# Patient Record
Sex: FEMALE | Race: BLACK OR AFRICAN AMERICAN | NOT HISPANIC OR LATINO | Employment: OTHER | URBAN - METROPOLITAN AREA
[De-identification: names, ages, dates, MRNs, and addresses within clinical notes are randomized per-mention and may not be internally consistent; named-entity substitution may affect disease eponyms.]

---

## 2017-01-24 ENCOUNTER — LAB VISIT (OUTPATIENT)
Dept: LAB | Facility: HOSPITAL | Age: 72
End: 2017-01-24
Attending: INTERNAL MEDICINE
Payer: MEDICARE

## 2017-01-24 DIAGNOSIS — Z11.59 NEED FOR HEPATITIS C SCREENING TEST: ICD-10-CM

## 2017-01-24 DIAGNOSIS — E78.00 HYPERCHOLESTEREMIA: ICD-10-CM

## 2017-01-24 LAB
ALBUMIN SERPL BCP-MCNC: 3.9 G/DL
ALP SERPL-CCNC: 93 U/L
ALT SERPL W/O P-5'-P-CCNC: 13 U/L
ANION GAP SERPL CALC-SCNC: 9 MMOL/L
AST SERPL-CCNC: 21 U/L
BILIRUB SERPL-MCNC: 0.7 MG/DL
BUN SERPL-MCNC: 22 MG/DL
CALCIUM SERPL-MCNC: 10.5 MG/DL
CHLORIDE SERPL-SCNC: 103 MMOL/L
CHOLEST/HDLC SERPL: 2.3 {RATIO}
CO2 SERPL-SCNC: 28 MMOL/L
CREAT SERPL-MCNC: 1.2 MG/DL
EST. GFR  (AFRICAN AMERICAN): 52.9 ML/MIN/1.73 M^2
EST. GFR  (NON AFRICAN AMERICAN): 45.9 ML/MIN/1.73 M^2
GLUCOSE SERPL-MCNC: 85 MG/DL
HDL/CHOLESTEROL RATIO: 43.7 %
HDLC SERPL-MCNC: 142 MG/DL
HDLC SERPL-MCNC: 62 MG/DL
LDLC SERPL CALC-MCNC: 66.8 MG/DL
NONHDLC SERPL-MCNC: 80 MG/DL
POTASSIUM SERPL-SCNC: 4.3 MMOL/L
PROT SERPL-MCNC: 7.3 G/DL
SODIUM SERPL-SCNC: 140 MMOL/L
TRIGL SERPL-MCNC: 66 MG/DL

## 2017-01-24 PROCEDURE — 86803 HEPATITIS C AB TEST: CPT

## 2017-01-24 PROCEDURE — 80061 LIPID PANEL: CPT

## 2017-01-24 PROCEDURE — 36415 COLL VENOUS BLD VENIPUNCTURE: CPT | Mod: PO

## 2017-01-24 PROCEDURE — 80053 COMPREHEN METABOLIC PANEL: CPT

## 2017-01-25 LAB — HCV AB SERPL QL IA: NEGATIVE

## 2017-04-10 ENCOUNTER — OFFICE VISIT (OUTPATIENT)
Dept: FAMILY MEDICINE | Facility: CLINIC | Age: 72
End: 2017-04-10
Payer: MEDICARE

## 2017-04-10 ENCOUNTER — LAB VISIT (OUTPATIENT)
Dept: LAB | Facility: HOSPITAL | Age: 72
End: 2017-04-10
Payer: MEDICARE

## 2017-04-10 VITALS
DIASTOLIC BLOOD PRESSURE: 68 MMHG | OXYGEN SATURATION: 99 % | TEMPERATURE: 97 F | WEIGHT: 133.81 LBS | RESPIRATION RATE: 18 BRPM | HEIGHT: 63 IN | BODY MASS INDEX: 23.71 KG/M2 | HEART RATE: 70 BPM | SYSTOLIC BLOOD PRESSURE: 122 MMHG

## 2017-04-10 DIAGNOSIS — E78.5 HYPERLIPIDEMIA, UNSPECIFIED HYPERLIPIDEMIA TYPE: ICD-10-CM

## 2017-04-10 DIAGNOSIS — I10 ESSENTIAL HYPERTENSION: Primary | ICD-10-CM

## 2017-04-10 DIAGNOSIS — I10 ESSENTIAL HYPERTENSION: ICD-10-CM

## 2017-04-10 LAB
ALBUMIN SERPL BCP-MCNC: 3.8 G/DL
ALP SERPL-CCNC: 108 U/L
ALT SERPL W/O P-5'-P-CCNC: 9 U/L
ANION GAP SERPL CALC-SCNC: 8 MMOL/L
AST SERPL-CCNC: 19 U/L
BASOPHILS # BLD AUTO: 0.03 K/UL
BASOPHILS NFR BLD: 0.6 %
BILIRUB SERPL-MCNC: 0.3 MG/DL
BUN SERPL-MCNC: 18 MG/DL
CALCIUM SERPL-MCNC: 10.2 MG/DL
CHLORIDE SERPL-SCNC: 102 MMOL/L
CHOLEST/HDLC SERPL: 2.6 {RATIO}
CO2 SERPL-SCNC: 29 MMOL/L
CREAT SERPL-MCNC: 1.2 MG/DL
DIFFERENTIAL METHOD: ABNORMAL
EOSINOPHIL # BLD AUTO: 0.1 K/UL
EOSINOPHIL NFR BLD: 1.8 %
ERYTHROCYTE [DISTWIDTH] IN BLOOD BY AUTOMATED COUNT: 14.1 %
EST. GFR  (AFRICAN AMERICAN): 52.9 ML/MIN/1.73 M^2
EST. GFR  (NON AFRICAN AMERICAN): 45.9 ML/MIN/1.73 M^2
GLUCOSE SERPL-MCNC: 88 MG/DL
HCT VFR BLD AUTO: 36.9 %
HDL/CHOLESTEROL RATIO: 38.5 %
HDLC SERPL-MCNC: 143 MG/DL
HDLC SERPL-MCNC: 55 MG/DL
HGB BLD-MCNC: 11.8 G/DL
LDLC SERPL CALC-MCNC: 64 MG/DL
LYMPHOCYTES # BLD AUTO: 1.8 K/UL
LYMPHOCYTES NFR BLD: 33.9 %
MCH RBC QN AUTO: 30.3 PG
MCHC RBC AUTO-ENTMCNC: 32 %
MCV RBC AUTO: 95 FL
MONOCYTES # BLD AUTO: 0.3 K/UL
MONOCYTES NFR BLD: 5.7 %
NEUTROPHILS # BLD AUTO: 3.1 K/UL
NEUTROPHILS NFR BLD: 57.8 %
NONHDLC SERPL-MCNC: 88 MG/DL
PLATELET # BLD AUTO: 246 K/UL
PMV BLD AUTO: 11.9 FL
POTASSIUM SERPL-SCNC: 4.2 MMOL/L
PROT SERPL-MCNC: 7.1 G/DL
RBC # BLD AUTO: 3.89 M/UL
SODIUM SERPL-SCNC: 139 MMOL/L
TRIGL SERPL-MCNC: 120 MG/DL
WBC # BLD AUTO: 5.42 K/UL

## 2017-04-10 PROCEDURE — 85025 COMPLETE CBC W/AUTO DIFF WBC: CPT

## 2017-04-10 PROCEDURE — 99999 PR PBB SHADOW E&M-EST. PATIENT-LVL III: CPT | Mod: PBBFAC,,, | Performed by: INTERNAL MEDICINE

## 2017-04-10 PROCEDURE — 36415 COLL VENOUS BLD VENIPUNCTURE: CPT | Mod: PO

## 2017-04-10 PROCEDURE — 99214 OFFICE O/P EST MOD 30 MIN: CPT | Mod: S$PBB,,, | Performed by: INTERNAL MEDICINE

## 2017-04-10 PROCEDURE — 80053 COMPREHEN METABOLIC PANEL: CPT

## 2017-04-10 PROCEDURE — 80061 LIPID PANEL: CPT

## 2017-04-10 NOTE — MR AVS SNAPSHOT
[facility]  [address]  [place] [address]  Phone: [phone]                  Mary Flanagan   4/10/2017 8:00 AM   Office Visit    Description:  Female : 1945   Provider:  Raul Hill MD   Department:  [facility]           Reason for Visit     Follow-up     Hypertension     Hyperlipidemia           Diagnoses this Visit        Comments    Essential hypertension    -  Primary     Hyperlipidemia, unspecified hyperlipidemia type                To Do List           Future Appointments        Provider Department Dept Phone    4/10/2017 9:10 AM LABORATORY, [address] [facility] [phone]    10/10/2017 8:00 AM Raul Hill MD [facility] [phone]      Goals (5 Years of Data)     None      Follow-Up and Disposition     Return in about 6 months (around 10/10/2017).    Follow-up and Disposition History      [facility]     [facility] -  Assistance  Unless otherwise directed by your provider, please contact [facility], our nurse care line that is available for  assistance.     Registered nurses in the [facility] provide: appointment scheduling, clinical advisement, health education, and other advisory services.  Call: [phone] (toll free)               Medications           Message regarding Medications     Verify the changes and/or additions to your medication regime listed below are the same as discussed with your clinician today.  If any of these changes or additions are incorrect, please notify your healthcare provider.             Verify that the below list of medications is an accurate representation of the medications you are currently taking.  If none reported, the list may be blank. If incorrect, please contact your healthcare provider. Carry this list with you in case of emergency.           Current Medications      "atorvastatin (LIPITOR) 10 MG tablet Take 1 tablet (10 mg total) by mouth once daily.    DYAZIDE 37.5-25 mg per capsule Take 1 capsule by mouth once daily.           Clinical Reference Information           Your Vitals Were     BP Pulse Temp Resp    122/68 (BP Location: Right arm, Patient Position: Sitting, BP Method: Manual) 70 97 °F (36.1 °C) (Tympanic) 18    Height Weight SpO2 BMI    5' 2.5" (1.588 m) 60.7 kg (133 lb 13.1 oz) 99% 24.09 kg/m2      Blood Pressure          Most Recent Value    BP  122/68      Allergies as of 4/10/2017     No Known Drug Allergies      Immunizations Administered on Date of Encounter - 4/10/2017     None      Orders Placed During Today's Visit     Future Labs/Procedures Expected by Expires    CBC auto differential  4/10/2017 6/9/2018    Comprehensive metabolic panel  4/10/2017 6/9/2018    Lipid panel  4/10/2017 6/9/2018      Language Assistance Services     ATTENTION: Language assistance services are available, free of charge. Please call [phone].      ATENCIÓN: Si habla español, tiene a quinteros disposición servicios gratuitos de asistencia lingüística. Llame al [phone].     CHÚ Ý: N?u b?n nói Ti?ng Vi?t, có các d?ch v? h? tr? ngôn ng? mi?n phí dành cho b?n. G?i s? [phone].         [facility] complies with applicable Federal civil rights laws and does not discriminate on the basis of race, color, national origin, age, disability, or sex.        "

## 2017-04-10 NOTE — PROGRESS NOTES
Subjective:       Patient ID: Mary Flanagan is a 71 y.o. female.    Chief Complaint: Follow-up; Hypertension; and Hyperlipidemia    Hypertension   Pertinent negatives include no chest pain, headaches, neck pain, palpitations or shortness of breath. Past treatments include diuretics and lifestyle changes. The current treatment provides significant improvement.   Hyperlipidemia   Pertinent negatives include no chest pain, myalgias or shortness of breath. Current antihyperlipidemic treatment includes statins. The current treatment provides significant improvement of lipids.     Review of Systems   Constitutional: Negative for activity change, appetite change, chills, diaphoresis, fatigue, fever and unexpected weight change.   HENT: Negative for congestion, drooling, ear discharge, ear pain, facial swelling, hearing loss, mouth sores, nosebleeds, postnasal drip, rhinorrhea, sinus pressure, sneezing, sore throat, tinnitus, trouble swallowing and voice change.    Eyes: Negative for photophobia, redness and visual disturbance.   Respiratory: Negative for apnea, cough, choking, chest tightness, shortness of breath and wheezing.    Cardiovascular: Negative for chest pain, palpitations and leg swelling.   Gastrointestinal: Negative for abdominal distention, abdominal pain, blood in stool, constipation, diarrhea, nausea and vomiting.   Endocrine: Negative for cold intolerance, heat intolerance, polydipsia, polyphagia and polyuria.   Genitourinary: Negative for decreased urine volume, difficulty urinating, dysuria, flank pain, frequency, genital sores, hematuria and urgency.   Musculoskeletal: Negative for arthralgias, back pain, gait problem, joint swelling, myalgias, neck pain and neck stiffness.   Skin: Negative for color change, pallor, rash and wound.   Allergic/Immunologic: Negative for food allergies and immunocompromised state.   Neurological: Negative for dizziness, tremors, seizures, syncope, speech difficulty,  weakness, light-headedness, numbness and headaches.   Hematological: Negative for adenopathy. Does not bruise/bleed easily.   Psychiatric/Behavioral: Negative for agitation, behavioral problems, confusion, decreased concentration, dysphoric mood, hallucinations, self-injury, sleep disturbance and suicidal ideas. The patient is not nervous/anxious and is not hyperactive.    All other systems reviewed and are negative.      Objective:      Physical Exam   Constitutional: She is oriented to person, place, and time. She appears well-developed and well-nourished. No distress.   HENT:   Head: Normocephalic and atraumatic.   Eyes: No scleral icterus.   Neck: Normal range of motion. Neck supple. No JVD present. Carotid bruit is not present. No tracheal deviation present. No thyromegaly present.   Cardiovascular: Normal rate, regular rhythm, normal heart sounds and intact distal pulses.    Pulmonary/Chest: Effort normal and breath sounds normal. No respiratory distress. She has no wheezes. She has no rales. She exhibits no tenderness.   Abdominal: Soft. Bowel sounds are normal. She exhibits no distension. There is no tenderness. There is no rebound and no guarding.   Musculoskeletal: Normal range of motion. She exhibits no edema or tenderness.   Lymphadenopathy:     She has no cervical adenopathy.   Neurological: She is alert and oriented to person, place, and time. No cranial nerve deficit. Coordination normal.   Skin: Skin is warm and dry. No rash noted. She is not diaphoretic. No erythema. No pallor.   Psychiatric: She has a normal mood and affect. Her behavior is normal. Judgment and thought content normal.   Nursing note and vitals reviewed.      Assessment:       1. Essential hypertension    2. Hyperlipidemia, unspecified hyperlipidemia type        Plan:        stable-----------continue meds, watch diet, exercise.         Notes/labs reviewed.      Check cbc,cmp,lipids.            F/u prn or 6 months.

## 2017-05-10 ENCOUNTER — TELEPHONE (OUTPATIENT)
Dept: FAMILY MEDICINE | Facility: CLINIC | Age: 72
End: 2017-05-10

## 2017-05-10 NOTE — TELEPHONE ENCOUNTER
----- Message from Kaushik Medina sent at 5/10/2017 10:17 AM CDT -----  Contact: pt  She's calling in regards to the incorrect diagnosis code, due to billing, please advise,  [phone] (cell)

## 2017-05-10 NOTE — TELEPHONE ENCOUNTER
Spoke with pt.Expressed understanding. Was questioning her DX codes and why insurance didn't cover her visit. I advised her to get with her insurance on copay and annual visits. She stated they said she hadn't met her deductible. That may very well be the problem but once she speaks with insurance, she can call and we will correct what needs to be corrected on our end.

## 2017-06-29 ENCOUNTER — OFFICE VISIT (OUTPATIENT)
Dept: FAMILY MEDICINE | Facility: CLINIC | Age: 72
End: 2017-06-29
Payer: MEDICARE

## 2017-06-29 VITALS
BODY MASS INDEX: 24.06 KG/M2 | DIASTOLIC BLOOD PRESSURE: 66 MMHG | WEIGHT: 135.81 LBS | HEART RATE: 72 BPM | HEIGHT: 63 IN | SYSTOLIC BLOOD PRESSURE: 110 MMHG | OXYGEN SATURATION: 98 %

## 2017-06-29 DIAGNOSIS — Z00.00 ENCOUNTER FOR PREVENTIVE HEALTH EXAMINATION: Primary | ICD-10-CM

## 2017-06-29 PROCEDURE — 99999 PR PBB SHADOW E&M-EST. PATIENT-LVL III: CPT | Mod: PBBFAC,,, | Performed by: NURSE PRACTITIONER

## 2017-06-29 PROCEDURE — 99213 OFFICE O/P EST LOW 20 MIN: CPT | Mod: PBBFAC,PO | Performed by: NURSE PRACTITIONER

## 2017-06-29 PROCEDURE — G0438 PPPS, INITIAL VISIT: HCPCS | Mod: ,,, | Performed by: NURSE PRACTITIONER

## 2017-06-29 RX ORDER — FERROUS SULFATE, DRIED 160(50) MG
1 TABLET, EXTENDED RELEASE ORAL 2 TIMES DAILY WITH MEALS
COMMUNITY
End: 2018-03-06

## 2017-06-29 NOTE — PROGRESS NOTES
"Mary Flanagan presented for an initial Medicare AWV today. The following components were reviewed and updated:    · Medical history  · Family History  · Social history  · Allergies and Current Medications  · Health Risk Assessment  · Health Maintenance  · Care Team    **See Completed Assessments for Annual Wellness visit with in the encounter summary    The following assessments were completed:  · Depression Screening  · Cognitive function Screening  · Timed Get Up Test  · Whisper Test    Vitals:    06/29/17 1253   BP: 110/66   BP Location: Left arm   Patient Position: Sitting   Pulse: 72   SpO2: 98%   Weight: 61.6 kg (135 lb 12.9 oz)   Height: 5' 2.5" (1.588 m)     Body mass index is 24.44 kg/m².  Waist Measurements: 32 in]    Diagnoses and health risks identified today and associated recommendations/orders:    1. Encounter for preventive health examination    Pt to follow up with pharmacy to receive Zoster and Prenvar    Provided Mary with a 5-10 year written screening schedule and personal prevention plan. Recommendations were developed using the USPSTF age appropriate recommendations. Education, counseling, and referrals were provided as needed.  After Visit Summary printed and given to patient which includes a list of additional screenings\tests needed.    Follow up 1 year    Gricelda Gonzalez NP  "

## 2017-06-29 NOTE — PATIENT INSTRUCTIONS
Counseling and Referral of Other Preventative  (Italic type indicates deductible and co-insurance are waived)    Patient Name: Mary Flanagan  Today's Date: 6/29/2017      SERVICE LIMITATIONS RECOMMENDATION    Vaccines    · Pneumococcal (once after 65)    · Influenza (annually)    · Hepatitis B (if medium/high risk)    · Prevnar 13      Hepatitis B medium/high risk factors:       - End-stage renal disease       - Hemophiliacs who received Factor VII or         IX concentrates       - Clients of institutions for the mentally             retarded       - Persons who live in the same house as          a HepB carrier       - Homosexual men       - Illicit injectable drug abusers     Pneumococcal: Done, no repeat necessary     Influenza: Scheduled - see appointments     Hepatitis B: N/A     Prevnar 13:  DUE NOW    Mammogram (biennial age 50-74)  Annually (age 40 or over)  Done this year, repeat every year    Pap (up to age 70 and after 70 if unknown history or abnormal study last 10 years)    3/14/2016  -repeat in 2 yrs     The USPSTF recommends against screening for cervical cancer in women older than age 65 years who have had adequate prior screening and are not otherwise at high risk for cervical cancer.      Colorectal cancer screening (to age 75)    · Fecal occult blood test (annual)  · Flexible sigmoidoscopy (5y)  · Screening colonoscopy (10y)  · Barium enema   1/12/ 2015- due again 2025    Diabetes self-management training (no USPSTF recommendations)  Requires referral by treating physician for patient with diabetes or renal disease. 10 hours of initial DSMT sessions of no less than 30 minutes each in a continuous 12-month period. 2 hours of follow-up DSMT in subsequent years.  N/A    Bone mass measurements (age 65 & older, biennial)  Requires diagnosis related to osteoporosis or estrogen deficiency. Biennial benefit unless patient has history of long-term glucocorticoid 6/17/2015  Due again 6/ 2020    Glaucoma  screening (no USPSTF recommendation)  Diabetes mellitus, family history   , age 50 or over    American, age 65 or over  Done this year, repeat every year    Medical nutrition therapy for diabetes or renal disease (no recommended schedule)  Requires referral by treating physician for patient with diabetes or renal disease or kidney transplant within the past 3 years.  Can be provided in same year as diabetes self-management training (DSMT), and CMS recommends medical nutrition therapy take place after DSMT. Up to 3 hours for initial year and 2 hours in subsequent years.  N/A    Cardiovascular screening blood tests (every 5 years)  · Fasting lipid panel  Order as a panel if possible  Done this year, repeat every year    Diabetes screening tests (at least every 3 years, Medicare covers annually or at 6-month intervals for prediabetic patients)  · Fasting blood sugar (FBS) or glucose tolerance test (GTT)  Patient must be diagnosed with one of the following:       - Hypertension       - Dyslipidemia       - Obesity (BMI 30kg/m2)       - Previous elevated impaired FBS or GTT       ... or any two of the following:       - Overweight (BMI 25 but <30)       - Family history of diabetes       - Age 65 or older       - History of gestational diabetes or birth of baby weighing more than 9 pounds  Done this year, repeat every year    Abdominal aortic aneurysm screening (once)  · Sonogram   Limited to patients who meet one of the following criteria:       - Men who are 65-75 years old and have smoked more than 100 cigarette in their lifetime       - Anyone with a family history of abdominal aortic aneurysm       - Anyone recommended for screening by the USPSTF  N/A    HIV screening (annually for increased risk patients)  · HIV-1 and HIV-2 by EIA, or VICENTE, rapid antibody test or oral mucosa transudate  Patients must be at increased risk for HIV infection per USPSTF guidelines or pregnant. Tests covered  annually for patient at increased risk or as requested by the patient. Pregnant patients may receive up to 3 tests during pregnancy.  Risks discussed, screening is not recommended    Smoking cessation counseling (up to 8 sessions per year)  Patients must be asymptomatic of tobacco-related conditions to receive as a preventative service.  Non-smoker    Subsequent annual wellness visit  At least 12 months since last AWV  Return in one year     The following information is provided to all patients.  This information is to help you find resources for any of the problems found today that may be affecting your health:                Living healthy guide: www.[facility]      Understanding Diabetes: www.diabetes.org      Eating healthy: www.cdc.gov/healthyweight      CDC home safety checklist: www.cdc.gov/steadi/patient.html      Agency on Aging: www.goea.[place].gov      Alcoholics anonymous (AA): www.aa.org      Physical Activity: www.wander.nih.gov/ok0qtdo      Tobacco use: www.quitwithusla.org

## 2017-07-03 ENCOUNTER — OFFICE VISIT (OUTPATIENT)
Dept: OBSTETRICS AND GYNECOLOGY | Facility: CLINIC | Age: 72
End: 2017-07-03
Payer: MEDICARE

## 2017-07-03 VITALS
DIASTOLIC BLOOD PRESSURE: 68 MMHG | WEIGHT: 135.38 LBS | SYSTOLIC BLOOD PRESSURE: 132 MMHG | HEIGHT: 63 IN | BODY MASS INDEX: 23.99 KG/M2

## 2017-07-03 DIAGNOSIS — Z01.419 WELL WOMAN EXAM WITH ROUTINE GYNECOLOGICAL EXAM: Primary | ICD-10-CM

## 2017-07-03 PROCEDURE — 99999 PR PBB SHADOW E&M-EST. PATIENT-LVL II: CPT | Mod: PBBFAC,,, | Performed by: OBSTETRICS & GYNECOLOGY

## 2017-07-03 PROCEDURE — G0101 CA SCREEN;PELVIC/BREAST EXAM: HCPCS | Mod: PBBFAC | Performed by: OBSTETRICS & GYNECOLOGY

## 2017-07-03 PROCEDURE — 99212 OFFICE O/P EST SF 10 MIN: CPT | Mod: PBBFAC | Performed by: OBSTETRICS & GYNECOLOGY

## 2017-07-03 PROCEDURE — G0101 CA SCREEN;PELVIC/BREAST EXAM: HCPCS | Mod: S$PBB,,, | Performed by: OBSTETRICS & GYNECOLOGY

## 2017-07-03 NOTE — PROGRESS NOTES
CHIEF COMPLAINT:   Gynecologic Exam  Chief Complaint   Patient presents with    Follow-up       HISTORY OF PRESENT ILLNESS  Patient presents for annual exam. The patient has no complaints today.   She is fine not doing pap this year - understands recommendations.     No LMP recorded. Patient is postmenopausal.  Postmenopausal    GYN screening history: last Pap: was normal. Never had any abnormal Pap smears in past     Reports no bowel movement irregularities from baseline, bloating, or weight loss.    HRT:   None       Health Maintenance   Topic Date Due    Zoster Vaccine  2006    Pneumococcal (65+) (1 of 2 - PCV13) 2011    Pap Smear  2017    Mammogram  2017    Influenza Vaccine  2017    Lipid Panel  04/10/2018    DEXA SCAN  2020    TETANUS VACCINE  2023    Colonoscopy  2025    Hepatitis C Screening  Completed       HISTORY  Patient Active Problem List   Diagnosis    Osteopenia    Fibroids    Nuclear sclerotic cataract of both eyes    Dry eyes    Refraction error - Both Eyes    Chest pain, atypical    Hyperlipidemia    Hypertension    Insomnia    Chest pain    Gastritis    Gastroesophageal reflux disease without esophagitis    Other constipation    Mastodynia    Constipation    Other symptoms involving digestive system       Past Medical History:   Diagnosis Date    Cataract     Chest pain     Dry eyes     Gastritis     Hyperlipidemia     Hypertension     Insomnia     Osteopenia        Past Surgical History:   Procedure Laterality Date     SECTION      x 1    left arm surgery      left knee surgery         Family History   Problem Relation Age of Onset    Hypertension Mother     Hypertension Father     Breast cancer Neg Hx     Colon cancer Neg Hx     Ovarian cancer Neg Hx     Thrombophilia Neg Hx     Strabismus Neg Hx     Macular degeneration Neg Hx     Retinal detachment Neg Hx     Glaucoma Neg Hx     Blindness  Neg Hx     Amblyopia Neg Hx        Social History     Social History    Marital status:      Spouse name: N/A    Number of children: N/A    Years of education: N/A     Social History Main Topics    Smoking status: Never Smoker    Smokeless tobacco: Never Used    Alcohol use No    Drug use: No    Sexual activity: Yes     Partners: Male     Birth control/ protection: None      Comment: mut monog     Other Topics Concern    None     Social History Narrative    None       Current Outpatient Prescriptions   Medication Sig Dispense Refill    atorvastatin (LIPITOR) 10 MG tablet Take 1 tablet (10 mg total) by mouth once daily. 90 tablet 3    calcium-vitamin D3 500 mg(1,250mg) -200 unit per tablet Take 1 tablet by mouth 2 (two) times daily with meals.      DYAZIDE 37.5-25 mg per capsule TAKE ONE CAPSULE BY MOUTH ONCE DAILY 30 capsule 6    VIT A/C/E AC/ZNOX/CUPRIC OXIDE (EYE VITAMIN AND MINERALS ORAL) Take by mouth.       No current facility-administered medications for this visit.        Review of patient's allergies indicates:   Allergen Reactions    No known drug allergies            PHYSICAL EXAM     Vitals:    07/03/17 1318   BP: 132/68       PAIN SCALE: 0/10 None    ROS:  GENERAL: No fever, chills, fatigability or weight loss.  ABDOMEN: Appetite fine. No weight loss. Denies diarrhea, abdominal pain, hematemesis or blood in stool.  No change in bowel movement pattern.  URINARY: No flank pain, dysuria or hematuria.  REPRODUCTIVE: No abnormal vaginal bleeding.  BREASTS: Breasts symmetric, nontender and no lumps detected.    PE:   APPEARANCE: Well nourished, well developed, in no acute distress.  NECK: Neck symmetric without masses or thyromegaly.   NODES: No inguinal lymph node enlargement.  ABDOMEN: Soft. No tenderness or masses. No hepatosplenomegaly. No hernias.  BREASTS: Symmetrical, no skin changes or visible lesions. No palpable masses, nipple discharge or adenopathy bilaterally.    PELVIC:    VULVA: No lesions.  Atrophic but normal female genitalia.  URETHRAL MEATUS: Normal size and location, no lesions, no prolapse.  URETHRA: No masses, tenderness, prolapse or scarring.  VAGINA: dry and narrow w/ atrophy, no discharge, no significant cystocele or rectocele.  CERVIX: No lesions, normal diameter, no stenosis, no cervical motion tenderness.  UTERUS: 6 week size, regular shape, mobile, non-tender, normal position, good support.  ADNEXA: No masses, tenderness or CDS nodularity.  ANUS PERINEUM: No lesions, no relaxation, external hemorrhoids noted.       DIAGNOSIS:   1. Normal gyn exam  2. Physiologic atrophy    PLAN:   Mammogram    Colonoscopy  dexa    MEDICATIONS PRESCRIBED:   Calcium vitamin D and weight bearing exercise    COUNSELING:  Patient was counseled today on A.C.S. Pap guidelines and recommendations for yearly pelvic exams, mammograms and monthly self breast exams; to see her PCP for other health maintenance.     FOLLOW-UP: With me in 1 year.

## 2017-07-05 ENCOUNTER — TELEPHONE (OUTPATIENT)
Dept: OBSTETRICS AND GYNECOLOGY | Facility: CLINIC | Age: 72
End: 2017-07-05

## 2017-07-05 NOTE — TELEPHONE ENCOUNTER
Spoke with patient regarding needing a bone density scan. I informed her that she has to repeat her scan in 06/2020 however to continue taking her vit. D3 and calcium supplement. She verbalized understanding.

## 2017-07-05 NOTE — TELEPHONE ENCOUNTER
----- Message from Monika Gonzalez sent at 7/5/2017 10:49 AM CDT -----  Contact: pt  Pt request a call concerning a bone density test, pt can be reached at [phone]///[id number]

## 2017-07-12 ENCOUNTER — HOSPITAL ENCOUNTER (OUTPATIENT)
Dept: RADIOLOGY | Facility: HOSPITAL | Age: 72
Discharge: HOME | End: 2017-07-12
Attending: NURSE PRACTITIONER
Payer: MEDICARE

## 2017-07-12 VITALS — WEIGHT: 135 LBS | HEIGHT: 63 IN | BODY MASS INDEX: 23.92 KG/M2

## 2017-07-12 DIAGNOSIS — Z12.31 ENCOUNTER FOR SCREENING MAMMOGRAM FOR BREAST CANCER: ICD-10-CM

## 2017-07-12 DIAGNOSIS — Z12.39 BREAST CANCER SCREENING, HIGH RISK PATIENT: ICD-10-CM

## 2017-07-12 DIAGNOSIS — N64.4 MASTODYNIA: ICD-10-CM

## 2017-07-12 PROCEDURE — 77063 BREAST TOMOSYNTHESIS BI: CPT | Mod: 26,,, | Performed by: RADIOLOGY

## 2017-07-12 PROCEDURE — 77067 SCR MAMMO BI INCL CAD: CPT | Mod: 26,,, | Performed by: RADIOLOGY

## 2017-07-12 PROCEDURE — 77067 SCR MAMMO BI INCL CAD: CPT | Mod: TC

## 2017-08-11 ENCOUNTER — TELEPHONE (OUTPATIENT)
Dept: HEMATOLOGY/ONCOLOGY | Facility: CLINIC | Age: 72
End: 2017-08-11

## 2017-08-11 NOTE — TELEPHONE ENCOUNTER
----- Message from Jasmin Diaz sent at 8/11/2017  1:23 PM CDT -----  States she received a letter stating she was told its time for pap but she recently visited dr trevino and was told it wasn't time. Please call back at [phone]. thanks

## 2017-08-30 ENCOUNTER — OFFICE VISIT (OUTPATIENT)
Dept: SURGERY | Facility: CLINIC | Age: 72
End: 2017-08-30
Payer: MEDICARE

## 2017-08-30 VITALS
SYSTOLIC BLOOD PRESSURE: 142 MMHG | DIASTOLIC BLOOD PRESSURE: 68 MMHG | HEIGHT: 62 IN | HEART RATE: 90 BPM | OXYGEN SATURATION: 100 % | RESPIRATION RATE: 16 BRPM | BODY MASS INDEX: 24.59 KG/M2 | WEIGHT: 133.63 LBS

## 2017-08-30 DIAGNOSIS — Z12.31 VISIT FOR SCREENING MAMMOGRAM: ICD-10-CM

## 2017-08-30 DIAGNOSIS — Z12.39 BREAST CANCER SCREENING: Primary | ICD-10-CM

## 2017-08-30 PROCEDURE — 1126F AMNT PAIN NOTED NONE PRSNT: CPT | Mod: ,,, | Performed by: NURSE PRACTITIONER

## 2017-08-30 PROCEDURE — 99999 PR PBB SHADOW E&M-EST. PATIENT-LVL III: CPT | Mod: PBBFAC,,, | Performed by: NURSE PRACTITIONER

## 2017-08-30 PROCEDURE — 3078F DIAST BP <80 MM HG: CPT | Mod: ,,, | Performed by: NURSE PRACTITIONER

## 2017-08-30 PROCEDURE — 99213 OFFICE O/P EST LOW 20 MIN: CPT | Mod: PBBFAC,PO | Performed by: NURSE PRACTITIONER

## 2017-08-30 PROCEDURE — 99213 OFFICE O/P EST LOW 20 MIN: CPT | Mod: S$PBB,,, | Performed by: NURSE PRACTITIONER

## 2017-08-30 PROCEDURE — 3077F SYST BP >= 140 MM HG: CPT | Mod: ,,, | Performed by: NURSE PRACTITIONER

## 2017-08-30 PROCEDURE — 1159F MED LIST DOCD IN RCRD: CPT | Mod: ,,, | Performed by: NURSE PRACTITIONER

## 2017-08-30 NOTE — PROGRESS NOTES
Patient ID: Mary Flanagan is a 72 y.o. female.    Chief Complaint: Breast Cancer Screening    HPI: Breast cancer screening     Review of Systems   Constitutional: Negative.    HENT: Negative.    Eyes: Negative.    Respiratory: Negative.    Cardiovascular: Negative.    Gastrointestinal: Negative.    Endocrine: Negative.    Genitourinary: Negative.    Musculoskeletal: Negative.    Skin: Negative.    Allergic/Immunologic: Negative.    Neurological: Negative.    Hematological: Negative.    Psychiatric/Behavioral: Negative.    Breast: Patient relates having sensitive breasts for years- bilateral and intermittent. Denies any caffeine intake - no hormone use and no changes in meds. Stable- outer aspect of her breasts- mostly with palpation. Had a cyst excised in the left axilla - - benign. No nipple dishcarge or palpable mass that she can feel. No history of trauma or bruising. Does work out in her yard and exercises most days of the week. States breast tenderness has improved and is intermittent.      Current Outpatient Prescriptions   Medication Sig Dispense Refill    atorvastatin (LIPITOR) 10 MG tablet Take 1 tablet (10 mg total) by mouth once daily. 90 tablet 3    calcium-vitamin D3 500 mg(1,250mg) -200 unit per tablet Take 1 tablet by mouth 2 (two) times daily with meals.      DYAZIDE 37.5-25 mg per capsule TAKE ONE CAPSULE BY MOUTH ONCE DAILY 30 capsule 6    VIT A/C/E AC/ZNOX/CUPRIC OXIDE (EYE VITAMIN AND MINERALS ORAL) Take by mouth.       No current facility-administered medications for this visit.        Allergies   Allergen Reactions    No Known Drug Allergies        Past Medical History:   Diagnosis Date    Cataract     Chest pain     Dry eyes     Gastritis     Hyperlipidemia     Hypertension     Insomnia     Osteopenia        Past Surgical History:   Procedure Laterality Date    BREAST BIOPSY Left          SECTION      x 1    left arm surgery      left knee surgery          Family History   Problem Relation Age of Onset    Hypertension Mother     Hypertension Father     Breast cancer Neg Hx     Colon cancer Neg Hx     Ovarian cancer Neg Hx     Thrombophilia Neg Hx     Strabismus Neg Hx     Macular degeneration Neg Hx     Retinal detachment Neg Hx     Glaucoma Neg Hx     Blindness Neg Hx     Amblyopia Neg Hx        Social History     Social History    Marital status:      Spouse name: N/A    Number of children: N/A    Years of education: N/A     Occupational History    Not on file.     Social History Main Topics    Smoking status: Never Smoker    Smokeless tobacco: Never Used    Alcohol use No    Drug use: No    Sexual activity: Yes     Partners: Male     Birth control/ protection: None      Comment: mut monog     Other Topics Concern    Not on file     Social History Narrative    No narrative on file       There were no vitals filed for this visit.  Menarche at 14 y/o    First preg at 29 y/o  LMP: 50 y/o  No history of hormone use or radiation exposure to chest or neck    FH: negative for breast cancer    Physical Exam   Constitutional: She is oriented to person, place, and time. She appears well-developed and well-nourished.   HENT:   Head: Normocephalic and atraumatic.   Right Ear: External ear normal.   Left Ear: External ear normal.   Eyes: Conjunctivae and EOM are normal. Pupils are equal, round, and reactive to light. Right eye exhibits no discharge. Left eye exhibits no discharge. No scleral icterus.   Neck: Normal range of motion. Neck supple. No thyromegaly present.   Cardiovascular: Normal rate and regular rhythm.    Pulmonary/Chest: Effort normal and breath sounds normal. Right breast exhibits tenderness. Right breast exhibits no inverted nipple, no mass, no nipple discharge and no skin change. Left breast exhibits tenderness. Left breast exhibits no inverted nipple, no mass, no nipple discharge and no skin change.   Bilateral breast  tenderness in the upper outer quadrants and laterally over the chest wall. Muscular in location.    Abdominal: Soft. Bowel sounds are normal.   Musculoskeletal: Normal range of motion.   Lymphadenopathy:        Head (right side): No submental, no submandibular, no tonsillar, no preauricular, no posterior auricular and no occipital adenopathy present.        Head (left side): No submental, no submandibular, no tonsillar, no preauricular, no posterior auricular and no occipital adenopathy present.     She has no cervical adenopathy.        Right cervical: No superficial cervical and no posterior cervical adenopathy present.       Left cervical: No superficial cervical and no posterior cervical adenopathy present.     She has no axillary adenopathy.        Right: No supraclavicular adenopathy present.        Left: No supraclavicular adenopathy present.   Neurological: She is alert and oriented to person, place, and time.   Skin: Skin is warm and dry.   Psychiatric: She has a normal mood and affect. Her behavior is normal. Judgment and thought content normal.   Vitals reviewed.    Imagin17- wnl - risk assessment score is 5.71%    Assessment & Plan:  1. Bilateral breast tenderness that is chronic, improved and intermittent.  2. Lateral chest wall tenderness  3. mammo 17 - wnl  4. Explained proper bse technique and demonstrated the technique on the patient. Pt verbalizes understanding and relief.  5. [place] screening mammogram and clinical exam 2018 for her annual exam and see me same day

## 2017-09-11 ENCOUNTER — TELEPHONE (OUTPATIENT)
Dept: FAMILY MEDICINE | Facility: CLINIC | Age: 72
End: 2017-09-11

## 2017-09-11 DIAGNOSIS — M85.89 OSTEOPENIA OF MULTIPLE SITES: Primary | ICD-10-CM

## 2017-09-11 NOTE — TELEPHONE ENCOUNTER
----- Message from Ayde Palm sent at 9/11/2017  3:03 PM CDT -----  Contact: Patient  Patient would like to have a bone density scan done but she need orders, Please call her at [phone] or [phone](home).    Thanks  Td

## 2017-09-12 ENCOUNTER — APPOINTMENT (OUTPATIENT)
Dept: RADIOLOGY | Facility: CLINIC | Age: 72
End: 2017-09-12
Attending: INTERNAL MEDICINE
Payer: MEDICARE

## 2017-09-12 DIAGNOSIS — M85.89 OSTEOPENIA OF MULTIPLE SITES: ICD-10-CM

## 2017-09-12 PROCEDURE — 77080 DXA BONE DENSITY AXIAL: CPT | Mod: 26,,, | Performed by: INTERNAL MEDICINE

## 2017-09-12 PROCEDURE — 77080 DXA BONE DENSITY AXIAL: CPT | Mod: TC

## 2017-09-26 ENCOUNTER — OFFICE VISIT (OUTPATIENT)
Dept: FAMILY MEDICINE | Facility: CLINIC | Age: 72
End: 2017-09-26
Payer: MEDICARE

## 2017-09-26 ENCOUNTER — LAB VISIT (OUTPATIENT)
Dept: LAB | Facility: HOSPITAL | Age: 72
End: 2017-09-26
Payer: MEDICARE

## 2017-09-26 ENCOUNTER — CLINICAL SUPPORT (OUTPATIENT)
Dept: CARDIOLOGY | Facility: CLINIC | Age: 72
End: 2017-09-26
Payer: MEDICARE

## 2017-09-26 VITALS
BODY MASS INDEX: 24.14 KG/M2 | DIASTOLIC BLOOD PRESSURE: 70 MMHG | HEART RATE: 77 BPM | SYSTOLIC BLOOD PRESSURE: 116 MMHG | TEMPERATURE: 96 F | OXYGEN SATURATION: 99 % | RESPIRATION RATE: 16 BRPM | HEIGHT: 62 IN | WEIGHT: 131.19 LBS

## 2017-09-26 DIAGNOSIS — M79.604 PAIN IN BOTH LOWER EXTREMITIES: ICD-10-CM

## 2017-09-26 DIAGNOSIS — M79.605 PAIN IN BOTH LOWER EXTREMITIES: ICD-10-CM

## 2017-09-26 DIAGNOSIS — E55.9 VITAMIN D DEFICIENCY: ICD-10-CM

## 2017-09-26 DIAGNOSIS — I10 ESSENTIAL HYPERTENSION: ICD-10-CM

## 2017-09-26 DIAGNOSIS — E78.5 HYPERLIPIDEMIA, UNSPECIFIED HYPERLIPIDEMIA TYPE: ICD-10-CM

## 2017-09-26 DIAGNOSIS — M85.89 OSTEOPENIA OF MULTIPLE SITES: ICD-10-CM

## 2017-09-26 DIAGNOSIS — I10 ESSENTIAL HYPERTENSION: Primary | ICD-10-CM

## 2017-09-26 LAB
25(OH)D3+25(OH)D2 SERPL-MCNC: 63 NG/ML
ALBUMIN SERPL BCP-MCNC: 3.8 G/DL
ALP SERPL-CCNC: 96 U/L
ALT SERPL W/O P-5'-P-CCNC: 11 U/L
ANION GAP SERPL CALC-SCNC: 10 MMOL/L
AST SERPL-CCNC: 21 U/L
BILIRUB SERPL-MCNC: 0.6 MG/DL
BUN SERPL-MCNC: 22 MG/DL
CALCIUM SERPL-MCNC: 10.2 MG/DL
CHLORIDE SERPL-SCNC: 99 MMOL/L
CHOLEST SERPL-MCNC: 138 MG/DL
CHOLEST/HDLC SERPL: 2.7 {RATIO}
CO2 SERPL-SCNC: 28 MMOL/L
CREAT SERPL-MCNC: 1.2 MG/DL
EST. GFR  (AFRICAN AMERICAN): 52.5 ML/MIN/1.73 M^2
EST. GFR  (NON AFRICAN AMERICAN): 45.6 ML/MIN/1.73 M^2
GLUCOSE SERPL-MCNC: 82 MG/DL
HDLC SERPL-MCNC: 51 MG/DL
HDLC SERPL: 37 %
LDLC SERPL CALC-MCNC: 69.6 MG/DL
NONHDLC SERPL-MCNC: 87 MG/DL
POTASSIUM SERPL-SCNC: 4.2 MMOL/L
PROT SERPL-MCNC: 7.2 G/DL
SODIUM SERPL-SCNC: 137 MMOL/L
TRIGL SERPL-MCNC: 87 MG/DL

## 2017-09-26 PROCEDURE — 3078F DIAST BP <80 MM HG: CPT | Mod: ,,, | Performed by: INTERNAL MEDICINE

## 2017-09-26 PROCEDURE — 3074F SYST BP LT 130 MM HG: CPT | Mod: ,,, | Performed by: INTERNAL MEDICINE

## 2017-09-26 PROCEDURE — 93922 UPR/L XTREMITY ART 2 LEVELS: CPT | Mod: PBBFAC,PO | Performed by: INTERNAL MEDICINE

## 2017-09-26 PROCEDURE — 82306 VITAMIN D 25 HYDROXY: CPT

## 2017-09-26 PROCEDURE — 36415 COLL VENOUS BLD VENIPUNCTURE: CPT | Mod: PO

## 2017-09-26 PROCEDURE — G0008 ADMIN INFLUENZA VIRUS VAC: HCPCS | Mod: PBBFAC,PO

## 2017-09-26 PROCEDURE — 1159F MED LIST DOCD IN RCRD: CPT | Mod: ,,, | Performed by: INTERNAL MEDICINE

## 2017-09-26 PROCEDURE — 80061 LIPID PANEL: CPT

## 2017-09-26 PROCEDURE — 99999 PR PBB SHADOW E&M-EST. PATIENT-LVL III: CPT | Mod: PBBFAC,,, | Performed by: INTERNAL MEDICINE

## 2017-09-26 PROCEDURE — 99214 OFFICE O/P EST MOD 30 MIN: CPT | Mod: S$PBB,,, | Performed by: INTERNAL MEDICINE

## 2017-09-26 PROCEDURE — 99213 OFFICE O/P EST LOW 20 MIN: CPT | Mod: PBBFAC,PO | Performed by: INTERNAL MEDICINE

## 2017-09-26 PROCEDURE — 1126F AMNT PAIN NOTED NONE PRSNT: CPT | Mod: ,,, | Performed by: INTERNAL MEDICINE

## 2017-09-26 PROCEDURE — 80053 COMPREHEN METABOLIC PANEL: CPT

## 2017-09-26 NOTE — PROGRESS NOTES
Subjective:       Patient ID: Mary Flanagan is a 72 y.o. female.    Chief Complaint: Follow-up; Hypertension; Hyperlipidemia; and Osteopenia    Hypertension   This is a chronic problem. The problem is controlled. Pertinent negatives include no chest pain, headaches, neck pain, palpitations or shortness of breath.   Hyperlipidemia   Pertinent negatives include no chest pain, myalgias or shortness of breath. Current antihyperlipidemic treatment includes statins. The current treatment provides significant improvement of lipids.     Review of Systems   Constitutional: Negative for activity change, appetite change, chills, diaphoresis, fatigue, fever and unexpected weight change.   HENT: Negative for congestion, drooling, ear discharge, ear pain, facial swelling, hearing loss, mouth sores, nosebleeds, postnasal drip, rhinorrhea, sinus pressure, sneezing, sore throat, tinnitus, trouble swallowing and voice change.    Eyes: Negative for photophobia, redness and visual disturbance.   Respiratory: Negative for apnea, cough, choking, chest tightness, shortness of breath and wheezing.    Cardiovascular: Negative for chest pain, palpitations and leg swelling.   Gastrointestinal: Negative for abdominal distention, abdominal pain, blood in stool, constipation, diarrhea, nausea and vomiting.   Endocrine: Negative for cold intolerance, heat intolerance, polydipsia, polyphagia and polyuria.   Genitourinary: Negative for decreased urine volume, difficulty urinating, dysuria, flank pain, frequency, hematuria and urgency.   Musculoskeletal: Negative for arthralgias, back pain, gait problem, joint swelling, myalgias, neck pain and neck stiffness.        Bilat leg pain with walking   Skin: Negative for color change, pallor, rash and wound.   Allergic/Immunologic: Negative for food allergies and immunocompromised state.   Neurological: Negative for dizziness, tremors, seizures, syncope, speech difficulty, weakness, light-headedness,  numbness and headaches.   Hematological: Negative for adenopathy. Does not bruise/bleed easily.   Psychiatric/Behavioral: Negative for agitation, behavioral problems, confusion, decreased concentration, dysphoric mood, hallucinations, self-injury, sleep disturbance and suicidal ideas. The patient is not nervous/anxious and is not hyperactive.    All other systems reviewed and are negative.      Objective:      Physical Exam   Constitutional: She is oriented to person, place, and time. She appears well-developed and well-nourished. No distress.   HENT:   Head: Normocephalic and atraumatic.   Neck: Normal range of motion. Neck supple. No JVD present. Carotid bruit is not present. No tracheal deviation present. No thyromegaly present.   Cardiovascular: Normal rate, regular rhythm, normal heart sounds and intact distal pulses.    Pulmonary/Chest: Effort normal and breath sounds normal. No respiratory distress. She has no wheezes. She has no rales. She exhibits no tenderness.   Abdominal: Soft. Bowel sounds are normal. She exhibits no distension. There is no tenderness. There is no rebound and no guarding.   Musculoskeletal: Normal range of motion. She exhibits no edema or tenderness.   Lymphadenopathy:     She has no cervical adenopathy.   Neurological: She is alert and oriented to person, place, and time.   Skin: Skin is warm and dry. No rash noted. She is not diaphoretic. No erythema. No pallor.   Psychiatric: She has a normal mood and affect. Her behavior is normal. Judgment and thought content normal.   Nursing note and vitals reviewed.      Assessment:       1. Essential hypertension    2. Hyperlipidemia, unspecified hyperlipidemia type    3. Osteopenia of multiple sites    4. Pain in both lower extremities    5. Vitamin D deficiency        Plan:        stable----------------continue meds, exercise.                 Notes/labs reviewed.                Check cmp,lipids,vitd,yolis's.                 F/u prn or 6  months.

## 2017-09-27 LAB — VASCULAR ANKLE BRACHIAL INDEX (ABI) RIGHT: 1.16 (ref 0.9–1.2)

## 2017-10-11 ENCOUNTER — HOSPITAL ENCOUNTER (EMERGENCY)
Age: 72
Discharge: HOME | End: 2017-10-11
Attending: EMERGENCY MEDICINE
Payer: MEDICARE

## 2017-10-11 ENCOUNTER — APPOINTMENT (OUTPATIENT)
Dept: GENERAL RADIOLOGY | Age: 72
End: 2017-10-11
Attending: PHYSICIAN ASSISTANT
Payer: MEDICARE

## 2017-10-11 VITALS
HEART RATE: 80 BPM | OXYGEN SATURATION: 100 % | RESPIRATION RATE: 16 BRPM | BODY MASS INDEX: 24.84 KG/M2 | TEMPERATURE: 98 F | WEIGHT: 135 LBS | HEIGHT: 62 IN | SYSTOLIC BLOOD PRESSURE: 180 MMHG | DIASTOLIC BLOOD PRESSURE: 81 MMHG

## 2017-10-11 DIAGNOSIS — M25.461 EFFUSION OF RIGHT KNEE: ICD-10-CM

## 2017-10-11 DIAGNOSIS — M25.561 ACUTE PAIN OF RIGHT KNEE: Primary | ICD-10-CM

## 2017-10-11 PROCEDURE — 74011250637 HC RX REV CODE- 250/637: Performed by: PHYSICIAN ASSISTANT

## 2017-10-11 PROCEDURE — 73562 X-RAY EXAM OF KNEE 3: CPT

## 2017-10-11 PROCEDURE — 99284 EMERGENCY DEPT VISIT MOD MDM: CPT

## 2017-10-11 PROCEDURE — 93971 EXTREMITY STUDY: CPT

## 2017-10-11 PROCEDURE — L1830 KO IMMOB CANVAS LONG PRE OTS: HCPCS

## 2017-10-11 RX ORDER — HYDROCODONE BITARTRATE AND ACETAMINOPHEN 5; 325 MG/1; MG/1
1 TABLET ORAL
Qty: 20 TAB | Refills: 0 | Status: SHIPPED | OUTPATIENT
Start: 2017-10-11

## 2017-10-11 RX ORDER — HYDROCODONE BITARTRATE AND ACETAMINOPHEN 5; 325 MG/1; MG/1
1 TABLET ORAL
Status: COMPLETED | OUTPATIENT
Start: 2017-10-11 | End: 2017-10-11

## 2017-10-11 RX ADMIN — HYDROCODONE BITARTRATE AND ACETAMINOPHEN 1 TABLET: 5; 325 TABLET ORAL at 20:23

## 2017-10-11 NOTE — ED TRIAGE NOTES
Patient arrives with EMS, states that she was walking down the stairs when her leg felt weak, fell forward, caught on a gate but fell forward. Pain in the right leg and behind the knee.

## 2017-10-11 NOTE — PROCEDURES
[address]  *** FINAL REPORT ***    Name: Veto Carlin  MRN: [id number]    Outpatient  : 30 Aug 1945  HIS Order #: [id number]  [id number] [facility] Visit #: [id number]  Date: 11 Oct 2017    TYPE OF TEST: Peripheral Venous Testing    REASON FOR TEST  Pain in limb, Limb swelling    Right Leg:-  Deep venous thrombosis:           No  Superficial venous thrombosis:    No  Deep venous insufficiency:        No  Superficial venous insufficiency: No      INTERPRETATION/FINDINGS  PROCEDURE:  RIGHT LOWER EXTREMITY  VENOUS DUPLEX. Evaluation of lower  extremity veins with ultrasound (B-mode imaging, pulsed Doppler, color   Doppler). Includes the common femoral, deep femoral, femoral,  popliteal, posterior tibial, peroneal, and great saphenous veins. Other veins, for example the gastrocnemius and soleal veins, may also  be visualized. FINDINGS: [place] Estimable scale and color flow duplex images of the veins in the  right lower extremity demonstrate normal compressibility, spontaneous  and augmented flow profiles, and absence of filling defects throughout   the deep and superficial veins in the right lower extremity. CONCLUSION: Right lower extremity venous duplex negative for deep  venous thrombosis or thrombophlebitis. Left common femoral vein is  thrombus free. NOTE: Avascular structure was seen in the right  popliteal fossa measuring 2.71 x 3.03 cm consistent with a baker's  cyst.    ADDITIONAL COMMENTS    I have personally reviewed the data relevant to the interpretation of  this  study. TECHNOLOGIST: Sarika Fernandez. Bigg  Signed: 10/11/2017 07:51 PM    PHYSICIAN: Alley Edward MD  Signed: 10/12/2017 08:30 AM

## 2017-10-12 NOTE — DISCHARGE INSTRUCTIONS
Knee Pain or Injury: Care Instructions  Your Care Instructions    Injuries are a common cause of knee problems. Sudden (acute) injuries may be caused by a direct blow to the knee. They can also be caused by abnormal twisting, bending, or falling on the knee. Pain, bruising, or swelling may be severe, and may start within minutes of the injury. Overuse is another cause of knee pain. Other causes are climbing stairs, kneeling, and other activities that use the knee. Everyday wear and tear, especially as you get older, also can cause knee pain. Rest, along with home treatment, often relieves pain and allows your knee to heal. If you have a serious knee injury, you may need tests and treatment. Follow-up care is a key part of your treatment and safety. Be sure to make and go to all appointments, and call your doctor if you are having problems. It's also a good idea to know your test results and keep a list of the medicines you take. How can you care for yourself at home? · Be safe with medicines. Read and follow all instructions on the label. ¨ If the doctor gave you a prescription medicine for pain, take it as prescribed. ¨ If you are not taking a prescription pain medicine, ask your doctor if you can take an over-the-counter medicine. · Rest and protect your knee. Take a break from any activity that may cause pain. · Put ice or a cold pack on your knee for 10 to 20 minutes at a time. Put a thin cloth between the ice and your skin. · Prop up a sore knee on a pillow when you ice it or anytime you sit or lie down for the next 3 days. Try to keep it above the level of your heart. This will help reduce swelling. · If your knee is not swollen, you can put moist heat, a heating pad, or a warm cloth on your knee. · If your doctor recommends an elastic bandage, sleeve, or other type of support for your knee, wear it as directed.   · Follow your doctor's instructions about how much weight you can put on your leg. Use a cane, crutches, or a walker as instructed. · Follow your doctor's instructions about activity during your healing process. If you can do mild exercise, slowly increase your activity. · Reach and stay at a healthy weight. Extra weight can strain the joints, especially the knees and hips, and make the pain worse. Losing even a few pounds may help. When should you call for help? Call 911 anytime you think you may need emergency care. For example, call if:  · You have symptoms of a blood clot in your lung (called a pulmonary embolism). These may include:  ¨ Sudden chest pain. ¨ Trouble breathing. ¨ Coughing up blood. Call your doctor now or seek immediate medical care if:  · You have severe or increasing pain. · Your leg or foot turns cold or changes color. · You cannot stand or put weight on your knee. · Your knee looks twisted or bent out of shape. · You cannot move your knee. · You have signs of infection, such as:  ¨ Increased pain, swelling, warmth, or redness. ¨ Red streaks leading from the knee. ¨ Pus draining from a place on your knee. ¨ A fever. · You have signs of a blood clot in your leg (called a deep vein thrombosis), such as:  ¨ Pain in your calf, back of the knee, thigh, or groin. ¨ Redness and swelling in your leg or groin. Watch closely for changes in your health, and be sure to contact your doctor if:  · You have tingling, weakness, or numbness in your knee. · You have any new symptoms, such as swelling. · You have bruises from a knee injury that last longer than 2 weeks. · You do not get better as expected. Where can you learn more? Go to http://liya-al.info/. Enter K195 in the search box to learn more about \"Knee Pain or Injury: Care Instructions. \"  Current as of: March 20, 2017  Content Version: 11.3  © 7472-9941 [employer].  Care instructions adapted under license by [employer] (which disclaims liability or warranty for this information). If you have questions about a medical condition or this instruction, always ask your healthcare professional. [place] [address] any warranty or liability for your use of this information.

## 2017-10-31 DIAGNOSIS — M25.562 ACUTE PAIN OF BOTH KNEES: Primary | ICD-10-CM

## 2017-10-31 DIAGNOSIS — M25.561 ACUTE PAIN OF BOTH KNEES: Primary | ICD-10-CM

## 2017-11-01 ENCOUNTER — OFFICE VISIT (OUTPATIENT)
Dept: ORTHOPEDICS | Facility: CLINIC | Age: 72
End: 2017-11-01
Payer: MEDICARE

## 2017-11-01 ENCOUNTER — HOSPITAL ENCOUNTER (OUTPATIENT)
Dept: RADIOLOGY | Facility: HOSPITAL | Age: 72
Discharge: HOME | End: 2017-11-01
Attending: ORTHOPAEDIC SURGERY
Payer: MEDICARE

## 2017-11-01 VITALS
HEART RATE: 86 BPM | WEIGHT: 128.75 LBS | HEIGHT: 62 IN | BODY MASS INDEX: 23.69 KG/M2 | SYSTOLIC BLOOD PRESSURE: 140 MMHG | DIASTOLIC BLOOD PRESSURE: 76 MMHG

## 2017-11-01 DIAGNOSIS — M17.9 OSTEOARTHRITIS OF KNEE, UNSPECIFIED (CODE): ICD-10-CM

## 2017-11-01 DIAGNOSIS — M76.30 ILIOTIBIAL BAND SYNDROME, UNSPECIFIED LATERALITY: ICD-10-CM

## 2017-11-01 DIAGNOSIS — M25.562 CHRONIC PAIN OF BOTH KNEES: ICD-10-CM

## 2017-11-01 DIAGNOSIS — M25.561 CHRONIC PAIN OF RIGHT KNEE: Primary | ICD-10-CM

## 2017-11-01 DIAGNOSIS — M25.561 ACUTE PAIN OF BOTH KNEES: ICD-10-CM

## 2017-11-01 DIAGNOSIS — M25.561 CHRONIC PAIN OF BOTH KNEES: ICD-10-CM

## 2017-11-01 DIAGNOSIS — G89.29 CHRONIC PAIN OF BOTH KNEES: ICD-10-CM

## 2017-11-01 DIAGNOSIS — G89.29 CHRONIC PAIN OF RIGHT KNEE: Primary | ICD-10-CM

## 2017-11-01 DIAGNOSIS — M17.0 PRIMARY OSTEOARTHRITIS OF BOTH KNEES: Primary | ICD-10-CM

## 2017-11-01 DIAGNOSIS — M25.562 ACUTE PAIN OF BOTH KNEES: ICD-10-CM

## 2017-11-01 PROCEDURE — 73564 X-RAY EXAM KNEE 4 OR MORE: CPT | Mod: TC,50,PO

## 2017-11-01 PROCEDURE — 99204 OFFICE O/P NEW MOD 45 MIN: CPT | Mod: S$PBB,,, | Performed by: ORTHOPAEDIC SURGERY

## 2017-11-01 PROCEDURE — 99213 OFFICE O/P EST LOW 20 MIN: CPT | Mod: PBBFAC,25,PO | Performed by: ORTHOPAEDIC SURGERY

## 2017-11-01 PROCEDURE — 73564 X-RAY EXAM KNEE 4 OR MORE: CPT | Mod: 26,50,, | Performed by: RADIOLOGY

## 2017-11-01 PROCEDURE — 99999 PR PBB SHADOW E&M-EST. PATIENT-LVL III: CPT | Mod: PBBFAC,,, | Performed by: ORTHOPAEDIC SURGERY

## 2017-11-01 RX ORDER — MELOXICAM 15 MG/1
15 TABLET ORAL DAILY
Qty: 30 TABLET | Refills: 2 | Status: SHIPPED | OUTPATIENT
Start: 2017-11-01 | End: 2018-03-06

## 2017-11-01 RX ORDER — HYDROCODONE BITARTRATE AND ACETAMINOPHEN 5; 325 MG/1; MG/1
TABLET ORAL
Refills: 0 | COMMUNITY
Start: 2017-10-11 | End: 2018-03-06

## 2017-11-01 NOTE — PROGRESS NOTES
Subjective:     Patient ID: Mary Flanagan is a 72 y.o. female.    Chief Complaint: Pain of the Right Knee    Mary Flanagan is a 72 y.o. female here for right knee pain, injured knee couple weeks ago, had steroid injection that improved pain greatly.       Knee Injury    The pain is present in the right knee. This is a new problem. The current episode started 1 to 4 weeks ago. The injury was the result of a falling action while at home. The problem occurs daily. The problem has been unchanged. The quality of the pain is described as numb, tightness and aching. The pain is at a severity of 5/10. Associated symptoms include joint swelling, numbness and stiffness. Pertinent negatives include no fever. The symptoms are aggravated by activity and walking. She has tried heat, oral narcotics, OTC ointments and NSAIDs for the symptoms. The treatment provided no relief. Physical therapy was not tried.      Past Medical History:   Diagnosis Date    Cataract     Chest pain     Dry eyes     Gastritis     Hyperlipidemia     Hypertension     Insomnia     Osteopenia      Past Surgical History:   Procedure Laterality Date    BREAST BIOPSY Left     2003     SECTION      x 1    left arm surgery      left knee surgery       Family History   Problem Relation Age of Onset    Hypertension Mother     Hypertension Father     Breast cancer Neg Hx     Colon cancer Neg Hx     Ovarian cancer Neg Hx     Thrombophilia Neg Hx     Strabismus Neg Hx     Macular degeneration Neg Hx     Retinal detachment Neg Hx     Glaucoma Neg Hx     Blindness Neg Hx     Amblyopia Neg Hx      Social History     Social History    Marital status:      Spouse name: N/A    Number of children: N/A    Years of education: N/A     Occupational History    Not on file.     Social History Main Topics    Smoking status: Never Smoker    Smokeless tobacco: Never Used    Alcohol use No    Drug use: No    Sexual activity: Yes      Partners: Male     Birth control/ protection: None      Comment: mut monog     Other Topics Concern    Not on file     Social History Narrative    No narrative on file     Medication List with Changes/Refills   Current Medications    ATORVASTATIN (LIPITOR) 10 MG TABLET    Take 1 tablet (10 mg total) by mouth once daily.    CALCIUM-VITAMIN D3 500 MG(1,250MG) -200 UNIT PER TABLET    Take 1 tablet by mouth 2 (two) times daily with meals.    DYAZIDE 37.5-25 MG PER CAPSULE    TAKE ONE CAPSULE BY MOUTH ONCE DAILY    HYDROCODONE-ACETAMINOPHEN 5-325MG (NORCO) 5-325 MG PER TABLET    TAKE 1 TABLET BY MOUTH EVERY 4 HOURS AS NEEDED FOR PAIN ,MAX DAILY AMOUNT 6 TABLETS    VIT A/C/E AC/ZNOX/CUPRIC OXIDE (EYE VITAMIN AND MINERALS ORAL)    Take by mouth.     Review of patient's allergies indicates:   Allergen Reactions    No known drug allergies      Review of Systems   Constitution: Negative for fever and night sweats.   HENT: Negative for hearing loss.    Eyes: Negative for blurred vision and visual disturbance.   Cardiovascular: Negative for chest pain.   Respiratory: Negative for shortness of breath.    Endocrine: Negative for polyuria.   Hematologic/Lymphatic: Negative for bleeding problem.   Skin: Negative for rash.   Musculoskeletal: Positive for joint pain, joint swelling, muscle weakness and stiffness. Negative for back pain and muscle cramps.   Gastrointestinal: Negative for melena.   Genitourinary: Negative for hematuria.   Neurological: Positive for numbness. Negative for loss of balance and paresthesias.   Psychiatric/Behavioral: Negative for altered mental status.       Objective:   Body mass index is 23.55 kg/m².  Vitals:    11/01/17 1324   BP: (!) 140/76   Pulse: 86       General: Mary is well-developed, well-nourished, appears stated age, in no acute distress, alert and oriented to time, place and person.       General    Vitals reviewed.  Constitutional: She is oriented to person, place, and time. She appears  well-developed and well-nourished. No distress.   HENT:   Mouth/Throat: No oropharyngeal exudate.   Eyes: Right eye exhibits no discharge. Left eye exhibits no discharge.   Neck: Normal range of motion.   Pulmonary/Chest: Effort normal and breath sounds normal. No respiratory distress.   Neurological: She is alert and oriented to person, place, and time. She has normal reflexes. No cranial nerve deficit. Coordination normal.   Psychiatric: She has a normal mood and affect. Her behavior is normal. Judgment and thought content normal.     General Musculoskeletal Exam   Gait: normal       Right Knee Exam     Inspection   Erythema: absent  Scars: absent  Swelling: absent  Effusion: effusion  Deformity: deformity  Bruising: absent    Tenderness   The patient is tender to palpation of the patella and iliotibial band.    Crepitus   The patient has crepitus of the patella.    Range of Motion   Extension: 0   Flexion: 140     Tests   Meniscus   Otto:  Medial - negative Lateral - negative  Ligament Examination Lachman: normal (-1 to 2mm) PCL-Posterior Drawer: normal (0 to 2mm)     MCL - Valgus: normal (0 to 2mm)  LCL - Varus: normalPivot Shift: normal (Equal)Reverse Pivot Shift: normal (Equal)Dial Test at 30 degrees: normal (< 5 degrees)Dial Test at 90 degrees: normal (< 5 degrees)  Posterior Sag Test: negative  Posterolateral Corner: unstable (>15 degrees difference)  Patella   Patellar Apprehension: negative  Passive Patellar Tilt: neutral  Patellar Tracking: normal  Patellar Glide (quadrants): Lateral - 1   Medial - 2  Q-Angle at 90 degrees: normal  Patellar Grind: positive  J-Sign: none    Other   Meniscal Cyst: absent  Popliteal (Baker's) Cyst: absent  Sensation: normal    Left Knee Exam     Inspection   Erythema: absent  Scars: absent  Swelling: absent  Effusion: absent  Deformity: deformity  Bruising: absent    Tenderness   The patient tender to palpation of the medial joint line and patella.    Crepitus   The  patient has crepitus of the patella.    Range of Motion   Extension: 0   Flexion: 140     Tests   Meniscus   Otto:  Medial - negative Lateral - negative  Stability Lachman: normal (-1 to 2mm) PCL-Posterior Drawer: normal (0 to 2mm)  MCL - Valgus: normal (0 to 2mm)  LCL - Varus: normal (0 to 2mm)Pivot Shift: normal (Equal)Reverse Pivot Shift: normal (Equal)Dial Test at 30 degrees: normal (< 5 degrees)Dial Test at 90 degrees: normal (< 5 degrees)  Posterior Sag Test: negative  Posterolateral Corner: unstable (>15 degrees difference)  Patella   Patellar Apprehension: negative  Passive Patellar Tilt: neutral  Patellar Tracking: normal  Patellar Glide (Quadrants): Lateral - 1 Medial - 2  Q-Angle at 90 degrees: normal  Patellar Grind: positive  J-Sign: J sign absent    Other   Meniscal Cyst: absent  Popliteal (Baker's) Cyst: absent  Sensation: normal    Right Hip Exam     Tests   Brittany: positive  Left Hip Exam     Tests   Brittany: negative          Muscle Strength   Right Lower Extremity   Hip Abduction: 5/5   Quadriceps:  4/5   Hamstrin/5   Left Lower Extremity   Hip Abduction: 5/5   Quadriceps:  5/5   Hamstrin/5     Reflexes     Left Side  Quadriceps:  2+  Achilles:  2+    Right Side   Quadriceps:  2+  Achilles:  2+    Vascular Exam     Right Pulses  Dorsalis Pedis:      2+  Posterior Tibial:      2+        Left Pulses  Dorsalis Pedis:      2+  Posterior Tibial:      2+        X-rays including standing, weight bearing AP and flexion bilateral knees, lateral and merchant views ordered and images reviewed by me show:    No fracture, dislocation or other pathology   Medial compartment: mild degenerative changes right, moderate left   Lateral compartment: no degenerative changes   Patellofemoral compartment: mild degenerative changes      Assessment:     Encounter Diagnoses   Name Primary?    Primary osteoarthritis of both knees Yes    Chronic pain of both knees     Iliotibial band syndrome, unspecified  laterality         Plan:     1. 1. PT for quad strengthening/Home exercise program,  IT band dry needling/rolling/stretching.    2.  Mobic    3. Synvisc authorization    4. RTC 6wk for synvisc injections

## 2017-11-03 ENCOUNTER — TELEPHONE (OUTPATIENT)
Dept: ORTHOPEDICS | Facility: CLINIC | Age: 72
End: 2017-11-03

## 2017-11-03 NOTE — TELEPHONE ENCOUNTER
Home number was called. Message left stating intentions. Pt stated that she will seeking a second opinion and wanted all appointments canceled.

## 2017-11-03 NOTE — TELEPHONE ENCOUNTER
----- Message from Monika Gonzalez sent at 11/3/2017  9:32 AM CDT -----  Contact: pt  The pt request a call concerning her 11/1 appt and a medication refill, pt can be reached at [phone]///[id number]

## 2017-11-07 ENCOUNTER — TELEPHONE (OUTPATIENT)
Dept: ORTHOPEDICS | Facility: CLINIC | Age: 72
End: 2017-11-07

## 2017-11-07 NOTE — TELEPHONE ENCOUNTER
Home number called. Message left stating intentions. Mobile phone called. Pt stated that she wanted all orthopedic appointments canceled. Pt was informed that appointments had been canceled.

## 2017-11-07 NOTE — TELEPHONE ENCOUNTER
----- Message from Amber Tomlinson sent at 11/7/2017  9:31 AM CST -----  Contact: Pt  Pt needs to speak with nurse regarding medication being cancelled for her injections. Please give pt a call at ..[phone] (home)

## 2017-12-17 RX ORDER — ATORVASTATIN CALCIUM 10 MG/1
TABLET, FILM COATED ORAL
Qty: 90 TABLET | Refills: 3 | Status: SHIPPED | OUTPATIENT
Start: 2017-12-17 | End: 2018-12-13 | Stop reason: SDUPTHER

## 2018-03-06 ENCOUNTER — OFFICE VISIT (OUTPATIENT)
Dept: FAMILY MEDICINE | Facility: CLINIC | Age: 73
End: 2018-03-06
Payer: MEDICARE

## 2018-03-06 ENCOUNTER — LAB VISIT (OUTPATIENT)
Dept: LAB | Facility: HOSPITAL | Age: 73
End: 2018-03-06
Payer: MEDICARE

## 2018-03-06 VITALS
OXYGEN SATURATION: 98 % | BODY MASS INDEX: 24.83 KG/M2 | DIASTOLIC BLOOD PRESSURE: 70 MMHG | WEIGHT: 134.94 LBS | HEART RATE: 71 BPM | HEIGHT: 62 IN | TEMPERATURE: 98 F | RESPIRATION RATE: 18 BRPM | SYSTOLIC BLOOD PRESSURE: 122 MMHG

## 2018-03-06 DIAGNOSIS — N39.0 URINARY TRACT INFECTION WITHOUT HEMATURIA, SITE UNSPECIFIED: ICD-10-CM

## 2018-03-06 DIAGNOSIS — E78.5 HYPERLIPIDEMIA, UNSPECIFIED HYPERLIPIDEMIA TYPE: ICD-10-CM

## 2018-03-06 DIAGNOSIS — M17.0 PRIMARY OSTEOARTHRITIS OF BOTH KNEES: ICD-10-CM

## 2018-03-06 DIAGNOSIS — M25.561 CHRONIC PAIN OF BOTH KNEES: ICD-10-CM

## 2018-03-06 DIAGNOSIS — I10 ESSENTIAL HYPERTENSION: ICD-10-CM

## 2018-03-06 DIAGNOSIS — K21.9 GASTROESOPHAGEAL REFLUX DISEASE WITHOUT ESOPHAGITIS: ICD-10-CM

## 2018-03-06 DIAGNOSIS — M25.562 CHRONIC PAIN OF BOTH KNEES: ICD-10-CM

## 2018-03-06 DIAGNOSIS — G89.29 CHRONIC PAIN OF BOTH KNEES: ICD-10-CM

## 2018-03-06 DIAGNOSIS — M85.89 OSTEOPENIA OF MULTIPLE SITES: Primary | ICD-10-CM

## 2018-03-06 LAB
ALBUMIN SERPL BCP-MCNC: 4.2 G/DL
ALP SERPL-CCNC: 88 U/L
ALT SERPL W/O P-5'-P-CCNC: 13 U/L
ANION GAP SERPL CALC-SCNC: 10 MMOL/L
AST SERPL-CCNC: 19 U/L
BASOPHILS # BLD AUTO: 0.05 K/UL
BASOPHILS NFR BLD: 0.9 %
BILIRUB SERPL-MCNC: 0.6 MG/DL
BUN SERPL-MCNC: 23 MG/DL
CALCIUM SERPL-MCNC: 10.3 MG/DL
CHLORIDE SERPL-SCNC: 100 MMOL/L
CHOLEST SERPL-MCNC: 226 MG/DL
CHOLEST/HDLC SERPL: 3.6 {RATIO}
CO2 SERPL-SCNC: 28 MMOL/L
CREAT SERPL-MCNC: 1.2 MG/DL
DIFFERENTIAL METHOD: ABNORMAL
EOSINOPHIL # BLD AUTO: 0.1 K/UL
EOSINOPHIL NFR BLD: 1.9 %
ERYTHROCYTE [DISTWIDTH] IN BLOOD BY AUTOMATED COUNT: 13.4 %
EST. GFR  (AFRICAN AMERICAN): 52.5 ML/MIN/1.73 M^2
EST. GFR  (NON AFRICAN AMERICAN): 45.6 ML/MIN/1.73 M^2
GLUCOSE SERPL-MCNC: 90 MG/DL
HCT VFR BLD AUTO: 37 %
HDLC SERPL-MCNC: 63 MG/DL
HDLC SERPL: 27.9 %
HGB BLD-MCNC: 11.7 G/DL
IMM GRANULOCYTES # BLD AUTO: 0.02 K/UL
IMM GRANULOCYTES NFR BLD AUTO: 0.3 %
LDLC SERPL CALC-MCNC: 141.2 MG/DL
LYMPHOCYTES # BLD AUTO: 2.5 K/UL
LYMPHOCYTES NFR BLD: 42.8 %
MCH RBC QN AUTO: 29.5 PG
MCHC RBC AUTO-ENTMCNC: 31.6 G/DL
MCV RBC AUTO: 93 FL
MONOCYTES # BLD AUTO: 0.3 K/UL
MONOCYTES NFR BLD: 5.8 %
NEUTROPHILS # BLD AUTO: 2.8 K/UL
NEUTROPHILS NFR BLD: 48.3 %
NONHDLC SERPL-MCNC: 163 MG/DL
NRBC BLD-RTO: 0 /100 WBC
PLATELET # BLD AUTO: 243 K/UL
PMV BLD AUTO: 11.8 FL
POTASSIUM SERPL-SCNC: 3.7 MMOL/L
PROT SERPL-MCNC: 7.5 G/DL
RBC # BLD AUTO: 3.97 M/UL
SODIUM SERPL-SCNC: 138 MMOL/L
TRIGL SERPL-MCNC: 109 MG/DL
WBC # BLD AUTO: 5.82 K/UL

## 2018-03-06 PROCEDURE — 80061 LIPID PANEL: CPT

## 2018-03-06 PROCEDURE — 36415 COLL VENOUS BLD VENIPUNCTURE: CPT | Mod: PO

## 2018-03-06 PROCEDURE — 85025 COMPLETE CBC W/AUTO DIFF WBC: CPT

## 2018-03-06 PROCEDURE — 99214 OFFICE O/P EST MOD 30 MIN: CPT | Mod: PBBFAC,PO | Performed by: INTERNAL MEDICINE

## 2018-03-06 PROCEDURE — 99214 OFFICE O/P EST MOD 30 MIN: CPT | Mod: S$PBB,,, | Performed by: INTERNAL MEDICINE

## 2018-03-06 PROCEDURE — 99999 PR PBB SHADOW E&M-EST. PATIENT-LVL IV: CPT | Mod: PBBFAC,,, | Performed by: INTERNAL MEDICINE

## 2018-03-06 PROCEDURE — 80053 COMPREHEN METABOLIC PANEL: CPT

## 2018-03-06 RX ORDER — BACLOFEN 10 MG/1
10 TABLET ORAL NIGHTLY PRN
Qty: 30 TABLET | Refills: 0 | Status: SHIPPED | OUTPATIENT
Start: 2018-03-06 | End: 2018-07-06

## 2018-03-06 NOTE — PROGRESS NOTES
Subjective:       Patient ID: Mary Flanagan is a 72 y.o. female.    Chief Complaint: 6 month follow up; Hypertension; Hyperlipidemia; and Osteopenia    Hypertension   This is a chronic problem. The problem is controlled. Pertinent negatives include no chest pain, headaches, neck pain, palpitations or shortness of breath.   Hyperlipidemia   Associated symptoms include myalgias. Pertinent negatives include no chest pain or shortness of breath. Current antihyperlipidemic treatment includes statins. The current treatment provides significant improvement of lipids.     Review of Systems   Constitutional: Negative for activity change, appetite change, chills, diaphoresis, fatigue, fever and unexpected weight change.   HENT: Negative for congestion, drooling, ear discharge, ear pain, facial swelling, hearing loss, mouth sores, nosebleeds, postnasal drip, rhinorrhea, sinus pressure, sneezing, sore throat, tinnitus, trouble swallowing and voice change.    Eyes: Negative for photophobia, redness and visual disturbance.   Respiratory: Negative for apnea, cough, choking, chest tightness, shortness of breath and wheezing.    Cardiovascular: Negative for chest pain, palpitations and leg swelling.   Gastrointestinal: Negative for abdominal distention, abdominal pain, blood in stool, constipation, diarrhea, nausea and vomiting.   Endocrine: Negative for cold intolerance, heat intolerance, polydipsia, polyphagia and polyuria.   Genitourinary: Negative for decreased urine volume, difficulty urinating, dysuria, flank pain, frequency, genital sores, hematuria and urgency.   Musculoskeletal: Positive for arthralgias, back pain and myalgias. Negative for gait problem, joint swelling, neck pain and neck stiffness.   Skin: Negative for color change, pallor, rash and wound.   Allergic/Immunologic: Negative for food allergies and immunocompromised state.   Neurological: Negative for dizziness, tremors, seizures, syncope, speech difficulty,  weakness, light-headedness, numbness and headaches.   Hematological: Negative for adenopathy. Does not bruise/bleed easily.   Psychiatric/Behavioral: Negative for agitation, behavioral problems, confusion, decreased concentration, dysphoric mood, hallucinations, self-injury, sleep disturbance and suicidal ideas. The patient is not nervous/anxious and is not hyperactive.    All other systems reviewed and are negative.      Objective:      Physical Exam   Constitutional: She is oriented to person, place, and time. She appears well-developed and well-nourished. No distress.   HENT:   Head: Normocephalic and atraumatic.   Neck: Normal range of motion. Neck supple. No JVD present. Carotid bruit is not present. No tracheal deviation present. No thyromegaly present.   Cardiovascular: Normal rate, regular rhythm, normal heart sounds and intact distal pulses.    Pulmonary/Chest: Effort normal and breath sounds normal. No respiratory distress. She has no wheezes. She has no rales. She exhibits no tenderness.   Abdominal: Soft. Bowel sounds are normal. She exhibits no distension. There is no tenderness. There is no rebound and no guarding.   Musculoskeletal: Normal range of motion. She exhibits no edema or tenderness.   Lymphadenopathy:     She has no cervical adenopathy.   Neurological: She is alert and oriented to person, place, and time.   Skin: Skin is warm and dry. No rash noted. She is not diaphoretic. No erythema. No pallor.   Psychiatric: She has a normal mood and affect. Her behavior is normal. Judgment and thought content normal.   Nursing note and vitals reviewed.      Assessment:       1. Osteopenia of multiple sites    2. Hyperlipidemia, unspecified hyperlipidemia type    3. Essential hypertension    4. Chronic pain of both knees    5. Primary osteoarthritis of both knees    6. Gastroesophageal reflux disease without esophagitis    7. Urinary tract infection without hematuria, site unspecified        Plan:          stable-continue meds.                     Notes/labs reviewed.              Tylenol with baclofen for back/knees--------call if persists.                     Check cbc,cmp,u/a and culture,lipids.               Has been off lipitor.           Sees ortho at [facility]                       F/u 3 months.

## 2018-03-07 ENCOUNTER — TELEPHONE (OUTPATIENT)
Dept: FAMILY MEDICINE | Facility: CLINIC | Age: 73
End: 2018-03-07

## 2018-03-13 ENCOUNTER — OFFICE VISIT (OUTPATIENT)
Dept: OTOLARYNGOLOGY | Facility: CLINIC | Age: 73
End: 2018-03-13
Payer: MEDICARE

## 2018-03-13 VITALS
HEART RATE: 69 BPM | WEIGHT: 134.06 LBS | DIASTOLIC BLOOD PRESSURE: 76 MMHG | RESPIRATION RATE: 20 BRPM | BODY MASS INDEX: 24.67 KG/M2 | TEMPERATURE: 97 F | SYSTOLIC BLOOD PRESSURE: 136 MMHG | HEIGHT: 62 IN

## 2018-03-13 DIAGNOSIS — H61.23 BILATERAL IMPACTED CERUMEN: Primary | ICD-10-CM

## 2018-03-13 PROCEDURE — 69210 REMOVE IMPACTED EAR WAX UNI: CPT | Mod: PBBFAC,PO | Performed by: ORTHOPAEDIC SURGERY

## 2018-03-13 PROCEDURE — 69210 REMOVE IMPACTED EAR WAX UNI: CPT | Mod: S$PBB,,, | Performed by: ORTHOPAEDIC SURGERY

## 2018-03-13 PROCEDURE — 99499 UNLISTED E&M SERVICE: CPT | Mod: S$PBB,,, | Performed by: ORTHOPAEDIC SURGERY

## 2018-03-13 PROCEDURE — 99213 OFFICE O/P EST LOW 20 MIN: CPT | Mod: PBBFAC,PO,25 | Performed by: ORTHOPAEDIC SURGERY

## 2018-03-13 PROCEDURE — 99999 PR PBB SHADOW E&M-EST. PATIENT-LVL III: CPT | Mod: PBBFAC,,, | Performed by: ORTHOPAEDIC SURGERY

## 2018-03-13 NOTE — PROGRESS NOTES
Subjective:   Cerumen impactions     Patient ID: Mary Flanagan is a 72 y.o. female.    Chief Complaint:  Excessive ear wax     Mary Flanagan is a 72 y.o. female here to see me today for evaluation of a possible wax impaction in bilateral ears.  She has complaints of hearing loss in the affected ears, but denies pain or drainage.  This has been an issue in the past.  The patient has not been using any sort of ear drop to soften the wax.    HPI  Review of Systems   HENT: Positive for hearing loss. Negative for ear discharge, ear pain and tinnitus.        Objective:     Physical Exam   HENT:   Right Ear: External ear and ear canal normal. Decreased hearing is noted.   Left Ear: External ear and ear canal normal. Decreased hearing is noted.   Bilateral complete cerumen impactions, removal described below       Procedure Note    CHIEF COMPLAINT:  Cerumen Impaction    Description:  The patient was seated in an exam chair.  An ear speculum was placed in the right EAC and was examined under the microscope.  Suction and/or loop curettes were used to remove a large cerumen impaction.  The tympanic membrane was visualized and was normal in appearance.  The procedure was repeated on the left side in a similar fashion.  The TM was intact and normal on this side as well.  The patient tolerated the procedure well.           Assessment:     1. Bilateral impacted cerumen        Plan:     1.  Cerumen impaction:  Removed today without difficulty.  I would recommend the use of a wax softening drop, either over the counter Debrox or mineral oil, on a weekly basis.  I also instructed the patient to avoid Qtips.

## 2018-05-22 ENCOUNTER — PATIENT OUTREACH (OUTPATIENT)
Dept: ADMINISTRATIVE | Facility: HOSPITAL | Age: 73
End: 2018-05-22

## 2018-06-05 ENCOUNTER — OFFICE VISIT (OUTPATIENT)
Dept: FAMILY MEDICINE | Facility: CLINIC | Age: 73
End: 2018-06-05
Payer: MEDICARE

## 2018-06-05 ENCOUNTER — LAB VISIT (OUTPATIENT)
Dept: LAB | Facility: HOSPITAL | Age: 73
End: 2018-06-05
Attending: INTERNAL MEDICINE
Payer: MEDICARE

## 2018-06-05 VITALS
WEIGHT: 132.06 LBS | SYSTOLIC BLOOD PRESSURE: 112 MMHG | DIASTOLIC BLOOD PRESSURE: 62 MMHG | TEMPERATURE: 94 F | HEIGHT: 62 IN | OXYGEN SATURATION: 98 % | BODY MASS INDEX: 24.3 KG/M2 | HEART RATE: 72 BPM

## 2018-06-05 DIAGNOSIS — I10 ESSENTIAL HYPERTENSION: ICD-10-CM

## 2018-06-05 DIAGNOSIS — E78.5 HYPERLIPIDEMIA, UNSPECIFIED HYPERLIPIDEMIA TYPE: ICD-10-CM

## 2018-06-05 DIAGNOSIS — R60.9 EDEMA, UNSPECIFIED TYPE: ICD-10-CM

## 2018-06-05 DIAGNOSIS — I10 ESSENTIAL HYPERTENSION: Primary | ICD-10-CM

## 2018-06-05 DIAGNOSIS — M85.89 OSTEOPENIA OF MULTIPLE SITES: ICD-10-CM

## 2018-06-05 LAB
ALBUMIN SERPL BCP-MCNC: 3.9 G/DL
ALP SERPL-CCNC: 84 U/L
ALT SERPL W/O P-5'-P-CCNC: 9 U/L
ANION GAP SERPL CALC-SCNC: 10 MMOL/L
AST SERPL-CCNC: 18 U/L
BILIRUB SERPL-MCNC: 0.6 MG/DL
BUN SERPL-MCNC: 27 MG/DL
CALCIUM SERPL-MCNC: 10.3 MG/DL
CHLORIDE SERPL-SCNC: 100 MMOL/L
CHOLEST SERPL-MCNC: 156 MG/DL
CHOLEST/HDLC SERPL: 2.8 {RATIO}
CO2 SERPL-SCNC: 29 MMOL/L
CREAT SERPL-MCNC: 1.4 MG/DL
EST. GFR  (AFRICAN AMERICAN): 43.6 ML/MIN/1.73 M^2
EST. GFR  (NON AFRICAN AMERICAN): 37.8 ML/MIN/1.73 M^2
GLUCOSE SERPL-MCNC: 91 MG/DL
HDLC SERPL-MCNC: 55 MG/DL
HDLC SERPL: 35.3 %
LDLC SERPL CALC-MCNC: 85.6 MG/DL
NONHDLC SERPL-MCNC: 101 MG/DL
POTASSIUM SERPL-SCNC: 3.7 MMOL/L
PROT SERPL-MCNC: 7.2 G/DL
SODIUM SERPL-SCNC: 139 MMOL/L
TRIGL SERPL-MCNC: 77 MG/DL

## 2018-06-05 PROCEDURE — 99213 OFFICE O/P EST LOW 20 MIN: CPT | Mod: PBBFAC,PO | Performed by: INTERNAL MEDICINE

## 2018-06-05 PROCEDURE — 80061 LIPID PANEL: CPT

## 2018-06-05 PROCEDURE — 80053 COMPREHEN METABOLIC PANEL: CPT

## 2018-06-05 PROCEDURE — 99999 PR PBB SHADOW E&M-EST. PATIENT-LVL III: CPT | Mod: PBBFAC,,, | Performed by: INTERNAL MEDICINE

## 2018-06-05 PROCEDURE — 36415 COLL VENOUS BLD VENIPUNCTURE: CPT | Mod: PO

## 2018-06-05 PROCEDURE — 99214 OFFICE O/P EST MOD 30 MIN: CPT | Mod: S$PBB,,, | Performed by: INTERNAL MEDICINE

## 2018-06-05 NOTE — PROGRESS NOTES
Subjective:       Patient ID: Mary Flanagan is a 72 y.o. female.    Chief Complaint: Follow-up; Hypertension; Hyperlipidemia; and Osteopenia    Hypertension   This is a chronic problem. The problem is controlled. Pertinent negatives include no chest pain, headaches, neck pain, palpitations or shortness of breath.   Hyperlipidemia   Pertinent negatives include no chest pain, myalgias or shortness of breath. Current antihyperlipidemic treatment includes statins. The current treatment provides significant improvement of lipids.     Past Medical History:   Diagnosis Date    Cataract     Chest pain     Dry eyes     Gastritis     Hyperlipidemia     Hypertension     Insomnia     Osteopenia      Past Surgical History:   Procedure Laterality Date    BREAST BIOPSY Left     2003     SECTION      x 1    left arm surgery      left knee surgery       Family History   Problem Relation Age of Onset    Hypertension Mother     Hypertension Father     Breast cancer Neg Hx     Colon cancer Neg Hx     Ovarian cancer Neg Hx     Thrombophilia Neg Hx     Strabismus Neg Hx     Macular degeneration Neg Hx     Retinal detachment Neg Hx     Glaucoma Neg Hx     Blindness Neg Hx     Amblyopia Neg Hx      Social History     Social History    Marital status:      Spouse name: N/A    Number of children: N/A    Years of education: N/A     Occupational History    Not on file.     Social History Main Topics    Smoking status: Never Smoker    Smokeless tobacco: Never Used    Alcohol use No    Drug use: No    Sexual activity: Yes     Partners: Male     Birth control/ protection: None      Comment: mut monog     Other Topics Concern    Not on file     Social History Narrative    No narrative on file     Review of Systems   Constitutional: Negative for activity change, appetite change, chills, diaphoresis, fatigue, fever and unexpected weight change.   HENT: Negative for congestion, drooling, ear  discharge, ear pain, facial swelling, hearing loss, mouth sores, nosebleeds, postnasal drip, rhinorrhea, sinus pressure, sneezing, sore throat, tinnitus, trouble swallowing and voice change.    Eyes: Negative for photophobia, redness and visual disturbance.   Respiratory: Negative for apnea, cough, choking, chest tightness, shortness of breath and wheezing.    Cardiovascular: Positive for leg swelling. Negative for chest pain and palpitations.   Gastrointestinal: Negative for abdominal distention, abdominal pain, blood in stool, constipation, diarrhea, nausea and vomiting.   Endocrine: Negative for cold intolerance, heat intolerance, polydipsia, polyphagia and polyuria.   Genitourinary: Negative for decreased urine volume, difficulty urinating, dysuria, flank pain, frequency, hematuria, pelvic pain and urgency.   Musculoskeletal: Negative for arthralgias, back pain, gait problem, joint swelling, myalgias, neck pain and neck stiffness.   Skin: Negative for color change, pallor, rash and wound.   Allergic/Immunologic: Negative for food allergies and immunocompromised state.   Neurological: Negative for dizziness, tremors, seizures, syncope, speech difficulty, weakness, light-headedness, numbness and headaches.   Hematological: Negative for adenopathy. Does not bruise/bleed easily.   Psychiatric/Behavioral: Negative for agitation, behavioral problems, confusion, decreased concentration, dysphoric mood, hallucinations, self-injury, sleep disturbance and suicidal ideas. The patient is not nervous/anxious and is not hyperactive.    All other systems reviewed and are negative.      Objective:      Physical Exam   Constitutional: She is oriented to person, place, and time. She appears well-developed and well-nourished. No distress.   HENT:   Head: Normocephalic and atraumatic.   Neck: Normal range of motion. Neck supple. Carotid bruit is not present.   Cardiovascular: Normal rate, regular rhythm, normal heart sounds and  intact distal pulses.    Pulmonary/Chest: Effort normal and breath sounds normal. No respiratory distress. She has no wheezes. She has no rales. She exhibits no tenderness.   Abdominal: Soft. Bowel sounds are normal. She exhibits no distension. There is no tenderness. There is no rebound and no guarding.   Musculoskeletal: Normal range of motion. She exhibits edema. She exhibits no tenderness.   Neurological: She is alert and oriented to person, place, and time. No cranial nerve deficit. Coordination normal.   Skin: Skin is warm and dry. No rash noted. She is not diaphoretic. No erythema. No pallor.   Psychiatric: She has a normal mood and affect. Her behavior is normal. Judgment and thought content normal.   Nursing note and vitals reviewed.      CMP  Sodium   Date Value Ref Range Status   03/06/2018 138 136 - 145 mmol/L Final     Potassium   Date Value Ref Range Status   03/06/2018 3.7 3.5 - 5.1 mmol/L Final     Chloride   Date Value Ref Range Status   03/06/2018 100 95 - 110 mmol/L Final     CO2   Date Value Ref Range Status   03/06/2018 28 23 - 29 mmol/L Final     Glucose   Date Value Ref Range Status   03/06/2018 90 70 - 110 mg/dL Final     BUN, Bld   Date Value Ref Range Status   03/06/2018 23 8 - 23 mg/dL Final     Creatinine   Date Value Ref Range Status   03/06/2018 1.2 0.5 - 1.4 mg/dL Final     Calcium   Date Value Ref Range Status   03/06/2018 10.3 8.7 - 10.5 mg/dL Final     Total Protein   Date Value Ref Range Status   03/06/2018 7.5 6.0 - 8.4 g/dL Final     Albumin   Date Value Ref Range Status   03/06/2018 4.2 3.5 - 5.2 g/dL Final     Total Bilirubin   Date Value Ref Range Status   03/06/2018 0.6 0.1 - 1.0 mg/dL Final     Comment:     For infants and newborns, interpretation of results should be based  on gestational age, weight and in agreement with clinical  observations.  Premature Infant recommended reference ranges:  Up to 24 hours.............<8.0 mg/dL  Up to 48 hours............<12.0 mg/dL  3-5  days..................<15.0 mg/dL  6-29 days.................<15.0 mg/dL       Alkaline Phosphatase   Date Value Ref Range Status   03/06/2018 88 55 - 135 U/L Final     AST   Date Value Ref Range Status   03/06/2018 19 10 - 40 U/L Final     ALT   Date Value Ref Range Status   03/06/2018 13 10 - 44 U/L Final     Anion Gap   Date Value Ref Range Status   03/06/2018 10 8 - 16 mmol/L Final     eGFR if    Date Value Ref Range Status   03/06/2018 52.5 (A) >60 mL/min/1.73 m^2 Final     eGFR if non    Date Value Ref Range Status   03/06/2018 45.6 (A) >60 mL/min/1.73 m^2 Final     Comment:     Calculation used to obtain the estimated glomerular filtration  rate (eGFR) is the CKD-EPI equation.        Lab Results   Component Value Date    WBC 5.82 03/06/2018    HGB 11.7 (L) 03/06/2018    HCT 37.0 03/06/2018    MCV 93 03/06/2018     03/06/2018     Lab Results   Component Value Date    CHOL 226 (H) 03/06/2018     Lab Results   Component Value Date    HDL 63 03/06/2018     Lab Results   Component Value Date    LDLCALC 141.2 03/06/2018     Lab Results   Component Value Date    TRIG 109 03/06/2018     Lab Results   Component Value Date    CHOLHDL 27.9 03/06/2018     Lab Results   Component Value Date    TSH 1.219 04/22/2015     No results found for: LABA1C, HGBA1C  Assessment:       1. Essential hypertension    2. Hyperlipidemia, unspecified hyperlipidemia type    3. Osteopenia of multiple sites    4. Edema, unspecified type        Plan:   Essential hypertension--------controlled.  -     Comprehensive metabolic panel; Future; Expected date: 06/05/2018    Hyperlipidemia, unspecified hyperlipidemia type--------------back on lipitor.  -     Lipid panel; Future; Expected date: 06/05/2018    Osteopenia of multiple sites    Edema, unspecified type------------low salt, elevation.  -     Cardiology Lab US Lower Extremity Veins Left; Future                      F/u 6 months.

## 2018-06-07 ENCOUNTER — CLINICAL SUPPORT (OUTPATIENT)
Dept: CARDIOLOGY | Facility: CLINIC | Age: 73
End: 2018-06-07
Attending: INTERNAL MEDICINE
Payer: MEDICARE

## 2018-06-07 DIAGNOSIS — R60.9 EDEMA, UNSPECIFIED TYPE: ICD-10-CM

## 2018-06-07 PROCEDURE — 93971 EXTREMITY STUDY: CPT | Mod: PBBFAC,PO | Performed by: INTERNAL MEDICINE

## 2018-06-08 ENCOUNTER — TELEPHONE (OUTPATIENT)
Dept: FAMILY MEDICINE | Facility: CLINIC | Age: 73
End: 2018-06-08

## 2018-06-08 DIAGNOSIS — I82.90 THROMBUS: Primary | ICD-10-CM

## 2018-06-11 NOTE — TELEPHONE ENCOUNTER
Spoke with patient, she is asking if it is urgent? , sent a message to Dr Hoffman staff to see if patient can be worked in sooner, pt states she is going out of town next week.

## 2018-06-11 NOTE — TELEPHONE ENCOUNTER
----- Message from Paty Hernandez sent at 6/11/2018  4:16 PM CDT -----  Contact: pt   Stated she was return a call about an appointment with Dr Hoffman and can be reached at [phone]

## 2018-06-11 NOTE — TELEPHONE ENCOUNTER
Spoke with pt advised spoke with Dr. Hoffman's office currently waiting on cb to see if she can be seen this week.

## 2018-06-12 ENCOUNTER — TELEPHONE (OUTPATIENT)
Dept: FAMILY MEDICINE | Facility: CLINIC | Age: 73
End: 2018-06-12

## 2018-06-12 NOTE — TELEPHONE ENCOUNTER
----- Message from Rodolfo Gomez sent at 6/12/2018 10:24 AM CDT -----  Contact: dr. cantu's nurse    Can get pt in this week at the main office instead of [facility].       [phone]

## 2018-06-12 NOTE — TELEPHONE ENCOUNTER
Spoke with Dr Hoffman's nurse she states they can see patient at main clinic [facility]  Tomorrow at 1:30 PM on the 3rd floor, pt advised and verbalized an understanding.

## 2018-07-05 DIAGNOSIS — M25.561 CHRONIC PAIN OF RIGHT KNEE: Primary | ICD-10-CM

## 2018-07-05 DIAGNOSIS — G89.29 CHRONIC PAIN OF RIGHT KNEE: Primary | ICD-10-CM

## 2018-07-06 ENCOUNTER — HOSPITAL ENCOUNTER (OUTPATIENT)
Dept: RADIOLOGY | Facility: HOSPITAL | Age: 73
Discharge: HOME | End: 2018-07-06
Attending: PHYSICIAN ASSISTANT
Payer: MEDICARE

## 2018-07-06 ENCOUNTER — OFFICE VISIT (OUTPATIENT)
Dept: ORTHOPEDICS | Facility: CLINIC | Age: 73
End: 2018-07-06
Payer: MEDICARE

## 2018-07-06 VITALS
HEART RATE: 76 BPM | BODY MASS INDEX: 24.3 KG/M2 | SYSTOLIC BLOOD PRESSURE: 138 MMHG | WEIGHT: 132.06 LBS | RESPIRATION RATE: 12 BRPM | HEIGHT: 62 IN | DIASTOLIC BLOOD PRESSURE: 73 MMHG

## 2018-07-06 DIAGNOSIS — G89.29 CHRONIC PAIN OF RIGHT KNEE: ICD-10-CM

## 2018-07-06 DIAGNOSIS — M76.31 ILIOTIBIAL BAND SYNDROME OF RIGHT SIDE: ICD-10-CM

## 2018-07-06 DIAGNOSIS — M25.561 CHRONIC PAIN OF RIGHT KNEE: Primary | ICD-10-CM

## 2018-07-06 DIAGNOSIS — M25.561 CHRONIC PAIN OF RIGHT KNEE: ICD-10-CM

## 2018-07-06 DIAGNOSIS — M17.0 PRIMARY OSTEOARTHRITIS OF BOTH KNEES: ICD-10-CM

## 2018-07-06 DIAGNOSIS — G89.29 CHRONIC PAIN OF RIGHT KNEE: Primary | ICD-10-CM

## 2018-07-06 PROCEDURE — 73564 X-RAY EXAM KNEE 4 OR MORE: CPT | Mod: 26,RT,, | Performed by: RADIOLOGY

## 2018-07-06 PROCEDURE — 99999 PR PBB SHADOW E&M-EST. PATIENT-LVL IV: CPT | Mod: PBBFAC,,, | Performed by: PHYSICIAN ASSISTANT

## 2018-07-06 PROCEDURE — 99214 OFFICE O/P EST MOD 30 MIN: CPT | Mod: S$PBB,,, | Performed by: PHYSICIAN ASSISTANT

## 2018-07-06 PROCEDURE — 73564 X-RAY EXAM KNEE 4 OR MORE: CPT | Mod: TC,FY,PO,RT

## 2018-07-06 PROCEDURE — 73562 X-RAY EXAM OF KNEE 3: CPT | Mod: TC,FY,PO,LT

## 2018-07-06 PROCEDURE — 73562 X-RAY EXAM OF KNEE 3: CPT | Mod: 26,XS,LT, | Performed by: RADIOLOGY

## 2018-07-06 PROCEDURE — 99214 OFFICE O/P EST MOD 30 MIN: CPT | Mod: PBBFAC,25,PO | Performed by: PHYSICIAN ASSISTANT

## 2018-07-06 NOTE — PROGRESS NOTES
Patient ID: Mary Flanagan is a 72 y.o. female.    Chief Complaint: Pain of the Right Knee      HPI: Mary Flanagan  is a 72 y.o. female who c/o Pain of the Right Knee   for duration of more than 8 months.  She for initially saw Dr. Guzman in 2017.  He diagnosed her with IT band syndrome on the right lower extremity as well as osteoarthritis of the bilateral knees.  At the time, he order physical therapy for quad strengthening as well as IT band stretching.  He also ordered an authorization for Synvisc.  She did not return for those injections, because he had ordered them for both knees.  She states that she was not having pain in both knees and worried her to do both.  She did not contact the office in regards to her concerns.  Instead, [facility] Dr. Andrew Handy.  He has given her hyaluronic acid injections.  She does not know which brain but knows that she got 1 a week for 3 weeks.  This did nothing to alleviate her symptoms.  He also felt that she had arthritis in the right knee as well as IT band syndrome.  On 2018, he gave her an intra-articular steroid injection.  This did help her symptoms. Pain level today is 4/10 in severity.  Quality is aching, throbbing, shooting.  She complains of pain down the lateral aspect of her thigh and down the lateral aspect of her leg. Alleviating factors include rest and elevation.  Aggravating factors include walking.  She states that she has walked 2 miles a day for years.  She is upset that she has not been able to do that recently.    Past Medical History:   Diagnosis Date    Cataract     Chest pain     Dry eyes     Gastritis     Hyperlipidemia     Hypertension     Insomnia     Osteopenia      Past Surgical History:   Procedure Laterality Date    BREAST BIOPSY Left          SECTION      x 1    KNEE ARTHROSCOPY Left 2014 approx    KNEE SURGERY      left arm surgery      left knee surgery        Family History   Problem Relation Age of Onset    Hypertension Mother     Hypertension Father     Breast cancer Neg Hx     Colon cancer Neg Hx     Ovarian cancer Neg Hx     Thrombophilia Neg Hx     Strabismus Neg Hx     Macular degeneration Neg Hx     Retinal detachment Neg Hx     Glaucoma Neg Hx     Blindness Neg Hx     Amblyopia Neg Hx      Social History     Social History    Marital status:      Spouse name: N/A    Number of children: N/A    Years of education: N/A     Occupational History    Not on file.     Social History Main Topics    Smoking status: Never Smoker    Smokeless tobacco: Never Used    Alcohol use No    Drug use: No    Sexual activity: Yes     Partners: Male     Birth control/ protection: None      Comment: mut monog     Other Topics Concern    Not on file     Social History Narrative    No narrative on file     Medication List with Changes/Refills   Current Medications    ATORVASTATIN (LIPITOR) 10 MG TABLET    TAKE ONE TABLET BY MOUTH ONCE DAILY    CALCIUM CARBONATE (CALCIUM 600 ORAL)    Take by mouth once daily.    DYAZIDE 37.5-25 MG PER CAPSULE    TAKE ONE CAPSULE BY MOUTH ONCE DAILY    MV,CA,MIN/IRON/FA/GUARANA/CAFF (ONE-A-DAY WOMEN'S ACTIVE ORAL)    Take by mouth once daily.    VIT A/C/E AC/ZNOX/CUPRIC OXIDE (EYE VITAMIN AND MINERALS ORAL)    Take by mouth.   Discontinued Medications    BACLOFEN (LIORESAL) 10 MG TABLET    Take 1 tablet (10 mg total) by mouth nightly as needed.     Review of patient's allergies indicates:   Allergen Reactions    No known drug allergies            Objective:        General    Vitals reviewed.  Constitutional: She is oriented to person, place, and time. She appears well-developed and well-nourished. No distress.   HENT:   Mouth/Throat: No oropharyngeal exudate.   Eyes: Right eye exhibits no discharge. Left eye exhibits no discharge.   Neck: Normal range of motion.   Pulmonary/Chest: Effort normal and breath sounds normal. No  respiratory distress.   Neurological: She is alert and oriented to person, place, and time. She has normal reflexes. No cranial nerve deficit. Coordination normal.   Psychiatric: She has a normal mood and affect. Her behavior is normal. Judgment and thought content normal.           Right Knee Exam     Inspection   Erythema: absent  Scars: absent  Swelling: absent  Effusion: effusion  Deformity: deformity  Bruising: absent    Tenderness   The patient is tender to palpation of the patella and iliotibial band.    Crepitus   The patient has crepitus of the patella.    Range of Motion   Extension: 0   Flexion: 140     Tests   Meniscus   Otto:  Medial - negative Lateral - negative  Ligament Examination Lachman: normal (-1 to 2mm) PCL-Posterior Drawer: normal (0 to 2mm)     MCL - Valgus: normal (0 to 2mm)  LCL - Varus: abnormal - grade IIPivot Shift: normal (Equal)Reverse Pivot Shift: normal (Equal)Dial Test at 30 degrees: normal (< 5 degrees)Dial Test at 90 degrees: normal (< 5 degrees)  Posterior Sag Test: negative  Posterolateral Corner: unstable (>15 degrees difference)  Patella   Patellar Apprehension: negative  Passive Patellar Tilt: neutral  Patellar Tracking: normal  Patellar Glide (quadrants): Lateral - 1   Medial - 2  Q-Angle at 90 degrees: normal  Patellar Grind: positive  J-Sign: none    Other   Meniscal Cyst: absent  Popliteal (Baker's) Cyst: absent  Sensation: normal    Left Knee Exam     Inspection   Erythema: absent  Scars: absent  Swelling: absent  Effusion: absent  Deformity: deformity  Bruising: absent    Crepitus   The patient has crepitus of the patella.    Range of Motion   Extension: 0   Flexion: 140     Tests   Meniscus   Otto:  Medial - negative Lateral - negative  Stability Lachman: normal (-1 to 2mm) PCL-Posterior Drawer: normal (0 to 2mm)  MCL - Valgus: abnormal - grade I  LCL - Varus: normal (0 to 2mm)Pivot Shift: normal (Equal)Reverse Pivot Shift: normal (Equal)Dial Test at 30  degrees: normal (< 5 degrees)Dial Test at 90 degrees: normal (< 5 degrees)  Posterior Sag Test: negative  Posterolateral Corner: unstable (>15 degrees difference)  Patella   Patellar Apprehension: negative  Passive Patellar Tilt: neutral  Patellar Tracking: normal  Patellar Glide (Quadrants): Lateral - 1 Medial - 2  Q-Angle at 90 degrees: normal  Patellar Grind: negative  J-Sign: J sign absent    Other   Meniscal Cyst: absent  Popliteal (Baker's) Cyst: absent  Sensation: normal    Right Hip Exam     Tests   Brittany: positive  Left Hip Exam     Tests   Brittany: negative          Muscle Strength   Right Lower Extremity   Hip Abduction: 5/5   Quadriceps:  4/5   Hamstrin/5   Left Lower Extremity   Hip Abduction: 5/5   Quadriceps:  5/5   Hamstrin/5     Reflexes     Left Side  Quadriceps:  2+  Achilles:  2+    Right Side   Quadriceps:  2+  Achilles:  2+    Vascular Exam     Right Pulses  Dorsalis Pedis:      2+  Posterior Tibial:      2+        Left Pulses  Dorsalis Pedis:      2+  Posterior Tibial:      2+                  Xray:   Bilateral knees from today images and report were reviewed today.  I agree with the radiologist's interpretation.  Small suprapatellar joint effusion suspected on the right.  No acute fractures or dislocations visualized.  Tricompartmental degenerative findings again noted bilaterally.  There is suggestion of possible slight worsening joint space loss in the right lateral tibiofemoral compartment.Previously described degenerative findings on the left appear unchanged.    Assessment:       Encounter Diagnoses   Name Primary?    Chronic pain of right knee Yes    Iliotibial band syndrome of right side     Primary osteoarthritis of both knees           Plan:       Mary was seen today for pain.    Diagnoses and all orders for this visit:    Chronic pain of right knee  -     HME - OTHER  -     Ambulatory Referral to Physical/Occupational Therapy    Iliotibial band syndrome of right side  -      HME - OTHER  -     Ambulatory Referral to Physical/Occupational Therapy    Primary osteoarthritis of both knees  -     HME - OTHER  -     Ambulatory Referral to Physical/Occupational Therapy    Ms. Hernandez comes in today for the above problems.  Although she has been treated in the department in the past, these are all new problems for me.  She does have arthritis in both knees.  However, the left knee is not giving her any trouble at this time.  Her concern is the right knee. She has failed a viscosupplementation.  She is disappointed that she has had to modify her activity level and not able to walk.  She is in no way ready to think about a knee replacement surgery. We had a long discussion regarding her other options.  She got some relief from the steroid injection, but still has pain. I recommend a lateral  brace for the right knee.  Also recommend formal physical therapy for quad strengthening, IT band stretching, manual modalities.  In reviewing her past notes from Dr. Huber's office visits as well as her labs, she is not a candidate for oral anti-inflammatory medications.  Instead, we will send a prescription to [facility] for a topical cream.  I will see her back in the office in 6 weeks.  If at that time she is not making significant progress, we may need to for consider further options. She verbalizes understanding and agrees.  Follow-up in about 6 weeks (around 8/17/2018).          The patient understands, chooses and consents to this plan and accepts all   the risks which include but are not limited to the risks mentioned above.     Disclaimer: This note was prepared using a voice recognition system and is likely to have sound alike errors within the text.

## 2018-07-13 ENCOUNTER — HOSPITAL ENCOUNTER (OUTPATIENT)
Dept: RADIOLOGY | Facility: HOSPITAL | Age: 73
Discharge: HOME | End: 2018-07-13
Attending: NURSE PRACTITIONER
Payer: MEDICARE

## 2018-07-13 DIAGNOSIS — Z12.39 BREAST CANCER SCREENING: ICD-10-CM

## 2018-07-13 DIAGNOSIS — Z12.31 VISIT FOR SCREENING MAMMOGRAM: ICD-10-CM

## 2018-07-13 PROCEDURE — 77063 BREAST TOMOSYNTHESIS BI: CPT | Mod: 26,,, | Performed by: RADIOLOGY

## 2018-07-13 PROCEDURE — 77067 SCR MAMMO BI INCL CAD: CPT | Mod: 26,,, | Performed by: RADIOLOGY

## 2018-07-13 PROCEDURE — 77067 SCR MAMMO BI INCL CAD: CPT | Mod: TC

## 2018-08-01 ENCOUNTER — PES CALL (OUTPATIENT)
Dept: ADMINISTRATIVE | Facility: CLINIC | Age: 73
End: 2018-08-01

## 2018-08-17 ENCOUNTER — OFFICE VISIT (OUTPATIENT)
Dept: ORTHOPEDICS | Facility: CLINIC | Age: 73
End: 2018-08-17
Payer: MEDICARE

## 2018-08-17 ENCOUNTER — TELEPHONE (OUTPATIENT)
Dept: ORTHOPEDICS | Facility: CLINIC | Age: 73
End: 2018-08-17

## 2018-08-17 VITALS
SYSTOLIC BLOOD PRESSURE: 103 MMHG | WEIGHT: 132 LBS | RESPIRATION RATE: 18 BRPM | DIASTOLIC BLOOD PRESSURE: 67 MMHG | HEIGHT: 62 IN | BODY MASS INDEX: 24.29 KG/M2 | HEART RATE: 64 BPM

## 2018-08-17 DIAGNOSIS — G89.29 CHRONIC PAIN OF RIGHT KNEE: Primary | ICD-10-CM

## 2018-08-17 DIAGNOSIS — M76.31 ILIOTIBIAL BAND SYNDROME OF RIGHT SIDE: ICD-10-CM

## 2018-08-17 DIAGNOSIS — M17.0 PRIMARY OSTEOARTHRITIS OF BOTH KNEES: ICD-10-CM

## 2018-08-17 DIAGNOSIS — M25.561 CHRONIC PAIN OF RIGHT KNEE: Primary | ICD-10-CM

## 2018-08-17 PROCEDURE — 99214 OFFICE O/P EST MOD 30 MIN: CPT | Mod: 25,S$PBB,, | Performed by: PHYSICIAN ASSISTANT

## 2018-08-17 PROCEDURE — 99999 PR PBB SHADOW E&M-EST. PATIENT-LVL III: CPT | Mod: PBBFAC,,, | Performed by: PHYSICIAN ASSISTANT

## 2018-08-17 PROCEDURE — 20550 NJX 1 TENDON SHEATH/LIGAMENT: CPT | Mod: S$PBB,RT,, | Performed by: PHYSICIAN ASSISTANT

## 2018-08-17 PROCEDURE — 20550 NJX 1 TENDON SHEATH/LIGAMENT: CPT | Mod: PBBFAC,PO | Performed by: PHYSICIAN ASSISTANT

## 2018-08-17 PROCEDURE — 99213 OFFICE O/P EST LOW 20 MIN: CPT | Mod: PBBFAC,PO,25 | Performed by: PHYSICIAN ASSISTANT

## 2018-08-17 RX ORDER — METHYLPREDNISOLONE ACETATE 40 MG/ML
40 INJECTION, SUSPENSION INTRA-ARTICULAR; INTRALESIONAL; INTRAMUSCULAR; SOFT TISSUE ONCE
Status: COMPLETED | OUTPATIENT
Start: 2018-08-17 | End: 2018-08-17

## 2018-08-17 RX ADMIN — METHYLPREDNISOLONE ACETATE 40 MG: 40 INJECTION, SUSPENSION INTRALESIONAL; INTRAMUSCULAR; INTRASYNOVIAL; SOFT TISSUE at 10:08

## 2018-08-17 NOTE — TELEPHONE ENCOUNTER
----- Message from Natali Glover PA-C sent at 8/17/2018  4:39 PM CDT -----  Contact: self   Ice and elevate.  The numbing agent has probably worn off.  Have her f/u with me on Monday and I'll be happy to check it out for her.    ----- Message -----  From: Owen Mayorga MA  Sent: 8/17/2018   4:30 PM  To: Natali Glover PA-C    Any concerns with this Distal IT band injection done today?  Or just elevate and ice?    Thanks  Owen       ----- Message -----  From: Jo Wiggins  Sent: 8/17/2018   4:00 PM  To: [facility]    Patient would like to know symptoms of shot given. She is currently experiencing severe pain and can barley move leg. Please call back at [phone] .    Thanks,  Jo Wiggins

## 2018-08-17 NOTE — PROGRESS NOTES
Patient ID: Mary Flanagan is a 72 y.o. female.    Chief Complaint: Pain and Numbness of the Right Knee      HPI: Mary Flanagan  is a 72 y.o. female who c/o Pain and Numbness of the Right Knee   for duration of nearly 3 months.  She has undergone viscosupplementation and [facility].  She also saw Dr. Guzman in December of last year for the right knee. Her main complaint today is continued pain along the distal IT band.  She states that physical therapy and stretching is helping to alleviate the symptoms.  She is better than she was.  She tried to walk half a mi, but flared her pain up in doing so.  She has been disappointed that she cannot get back to walking 2 miles per day.  Pain is improved.  It is 4/10 in severity.  It is an intermittent ache.  She points to the distal IT band is to wear the pain is located.  Alleviating factors include physical therapy.  Aggravating factors include standing and walking for prolonged periods of time.    Past Medical History:   Diagnosis Date    Cataract     Chest pain     Dry eyes     Gastritis     Hyperlipidemia     Hypertension     Insomnia     Osteopenia      Past Surgical History:   Procedure Laterality Date    BREAST BIOPSY Left     , benign     SECTION      x 1    KNEE ARTHROSCOPY Left 2014 approx    KNEE SURGERY      left arm surgery      left knee surgery       Family History   Problem Relation Age of Onset    Hypertension Mother     Hypertension Father     Breast cancer Neg Hx     Colon cancer Neg Hx     Ovarian cancer Neg Hx     Thrombophilia Neg Hx     Strabismus Neg Hx     Macular degeneration Neg Hx     Retinal detachment Neg Hx     Glaucoma Neg Hx     Blindness Neg Hx     Amblyopia Neg Hx      Social History     Socioeconomic History    Marital status:      Spouse name: Not on file    Number of children: Not on file    Years of education: Not  on file    Highest education level: Not on file   Social Needs    Financial resource strain: Not on file    Food insecurity - worry: Not on file    Food insecurity - inability: Not on file    Transportation needs - medical: Not on file    Transportation needs - non-medical: Not on file   Occupational History    Not on file   Tobacco Use    Smoking status: Never Smoker    Smokeless tobacco: Never Used   Substance and Sexual Activity    Alcohol use: No     Alcohol/week: 0.0 oz    Drug use: No    Sexual activity: Yes     Partners: Male     Birth control/protection: None     Comment: mut monog   Other Topics Concern    Not on file   Social History Narrative    Not on file     Medication List with Changes/Refills   Current Medications    ATORVASTATIN (LIPITOR) 10 MG TABLET    TAKE ONE TABLET BY MOUTH ONCE DAILY    CALCIUM CARBONATE (CALCIUM 600 ORAL)    Take by mouth once daily.    DYAZIDE 37.5-25 MG PER CAPSULE    TAKE ONE CAPSULE BY MOUTH ONCE DAILY    MV,CA,MIN/IRON/FA/GUARANA/CAFF (ONE-A-DAY WOMEN'S ACTIVE ORAL)    Take by mouth once daily.    VIT A/C/E AC/ZNOX/CUPRIC OXIDE (EYE VITAMIN AND MINERALS ORAL)    Take by mouth.     Review of patient's allergies indicates:   Allergen Reactions    No known drug allergies            Objective:        General    Vitals reviewed.  Constitutional: She is oriented to person, place, and time. She appears well-developed and well-nourished. No distress.   HENT:   Mouth/Throat: No oropharyngeal exudate.   Eyes: Right eye exhibits no discharge. Left eye exhibits no discharge.   Neck: Normal range of motion.   Pulmonary/Chest: Effort normal and breath sounds normal. No respiratory distress.   Neurological: She is alert and oriented to person, place, and time. She has normal reflexes. No cranial nerve deficit. Coordination normal.   Psychiatric: She has a normal mood and affect. Her behavior is normal. Judgment and thought content normal.           Right Knee Exam      Inspection   Erythema: absent  Scars: absent  Swelling: absent  Effusion: effusion  Deformity: deformity  Bruising: absent    Tenderness   The patient is tender to palpation of the patella and iliotibial band.    Crepitus   The patient has crepitus of the patella.    Range of Motion   Extension: 0   Flexion: 140     Tests   Meniscus   Otto:  Medial - negative Lateral - negative  Ligament Examination Lachman: normal (-1 to 2mm) PCL-Posterior Drawer: normal (0 to 2mm)     MCL - Valgus: normal (0 to 2mm)  LCL - Varus: abnormal - grade IIPivot Shift: normal (Equal)Reverse Pivot Shift: normal (Equal)Dial Test at 30 degrees: normal (< 5 degrees)Dial Test at 90 degrees: normal (< 5 degrees)  Posterior Sag Test: negative  Posterolateral Corner: unstable (>15 degrees difference)  Patella   Patellar apprehension: negative  Passive Patellar Tilt: neutral  Patellar Tracking: normal  Patellar Glide (quadrants): Lateral - 1   Medial - 2  Q-Angle at 90 degrees: normal  Patellar Grind: positive  J-Sign: none    Other   Meniscal Cyst: absent  Popliteal (Baker's) Cyst: absent  Sensation: normal    Left Knee Exam     Inspection   Erythema: absent  Scars: absent  Swelling: absent  Effusion: absent  Deformity: deformity  Bruising: absent    Crepitus   The patient has crepitus of the patella.    Range of Motion   Extension: 0   Flexion: 140     Tests   Meniscus   Otto:  Medial - negative Lateral - negative  Stability Lachman: normal (-1 to 2mm) PCL-Posterior Drawer: normal (0 to 2mm)  MCL - Valgus: abnormal - grade I  LCL - Varus: normal (0 to 2mm)Pivot Shift: normal (Equal)Reverse Pivot Shift: normal (Equal)Dial Test at 30 degrees: normal (< 5 degrees)Dial Test at 90 degrees: normal (< 5 degrees)  Posterior Sag Test: negative  Posterolateral Corner: unstable (>15 degrees difference)  Patella   Patellar apprehension: negative  Passive Patellar Tilt: neutral  Patellar Tracking: normal  Patellar Glide (Quadrants): Lateral - 1  Medial - 2  Q-Angle at 90 degrees: normal  Patellar Grind: negative  J-Sign: J sign absent    Other   Meniscal Cyst: absent  Popliteal (Baker's) Cyst: absent  Sensation: normal    Right Hip Exam     Tests   Brittany: positive  Left Hip Exam     Tests   Brittany: negative          Muscle Strength   Right Lower Extremity   Hip Abduction: 5/5   Quadriceps:  4/5   Hamstrin/5   Left Lower Extremity   Hip Abduction: 5/5   Quadriceps:  5/5   Hamstrin/5     Reflexes     Left Side  Quadriceps:  2+  Achilles:  2+    Right Side   Quadriceps:  2+  Achilles:  2+    Vascular Exam     Right Pulses  Dorsalis Pedis:      2+  Posterior Tibial:      2+        Left Pulses  Dorsalis Pedis:      2+  Posterior Tibial:      2+                    Assessment:       Encounter Diagnoses   Name Primary?    Chronic pain of right knee Yes    Iliotibial band syndrome of right side     Primary osteoarthritis of both knees           Plan:       Mary was seen today for pain and numbness.    Diagnoses and all orders for this visit:    Chronic pain of right knee    Iliotibial band syndrome of right side  -     methylPREDNISolone acetate injection 40 mg; 1 mL (40 mg total) by Intra-Lesional route once.    Primary osteoarthritis of both knees    Ms. Hernandez is an established patient with the above establish problems.  She is slightly improved, but still having significant discomfort which is affecting her ability to do her exercises as she wishes.  I will have her continue with physical therapy.  I would like them to work with her on a foam roller to see if that helps alleviate some of her symptoms.  We have also discussed risks and benefits of doing a distal IT band injection. She wishes to proceed with that.  I will see her back in the office in 6 weeks.  She is not having pain along the lateral joint line or the lateral femoral condyle.  She has tried the brace, but states that it slips down on her.  I have advised that this is a common  complaint in to do the best she can.  She only needs to use it when she is active.  She can continue with the topical cream which also helps her symptoms. She verbalizes understanding and agrees with the above plan.  No Follow-up on file.        Right Knee IT band Injection Report:  After verbal consent was obtained for right knee IT band injection, patient ID, site, and side were verified.  The  right  knee was sterilly prepped in the standard fashion.  A 25-gauge needle was introduced into right knee IT band without complication. It was then injected with 5 mg lidocaine plain and 40 mg depomedrol.  A sterile bandaid was applied.  The patient was informed to apply an ice pack approximately 10min once arriving home and not to do anything strenuous for 24hours.  She was instructed to call if there were any problems. The patient was discharged in stable condition.    The patient understands, chooses and consents to this plan and accepts all   the risks which include but are not limited to the risks mentioned above.     Disclaimer: This note was prepared using a voice recognition system and is likely to have sound alike errors within the text.

## 2018-08-20 ENCOUNTER — TELEPHONE (OUTPATIENT)
Dept: ORTHOPEDICS | Facility: CLINIC | Age: 73
End: 2018-08-20

## 2018-08-20 NOTE — TELEPHONE ENCOUNTER
Spoke to Ms. Browna eb pain following distal IT band injection.  Her pain is much improved today. She called this morning cancel her appointment. She will keep her follow-up with me as scheduled.  She verbalizes understanding and agrees.

## 2018-08-22 ENCOUNTER — DOCUMENTATION ONLY (OUTPATIENT)
Dept: HEMATOLOGY/ONCOLOGY | Facility: CLINIC | Age: 73
End: 2018-08-22

## 2018-08-22 NOTE — PROGRESS NOTES
"Nurse called pt to scheduled her follow up with travis garcía np to exam her breast after her mammogram was completed, ms up stated, "i will continue to follow with my gyn doctor, its the same thing, thank you"    "

## 2018-08-30 ENCOUNTER — OFFICE VISIT (OUTPATIENT)
Dept: OBSTETRICS AND GYNECOLOGY | Facility: CLINIC | Age: 73
End: 2018-08-30
Payer: MEDICARE

## 2018-08-30 VITALS
SYSTOLIC BLOOD PRESSURE: 130 MMHG | WEIGHT: 135.38 LBS | DIASTOLIC BLOOD PRESSURE: 74 MMHG | BODY MASS INDEX: 24.91 KG/M2 | HEIGHT: 62 IN

## 2018-08-30 DIAGNOSIS — Z01.419 WELL WOMAN EXAM WITH ROUTINE GYNECOLOGICAL EXAM: Primary | ICD-10-CM

## 2018-08-30 PROCEDURE — 99212 OFFICE O/P EST SF 10 MIN: CPT | Mod: PBBFAC,PO | Performed by: OBSTETRICS & GYNECOLOGY

## 2018-08-30 PROCEDURE — G0101 CA SCREEN;PELVIC/BREAST EXAM: HCPCS | Mod: PBBFAC,PO | Performed by: OBSTETRICS & GYNECOLOGY

## 2018-08-30 PROCEDURE — 99999 PR PBB SHADOW E&M-EST. PATIENT-LVL II: CPT | Mod: PBBFAC,,, | Performed by: OBSTETRICS & GYNECOLOGY

## 2018-08-30 PROCEDURE — G0101 CA SCREEN;PELVIC/BREAST EXAM: HCPCS | Mod: S$PBB,,, | Performed by: OBSTETRICS & GYNECOLOGY

## 2018-08-30 PROCEDURE — 88175 CYTOPATH C/V AUTO FLUID REDO: CPT

## 2018-09-06 NOTE — PROGRESS NOTES
CHIEF COMPLAINT:   Gynecologic Exam  Chief Complaint   Patient presents with    Annual Exam       HISTORY OF PRESENT ILLNESS  Patient presents for annual exam. The patient has no complaints today.     No LMP recorded. Patient is postmenopausal.      GYN screening history: last Pap: was normal. Never had any abnormal Pap smears in past.  However states she very much wants to continue pap smears (we have reviewed ACOG recs for years now)    Reports no bowel movement irregularities from baseline, bloating, or weight loss.    HRT:   None         Health Maintenance   Topic Date Due    Zoster Vaccine  2006    Pneumococcal (65+) (1 of 2 - PCV13) 2011    Influenza Vaccine  2018    Lipid Panel  2019    Mammogram  2019    Pap Smear  2021    DEXA SCAN  2022    TETANUS VACCINE  2023    Colonoscopy  2025    Hepatitis C Screening  Completed       HISTORY  Patient Active Problem List   Diagnosis    Osteopenia    Fibroids    Nuclear sclerotic cataract of both eyes    Dry eyes    Refraction error - Both Eyes    Chest pain, atypical    Hyperlipidemia    Hypertension    Insomnia    Chest pain    Gastritis    Gastroesophageal reflux disease without esophagitis    Other constipation    Mastodynia    Constipation    Other symptoms involving digestive system(787.99)    Primary osteoarthritis of both knees    Chronic pain of both knees    Iliotibial band syndrome       Past Medical History:   Diagnosis Date    Cataract     Chest pain     Dry eyes     Gastritis     Hyperlipidemia     Hypertension     Insomnia     Osteopenia        Past Surgical History:   Procedure Laterality Date    BREAST BIOPSY Left     , benign     SECTION      x 1    KNEE ARTHROSCOPY Left  approx    KNEE SURGERY      left arm surgery      left knee surgery         Family History   Problem Relation Age of Onset    Hypertension Mother     Hypertension  Father     Breast cancer Neg Hx     Colon cancer Neg Hx     Ovarian cancer Neg Hx     Thrombophilia Neg Hx     Strabismus Neg Hx     Macular degeneration Neg Hx     Retinal detachment Neg Hx     Glaucoma Neg Hx     Blindness Neg Hx     Amblyopia Neg Hx        Social History     Socioeconomic History    Marital status:      Spouse name: None    Number of children: None    Years of education: None    Highest education level: None   Social Needs    Financial resource strain: None    Food insecurity - worry: None    Food insecurity - inability: None    Transportation needs - medical: None    Transportation needs - non-medical: None   Occupational History    None   Tobacco Use    Smoking status: Never Smoker    Smokeless tobacco: Never Used   Substance and Sexual Activity    Alcohol use: No     Alcohol/week: 0.0 oz    Drug use: No    Sexual activity: Not Currently     Partners: Male     Birth control/protection: None     Comment: mut monog   Other Topics Concern    None   Social History Narrative    None       Current Outpatient Medications   Medication Sig Dispense Refill    atorvastatin (LIPITOR) 10 MG tablet TAKE ONE TABLET BY MOUTH ONCE DAILY 90 tablet 3    CALCIUM CARBONATE (CALCIUM 600 ORAL) Take by mouth once daily.      DYAZIDE 37.5-25 mg per capsule TAKE ONE CAPSULE BY MOUTH ONCE DAILY 30 capsule 6    MV,CA,MIN/IRON/FA/GUARANA/CAFF (ONE-A-DAY WOMEN'S ACTIVE ORAL) Take by mouth once daily.      VIT A/C/E AC/ZNOX/CUPRIC OXIDE (EYE VITAMIN AND MINERALS ORAL) Take by mouth.       No current facility-administered medications for this visit.        Review of patient's allergies indicates:   Allergen Reactions    No known drug allergies            PHYSICAL EXAM     Vitals:    08/30/18 0816   BP: 130/74       PAIN SCALE: 0/10 None    ROS:  GENERAL: No fever, chills, fatigability or weight loss.  ABDOMEN: Appetite fine. No weight loss. Denies diarrhea, abdominal pain, hematemesis  or blood in stool.  No change in bowel movement pattern.  URINARY: No flank pain, dysuria or hematuria.  REPRODUCTIVE: No abnormal vaginal bleeding.  BREASTS: Breasts symmetric, nontender and no lumps detected.    PE:   APPEARANCE: Well nourished, well developed, in no acute distress.  NECK: Neck symmetric without masses or thyromegaly.    NODES: No inguinal lymph node enlargement.  ABDOMEN: Soft. No tenderness or masses. No hepatosplenomegaly. No hernias.  BREASTS: Symmetrical, no skin changes or visible lesions. No palpable masses, nipple discharge or adenopathy bilaterally.    PELVIC:   VULVA: No lesions.  Atrophic but normal female genitalia.  URETHRAL MEATUS: Normal size and location, no lesions, no prolapse.  URETHRA: No masses, tenderness, prolapse or scarring.  VAGINA: dry and narrow w/ atrophy, no discharge, no significant cystocele or rectocele.  CERVIX: No lesions, normal diameter, no stenosis, no cervical motion tenderness.  UTERUS: 6 week size, regular shape, mobile, non-tender, normal position, good support.  ADNEXA: No masses, tenderness or CDS nodularity.  ANUS PERINEUM: No lesions, no relaxation, external hemorrhoids noted.        DIAGNOSIS:   1. Normal gyn exam  2. Screening pap smear  3. Physiologic atrophy    PLAN:   Mammogram    Colonoscopy  dexa      MEDICATIONS PRESCRIBED:   Calcium vitamin D and weight bearing exercise    COUNSELING:  Patient was counseled today on A.C.S. Pap guidelines and recommendations for yearly pelvic exams, mammograms and monthly self breast exams; to see her PCP for other health maintenance.     FOLLOW-UP: With me in 1 year.

## 2018-09-27 ENCOUNTER — TELEPHONE (OUTPATIENT)
Dept: FAMILY MEDICINE | Facility: CLINIC | Age: 73
End: 2018-09-27

## 2018-09-27 NOTE — TELEPHONE ENCOUNTER
----- Message from [place] sent at 9/27/2018  4:27 PM CDT -----  Contact: pt  She is calling to cancel her nurse visit due to her insurance not covering the shingles shot, please advise [phone], [phone](Cell)

## 2018-09-27 NOTE — TELEPHONE ENCOUNTER
----- Message from Maureen Brower sent at 9/27/2018 10:46 AM CDT -----  Contact: self  Pt is calling in regards to shingles shot. Pt would like to receive shot on 10/1 if possible. Please call pt back at [phone] or [phone].    Thanks,   Maureen Brower

## 2018-10-09 ENCOUNTER — IMMUNIZATION (OUTPATIENT)
Dept: FAMILY MEDICINE | Facility: CLINIC | Age: 73
End: 2018-10-09
Payer: MEDICARE

## 2018-10-09 ENCOUNTER — OFFICE VISIT (OUTPATIENT)
Dept: FAMILY MEDICINE | Facility: CLINIC | Age: 73
End: 2018-10-09
Payer: MEDICARE

## 2018-10-09 VITALS
TEMPERATURE: 98 F | SYSTOLIC BLOOD PRESSURE: 120 MMHG | HEART RATE: 68 BPM | HEIGHT: 62 IN | WEIGHT: 137.13 LBS | DIASTOLIC BLOOD PRESSURE: 76 MMHG | OXYGEN SATURATION: 98 % | BODY MASS INDEX: 25.23 KG/M2

## 2018-10-09 DIAGNOSIS — Z00.00 ENCOUNTER FOR PREVENTIVE HEALTH EXAMINATION: Primary | ICD-10-CM

## 2018-10-09 DIAGNOSIS — M17.0 PRIMARY OSTEOARTHRITIS OF BOTH KNEES: ICD-10-CM

## 2018-10-09 DIAGNOSIS — E78.5 HYPERLIPIDEMIA, UNSPECIFIED HYPERLIPIDEMIA TYPE: ICD-10-CM

## 2018-10-09 DIAGNOSIS — I10 ESSENTIAL HYPERTENSION: ICD-10-CM

## 2018-10-09 DIAGNOSIS — M85.89 OSTEOPENIA OF MULTIPLE SITES: ICD-10-CM

## 2018-10-09 DIAGNOSIS — K59.00 CONSTIPATION, UNSPECIFIED CONSTIPATION TYPE: ICD-10-CM

## 2018-10-09 DIAGNOSIS — N18.30 CHRONIC KIDNEY DISEASE, STAGE III (MODERATE): ICD-10-CM

## 2018-10-09 DIAGNOSIS — G47.00 INSOMNIA, UNSPECIFIED TYPE: ICD-10-CM

## 2018-10-09 PROCEDURE — 99999 PR PBB SHADOW E&M-EST. PATIENT-LVL I: CPT | Mod: PBBFAC,,,

## 2018-10-09 PROCEDURE — G0439 PPPS, SUBSEQ VISIT: HCPCS | Mod: S$GLB,,, | Performed by: NURSE PRACTITIONER

## 2018-10-09 PROCEDURE — 99999 PR PBB SHADOW E&M-EST. PATIENT-LVL IV: CPT | Mod: PBBFAC,,, | Performed by: NURSE PRACTITIONER

## 2018-10-09 PROCEDURE — 99211 OFF/OP EST MAY X REQ PHY/QHP: CPT | Mod: PBBFAC,PO,25

## 2018-10-09 PROCEDURE — 99214 OFFICE O/P EST MOD 30 MIN: CPT | Mod: PBBFAC,25,27,PO | Performed by: NURSE PRACTITIONER

## 2018-10-09 PROCEDURE — 90662 IIV NO PRSV INCREASED AG IM: CPT | Mod: PBBFAC,PO

## 2018-10-09 NOTE — PATIENT INSTRUCTIONS
Counseling and Referral of Other Preventative  (Italic type indicates deductible and co-insurance are waived)    Patient Name: Mary Flanagan  Today's Date: 10/9/2018    Health Maintenance       Date Due Completion Date    Pneumococcal (65+) (1 of 2 - PCV13) 08/30/2011 ---    Influenza Vaccine 08/01/2018 9/26/2017    Lipid Panel 06/05/2019 6/5/2018    Mammogram 07/13/2019 7/13/2018    Pap Smear 08/30/2021 8/30/2018    Override on 7/1/2013: Done    DEXA SCAN 09/12/2022 9/12/2017    Override on 6/10/2013: Done    TETANUS VACCINE 06/28/2023 6/28/2013    Colonoscopy 01/23/2025 1/23/2015        No orders of the defined types were placed in this encounter.    The following information is provided to all patients.  This information is to help you find resources for any of the problems found today that may be affecting your health:                Living healthy guide: www.[facility].[place].[facility]      Understanding Diabetes: www.diabetes.org      Eating healthy: www.cdc.gov/healthyweight      [facility] home safety checklist: www.cdc.gov/steadi/patient.html      Agency on Aging: www.goea.[place]      Alcoholics anonymous (AA): www.aa.org      Physical Activity: www.wander.nih.gov/vu0tpqo      Tobacco use: www.quitwithusla.org     Counseling and Referral of Other Preventative  (Italic type indicates deductible and co-insurance are waived)    Patient Name: Mary Flanagan  Today's Date: 10/9/2018    Health Maintenance       Date Due Completion Date    Pneumococcal (65+) (1 of 2 - PCV13) 08/30/2011 ---    Influenza Vaccine 08/01/2018 9/26/2017    Lipid Panel 06/05/2019 6/5/2018    Mammogram 07/13/2019 7/13/2018    Pap Smear 08/30/2021 8/30/2018    Override on 7/1/2013: Done    DEXA SCAN 09/12/2022 9/12/2017    Override on 6/10/2013: Done    TETANUS VACCINE 06/28/2023 6/28/2013    Colonoscopy 01/23/2025 1/23/2015        No orders of the defined types were placed in this encounter.    The following information is provided to all patients.   This information is to help you find resources for any of the problems found today that may be affecting your health:                Living healthy guide: www.[facility]      Understanding Diabetes: www.diabetes.org      Eating healthy: www.cdc.gov/healthyweight      CDC home safety checklist: www.cdc.gov/steadi/patient.html      Agency on Aging: www.goea.[place]      Alcoholics anonymous (AA): www.aa.org      Physical Activity: www.wander.nih.gov/wo5oqgk      Tobacco use: www.quitwithusla.org

## 2018-10-09 NOTE — PROGRESS NOTES
"Mary Flanagan presented for a  Medicare AWV and comprehensive Health Risk Assessment today. The following components were reviewed and updated:    · Medical history  · Family History  · Social history  · Allergies and Current Medications  · Health Risk Assessment  · Health Maintenance  · Care Team     ** See Completed Assessments for Annual Wellness Visit within the encounter summary.**       The following assessments were completed:  · Living Situation  · CAGE  · Depression Screening  · Timed Get Up and Go  · Whisper Test  · Cognitive Function Screening  · Nutrition Screening  · ADL Screening  · PAQ Screening    Vitals:    10/09/18 1258   BP: 120/76   BP Location: Right arm   Patient Position: Sitting   Pulse: 68   Temp: 98.4 °F (36.9 °C)   TempSrc: Tympanic   SpO2: 98%   Weight: 62.2 kg (137 lb 2 oz)   Height: 5' 2" (1.575 m)     Body mass index is 25.08 kg/m².  Physical Exam   Constitutional: She appears well-developed.   HENT:   Head: Normocephalic and atraumatic.   Eyes: Pupils are equal, round, and reactive to light.   Neck: Carotid bruit is not present.   Cardiovascular: Normal rate, regular rhythm, normal heart sounds, intact distal pulses and normal pulses. Exam reveals no gallop.   No murmur heard.  Pulmonary/Chest: Effort normal and breath sounds normal.   Abdominal: Soft. Normal appearance and bowel sounds are normal. She exhibits no distension. There is no tenderness.   Musculoskeletal: Normal range of motion. She exhibits no edema or tenderness.   Neurological: She is alert. She exhibits normal muscle tone. Gait normal.   Skin: Skin is warm, dry and intact.   Psychiatric: She has a normal mood and affect. Her speech is normal and behavior is normal. Judgment and thought content normal. Cognition and memory are normal.   Nursing note and vitals reviewed.        Diagnoses and health risks identified today and associated recommendations/orders:    1. Encounter for preventive health examination    2. " Essential hypertension   Chronic and Stable on Dyazide. Continue current treatment plan as previously prescribed with your PCP    3. Hyperlipidemia, unspecified hyperlipidemia type  Component      Latest Ref Rng & Units 6/5/2018   Cholesterol      120 - 199 mg/dL 156   Triglycerides      30 - 150 mg/dL 77   HDL      40 - 75 mg/dL 55   LDL Cholesterol      63.0 - 159.0 mg/dL 85.6   HDL/Chol Ratio      20.0 - 50.0 % 35.3   Total Cholesterol/HDL Ratio      2.0 - 5.0 2.8   Non-HDL Cholesterol      mg/dL 101   Chronic and Stable on Lipitor. Continue current treatment plan as previously prescribed with your PCP    4. Insomnia, unspecified type  Chronic and Stable on no medications. Continue current treatment plan as previously prescribed with your PCP    5. Osteopenia of multiple sites  DEXA -Repeat in   3 years Chronic and Stable on Calcium and vitamin D  supplements. Continue current treatment plan as previously prescribed with your PCP    6. Chronic kidney disease, stage III (moderate)  eGFR if African American >60 mL/min/1.73 m^2 43.6 Abnormal   52.5 Abnormal   52.   This is a new problem that has been identified during today's visit. Please follow up with your PCP to discuss the next steps.    7. Constipation, unspecified constipation type  Chronic and Stable on diet modifications and fiber. Continue current treatment plan as previously prescribed with your PCP    8. Primary osteoarthritis of both knees  Chronic and Stable on Tylenol. Continue current treatment plan as previously prescribed with your PCP    FLU SHOT TODAY   I offered to discuss end of life issues, including information on how to make advance directives that the patient could use to name someone who would make medical decisions on their behalf if they became too ill to make themselves.  _X_Patient is interested, I provided paper work and offered to discuss.      Provided Mary with a 5-10 year written screening schedule and personal prevention plan.  Recommendations were developed using the USPSTF age appropriate recommendations. Education, counseling, and referrals were provided as needed. After Visit Summary printed and given to patient which includes a list of additional screenings\tests needed.    Follow-up in about 1 year (around 10/9/2019).    Gricelda Gonzalez NP

## 2018-10-09 NOTE — Clinical Note
Your patient was seen today for a HRA visit.Please review # [id number] have included a copy of my visit note, please review the note and feel free to contact me with any questions. Thank you for allowing me to participate in the care of your patients. Gricelda Gonzalez NP

## 2018-11-13 ENCOUNTER — OFFICE VISIT (OUTPATIENT)
Dept: FAMILY MEDICINE | Facility: CLINIC | Age: 73
End: 2018-11-13
Payer: MEDICARE

## 2018-11-13 ENCOUNTER — LAB VISIT (OUTPATIENT)
Dept: LAB | Facility: HOSPITAL | Age: 73
End: 2018-11-13
Payer: MEDICARE

## 2018-11-13 VITALS
OXYGEN SATURATION: 98 % | DIASTOLIC BLOOD PRESSURE: 60 MMHG | RESPIRATION RATE: 16 BRPM | HEIGHT: 62 IN | HEART RATE: 84 BPM | WEIGHT: 134.56 LBS | TEMPERATURE: 98 F | SYSTOLIC BLOOD PRESSURE: 112 MMHG | BODY MASS INDEX: 24.76 KG/M2

## 2018-11-13 DIAGNOSIS — E78.5 HYPERLIPIDEMIA, UNSPECIFIED HYPERLIPIDEMIA TYPE: ICD-10-CM

## 2018-11-13 DIAGNOSIS — M85.89 OSTEOPENIA OF MULTIPLE SITES: ICD-10-CM

## 2018-11-13 DIAGNOSIS — N18.2 STAGE 2 CHRONIC KIDNEY DISEASE: ICD-10-CM

## 2018-11-13 DIAGNOSIS — I10 ESSENTIAL HYPERTENSION: ICD-10-CM

## 2018-11-13 DIAGNOSIS — I10 ESSENTIAL HYPERTENSION: Primary | ICD-10-CM

## 2018-11-13 LAB
ALBUMIN SERPL BCP-MCNC: 4 G/DL
ALP SERPL-CCNC: 102 U/L
ALT SERPL W/O P-5'-P-CCNC: 12 U/L
ANION GAP SERPL CALC-SCNC: 10 MMOL/L
AST SERPL-CCNC: 24 U/L
BILIRUB SERPL-MCNC: 0.7 MG/DL
BUN SERPL-MCNC: 23 MG/DL
CALCIUM SERPL-MCNC: 10.5 MG/DL
CHLORIDE SERPL-SCNC: 101 MMOL/L
CHOLEST SERPL-MCNC: 153 MG/DL
CHOLEST/HDLC SERPL: 2.6 {RATIO}
CO2 SERPL-SCNC: 27 MMOL/L
CREAT SERPL-MCNC: 1.2 MG/DL
EST. GFR  (AFRICAN AMERICAN): 52.2 ML/MIN/1.73 M^2
EST. GFR  (NON AFRICAN AMERICAN): 45.3 ML/MIN/1.73 M^2
GLUCOSE SERPL-MCNC: 89 MG/DL
HDLC SERPL-MCNC: 60 MG/DL
HDLC SERPL: 39.2 %
LDLC SERPL CALC-MCNC: 76.2 MG/DL
NONHDLC SERPL-MCNC: 93 MG/DL
POTASSIUM SERPL-SCNC: 4.1 MMOL/L
PROT SERPL-MCNC: 7.4 G/DL
SODIUM SERPL-SCNC: 138 MMOL/L
TRIGL SERPL-MCNC: 84 MG/DL
TSH SERPL DL<=0.005 MIU/L-ACNC: 2.55 UIU/ML

## 2018-11-13 PROCEDURE — 99999 PR PBB SHADOW E&M-EST. PATIENT-LVL IV: CPT | Mod: PBBFAC,,, | Performed by: INTERNAL MEDICINE

## 2018-11-13 PROCEDURE — 99215 OFFICE O/P EST HI 40 MIN: CPT | Mod: S$PBB,,, | Performed by: INTERNAL MEDICINE

## 2018-11-13 PROCEDURE — 90670 PCV13 VACCINE IM: CPT | Mod: PBBFAC,PO

## 2018-11-13 PROCEDURE — 99214 OFFICE O/P EST MOD 30 MIN: CPT | Mod: PBBFAC,PO,25 | Performed by: INTERNAL MEDICINE

## 2018-11-13 PROCEDURE — 36415 COLL VENOUS BLD VENIPUNCTURE: CPT | Mod: PO

## 2018-11-13 PROCEDURE — 80053 COMPREHEN METABOLIC PANEL: CPT

## 2018-11-13 PROCEDURE — 84443 ASSAY THYROID STIM HORMONE: CPT

## 2018-11-13 PROCEDURE — 80061 LIPID PANEL: CPT

## 2018-11-13 NOTE — PROGRESS NOTES
Subjective:       Patient ID: Mary Flanagan is a 73 y.o. female.    Chief Complaint: Follow-up; Immunizations; Hypertension; Hyperlipidemia; Osteopenia; and Chronic Kidney Disease    Hypertension   This is a chronic problem. The problem is controlled. Pertinent negatives include no chest pain, headaches, neck pain, palpitations or shortness of breath.   Hyperlipidemia   Pertinent negatives include no chest pain, myalgias or shortness of breath. Current antihyperlipidemic treatment includes statins. The current treatment provides significant improvement of lipids.     Past Medical History:   Diagnosis Date    Cataract     Chest pain     Dry eyes     Gastritis     Hyperlipidemia     Hypertension     Insomnia     Osteopenia      Past Surgical History:   Procedure Laterality Date    BREAST BIOPSY Left     , benign     SECTION      x 1    COLONOSCOPY N/A 2015    [facility] by [facility]O    KNEE ARTHROSCOPY Left  approx    KNEE SURGERY      left arm surgery      left knee surgery       Family History   Problem Relation Age of Onset    Hypertension Mother     Hypertension Father     Breast cancer Neg Hx     Colon cancer Neg Hx     Ovarian cancer Neg Hx     Thrombophilia Neg Hx     Strabismus Neg Hx     Macular degeneration Neg Hx     Retinal detachment Neg Hx     Glaucoma Neg Hx     Blindness Neg Hx     Amblyopia Neg Hx      Social History     Socioeconomic History    Marital status:      Spouse name: Not on file    Number of children: Not on file    Years of education: Not on file    Highest education level: Not on file   Social Needs    Financial resource strain: Not on file    Food insecurity - worry: Not on file    Food insecurity - inability: Not on file    Transportation needs - medical: Not on file    Transportation needs - non-medical: Not on file   Occupational History    Not on file   Tobacco Use    Smoking status: Never Smoker     Smokeless tobacco: Never Used   Substance and Sexual Activity    Alcohol use: No     Alcohol/week: 0.0 oz    Drug use: No    Sexual activity: Not Currently     Partners: Male     Birth control/protection: None     Comment: mut monog   Other Topics Concern    Not on file   Social History Narrative    Not on file     Review of Systems   Constitutional: Negative for activity change, appetite change, chills, diaphoresis, fatigue, fever and unexpected weight change.   HENT: Negative for congestion, drooling, ear discharge, ear pain, facial swelling, hearing loss, mouth sores, nosebleeds, postnasal drip, rhinorrhea, sinus pressure, sneezing, sore throat, tinnitus, trouble swallowing and voice change.    Eyes: Negative for photophobia, redness and visual disturbance.   Respiratory: Negative for apnea, cough, choking, chest tightness, shortness of breath and wheezing.    Cardiovascular: Negative for chest pain, palpitations and leg swelling.   Gastrointestinal: Negative for abdominal distention, abdominal pain, blood in stool, constipation, diarrhea, nausea and vomiting.   Endocrine: Negative for cold intolerance, heat intolerance, polydipsia, polyphagia and polyuria.   Genitourinary: Negative for decreased urine volume, difficulty urinating, dysuria, flank pain, frequency, hematuria and urgency.   Musculoskeletal: Negative for arthralgias, back pain, gait problem, joint swelling, myalgias, neck pain and neck stiffness.   Skin: Negative for color change, pallor, rash and wound.   Allergic/Immunologic: Negative for food allergies and immunocompromised state.   Neurological: Negative for dizziness, tremors, seizures, syncope, speech difficulty, weakness, light-headedness, numbness and headaches.   Hematological: Negative for adenopathy. Does not bruise/bleed easily.   Psychiatric/Behavioral: Negative for agitation, behavioral problems, confusion, decreased concentration, dysphoric mood, hallucinations, self-injury,  sleep disturbance and suicidal ideas. The patient is not nervous/anxious and is not hyperactive.    All other systems reviewed and are negative.      Objective:      Physical Exam   Constitutional: She is oriented to person, place, and time. She appears well-developed and well-nourished. No distress.   HENT:   Head: Normocephalic and atraumatic.   Neck: Normal range of motion. Neck supple. No JVD present. Carotid bruit is not present. No tracheal deviation present. No thyromegaly present.   Cardiovascular: Normal rate, regular rhythm, normal heart sounds and intact distal pulses.   Pulmonary/Chest: Effort normal and breath sounds normal. No respiratory distress. She has no wheezes. She has no rales. She exhibits no tenderness.   Abdominal: Soft. Bowel sounds are normal. She exhibits no distension. There is no tenderness. There is no rebound and no guarding.   Musculoskeletal: Normal range of motion. She exhibits no edema or tenderness.   Lymphadenopathy:     She has no cervical adenopathy.   Neurological: She is alert and oriented to person, place, and time.   Skin: Skin is warm and dry. No rash noted. She is not diaphoretic. No erythema. No pallor.   Psychiatric: She has a normal mood and affect. Her behavior is normal. Judgment and thought content normal.   Nursing note and vitals reviewed.      CMP  Sodium   Date Value Ref Range Status   06/05/2018 139 136 - 145 mmol/L Final     Potassium   Date Value Ref Range Status   06/05/2018 3.7 3.5 - 5.1 mmol/L Final     Chloride   Date Value Ref Range Status   06/05/2018 100 95 - 110 mmol/L Final     CO2   Date Value Ref Range Status   06/05/2018 29 23 - 29 mmol/L Final     Glucose   Date Value Ref Range Status   06/05/2018 91 70 - 110 mg/dL Final     BUN, Bld   Date Value Ref Range Status   06/05/2018 27 (H) 8 - 23 mg/dL Final     Creatinine   Date Value Ref Range Status   06/05/2018 1.4 0.5 - 1.4 mg/dL Final     Calcium   Date Value Ref Range Status   06/05/2018 10.3  8.7 - 10.5 mg/dL Final     Total Protein   Date Value Ref Range Status   06/05/2018 7.2 6.0 - 8.4 g/dL Final     Albumin   Date Value Ref Range Status   06/05/2018 3.9 3.5 - 5.2 g/dL Final     Total Bilirubin   Date Value Ref Range Status   06/05/2018 0.6 0.1 - 1.0 mg/dL Final     Comment:     For infants and newborns, interpretation of results should be based  on gestational age, weight and in agreement with clinical  observations.  Premature Infant recommended reference ranges:  Up to 24 hours.............<8.0 mg/dL  Up to 48 hours............<12.0 mg/dL  3-5 days..................<15.0 mg/dL  6-29 days.................<15.0 mg/dL       Alkaline Phosphatase   Date Value Ref Range Status   06/05/2018 84 55 - 135 U/L Final     AST   Date Value Ref Range Status   06/05/2018 18 10 - 40 U/L Final     ALT   Date Value Ref Range Status   06/05/2018 9 (L) 10 - 44 U/L Final     Anion Gap   Date Value Ref Range Status   06/05/2018 10 8 - 16 mmol/L Final     eGFR if    Date Value Ref Range Status   06/05/2018 43.6 (A) >60 mL/min/1.73 m^2 Final     eGFR if non    Date Value Ref Range Status   06/05/2018 37.8 (A) >60 mL/min/1.73 m^2 Final     Comment:     Calculation used to obtain the estimated glomerular filtration  rate (eGFR) is the CKD-EPI equation.        Lab Results   Component Value Date    WBC 5.82 03/06/2018    HGB 11.7 (L) 03/06/2018    HCT 37.0 03/06/2018    MCV 93 03/06/2018     03/06/2018     Lab Results   Component Value Date    CHOL 156 06/05/2018     Lab Results   Component Value Date    HDL 55 06/05/2018     Lab Results   Component Value Date    LDLCALC 85.6 06/05/2018     Lab Results   Component Value Date    TRIG 77 06/05/2018     Lab Results   Component Value Date    CHOLHDL 35.3 06/05/2018     Lab Results   Component Value Date    TSH 1.219 04/22/2015     No results found for: LABA1C, HGBA1C  Assessment:       1. Essential hypertension    2. Hyperlipidemia,  unspecified hyperlipidemia type    3. Osteopenia of multiple sites    4. Stage 2 chronic kidney disease        Plan:   Essential hypertension----stable.  -     Comprehensive metabolic panel; Future; Expected date: 11/13/2018  -     TSH; Future; Expected date: 11/13/2018    Hyperlipidemia, unspecified hyperlipidemia type------------stable.  -     Lipid panel; Future; Expected date: 11/13/2018    Osteopenia of multiple sites    Stage 2 chronic kidney disease-stable.    Other orders  -     (In Office Administered) Pneumococcal Conjugate Vaccine (13 Valent) (IM)    F/u 6 months.

## 2018-11-14 ENCOUNTER — TELEPHONE (OUTPATIENT)
Dept: FAMILY MEDICINE | Facility: CLINIC | Age: 73
End: 2018-11-14

## 2018-11-14 NOTE — TELEPHONE ENCOUNTER
----- Message from Jo Wiggins sent at 11/14/2018 11:18 AM CST -----  Contact: self   Requesting a call back regarding after summary that stated that she has stage two kidney failure..please call back at [phone].    Thanks,  Jo Wiggins

## 2018-11-15 ENCOUNTER — OFFICE VISIT (OUTPATIENT)
Dept: FAMILY MEDICINE | Facility: CLINIC | Age: 73
End: 2018-11-15
Payer: MEDICARE

## 2018-11-15 VITALS
DIASTOLIC BLOOD PRESSURE: 70 MMHG | HEIGHT: 62 IN | TEMPERATURE: 98 F | SYSTOLIC BLOOD PRESSURE: 138 MMHG | OXYGEN SATURATION: 98 % | BODY MASS INDEX: 24.85 KG/M2 | WEIGHT: 135.06 LBS | HEART RATE: 91 BPM

## 2018-11-15 DIAGNOSIS — N18.2 STAGE 2 CHRONIC KIDNEY DISEASE: Primary | ICD-10-CM

## 2018-11-15 PROCEDURE — 99214 OFFICE O/P EST MOD 30 MIN: CPT | Mod: PBBFAC,PO | Performed by: INTERNAL MEDICINE

## 2018-11-15 PROCEDURE — 99999 PR PBB SHADOW E&M-EST. PATIENT-LVL IV: CPT | Mod: PBBFAC,,, | Performed by: INTERNAL MEDICINE

## 2018-11-15 PROCEDURE — 99213 OFFICE O/P EST LOW 20 MIN: CPT | Mod: S$PBB,,, | Performed by: INTERNAL MEDICINE

## 2018-11-15 NOTE — PROGRESS NOTES
Subjective:       Patient ID: Mary Flanagan is a 73 y.o. female.    Chief Complaint: Follow-up (CKD)    F/u CKD---------doing well---------no new symptoms today--------      Past Medical History:   Diagnosis Date    Cataract     Chest pain     Dry eyes     Gastritis     Hyperlipidemia     Hypertension     Insomnia     Osteopenia      Past Surgical History:   Procedure Laterality Date    BREAST BIOPSY Left     , benign     SECTION      x 1    COLONOSCOPY N/A 2015    Performed by [facility]oto [facility] at [facility] ENDO    KNEE ARTHROSCOPY Left  approx    KNEE SURGERY      left arm surgery      left knee surgery       Family History   Problem Relation Age of Onset    Hypertension Mother     Hypertension Father     Breast cancer Neg Hx     Colon cancer Neg Hx     Ovarian cancer Neg Hx     Thrombophilia Neg Hx     Strabismus Neg Hx     Macular degeneration Neg Hx     Retinal detachment Neg Hx     Glaucoma Neg Hx     Blindness Neg Hx     Amblyopia Neg Hx      Social History     Socioeconomic History    Marital status:      Spouse name: Not on file    Number of children: Not on file    Years of education: Not on file    Highest education level: Not on file   Social Needs    Financial resource strain: Not on file    Food insecurity - worry: Not on file    Food insecurity - inability: Not on file    Transportation needs - medical: Not on file    Transportation needs - non-medical: Not on file   Occupational History    Not on file   Tobacco Use    Smoking status: Never Smoker    Smokeless tobacco: Never Used   Substance and Sexual Activity    Alcohol use: No     Alcohol/week: 0.0 oz    Drug use: No    Sexual activity: Not Currently     Partners: Male     Birth control/protection: None     Comment: mut monog   Other Topics Concern    Not on file   Social History Narrative    Not on file     Review of Systems   Constitutional: Negative for activity change,  appetite change, chills, diaphoresis, fatigue, fever and unexpected weight change.   HENT: Negative for congestion, drooling, ear discharge, ear pain, facial swelling, hearing loss, mouth sores, nosebleeds, postnasal drip, rhinorrhea, sinus pressure, sneezing, sore throat, tinnitus, trouble swallowing and voice change.    Eyes: Negative for photophobia, redness and visual disturbance.   Respiratory: Negative for apnea, cough, choking, chest tightness, shortness of breath and wheezing.    Cardiovascular: Negative for chest pain, palpitations and leg swelling.   Gastrointestinal: Negative for abdominal distention, abdominal pain, blood in stool, constipation, diarrhea, nausea and vomiting.   Endocrine: Negative for cold intolerance, heat intolerance, polydipsia, polyphagia and polyuria.   Genitourinary: Negative for decreased urine volume, difficulty urinating, dysuria, flank pain, frequency, hematuria and urgency.   Musculoskeletal: Negative for arthralgias, back pain, gait problem, joint swelling, myalgias, neck pain and neck stiffness.   Skin: Negative for color change, pallor, rash and wound.   Allergic/Immunologic: Negative for food allergies and immunocompromised state.   Neurological: Negative for dizziness, tremors, seizures, syncope, speech difficulty, weakness, light-headedness, numbness and headaches.   Hematological: Negative for adenopathy. Does not bruise/bleed easily.   Psychiatric/Behavioral: Negative for agitation, behavioral problems, confusion, decreased concentration, dysphoric mood, hallucinations, self-injury, sleep disturbance and suicidal ideas. The patient is not nervous/anxious and is not hyperactive.    All other systems reviewed and are negative.      Objective:      Physical Exam   Constitutional: She is oriented to person, place, and time.   HENT:   Head: Normocephalic and atraumatic.   Cardiovascular: Normal rate, regular rhythm, normal heart sounds and intact distal pulses.    Pulmonary/Chest: Effort normal and breath sounds normal.   Abdominal: Soft. Bowel sounds are normal.   Musculoskeletal: Normal range of motion.   Neurological: She is alert and oriented to person, place, and time.   Psychiatric: She has a normal mood and affect. Her behavior is normal. Judgment and thought content normal.   Nursing note and vitals reviewed.      CMP  Sodium   Date Value Ref Range Status   11/13/2018 138 136 - 145 mmol/L Final     Potassium   Date Value Ref Range Status   11/13/2018 4.1 3.5 - 5.1 mmol/L Final     Chloride   Date Value Ref Range Status   11/13/2018 101 95 - 110 mmol/L Final     CO2   Date Value Ref Range Status   11/13/2018 27 23 - 29 mmol/L Final     Glucose   Date Value Ref Range Status   11/13/2018 89 70 - 110 mg/dL Final     BUN, Bld   Date Value Ref Range Status   11/13/2018 23 8 - 23 mg/dL Final     Creatinine   Date Value Ref Range Status   11/13/2018 1.2 0.5 - 1.4 mg/dL Final     Calcium   Date Value Ref Range Status   11/13/2018 10.5 8.7 - 10.5 mg/dL Final     Total Protein   Date Value Ref Range Status   11/13/2018 7.4 6.0 - 8.4 g/dL Final     Albumin   Date Value Ref Range Status   11/13/2018 4.0 3.5 - 5.2 g/dL Final     Total Bilirubin   Date Value Ref Range Status   11/13/2018 0.7 0.1 - 1.0 mg/dL Final     Comment:     For infants and newborns, interpretation of results should be based  on gestational age, weight and in agreement with clinical  observations.  Premature Infant recommended reference ranges:  Up to 24 hours.............<8.0 mg/dL  Up to 48 hours............<12.0 mg/dL  3-5 days..................<15.0 mg/dL  6-29 days.................<15.0 mg/dL       Alkaline Phosphatase   Date Value Ref Range Status   11/13/2018 102 55 - 135 U/L Final     AST   Date Value Ref Range Status   11/13/2018 24 10 - 40 U/L Final     ALT   Date Value Ref Range Status   11/13/2018 12 10 - 44 U/L Final     Anion Gap   Date Value Ref Range Status   11/13/2018 10 8 - 16 mmol/L Final      eGFR if    Date Value Ref Range Status   11/13/2018 52.2 (A) >60 mL/min/1.73 m^2 Final     eGFR if non    Date Value Ref Range Status   11/13/2018 45.3 (A) >60 mL/min/1.73 m^2 Final     Comment:     Calculation used to obtain the estimated glomerular filtration  rate (eGFR) is the CKD-EPI equation.        Lab Results   Component Value Date    WBC 5.82 03/06/2018    HGB 11.7 (L) 03/06/2018    HCT 37.0 03/06/2018    MCV 93 03/06/2018     03/06/2018     Lab Results   Component Value Date    CHOL 153 11/13/2018     Lab Results   Component Value Date    HDL 60 11/13/2018     Lab Results   Component Value Date    LDLCALC 76.2 11/13/2018     Lab Results   Component Value Date    TRIG 84 11/13/2018     Lab Results   Component Value Date    CHOLHDL 39.2 11/13/2018     Lab Results   Component Value Date    TSH 2.548 11/13/2018     No results found for: LABA1C, HGBA1C  Assessment:       1. Stage 2 chronic kidney disease        Plan:   Stage 2 chronic kidney disease------------------stable.  -     Ambulatory referral to Nephrology    F/u as scheduled.

## 2018-11-16 ENCOUNTER — LAB VISIT (OUTPATIENT)
Dept: LAB | Facility: HOSPITAL | Age: 73
End: 2018-11-16
Attending: INTERNAL MEDICINE
Payer: MEDICARE

## 2018-11-16 ENCOUNTER — OFFICE VISIT (OUTPATIENT)
Dept: NEPHROLOGY | Facility: CLINIC | Age: 73
End: 2018-11-16
Payer: MEDICARE

## 2018-11-16 VITALS
SYSTOLIC BLOOD PRESSURE: 110 MMHG | WEIGHT: 134.69 LBS | DIASTOLIC BLOOD PRESSURE: 64 MMHG | HEART RATE: 72 BPM | BODY MASS INDEX: 24.78 KG/M2 | HEIGHT: 62 IN

## 2018-11-16 DIAGNOSIS — N18.30 CHRONIC KIDNEY DISEASE (CKD), STAGE III (MODERATE): ICD-10-CM

## 2018-11-16 DIAGNOSIS — N18.30 CHRONIC KIDNEY DISEASE (CKD), STAGE III (MODERATE): Primary | ICD-10-CM

## 2018-11-16 LAB
BILIRUB UR QL STRIP: NEGATIVE
CLARITY UR: CLEAR
COLOR UR: YELLOW
GLUCOSE UR QL STRIP: NEGATIVE
HGB UR QL STRIP: NEGATIVE
KETONES UR QL STRIP: NEGATIVE
LEUKOCYTE ESTERASE UR QL STRIP: ABNORMAL
MICROSCOPIC COMMENT: NORMAL
NITRITE UR QL STRIP: NEGATIVE
PH UR STRIP: 7 [PH] (ref 5–8)
PROT UR QL STRIP: NEGATIVE
SP GR UR STRIP: 1.01 (ref 1–1.03)
URN SPEC COLLECT METH UR: ABNORMAL
WBC #/AREA URNS HPF: 1 /HPF (ref 0–5)

## 2018-11-16 PROCEDURE — 99213 OFFICE O/P EST LOW 20 MIN: CPT | Mod: PBBFAC,PO | Performed by: INTERNAL MEDICINE

## 2018-11-16 PROCEDURE — 81000 URINALYSIS NONAUTO W/SCOPE: CPT | Mod: PO

## 2018-11-16 PROCEDURE — 99999 PR PBB SHADOW E&M-EST. PATIENT-LVL III: CPT | Mod: PBBFAC,,, | Performed by: INTERNAL MEDICINE

## 2018-11-16 PROCEDURE — 99215 OFFICE O/P EST HI 40 MIN: CPT | Mod: S$PBB,ICN,, | Performed by: INTERNAL MEDICINE

## 2018-11-16 NOTE — PROGRESS NOTES
Subjective:       Patient ID: Mary Flanagan is a 73 y.o.  female who presents for new evaluation of CKD stage 3 , HTN ,     Raul Hill MD      HPI :  Mary Flanagan is a pleasant 73-year-old  woman seen in office today in consultation for above medical problems on referral from primary MD.  Pertinent previous provider notes and lab results were reviewed.  Patient has history of hypertension that is well controlled on 1 medication, she has chronic kidney disease stage 3, recent serum creatinine is 1.2 mg/dL with a GFR of about 50 mL/minute.  Patient denies NSAID use.  No family history of kidney disease.        Past Medical History:   Diagnosis Date    Cataract     Chest pain     CKD (chronic kidney disease) stage 3, GFR 30-59 ml/min     Dry eyes     Gastritis     Hyperlipidemia     Hypertension     Insomnia     Osteopenia        Past Surgical History:   Procedure Laterality Date    BREAST BIOPSY Left     , benign     SECTION      x 1    COLONOSCOPY N/A 2015    Performed [facility] ENDO    KNEE ARTHROSCOPY Left  approx    KNEE SURGERY      left arm surgery      left knee surgery         Review of patient's allergies indicates:   Allergen Reactions    No known drug allergies          Current Outpatient Medications on File Prior to Visit   Medication Sig Dispense Refill    atorvastatin (LIPITOR) 10 MG tablet TAKE ONE TABLET BY MOUTH ONCE DAILY 90 tablet 3    CALCIUM CARBONATE (CALCIUM 600 ORAL) Take by mouth once daily.      DYAZIDE 37.5-25 mg per capsule TAKE ONE CAPSULE BY MOUTH ONCE DAILY 30 capsule 6    MV,CA,MIN/IRON/FA/GUARANA/CAFF (ONE-A-DAY WOMEN'S ACTIVE ORAL) Take by mouth once daily.      VIT A/C/E AC/ZNOX/CUPRIC OXIDE (EYE VITAMIN AND MINERALS ORAL) Take by mouth.       No current facility-administered medications on file prior to visit.        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   HENT: Negative for congestion, facial swelling, sore throat, trouble swallowing and voice change.    Eyes: Negative for redness and visual disturbance.   Respiratory: Negative for apnea, cough, chest tightness, shortness of breath and wheezing.    Cardiovascular: Negative for chest pain, palpitations and leg swelling.   Gastrointestinal: Negative for abdominal distention, abdominal pain, blood in stool, constipation, diarrhea, nausea and vomiting.   Genitourinary: Negative for decreased urine volume, difficulty urinating, dysuria, flank pain, frequency, hematuria, pelvic pain and urgency.   Musculoskeletal: Negative for back pain, gait problem and joint swelling.   Skin: Negative for color change and rash.   Neurological: Negative for dizziness, syncope, weakness and headaches.   Hematological: Does not bruise/bleed easily.   Psychiatric/Behavioral: Negative for agitation, behavioral problems and confusion. The patient is not nervous/anxious.      :          Objective:           Vitals:    11/16/18 1103   BP: 110/64   Pulse: 72       Weight 134 lb      Physical Exam   Constitutional: She is oriented to person, place, and time. She appears well-developed and well-nourished. No distress.   HENT:   Head: Normocephalic and atraumatic.   Mouth/Throat: Oropharynx is clear and moist. No oropharyngeal exudate.   Eyes: Conjunctivae and EOM are normal. Pupils are equal, round, and reactive to light.   Neck: Normal range of motion. Neck supple. No JVD present. Carotid bruit is not present. No tracheal deviation present. No thyroid mass and no thyromegaly present.   Cardiovascular: Normal rate, regular rhythm, normal heart sounds and intact distal pulses. Exam reveals no gallop and no friction rub.   No murmur heard.  Pulmonary/Chest: Effort normal and breath sounds normal. No  respiratory distress. She has no wheezes. She has no rales. She exhibits no tenderness.   Abdominal: Soft. Bowel sounds are normal. She exhibits no distension, no abdominal bruit, no ascites and no mass. There is no hepatosplenomegaly. There is no tenderness. There is no rebound, no guarding and no CVA tenderness.   Musculoskeletal: Normal range of motion. She exhibits no edema or tenderness.   Lymphadenopathy:     She has no cervical adenopathy.   Neurological: She is alert and oriented to person, place, and time. She has normal reflexes. She displays normal reflexes. No cranial nerve deficit. She exhibits normal muscle tone. Coordination normal.   Skin: Skin is warm and intact. No rash noted. No erythema. No pallor.   Psychiatric: She has a normal mood and affect. Her behavior is normal. Judgment normal.     :              Labs:    Lab Results   Component Value Date    CREATININE 1.2 11/13/2018    BUN 23 11/13/2018     11/13/2018    K 4.1 11/13/2018     11/13/2018    CO2 27 11/13/2018       Lab Results   Component Value Date    WBC 5.82 03/06/2018    HGB 11.7 (L) 03/06/2018    HCT 37.0 03/06/2018    MCV 93 03/06/2018     03/06/2018       Lab Results   Component Value Date    CALCIUM 10.5 11/13/2018       Lab Results   Component Value Date    ALBUMIN 4.0 11/13/2018       Impression and Plan :  73-year-old  woman seen in office today in consultation for following medical problems    1.  Chronic kidney disease stage 3 - recent lab results reviewed and discussed with the patient, serum creatinine is 1.2 mg/dL and GFR of about 50 mL/minute.  Age related loss in renal function.  Advised patient to minimize/avoid NSAID use.    2.  Hypertension - blood pressure is controlled is controlled on Dyazide,    3.  Volume - euvolemic on exam,    4.  Check urinalysis to evaluate for proteinuria and hematuria    Return to clinic in 1 year, more than 40 min of face-to-face time was spent with the  patient discussing labs and plan of care, also time was spent educating her about chronic kidney disease.  Thank you Dr. Hill for involving us in the care of this pleasant woman.    Sincerely,    Sascha Dean MD    Cc to Raul Hill MD

## 2018-11-16 NOTE — PATIENT INSTRUCTIONS
Avoid NSAID pain medications such as advil, aleve, motrin, ibuprofen, naprosyn, meloxicam, diclofenac, mobic.      goal blood pressure is less than 130/80     keep a track of daily BPs and bring at next appt    Fluid restriction about 50-60 oz a day

## 2018-12-14 RX ORDER — ATORVASTATIN CALCIUM 10 MG/1
10 TABLET, FILM COATED ORAL DAILY
Qty: 90 TABLET | Refills: 3 | Status: SHIPPED | OUTPATIENT
Start: 2018-12-14 | End: 2019-09-16

## 2019-04-08 RX ORDER — TRIAMTERENE AND HYDROCHLOROTHIAZIDE 37.5; 25 MG/1; MG/1
CAPSULE ORAL
Qty: 90 CAPSULE | Refills: 3 | Status: SHIPPED | OUTPATIENT
Start: 2019-04-08 | End: 2020-05-07

## 2019-05-13 ENCOUNTER — LAB VISIT (OUTPATIENT)
Dept: LAB | Facility: HOSPITAL | Age: 74
End: 2019-05-13
Payer: MEDICARE

## 2019-05-13 ENCOUNTER — OFFICE VISIT (OUTPATIENT)
Dept: FAMILY MEDICINE | Facility: CLINIC | Age: 74
End: 2019-05-13
Payer: MEDICARE

## 2019-05-13 VITALS
OXYGEN SATURATION: 97 % | TEMPERATURE: 98 F | WEIGHT: 132.63 LBS | HEART RATE: 86 BPM | DIASTOLIC BLOOD PRESSURE: 67 MMHG | SYSTOLIC BLOOD PRESSURE: 135 MMHG | BODY MASS INDEX: 24.25 KG/M2

## 2019-05-13 DIAGNOSIS — I10 ESSENTIAL HYPERTENSION: ICD-10-CM

## 2019-05-13 DIAGNOSIS — N18.2 STAGE 2 CHRONIC KIDNEY DISEASE: ICD-10-CM

## 2019-05-13 DIAGNOSIS — E78.5 HYPERLIPIDEMIA, UNSPECIFIED HYPERLIPIDEMIA TYPE: ICD-10-CM

## 2019-05-13 DIAGNOSIS — I10 ESSENTIAL HYPERTENSION: Primary | ICD-10-CM

## 2019-05-13 LAB
ALBUMIN SERPL BCP-MCNC: 4 G/DL (ref 3.5–5.2)
ALP SERPL-CCNC: 99 U/L (ref 55–135)
ALT SERPL W/O P-5'-P-CCNC: 11 U/L (ref 10–44)
ANION GAP SERPL CALC-SCNC: 14 MMOL/L (ref 8–16)
AST SERPL-CCNC: 20 U/L (ref 10–40)
BASOPHILS # BLD AUTO: 0.05 K/UL (ref 0–0.2)
BASOPHILS NFR BLD: 0.9 % (ref 0–1.9)
BILIRUB SERPL-MCNC: 0.6 MG/DL (ref 0.1–1)
BUN SERPL-MCNC: 18 MG/DL (ref 8–23)
CALCIUM SERPL-MCNC: 11.2 MG/DL (ref 8.7–10.5)
CHLORIDE SERPL-SCNC: 99 MMOL/L (ref 95–110)
CHOLEST SERPL-MCNC: 147 MG/DL (ref 120–199)
CHOLEST/HDLC SERPL: 2.5 {RATIO} (ref 2–5)
CO2 SERPL-SCNC: 26 MMOL/L (ref 23–29)
CREAT SERPL-MCNC: 1.3 MG/DL (ref 0.5–1.4)
DIFFERENTIAL METHOD: ABNORMAL
EOSINOPHIL # BLD AUTO: 0.1 K/UL (ref 0–0.5)
EOSINOPHIL NFR BLD: 1.7 % (ref 0–8)
ERYTHROCYTE [DISTWIDTH] IN BLOOD BY AUTOMATED COUNT: 13.4 % (ref 11.5–14.5)
EST. GFR  (AFRICAN AMERICAN): 47.4 ML/MIN/1.73 M^2
EST. GFR  (NON AFRICAN AMERICAN): 41.1 ML/MIN/1.73 M^2
GLUCOSE SERPL-MCNC: 95 MG/DL (ref 70–110)
HCT VFR BLD AUTO: 39.7 % (ref 37–48.5)
HDLC SERPL-MCNC: 58 MG/DL (ref 40–75)
HDLC SERPL: 39.5 % (ref 20–50)
HGB BLD-MCNC: 12.2 G/DL (ref 12–16)
IMM GRANULOCYTES # BLD AUTO: 0.02 K/UL (ref 0–0.04)
IMM GRANULOCYTES NFR BLD AUTO: 0.4 % (ref 0–0.5)
LDLC SERPL CALC-MCNC: 68.2 MG/DL (ref 63–159)
LYMPHOCYTES # BLD AUTO: 1.8 K/UL (ref 1–4.8)
LYMPHOCYTES NFR BLD: 33.7 % (ref 18–48)
MCH RBC QN AUTO: 30.1 PG (ref 27–31)
MCHC RBC AUTO-ENTMCNC: 30.7 G/DL (ref 32–36)
MCV RBC AUTO: 98 FL (ref 82–98)
MONOCYTES # BLD AUTO: 0.4 K/UL (ref 0.3–1)
MONOCYTES NFR BLD: 6.5 % (ref 4–15)
NEUTROPHILS # BLD AUTO: 3.1 K/UL (ref 1.8–7.7)
NEUTROPHILS NFR BLD: 56.8 % (ref 38–73)
NONHDLC SERPL-MCNC: 89 MG/DL
NRBC BLD-RTO: 0 /100 WBC
PLATELET # BLD AUTO: 236 K/UL (ref 150–350)
PMV BLD AUTO: 12.6 FL (ref 9.2–12.9)
POTASSIUM SERPL-SCNC: 4.1 MMOL/L (ref 3.5–5.1)
PROT SERPL-MCNC: 7.4 G/DL (ref 6–8.4)
RBC # BLD AUTO: 4.05 M/UL (ref 4–5.4)
SODIUM SERPL-SCNC: 139 MMOL/L (ref 136–145)
TRIGL SERPL-MCNC: 104 MG/DL (ref 30–150)
WBC # BLD AUTO: 5.37 K/UL (ref 3.9–12.7)

## 2019-05-13 PROCEDURE — 80061 LIPID PANEL: CPT

## 2019-05-13 PROCEDURE — 99215 OFFICE O/P EST HI 40 MIN: CPT | Mod: S$PBB,,, | Performed by: INTERNAL MEDICINE

## 2019-05-13 PROCEDURE — 80053 COMPREHEN METABOLIC PANEL: CPT

## 2019-05-13 PROCEDURE — 36415 COLL VENOUS BLD VENIPUNCTURE: CPT | Mod: PO

## 2019-05-13 PROCEDURE — 99999 PR PBB SHADOW E&M-EST. PATIENT-LVL III: ICD-10-PCS | Mod: PBBFAC,,, | Performed by: INTERNAL MEDICINE

## 2019-05-13 PROCEDURE — 99213 OFFICE O/P EST LOW 20 MIN: CPT | Mod: PBBFAC,PO | Performed by: INTERNAL MEDICINE

## 2019-05-13 PROCEDURE — 99215 PR OFFICE/OUTPT VISIT, EST, LEVL V, 40-54 MIN: ICD-10-PCS | Mod: S$PBB,,, | Performed by: INTERNAL MEDICINE

## 2019-05-13 PROCEDURE — 99999 PR PBB SHADOW E&M-EST. PATIENT-LVL III: CPT | Mod: PBBFAC,,, | Performed by: INTERNAL MEDICINE

## 2019-05-13 PROCEDURE — 85025 COMPLETE CBC W/AUTO DIFF WBC: CPT

## 2019-05-13 NOTE — PROGRESS NOTES
Subjective:       Patient ID: Mary Flanagan is a 73 y.o. female.    Chief Complaint: Follow-up; Hypertension; Hyperlipidemia; and Chronic Kidney Disease    Hypertension   Pertinent negatives include no chest pain, headaches, neck pain, palpitations or shortness of breath.   Hyperlipidemia   Pertinent negatives include no chest pain, myalgias or shortness of breath.     Past Medical History:   Diagnosis Date    Cataract     Chest pain     CKD (chronic kidney disease) stage 3, GFR 30-59 ml/min     Dry eyes     Gastritis     Hyperlipidemia     Hypertension     Insomnia     Osteopenia      Past Surgical History:   Procedure Laterality Date    BREAST BIOPSY Left     , benign     SECTION      x 1    COLONOSCOPY N/A 2015    [facility] ENDO    KNEE ARTHROSCOPY Left  approx    KNEE SURGERY      left arm surgery      left knee surgery       Family History   Problem Relation Age of Onset    Hypertension Mother     Hypertension Father     Breast cancer Neg Hx     Colon cancer Neg Hx     Ovarian cancer Neg Hx     Thrombophilia Neg Hx     Strabismus Neg Hx     Macular degeneration Neg Hx     Retinal detachment Neg Hx     Glaucoma Neg Hx     Blindness Neg Hx     Amblyopia Neg Hx      Social History     Socioeconomic History    Marital status:      Spouse name: Not on file    Number of children: Not on file    Years of education: Not on file    Highest education level: Not on file   Occupational History    Not on file   Social Needs    Financial resource strain: Not on file    Food insecurity:     Worry: Not on file     Inability: Not on file    Transportation needs:     Medical: Not on file     Non-medical: Not on file   Tobacco Use    Smoking status: Never Smoker    Smokeless tobacco: Never Used   Substance and Sexual Activity    Alcohol use: No     Alcohol/week: 0.0 oz    Drug use: No    Sexual activity: Not Currently     Partners:  Male     Birth control/protection: None     Comment: mut monog   Lifestyle    Physical activity:     Days per week: Not on file     Minutes per session: Not on file    Stress: Not on file   Relationships    Social connections:     Talks on phone: Not on file     Gets together: Not on file     Attends [employer] witness service: Not on file     Active member of club or organization: Not on file     Attends meetings of clubs or organizations: Not on file     Relationship status: Not on file   Other Topics Concern    Not on file   Social History Narrative    Not on file     Review of Systems   Constitutional: Negative for activity change, appetite change, chills, diaphoresis, fatigue, fever and unexpected weight change.   HENT: Negative for congestion, drooling, ear discharge, ear pain, facial swelling, hearing loss, mouth sores, nosebleeds, postnasal drip, rhinorrhea, sinus pressure, sneezing, sore throat, tinnitus, trouble swallowing and voice change.    Eyes: Negative for photophobia, redness and visual disturbance.   Respiratory: Negative for apnea, cough, choking, chest tightness and shortness of breath.    Cardiovascular: Negative for chest pain, palpitations and leg swelling.   Gastrointestinal: Negative for abdominal distention, abdominal pain, blood in stool, constipation, diarrhea, nausea and vomiting.   Endocrine: Negative for cold intolerance, heat intolerance, polydipsia, polyphagia and polyuria.   Genitourinary: Negative for decreased urine volume, difficulty urinating, dysuria, frequency, genital sores, hematuria and urgency.   Musculoskeletal: Positive for back pain. Negative for arthralgias, gait problem, joint swelling, myalgias, neck pain and neck stiffness.   Skin: Negative for color change, pallor, rash and wound.   Allergic/Immunologic: Negative for food allergies and immunocompromised state.   Neurological: Negative for dizziness, tremors, seizures, syncope, speech difficulty, weakness,  light-headedness, numbness and headaches.   Hematological: Negative for adenopathy. Does not bruise/bleed easily.   Psychiatric/Behavioral: Negative for agitation, behavioral problems, confusion, decreased concentration, dysphoric mood, hallucinations, self-injury, sleep disturbance and suicidal ideas. The patient is not nervous/anxious and is not hyperactive.    All other systems reviewed and are negative.      Objective:      Physical Exam   Constitutional: She is oriented to person, place, and time. She appears well-developed and well-nourished. No distress.   HENT:   Head: Normocephalic and atraumatic.   Neck: Normal range of motion. Neck supple. No JVD present. Carotid bruit is not present. No tracheal deviation present. No thyromegaly present.   Cardiovascular: Normal rate, regular rhythm, normal heart sounds and intact distal pulses.   Pulmonary/Chest: Effort normal and breath sounds normal. No respiratory distress. She has no wheezes. She has no rales. She exhibits no tenderness.   Abdominal: Soft. Bowel sounds are normal. She exhibits no distension. There is no tenderness. There is no rebound and no guarding.   Musculoskeletal: Normal range of motion. She exhibits no edema or tenderness.   Lymphadenopathy:     She has no cervical adenopathy.   Neurological: She is alert and oriented to person, place, and time.   Skin: Skin is warm and dry. No rash noted. She is not diaphoretic. No erythema. No pallor.   Psychiatric: She has a normal mood and affect. Her behavior is normal. Judgment and thought content normal.   Nursing note and vitals reviewed.      CMP  Sodium   Date Value Ref Range Status   11/13/2018 138 136 - 145 mmol/L Final     Potassium   Date Value Ref Range Status   11/13/2018 4.1 3.5 - 5.1 mmol/L Final     Chloride   Date Value Ref Range Status   11/13/2018 101 95 - 110 mmol/L Final     CO2   Date Value Ref Range Status   11/13/2018 27 23 - 29 mmol/L Final     Glucose   Date Value Ref Range Status    11/13/2018 89 70 - 110 mg/dL Final     BUN, Bld   Date Value Ref Range Status   11/13/2018 23 8 - 23 mg/dL Final     Creatinine   Date Value Ref Range Status   11/13/2018 1.2 0.5 - 1.4 mg/dL Final     Calcium   Date Value Ref Range Status   11/13/2018 10.5 8.7 - 10.5 mg/dL Final     Total Protein   Date Value Ref Range Status   11/13/2018 7.4 6.0 - 8.4 g/dL Final     Albumin   Date Value Ref Range Status   11/13/2018 4.0 3.5 - 5.2 g/dL Final     Total Bilirubin   Date Value Ref Range Status   11/13/2018 0.7 0.1 - 1.0 mg/dL Final     Comment:     For infants and newborns, interpretation of results should be based  on gestational age, weight and in agreement with clinical  observations.  Premature Infant recommended reference ranges:  Up to 24 hours.............<8.0 mg/dL  Up to 48 hours............<12.0 mg/dL  3-5 days..................<15.0 mg/dL  6-29 days.................<15.0 mg/dL       Alkaline Phosphatase   Date Value Ref Range Status   11/13/2018 102 55 - 135 U/L Final     AST   Date Value Ref Range Status   11/13/2018 24 10 - 40 U/L Final     ALT   Date Value Ref Range Status   11/13/2018 12 10 - 44 U/L Final     Anion Gap   Date Value Ref Range Status   11/13/2018 10 8 - 16 mmol/L Final     eGFR if    Date Value Ref Range Status   11/13/2018 52.2 (A) >60 mL/min/1.73 m^2 Final     eGFR if non    Date Value Ref Range Status   11/13/2018 45.3 (A) >60 mL/min/1.73 m^2 Final     Comment:     Calculation used to obtain the estimated glomerular filtration  rate (eGFR) is the CKD-EPI equation.        Lab Results   Component Value Date    WBC 5.82 03/06/2018    HGB 11.7 (L) 03/06/2018    HCT 37.0 03/06/2018    MCV 93 03/06/2018     03/06/2018     Lab Results   Component Value Date    CHOL 153 11/13/2018     Lab Results   Component Value Date    HDL 60 11/13/2018     Lab Results   Component Value Date    LDLCALC 76.2 11/13/2018     Lab Results   Component Value Date    TRIG 84  11/13/2018     Lab Results   Component Value Date    CHOLHDL 39.2 11/13/2018     Lab Results   Component Value Date    TSH 2.548 11/13/2018     No results found for: LABA1C, HGBA1C  Assessment:       1. Essential hypertension    2. Hyperlipidemia, unspecified hyperlipidemia type    3. Stage 2 chronic kidney disease        Plan:   Essential hypertension  -     CBC auto differential; Future; Expected date: 05/13/2019  -     Comprehensive metabolic panel; Future; Expected date: 05/13/2019    Hyperlipidemia, unspecified hyperlipidemia type  -     Lipid panel; Future; Expected date: 05/13/2019    Stage 2 chronic kidney disease    Stable--------------------continue meds.                 F/u 6 months./

## 2019-05-17 ENCOUNTER — TELEPHONE (OUTPATIENT)
Dept: OBSTETRICS AND GYNECOLOGY | Facility: CLINIC | Age: 74
End: 2019-05-17

## 2019-05-17 DIAGNOSIS — Z12.31 SCREENING MAMMOGRAM, ENCOUNTER FOR: Primary | ICD-10-CM

## 2019-05-17 NOTE — TELEPHONE ENCOUNTER
----- Message from Kelsea Gonzalez sent at 5/17/2019  9:28 AM CDT -----  Type: Needs Medical Advice    Who Called: self Symptoms (please be specific):   How long has patient had these symptoms:    Pharmacy name and phone #:    Best Call Back Number: [phone]  Additional Information: Patient requesting orders for annual mammogram one week prior to seeing the doctor.

## 2019-05-17 NOTE — TELEPHONE ENCOUNTER
Left message at [phone] for patient to call the office back. Her mammogram is scheduled for 9/3/19 at 10:20a

## 2019-05-20 ENCOUNTER — TELEPHONE (OUTPATIENT)
Dept: OBSTETRICS AND GYNECOLOGY | Facility: CLINIC | Age: 74
End: 2019-05-20

## 2019-05-20 NOTE — TELEPHONE ENCOUNTER
----- Message from Mari Mathias sent at 5/17/2019  2:41 PM CDT -----  Contact: [phone]  .Type:  Patient Returning Call    Who Called:Zaid  Who Left Message for Patient:Angie  Does the patient know what this is regarding?:no  Would the patient rather a call back or a response via [facility]? Call back  Best Call Back Number:[phone]  Additional Information:

## 2019-05-20 NOTE — TELEPHONE ENCOUNTER
Spoke to patient and let her know her mammogram has been scheduled for 9/3 at [facility].  Patient verbalized understanding.

## 2019-07-16 ENCOUNTER — OFFICE VISIT (OUTPATIENT)
Dept: OTOLARYNGOLOGY | Facility: CLINIC | Age: 74
End: 2019-07-16
Payer: MEDICARE

## 2019-07-16 VITALS
BODY MASS INDEX: 25.08 KG/M2 | TEMPERATURE: 96 F | HEART RATE: 76 BPM | SYSTOLIC BLOOD PRESSURE: 144 MMHG | WEIGHT: 137.13 LBS | DIASTOLIC BLOOD PRESSURE: 68 MMHG

## 2019-07-16 DIAGNOSIS — H61.23 BILATERAL IMPACTED CERUMEN: Primary | ICD-10-CM

## 2019-07-16 PROCEDURE — 69210 PR REMOVAL IMPACTED CERUMEN REQUIRING INSTRUMENTATION, UNILATERAL: ICD-10-PCS | Mod: S$PBB,,, | Performed by: ORTHOPAEDIC SURGERY

## 2019-07-16 PROCEDURE — 99499 NO LOS: ICD-10-PCS | Mod: S$PBB,,, | Performed by: ORTHOPAEDIC SURGERY

## 2019-07-16 PROCEDURE — 99999 PR PBB SHADOW E&M-EST. PATIENT-LVL III: ICD-10-PCS | Mod: PBBFAC,,, | Performed by: ORTHOPAEDIC SURGERY

## 2019-07-16 PROCEDURE — 69210 REMOVE IMPACTED EAR WAX UNI: CPT | Mod: PBBFAC | Performed by: ORTHOPAEDIC SURGERY

## 2019-07-16 PROCEDURE — 99499 UNLISTED E&M SERVICE: CPT | Mod: S$PBB,,, | Performed by: ORTHOPAEDIC SURGERY

## 2019-07-16 PROCEDURE — 99213 OFFICE O/P EST LOW 20 MIN: CPT | Mod: PBBFAC | Performed by: ORTHOPAEDIC SURGERY

## 2019-07-16 PROCEDURE — 99999 PR PBB SHADOW E&M-EST. PATIENT-LVL III: CPT | Mod: PBBFAC,,, | Performed by: ORTHOPAEDIC SURGERY

## 2019-07-16 PROCEDURE — 69210 REMOVE IMPACTED EAR WAX UNI: CPT | Mod: S$PBB,,, | Performed by: ORTHOPAEDIC SURGERY

## 2019-07-16 NOTE — PROGRESS NOTES
Subjective:       Patient ID: Mary Flanagan is a 73 y.o. female.  aChief Complaint: Cerumen Impaction (bilateral)    HPI  Review of Systems   Constitutional: Negative for chills, fatigue, fever and unexpected weight change.   HENT: Negative for congestion, dental problem, ear discharge, ear pain, facial swelling, hearing loss, nosebleeds, postnasal drip, rhinorrhea, sinus pressure, sneezing, sore throat, tinnitus, trouble swallowing and voice change.    Eyes: Negative for redness, itching and visual disturbance.   Respiratory: Negative for cough, choking, shortness of breath and wheezing.    Cardiovascular: Negative for chest pain and palpitations.   Gastrointestinal: Negative for abdominal pain.        No reflux.   Musculoskeletal: Negative for gait problem.   Skin: Negative for rash.   Neurological: Negative for dizziness, light-headedness and headaches.       Objective:      Physical Exam    Assessment:       No diagnosis found.    Plan:       ***

## 2019-07-16 NOTE — PROGRESS NOTES
Subjective:   Cerumen impactions     Patient ID: Mary Flanagan is a 73 y.o. female.    Chief Complaint:  Excessive ear wax     Mary Flanagan is a 73 y.o. female here to see me today for evaluation of a possible wax impaction in bilateral ears.  She has complaints of hearing loss in the affected ears, but denies pain or drainage.  This has been an issue in the past.  The patient has been using any sort of ear drop to soften the wax.    HPI  Review of Systems   HENT: Positive for hearing loss. Negative for ear discharge, ear pain and tinnitus.        Objective:     Physical Exam   HENT:   Right Ear: External ear and ear canal normal. Decreased hearing is noted.   Left Ear: External ear and ear canal normal. Decreased hearing is noted.   Bilateral complete cerumen impactions, removal described below       Procedure Note    CHIEF COMPLAINT:  Cerumen Impaction    Description:  The patient was seated in an exam chair.  An ear speculum was placed in the right EAC and was examined under the microscope.  Suction and/or loop curettes were used to remove a large cerumen impaction.  The tympanic membrane was visualized and was normal in appearance.  The procedure was repeated on the left side in a similar fashion.  The TM was intact and normal on this side as well.  The patient tolerated the procedure well.           Assessment:     1. Bilateral impacted cerumen        Plan:     1.  Cerumen impaction:  Removed today without difficulty.  I would recommend the use of a wax softening drop, either over the counter Debrox or mineral oil, on a weekly basis.  I also instructed the patient to avoid Qtips.

## 2019-09-05 NOTE — Clinical Note
Your patient was seen today for a AW visit.  additional testing and follow up. I have included a copy of my visit note, please review the note and feel free to contact me with any questions.  Thank you for allowing me to participate in the care of your patients.  Gricelda Gonzalez NP 
Normal
Other
Normal
Other
Normal
Other
Other
Slowed
Normal
Slowed

## 2019-09-11 ENCOUNTER — HOSPITAL ENCOUNTER (OUTPATIENT)
Dept: RADIOLOGY | Facility: HOSPITAL | Age: 74
Discharge: HOME | End: 2019-09-11
Attending: OBSTETRICS & GYNECOLOGY
Payer: MEDICARE

## 2019-09-11 DIAGNOSIS — Z12.31 SCREENING MAMMOGRAM, ENCOUNTER FOR: ICD-10-CM

## 2019-09-11 PROCEDURE — 77067 SCR MAMMO BI INCL CAD: CPT | Mod: TC

## 2019-09-11 PROCEDURE — 77063 BREAST TOMOSYNTHESIS BI: CPT | Mod: 26,,, | Performed by: RADIOLOGY

## 2019-09-11 PROCEDURE — 77067 SCR MAMMO BI INCL CAD: CPT | Mod: 26,,, | Performed by: RADIOLOGY

## 2019-09-11 PROCEDURE — 77067 MAMMO DIGITAL SCREENING BILAT WITH TOMOSYNTHESIS_CAD: ICD-10-PCS | Mod: 26,,, | Performed by: RADIOLOGY

## 2019-09-11 PROCEDURE — 77063 MAMMO DIGITAL SCREENING BILAT WITH TOMOSYNTHESIS_CAD: ICD-10-PCS | Mod: 26,,, | Performed by: RADIOLOGY

## 2019-09-16 ENCOUNTER — OFFICE VISIT (OUTPATIENT)
Dept: OBSTETRICS AND GYNECOLOGY | Facility: CLINIC | Age: 74
End: 2019-09-16
Payer: MEDICARE

## 2019-09-16 ENCOUNTER — TELEPHONE (OUTPATIENT)
Dept: FAMILY MEDICINE | Facility: CLINIC | Age: 74
End: 2019-09-16

## 2019-09-16 VITALS
SYSTOLIC BLOOD PRESSURE: 108 MMHG | BODY MASS INDEX: 24.54 KG/M2 | WEIGHT: 133.38 LBS | DIASTOLIC BLOOD PRESSURE: 66 MMHG | HEIGHT: 62 IN

## 2019-09-16 DIAGNOSIS — Z01.419 WELL WOMAN EXAM WITH ROUTINE GYNECOLOGICAL EXAM: Primary | ICD-10-CM

## 2019-09-16 PROCEDURE — G0101 PR CA SCREEN;PELVIC/BREAST EXAM: ICD-10-PCS | Mod: S$PBB,,, | Performed by: OBSTETRICS & GYNECOLOGY

## 2019-09-16 PROCEDURE — 99999 PR PBB SHADOW E&M-EST. PATIENT-LVL II: CPT | Mod: PBBFAC,,, | Performed by: OBSTETRICS & GYNECOLOGY

## 2019-09-16 PROCEDURE — 99999 PR PBB SHADOW E&M-EST. PATIENT-LVL II: ICD-10-PCS | Mod: PBBFAC,,, | Performed by: OBSTETRICS & GYNECOLOGY

## 2019-09-16 PROCEDURE — 99212 OFFICE O/P EST SF 10 MIN: CPT | Mod: PBBFAC | Performed by: OBSTETRICS & GYNECOLOGY

## 2019-09-16 PROCEDURE — G0101 CA SCREEN;PELVIC/BREAST EXAM: HCPCS | Mod: S$PBB,,, | Performed by: OBSTETRICS & GYNECOLOGY

## 2019-09-16 RX ORDER — ATORVASTATIN CALCIUM 10 MG/1
10 TABLET, FILM COATED ORAL DAILY
Qty: 90 TABLET | Refills: 3 | Status: SHIPPED | OUTPATIENT
Start: 2019-09-16 | End: 2020-07-16

## 2019-09-16 NOTE — PROGRESS NOTES
CHIEF COMPLAINT:   Gynecologic Exam  Chief Complaint   Patient presents with    Annual Exam       HISTORY OF PRESENT ILLNESS  Mary Flanagan   presents for annual exam. The patient has no complaints today.     No LMP recorded. Patient is postmenopausal.    GYN screening history: last Pap: was normal. Never had any abnormal Pap smears in past.  Ok w no more pap smears after discussion last year    Reports no bowel movement irregularities from baseline, bloating, or weight loss.    HRT:   None         Health Maintenance   Topic Date Due    Pneumococcal Vaccine (65+ Low/Medium Risk) (2 of 2 - PPSV23) 2019    Lipid Panel  2020    Mammogram  2020    Pap Smear  2021    DEXA SCAN  2022    TETANUS VACCINE  2023    Colonoscopy  2025    Hepatitis C Screening  Completed       HISTORY  Patient Active Problem List   Diagnosis    Osteopenia    Nuclear sclerotic cataract of both eyes    Hyperlipidemia    Hypertension    Insomnia    Gastroesophageal reflux disease without esophagitis    Other constipation    Mastodynia    Constipation    Primary osteoarthritis of both knees    Iliotibial band syndrome    Stage 2 chronic kidney disease       Past Medical History:   Diagnosis Date    Cataract     Chest pain     CKD (chronic kidney disease) stage 3, GFR 30-59 ml/min     Dry eyes     Gastritis     Hyperlipidemia     Hypertension     Insomnia     Osteopenia        Past Surgical History:   Procedure Laterality Date    BREAST BIOPSY Left     , benign     SECTION      x 1    COLONOSCOPY N/A 2015    Per[facility]    KNEE ARTHROSCOPY Left  approx    KNEE SURGERY      left arm surgery      left knee surgery         Family History   Problem Relation Age of Onset    Hypertension Mother     Hypertension Father     Breast cancer Neg Hx     Colon cancer Neg Hx     Ovarian cancer Neg Hx     Thrombophilia  Neg Hx     Strabismus Neg Hx     Macular degeneration Neg Hx     Retinal detachment Neg Hx     Glaucoma Neg Hx     Blindness Neg Hx     Amblyopia Neg Hx        Social History     Socioeconomic History    Marital status:      Spouse name: Not on file    Number of children: Not on file    Years of education: Not on file    Highest education level: Not on file   Occupational History    Not on file   Social Needs    Financial resource strain: Not on file    Food insecurity:     Worry: Not on file     Inability: Not on file    Transportation needs:     Medical: Not on file     Non-medical: Not on file   Tobacco Use    Smoking status: Never Smoker    Smokeless tobacco: Never Used   Substance and Sexual Activity    Alcohol use: No     Alcohol/week: 0.0 oz    Drug use: No    Sexual activity: Not Currently     Partners: Male     Birth control/protection: None     Comment: mut monog   Lifestyle    Physical activity:     Days per week: Not on file     Minutes per session: Not on file    Stress: Not on file   Relationships    Social connections:     Talks on phone: Not on file     Gets together: Not on file     Attends Confucianist service: Not on file     Active member of club or organization: Not on file     Attends meetings of clubs or organizations: Not on file     Relationship status: Not on file   Other Topics Concern    Not on file   Social History Narrative    Not on file       Current Outpatient Medications   Medication Sig Dispense Refill    CALCIUM CARBONATE (CALCIUM 600 ORAL) Take by mouth once daily.      triamterene-hydrochlorothiazide 37.5-25 mg (DYAZIDE) 37.5-25 mg per capsule TAKE 1 CAPSULE BY MOUTH ONCE DAILY IN THE MORNING 90 capsule 3    VIT A/C/E AC/ZNOX/CUPRIC OXIDE (EYE VITAMIN AND MINERALS ORAL) Take by mouth.       No current facility-administered medications for this visit.        Review of patient's allergies indicates:   Allergen Reactions    No known drug allergies             PHYSICAL EXAM     Vitals:    09/16/19 1024   BP: 108/66       PAIN SCALE: 0/10 None    ROS:  GENERAL: No fever, chills, fatigability or weight loss.  ABDOMEN: Appetite fine. No weight loss. Denies diarrhea, abdominal pain, hematemesis or blood in stool.  No change in bowel movement pattern.  URINARY: No flank pain, dysuria or hematuria.  REPRODUCTIVE: No abnormal vaginal bleeding.  BREASTS: Breasts symmetric, nontender and no lumps detected.    PE:   APPEARANCE: Well nourished, well developed, in no acute distress.  NECK: Neck symmetric without masses or thyromegaly.     NODES: No inguinal lymph node enlargement.  ABDOMEN: Soft. No tenderness or masses. No hepatosplenomegaly. No hernias.  BREASTS: Symmetrical, no skin changes or visible lesions. No palpable masses, nipple discharge or adenopathy bilaterally.    PELVIC:   VULVA: No lesions.  Atrophic but normal female genitalia.  URETHRAL MEATUS: Normal size and location, no lesions, no prolapse.  URETHRA: No masses, tenderness, prolapse or scarring.  VAGINA: dry and narrow w/ atrophy, no discharge, no significant cystocele or rectocele.  CERVIX: No lesions, normal diameter, no stenosis, no cervical motion tenderness.  UTERUS: 8 week size, regular shape, mobile, non-tender, normal position, good support.  ADNEXA: No masses, tenderness or CDS nodularity.  ANUS PERINEUM: No lesions, no relaxation, external hemorrhoids noted.         DIAGNOSIS:   1. Normal gyn exam  2.  Physiologic atrophy    PLAN:   Mammogram    Colonoscopy  dexa       MEDICATIONS PRESCRIBED:   Calcium vitamin D and weight bearing exercise    COUNSELING:  Patient was counseled today on A.C.S. Pap guidelines and recommendations for yearly pelvic exams, mammograms and monthly self breast exams; to see her PCP for other health maintenance.   Pt counseled on signs and symptoms of cancer of the pelvic organs including ovarian and uterine, and to alert myself and my office if pt has any vaginal  bleeding, pelvic/abdominal pain, persistent bloating, unexplained constipation, unexplained appetite and/or weight loss.     FOLLOW-UP: With me in 1 year.

## 2019-09-16 NOTE — TELEPHONE ENCOUNTER
----- Message from Anthony Carrion sent at 9/16/2019  2:14 PM CDT -----  ..Type:  Patient Returning Call    Who Called: Pt   Who Left Message for Patient:  Does the patient know what this is regarding?:f/u on prescription   Would the patient rather a call back or a response via [facility]? Call back   Best Call Back Number:[phone]  Additional Information: pt is requesting a call from nurse to f/u as to why her prescription request was cancelled at the pharmacy.   no

## 2019-09-16 NOTE — TELEPHONE ENCOUNTER
----- Message from Anthony Carrion sent at 9/16/2019  2:14 PM CDT -----  ..Type:  Patient Returning Call    Who Called: Pt   Who Left Message for Patient:  Does the patient know what this is regarding?:f/u on prescription   Would the patient rather a call back or a response via [facility]? Call back   Best Call Back Number:[phone]  Additional Information: pt is requesting a call from nurse to f/u as to why her prescription request was cancelled at the pharmacy.

## 2019-09-16 NOTE — TELEPHONE ENCOUNTER
----- Message from Anthony Carrion sent at 9/16/2019  2:14 PM CDT -----  ..Type:  Patient Returning Call    Who Called: Pt   Who Left Message for Patient:  Does the patient know what this is regarding?:f/u on prescription   Would the patient rather a call back or a response via [facility]? Call back   Best Call Back Number:[phone]  Additional Information: pt is requesting a call from nurse to f/u as to why her prescription request was cancelled at the pharmacy.

## 2019-09-16 NOTE — TELEPHONE ENCOUNTER
----- Message from Rukhsana Gong sent at 9/16/2019 12:15 PM CDT -----  Contact: pt  Please call pt @ [phone] or [phone] Atorvastatin medication, pt need to know why it was denied today..

## 2019-09-16 NOTE — TELEPHONE ENCOUNTER
----- Message from Anthony Carrion sent at 9/16/2019  2:14 PM CDT -----  ..Type:  Patient Returning Call    Who Called: Pt   Who Left Message for Patient:  Does the patient know what this is regarding?:f/u on prescription   Would the patient rather a call back or a response via [facility]? Call back   Best Call Back Number:[phone]  Additional Information: pt is requesting a call from nurse to f/u as to why her prescription request was cancelled at the pharmacy.

## 2019-10-01 ENCOUNTER — OFFICE VISIT (OUTPATIENT)
Dept: OPHTHALMOLOGY | Facility: CLINIC | Age: 74
End: 2019-10-01
Payer: MEDICARE

## 2019-10-01 DIAGNOSIS — H25.11 SENILE NUCLEAR CATARACT, RIGHT: ICD-10-CM

## 2019-10-01 DIAGNOSIS — H04.123 DRY EYE SYNDROME, BILATERAL: Primary | ICD-10-CM

## 2019-10-01 DIAGNOSIS — H25.12 SENILE NUCLEAR CATARACT, LEFT: ICD-10-CM

## 2019-10-01 DIAGNOSIS — H52.7 REFRACTION DISORDER: ICD-10-CM

## 2019-10-01 PROCEDURE — 99212 OFFICE O/P EST SF 10 MIN: CPT | Mod: PBBFAC | Performed by: OPHTHALMOLOGY

## 2019-10-01 PROCEDURE — 92004 COMPRE OPH EXAM NEW PT 1/>: CPT | Mod: S$PBB,,, | Performed by: OPHTHALMOLOGY

## 2019-10-01 PROCEDURE — 99999 PR PBB SHADOW E&M-EST. PATIENT-LVL II: CPT | Mod: PBBFAC,,, | Performed by: OPHTHALMOLOGY

## 2019-10-01 PROCEDURE — 99999 PR PBB SHADOW E&M-EST. PATIENT-LVL II: ICD-10-PCS | Mod: PBBFAC,,, | Performed by: OPHTHALMOLOGY

## 2019-10-01 PROCEDURE — 92015 PR REFRACTION: ICD-10-PCS | Mod: ,,, | Performed by: OPHTHALMOLOGY

## 2019-10-01 PROCEDURE — 92004 PR EYE EXAM, NEW PATIENT,COMPREHESV: ICD-10-PCS | Mod: S$PBB,,, | Performed by: OPHTHALMOLOGY

## 2019-10-01 PROCEDURE — 92015 DETERMINE REFRACTIVE STATE: CPT | Mod: ,,, | Performed by: OPHTHALMOLOGY

## 2019-10-01 NOTE — PROGRESS NOTES
HPI     Blurred Vision      Additional comments: reading vision is blurry              Comments     Pt states she's in a book club and have noticed a decrease in her   reading. She hasn't updated her glasses in about 3 years. Only use Systane   twice a day, did not like the taste of the O3FO. She's also taking eye   vitamins. No ocular pain or irritation.     Previously seen by [facility] 2015    1. NS Early  2. Dry Eyes    Systane BID OU  Eye vitamins          Last edited by Candido Chavez on 10/1/2019 10:17 AM. (History)            Assessment /Plan     For exam results, see Encounter Report.      ICD-10-CM ICD-9-CM    1. Dry eye syndrome, bilateral H04.123 375.15 Appears stable today,improved since last exam  follow    2. Senile nuclear cataract, right H25.11 366.16   You were found to have an early cataract in your eye(s) today, however the cataract is not affecting your activities of daily living, such as reading and driving.You do not need  surgery at this time. We will recheck your cataract at your next visit. You are welcome to call for an earlier appointment if your vision gets worse.      3. Senile nuclear cataract, left H25.12 366.16 You were found to have an early cataract in your eye(s) today, however the cataract is not affecting your activities of daily living, such as reading and driving.You do not need  surgery at this time. We will recheck your cataract at your next visit. You are welcome to call for an earlier appointment if your vision gets worse.    4. 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

## 2019-10-07 ENCOUNTER — IMMUNIZATION (OUTPATIENT)
Dept: FAMILY MEDICINE | Facility: CLINIC | Age: 74
End: 2019-10-07
Payer: MEDICARE

## 2019-10-07 PROCEDURE — 90662 IIV NO PRSV INCREASED AG IM: CPT | Mod: PBBFAC,PO

## 2019-10-16 ENCOUNTER — OFFICE VISIT (OUTPATIENT)
Dept: FAMILY MEDICINE | Facility: CLINIC | Age: 74
End: 2019-10-16
Payer: MEDICARE

## 2019-10-16 ENCOUNTER — LAB VISIT (OUTPATIENT)
Dept: LAB | Facility: HOSPITAL | Age: 74
End: 2019-10-16
Payer: MEDICARE

## 2019-10-16 VITALS
DIASTOLIC BLOOD PRESSURE: 66 MMHG | HEART RATE: 77 BPM | WEIGHT: 131.63 LBS | TEMPERATURE: 98 F | BODY MASS INDEX: 24.07 KG/M2 | SYSTOLIC BLOOD PRESSURE: 126 MMHG | OXYGEN SATURATION: 98 %

## 2019-10-16 DIAGNOSIS — R20.2 PARESTHESIA OF ARM: ICD-10-CM

## 2019-10-16 DIAGNOSIS — R20.2 PARESTHESIA OF ARM: Primary | ICD-10-CM

## 2019-10-16 DIAGNOSIS — E78.5 DYSLIPIDEMIA: ICD-10-CM

## 2019-10-16 LAB
ALBUMIN SERPL BCP-MCNC: 4.1 G/DL (ref 3.5–5.2)
ALP SERPL-CCNC: 95 U/L (ref 55–135)
ALT SERPL W/O P-5'-P-CCNC: 11 U/L (ref 10–44)
ANION GAP SERPL CALC-SCNC: 9 MMOL/L (ref 8–16)
AST SERPL-CCNC: 19 U/L (ref 10–40)
BILIRUB SERPL-MCNC: 0.7 MG/DL (ref 0.1–1)
BUN SERPL-MCNC: 19 MG/DL (ref 8–23)
CALCIUM SERPL-MCNC: 10.9 MG/DL (ref 8.7–10.5)
CHLORIDE SERPL-SCNC: 101 MMOL/L (ref 95–110)
CHOLEST SERPL-MCNC: 154 MG/DL (ref 120–199)
CHOLEST/HDLC SERPL: 2.6 {RATIO} (ref 2–5)
CO2 SERPL-SCNC: 29 MMOL/L (ref 23–29)
CREAT SERPL-MCNC: 1.3 MG/DL (ref 0.5–1.4)
EST. GFR  (AFRICAN AMERICAN): 47 ML/MIN/1.73 M^2
EST. GFR  (NON AFRICAN AMERICAN): 40.8 ML/MIN/1.73 M^2
GLUCOSE SERPL-MCNC: 87 MG/DL (ref 70–110)
HDLC SERPL-MCNC: 60 MG/DL (ref 40–75)
HDLC SERPL: 39 % (ref 20–50)
LDLC SERPL CALC-MCNC: 74.6 MG/DL (ref 63–159)
NONHDLC SERPL-MCNC: 94 MG/DL
POTASSIUM SERPL-SCNC: 4.1 MMOL/L (ref 3.5–5.1)
PROT SERPL-MCNC: 7.4 G/DL (ref 6–8.4)
SODIUM SERPL-SCNC: 139 MMOL/L (ref 136–145)
TRIGL SERPL-MCNC: 97 MG/DL (ref 30–150)
VIT B12 SERPL-MCNC: >2000 PG/ML (ref 210–950)

## 2019-10-16 PROCEDURE — 80053 COMPREHEN METABOLIC PANEL: CPT

## 2019-10-16 PROCEDURE — 99213 OFFICE O/P EST LOW 20 MIN: CPT | Mod: PBBFAC,PO | Performed by: INTERNAL MEDICINE

## 2019-10-16 PROCEDURE — 82607 VITAMIN B-12: CPT

## 2019-10-16 PROCEDURE — 99213 OFFICE O/P EST LOW 20 MIN: CPT | Mod: S$PBB,,, | Performed by: INTERNAL MEDICINE

## 2019-10-16 PROCEDURE — 99999 PR PBB SHADOW E&M-EST. PATIENT-LVL III: ICD-10-PCS | Mod: PBBFAC,,, | Performed by: INTERNAL MEDICINE

## 2019-10-16 PROCEDURE — 36415 COLL VENOUS BLD VENIPUNCTURE: CPT | Mod: PO

## 2019-10-16 PROCEDURE — 99999 PR PBB SHADOW E&M-EST. PATIENT-LVL III: CPT | Mod: PBBFAC,,, | Performed by: INTERNAL MEDICINE

## 2019-10-16 PROCEDURE — 99213 PR OFFICE/OUTPT VISIT, EST, LEVL III, 20-29 MIN: ICD-10-PCS | Mod: S$PBB,,, | Performed by: INTERNAL MEDICINE

## 2019-10-16 PROCEDURE — 80061 LIPID PANEL: CPT

## 2019-10-16 RX ORDER — PREDNISONE 10 MG/1
10 TABLET ORAL DAILY
Qty: 3 TABLET | Refills: 0 | Status: SHIPPED | OUTPATIENT
Start: 2019-10-16 | End: 2019-10-19

## 2019-10-16 RX ORDER — CYANOCOBALAMIN (VITAMIN B-12) 250 MCG
250 TABLET ORAL DAILY
COMMUNITY
End: 2020-01-03

## 2019-10-16 NOTE — PROGRESS NOTES
Subjective:       Patient ID: Mary Flanagan is a 74 y.o. female.    Chief Complaint: Numbness    2 weeks------wakes up with left arm numbness/tingling---------moves arm and resolves--------never occurs any other time---------    Past Medical History:   Diagnosis Date    Cataract     Chest pain     CKD (chronic kidney disease) stage 3, GFR 30-59 ml/min     Dry eyes     Gastritis     Hyperlipidemia     Hypertension     Insomnia     Osteopenia      Past Surgical History:   Procedure Laterality Date    BREAST BIOPSY Left     , benign     SECTION      x 1    KNEE ARTHROSCOPY Left 2014 approx    KNEE SURGERY      left arm surgery      left knee surgery       Family History   Problem Relation Age of Onset    Hypertension Mother     Hypertension Father     Breast cancer Neg Hx     Colon cancer Neg Hx     Ovarian cancer Neg Hx     Thrombophilia Neg Hx     Strabismus Neg Hx     Macular degeneration Neg Hx     Retinal detachment Neg Hx     Glaucoma Neg Hx     Blindness Neg Hx     Amblyopia Neg Hx      Social History     Socioeconomic History    Marital status:      Spouse name: Not on file    Number of children: Not on file    Years of education: Not on file    Highest education level: Not on file   Occupational History    Not on file   Social Needs    Financial resource strain: Not on file    Food insecurity:     Worry: Not on file     Inability: Not on file    Transportation needs:     Medical: Not on file     Non-medical: Not on file   Tobacco Use    Smoking status: Never Smoker    Smokeless tobacco: Never Used   Substance and Sexual Activity    Alcohol use: No     Alcohol/week: 0.0 standard drinks    Drug use: No    Sexual activity: Not Currently     Partners: Male     Birth control/protection: None     Comment: mut monog   Lifestyle    Physical activity:     Days per week: Not on file     Minutes per session: Not on file    Stress: Not on file   Relationships     Social connections:     Talks on phone: Not on file     Gets together: Not on file     Attends Gnosticist service: Not on file     Active member of club or organization: Not on file     Attends meetings of clubs or organizations: Not on file     Relationship status: Not on file   Other Topics Concern    Not on file   Social History Narrative    Not on file     Review of Systems   Constitutional: Negative for activity change, appetite change, chills, diaphoresis, fatigue, fever and unexpected weight change.   HENT: Negative for congestion, drooling, ear discharge, ear pain, facial swelling, hearing loss, mouth sores, nosebleeds, postnasal drip, rhinorrhea, sinus pressure, sneezing, sore throat, tinnitus, trouble swallowing and voice change.    Eyes: Negative for photophobia, redness and visual disturbance.   Respiratory: Negative for apnea, cough, choking, chest tightness, shortness of breath and wheezing.    Cardiovascular: Negative for chest pain, palpitations and leg swelling.   Gastrointestinal: Negative for abdominal distention, abdominal pain, blood in stool, constipation, diarrhea, nausea and vomiting.   Endocrine: Negative for cold intolerance, heat intolerance, polydipsia, polyphagia and polyuria.   Genitourinary: Negative for decreased urine volume, difficulty urinating, dysuria, flank pain, frequency, genital sores, hematuria, urgency and vaginal discharge.   Musculoskeletal: Negative for arthralgias, back pain, gait problem, joint swelling, myalgias, neck pain and neck stiffness.   Skin: Negative for color change, pallor, rash and wound.   Allergic/Immunologic: Negative for food allergies and immunocompromised state.   Neurological: Negative for dizziness, tremors, seizures, syncope, speech difficulty, weakness, light-headedness and headaches.   Hematological: Negative for adenopathy. Does not bruise/bleed easily.   Psychiatric/Behavioral: Negative for agitation, behavioral problems, confusion,  decreased concentration, dysphoric mood, hallucinations, self-injury, sleep disturbance and suicidal ideas. The patient is not nervous/anxious and is not hyperactive.    All other systems reviewed and are negative.      Objective:      Physical Exam   Constitutional: She is oriented to person, place, and time. She appears well-developed and well-nourished. No distress.   HENT:   Head: Normocephalic and atraumatic.   Neck: Normal range of motion. Neck supple. No JVD present. Carotid bruit is not present. No tracheal deviation present. No thyromegaly present.   Cardiovascular: Normal rate, regular rhythm, normal heart sounds and intact distal pulses.   Pulmonary/Chest: Effort normal and breath sounds normal. No respiratory distress. She has no wheezes. She has no rales. She exhibits no tenderness.   Abdominal: Soft. Bowel sounds are normal. She exhibits no distension. There is no tenderness. There is no rebound and no guarding.   Musculoskeletal: Normal range of motion. She exhibits no edema or tenderness.   Lymphadenopathy:     She has no cervical adenopathy.   Neurological: She is alert and oriented to person, place, and time. No cranial nerve deficit. Coordination normal.   Skin: Skin is warm and dry. No rash noted. She is not diaphoretic. No erythema. No pallor.   Psychiatric: She has a normal mood and affect. Her behavior is normal. Judgment and thought content normal.   Nursing note and vitals reviewed.      CMP  Sodium   Date Value Ref Range Status   05/13/2019 139 136 - 145 mmol/L Final     Potassium   Date Value Ref Range Status   05/13/2019 4.1 3.5 - 5.1 mmol/L Final     Chloride   Date Value Ref Range Status   05/13/2019 99 95 - 110 mmol/L Final     CO2   Date Value Ref Range Status   05/13/2019 26 23 - 29 mmol/L Final     Glucose   Date Value Ref Range Status   05/13/2019 95 70 - 110 mg/dL Final     BUN, Bld   Date Value Ref Range Status   05/13/2019 18 8 - 23 mg/dL Final     Creatinine   Date Value Ref  Range Status   05/13/2019 1.3 0.5 - 1.4 mg/dL Final     Calcium   Date Value Ref Range Status   05/13/2019 11.2 (H) 8.7 - 10.5 mg/dL Final     Total Protein   Date Value Ref Range Status   05/13/2019 7.4 6.0 - 8.4 g/dL Final     Albumin   Date Value Ref Range Status   05/13/2019 4.0 3.5 - 5.2 g/dL Final     Total Bilirubin   Date Value Ref Range Status   05/13/2019 0.6 0.1 - 1.0 mg/dL Final     Comment:     For infants and newborns, interpretation of results should be based  on gestational age, weight and in agreement with clinical  observations.  Premature Infant recommended reference ranges:  Up to 24 hours.............<8.0 mg/dL  Up to 48 hours............<12.0 mg/dL  3-5 days..................<15.0 mg/dL  6-29 days.................<15.0 mg/dL       Alkaline Phosphatase   Date Value Ref Range Status   05/13/2019 99 55 - 135 U/L Final     AST   Date Value Ref Range Status   05/13/2019 20 10 - 40 U/L Final     ALT   Date Value Ref Range Status   05/13/2019 11 10 - 44 U/L Final     Anion Gap   Date Value Ref Range Status   05/13/2019 14 8 - 16 mmol/L Final     eGFR if    Date Value Ref Range Status   05/13/2019 47.4 (A) >60 mL/min/1.73 m^2 Final     eGFR if non    Date Value Ref Range Status   05/13/2019 41.1 (A) >60 mL/min/1.73 m^2 Final     Comment:     Calculation used to obtain the estimated glomerular filtration  rate (eGFR) is the CKD-EPI equation.        Lab Results   Component Value Date    WBC 5.37 05/13/2019    HGB 12.2 05/13/2019    HCT 39.7 05/13/2019    MCV 98 05/13/2019     05/13/2019     Lab Results   Component Value Date    CHOL 147 05/13/2019     Lab Results   Component Value Date    HDL 58 05/13/2019     Lab Results   Component Value Date    LDLCALC 68.2 05/13/2019     Lab Results   Component Value Date    TRIG 104 05/13/2019     Lab Results   Component Value Date    CHOLHDL 39.5 05/13/2019     Lab Results   Component Value Date    TSH 2.548 11/13/2018     No  results found for: LABA1C, HGBA1C  Assessment:       1. Paresthesia of arm    2. Dyslipidemia        Plan:   Paresthesia of arm  -     predniSONE (DELTASONE) 10 MG tablet; Take 1 tablet (10 mg total) by mouth once daily. for 3 days  Dispense: 3 tablet; Refill: 0  -     Comprehensive metabolic panel; Future; Expected date: 10/16/2019  -     Vitamin B12; Future; Expected date: 10/16/2019    Dyslipidemia  -     Comprehensive metabolic panel; Future; Expected date: 10/16/2019  -     Lipid panel; Future; Expected date: 10/16/2019    F/u 1 month

## 2019-10-17 ENCOUNTER — TELEPHONE (OUTPATIENT)
Dept: FAMILY MEDICINE | Facility: CLINIC | Age: 74
End: 2019-10-17

## 2019-11-11 ENCOUNTER — OFFICE VISIT (OUTPATIENT)
Dept: FAMILY MEDICINE | Facility: CLINIC | Age: 74
End: 2019-11-11
Payer: MEDICARE

## 2019-11-11 VITALS
WEIGHT: 135.06 LBS | HEART RATE: 68 BPM | SYSTOLIC BLOOD PRESSURE: 122 MMHG | TEMPERATURE: 98 F | DIASTOLIC BLOOD PRESSURE: 68 MMHG | BODY MASS INDEX: 24.7 KG/M2 | OXYGEN SATURATION: 99 %

## 2019-11-11 DIAGNOSIS — I10 ESSENTIAL HYPERTENSION: Primary | ICD-10-CM

## 2019-11-11 DIAGNOSIS — E78.5 HYPERLIPIDEMIA, UNSPECIFIED HYPERLIPIDEMIA TYPE: ICD-10-CM

## 2019-11-11 DIAGNOSIS — N18.2 STAGE 2 CHRONIC KIDNEY DISEASE: ICD-10-CM

## 2019-11-11 PROCEDURE — 99999 PR PBB SHADOW E&M-EST. PATIENT-LVL III: CPT | Mod: PBBFAC,,, | Performed by: INTERNAL MEDICINE

## 2019-11-11 PROCEDURE — 99214 PR OFFICE/OUTPT VISIT, EST, LEVL IV, 30-39 MIN: ICD-10-PCS | Mod: S$PBB,,, | Performed by: INTERNAL MEDICINE

## 2019-11-11 PROCEDURE — 99999 PR PBB SHADOW E&M-EST. PATIENT-LVL III: ICD-10-PCS | Mod: PBBFAC,,, | Performed by: INTERNAL MEDICINE

## 2019-11-11 PROCEDURE — 99214 OFFICE O/P EST MOD 30 MIN: CPT | Mod: S$PBB,,, | Performed by: INTERNAL MEDICINE

## 2019-11-11 PROCEDURE — 99213 OFFICE O/P EST LOW 20 MIN: CPT | Mod: PBBFAC,PO | Performed by: INTERNAL MEDICINE

## 2019-11-11 NOTE — PROGRESS NOTES
Subjective:       Patient ID: Mary Flanagan is a 74 y.o. female.    Chief Complaint: Annual Exam; Hyperlipidemia; Hypertension; and Chronic Kidney Disease    Hyperlipidemia   Associated symptoms include myalgias. Pertinent negatives include no chest pain or shortness of breath.   Hypertension   Pertinent negatives include no chest pain, headaches, neck pain, palpitations or shortness of breath.     Past Medical History:   Diagnosis Date    Cataract     Chest pain     CKD (chronic kidney disease) stage 3, GFR 30-59 ml/min     Dry eyes     Gastritis     Hyperlipidemia     Hypertension     Insomnia     Osteopenia      Past Surgical History:   Procedure Laterality Date    BREAST BIOPSY Left     , benign     SECTION      x 1    KNEE ARTHROSCOPY Left  approx    KNEE SURGERY      left arm surgery      left knee surgery       Family History   Problem Relation Age of Onset    Hypertension Mother     Hypertension Father     Breast cancer Neg Hx     Colon cancer Neg Hx     Ovarian cancer Neg Hx     Thrombophilia Neg Hx     Strabismus Neg Hx     Macular degeneration Neg Hx     Retinal detachment Neg Hx     Glaucoma Neg Hx     Blindness Neg Hx     Amblyopia Neg Hx      Social History     Socioeconomic History    Marital status:      Spouse name: Not on file    Number of children: Not on file    Years of education: Not on file    Highest education level: Not on file   Occupational History    Not on file   Social Needs    Financial resource strain: Not on file    Food insecurity:     Worry: Not on file     Inability: Not on file    Transportation needs:     Medical: Not on file     Non-medical: Not on file   Tobacco Use    Smoking status: Never Smoker    Smokeless tobacco: Never Used   Substance and Sexual Activity    Alcohol use: No     Alcohol/week: 0.0 standard drinks    Drug use: No    Sexual activity: Not Currently     Partners: Male     Birth  control/protection: None     Comment: mut monog   Lifestyle    Physical activity:     Days per week: Not on file     Minutes per session: Not on file    Stress: Not on file   Relationships    Social connections:     Talks on phone: Not on file     Gets together: Not on file     Attends Je[employer]'s witness service: Not on file     Active member of club or organization: Not on file     Attends meetings of clubs or organizations: Not on file     Relationship status: Not on file   Other Topics Concern    Not on file   Social History Narrative    Not on file     Review of Systems   Constitutional: Negative for activity change, appetite change, chills, diaphoresis, fatigue, fever and unexpected weight change.   HENT: Negative for congestion, drooling, ear discharge, ear pain, facial swelling, hearing loss, mouth sores, nosebleeds, postnasal drip, rhinorrhea, sinus pressure, sneezing, sore throat, tinnitus, trouble swallowing and voice change.    Eyes: Negative for photophobia, redness and visual disturbance.   Respiratory: Negative for apnea, cough, choking, chest tightness, shortness of breath and wheezing.    Cardiovascular: Negative for chest pain, palpitations and leg swelling.   Gastrointestinal: Negative for abdominal distention, abdominal pain, blood in stool, constipation, diarrhea, nausea and vomiting.   Endocrine: Negative for cold intolerance, heat intolerance, polydipsia, polyphagia and polyuria.   Genitourinary: Negative for decreased urine volume, difficulty urinating, dysuria, flank pain, frequency, genital sores, hematuria and urgency.   Musculoskeletal: Positive for arthralgias and myalgias. Negative for back pain, gait problem, joint swelling, neck pain and neck stiffness.   Skin: Negative for color change, pallor, rash and wound.   Allergic/Immunologic: Negative for food allergies and immunocompromised state.   Neurological: Negative for dizziness, tremors, seizures, syncope, speech difficulty, weakness,  light-headedness, numbness and headaches.   Hematological: Negative for adenopathy. Does not bruise/bleed easily.   Psychiatric/Behavioral: Negative for agitation, behavioral problems, confusion, decreased concentration, dysphoric mood, hallucinations, self-injury, sleep disturbance and suicidal ideas. The patient is not nervous/anxious and is not hyperactive.    All other systems reviewed and are negative.      Objective:      Physical Exam   Constitutional: She is oriented to person, place, and time. She appears well-developed and well-nourished. No distress.   HENT:   Head: Normocephalic and atraumatic.   Neck: Normal range of motion. Neck supple. No JVD present. Carotid bruit is not present. No tracheal deviation present. No thyromegaly present.   Cardiovascular: Normal rate, regular rhythm, normal heart sounds and intact distal pulses.   Pulmonary/Chest: Effort normal and breath sounds normal. No respiratory distress. She has no wheezes. She has no rales. She exhibits no tenderness.   Abdominal: Soft. Bowel sounds are normal. She exhibits no distension. There is no tenderness. There is no rebound and no guarding.   Musculoskeletal: Normal range of motion. She exhibits no edema or tenderness.   Lymphadenopathy:     She has no cervical adenopathy.   Neurological: She is alert and oriented to person, place, and time.   Skin: Skin is warm and dry. No rash noted. She is not diaphoretic. No erythema. No pallor.   Psychiatric: She has a normal mood and affect. Her behavior is normal. Judgment and thought content normal.   Nursing note and vitals reviewed.      CMP  Sodium   Date Value Ref Range Status   10/16/2019 139 136 - 145 mmol/L Final     Potassium   Date Value Ref Range Status   10/16/2019 4.1 3.5 - 5.1 mmol/L Final     Chloride   Date Value Ref Range Status   10/16/2019 101 95 - 110 mmol/L Final     CO2   Date Value Ref Range Status   10/16/2019 29 23 - 29 mmol/L Final     Glucose   Date Value Ref Range Status    10/16/2019 87 70 - 110 mg/dL Final     BUN, Bld   Date Value Ref Range Status   10/16/2019 19 8 - 23 mg/dL Final     Creatinine   Date Value Ref Range Status   10/16/2019 1.3 0.5 - 1.4 mg/dL Final     Calcium   Date Value Ref Range Status   10/16/2019 10.9 (H) 8.7 - 10.5 mg/dL Final     Total Protein   Date Value Ref Range Status   10/16/2019 7.4 6.0 - 8.4 g/dL Final     Albumin   Date Value Ref Range Status   10/16/2019 4.1 3.5 - 5.2 g/dL Final     Total Bilirubin   Date Value Ref Range Status   10/16/2019 0.7 0.1 - 1.0 mg/dL Final     Comment:     For infants and newborns, interpretation of results should be based  on gestational age, weight and in agreement with clinical  observations.  Premature Infant recommended reference ranges:  Up to 24 hours.............<8.0 mg/dL  Up to 48 hours............<12.0 mg/dL  3-5 days..................<15.0 mg/dL  6-29 days.................<15.0 mg/dL       Alkaline Phosphatase   Date Value Ref Range Status   10/16/2019 95 55 - 135 U/L Final     AST   Date Value Ref Range Status   10/16/2019 19 10 - 40 U/L Final     ALT   Date Value Ref Range Status   10/16/2019 11 10 - 44 U/L Final     Anion Gap   Date Value Ref Range Status   10/16/2019 9 8 - 16 mmol/L Final     eGFR if    Date Value Ref Range Status   10/16/2019 47.0 (A) >60 mL/min/1.73 m^2 Final     eGFR if non    Date Value Ref Range Status   10/16/2019 40.8 (A) >60 mL/min/1.73 m^2 Final     Comment:     Calculation used to obtain the estimated glomerular filtration  rate (eGFR) is the CKD-EPI equation.        Lab Results   Component Value Date    WBC 5.37 05/13/2019    HGB 12.2 05/13/2019    HCT 39.7 05/13/2019    MCV 98 05/13/2019     05/13/2019     Lab Results   Component Value Date    CHOL 154 10/16/2019     Lab Results   Component Value Date    HDL 60 10/16/2019     Lab Results   Component Value Date    LDLCALC 74.6 10/16/2019     Lab Results   Component Value Date    TRIG 97  10/16/2019     Lab Results   Component Value Date    CHOLHDL 39.0 10/16/2019     Lab Results   Component Value Date    TSH 2.548 11/13/2018     No results found for: LABA1C, HGBA1C  Assessment:       1. Essential hypertension    2. Hyperlipidemia, unspecified hyperlipidemia type    3. Stage 2 chronic kidney disease        Plan:   Essential hypertension    Hyperlipidemia, unspecified hyperlipidemia type    Stage 2 chronic kidney disease  -     Ambulatory referral to Nephrology    Stable-------------------continue current meds.                 F/u 6 months--

## 2019-11-14 ENCOUNTER — OFFICE VISIT (OUTPATIENT)
Dept: FAMILY MEDICINE | Facility: CLINIC | Age: 74
End: 2019-11-14
Payer: MEDICARE

## 2019-11-14 VITALS
HEART RATE: 70 BPM | HEIGHT: 62 IN | BODY MASS INDEX: 24.91 KG/M2 | WEIGHT: 135.38 LBS | OXYGEN SATURATION: 98 % | TEMPERATURE: 98 F | DIASTOLIC BLOOD PRESSURE: 60 MMHG | SYSTOLIC BLOOD PRESSURE: 118 MMHG | RESPIRATION RATE: 18 BRPM

## 2019-11-14 DIAGNOSIS — K21.9 GASTROESOPHAGEAL REFLUX DISEASE WITHOUT ESOPHAGITIS: ICD-10-CM

## 2019-11-14 DIAGNOSIS — N18.30 CHRONIC KIDNEY DISEASE, STAGE III (MODERATE): ICD-10-CM

## 2019-11-14 DIAGNOSIS — Z00.00 ENCOUNTER FOR PREVENTIVE HEALTH EXAMINATION: ICD-10-CM

## 2019-11-14 DIAGNOSIS — Z23 NEED FOR 23-POLYVALENT PNEUMOCOCCAL POLYSACCHARIDE VACCINE: Primary | ICD-10-CM

## 2019-11-14 DIAGNOSIS — E78.5 HYPERLIPIDEMIA, UNSPECIFIED HYPERLIPIDEMIA TYPE: ICD-10-CM

## 2019-11-14 DIAGNOSIS — M85.89 OSTEOPENIA OF MULTIPLE SITES: ICD-10-CM

## 2019-11-14 DIAGNOSIS — M17.0 PRIMARY OSTEOARTHRITIS OF BOTH KNEES: ICD-10-CM

## 2019-11-14 DIAGNOSIS — I10 ESSENTIAL HYPERTENSION: ICD-10-CM

## 2019-11-14 PROCEDURE — G0009 ADMIN PNEUMOCOCCAL VACCINE: HCPCS | Mod: PBBFAC,PO

## 2019-11-14 PROCEDURE — G0439 PR MEDICARE ANNUAL WELLNESS SUBSEQUENT VISIT: ICD-10-PCS | Mod: S$GLB,,, | Performed by: NURSE PRACTITIONER

## 2019-11-14 PROCEDURE — 99999 PR PBB SHADOW E&M-EST. PATIENT-LVL III: ICD-10-PCS | Mod: PBBFAC,,, | Performed by: NURSE PRACTITIONER

## 2019-11-14 PROCEDURE — 99999 PR PBB SHADOW E&M-EST. PATIENT-LVL III: CPT | Mod: PBBFAC,,, | Performed by: NURSE PRACTITIONER

## 2019-11-14 PROCEDURE — 99213 OFFICE O/P EST LOW 20 MIN: CPT | Mod: PBBFAC,PO,25 | Performed by: NURSE PRACTITIONER

## 2019-11-14 PROCEDURE — G0439 PPPS, SUBSEQ VISIT: HCPCS | Mod: S$GLB,,, | Performed by: NURSE PRACTITIONER

## 2019-11-14 NOTE — PATIENT INSTRUCTIONS
Counseling and Referral of Other Preventative  (Italic type indicates deductible and co-insurance are waived)    Patient Name: Mary Flanagan  Today's Date: 11/14/2019    Health Maintenance       Date Due Completion Date    Pneumococcal Vaccine (65+ High/Highest Risk) (2 of 2 - PPSV23) 01/08/2019 11/13/2018    Mammogram 09/11/2020 9/11/2019    Lipid Panel 10/16/2020 10/16/2019    Pap Smear 08/30/2021 8/30/2018    Override on 7/1/2013: Done    DEXA SCAN 09/12/2022 9/12/2017    Override on 6/10/2013: Done    TETANUS VACCINE 06/28/2023 6/28/2013    Colonoscopy 01/23/2025 1/23/2015        Orders Placed This Encounter   Procedures    (In Office Administered) Pneumococcal Polysaccharide Vaccine (23 Valent) (SQ/IM)     The following information is provided to all patients.  This information is to help you find resources for any of the problems found today that may be affecting your health:                Living healthy guide: www.[facility].[email or web address].[facility]      Understanding Diabetes: www.diabetes.org      Eating healthy: www.cdc.gov/healthyweight      [facility] home safety checklist: www.cdc.gov/steadi/patient.html      Agency on Aging: www.goea.[place].[facility]      Alcoholics anonymous (AA): www.aa.org      Physical Activity: www.wander.nih.gov/jp8dfnj      Tobacco use: www.quitwithusla.org     Counseling and Referral of Other Preventative  (Italic type indicates deductible and co-insurance are waived)    Patient Name: Mary Flanagan  Today's Date: 11/15/2019    Health Maintenance       Date Due Completion Date    Mammogram 09/11/2020 9/11/2019    Lipid Panel 10/16/2020 10/16/2019    Pap Smear 08/30/2021 8/30/2018    Override on 7/1/2013: Done    DEXA SCAN 09/12/2022 9/12/2017    Override on 6/10/2013: Done    TETANUS VACCINE 06/28/2023 6/28/2013    Colonoscopy 01/23/2025 1/23/2015        Orders Placed This Encounter   Procedures    (In Office Administered) Pneumococcal Polysaccharide Vaccine (23 Valent) (SQ/IM)     The following  information is provided to all patients.  This information is to help you find resources for any of the problems found today that may be affecting your health:                Living healthy guide: www.[facility].[place].[facility]      Understanding Diabetes: www.diabetes.org      Eating healthy: www.cdc.gov/healthyweight      CDC home safety checklist: www.cdc.gov/steadi/patient.html      Agency on Aging: www.goea.[place]      Alcoholics anonymous (AA): www.aa.org      Physical Activity: www.wander.nih.gov/kg0ptmf      Tobacco use: www.quitwithusla.org

## 2019-11-14 NOTE — Clinical Note
Your patient was seen today for a HRA visit. I have included a copy of my visit note, please review the note and feel free to contact me with any questions. Thank you for allowing me to participate in the care of your patients. Gricelda Gonzalez NP

## 2019-11-14 NOTE — PROGRESS NOTES
"Mary Flanagan presented for a  Medicare AWV and comprehensive Health Risk Assessment today. The following components were reviewed and updated:    · Medical history  · Family History  · Social history  · Allergies and Current Medications  · Health Risk Assessment  · Health Maintenance  · Care Team     ** See Completed Assessments for Annual Wellness Visit within the encounter summary.**       The following assessments were completed:  · Living Situation  · CAGE  · Depression Screening  · Timed Get Up and Go  · Whisper Test  · Cognitive Function Screening  · Nutrition Screening  · ADL Screening  · PAQ Screening    Vitals:    11/14/19 1328   BP: 118/60   BP Location: Right arm   Patient Position: Sitting   BP Method: Medium (Automatic)   Pulse: 70   Resp: 18   Temp: 97.8 °F (36.6 °C)   TempSrc: Oral   SpO2: 98%   Weight: 61.4 kg (135 lb 5.8 oz)   Height: 5' 2" (1.575 m)     Body mass index is 24.76 kg/m².  Physical Exam   Constitutional: She appears well-developed and well-nourished.   HENT:   Head: Normocephalic and atraumatic.   Eyes: Pupils are equal, round, and reactive to light.   Neck: Carotid bruit is not present.   Cardiovascular: Normal rate, regular rhythm, normal heart sounds, intact distal pulses and normal pulses. Exam reveals no gallop.   No murmur heard.  Pulmonary/Chest: Effort normal and breath sounds normal.   Abdominal: Soft. Normal appearance and bowel sounds are normal. She exhibits no distension. There is no tenderness.   Musculoskeletal: Normal range of motion. She exhibits no edema or tenderness.   Neurological: She is alert. She exhibits normal muscle tone.   Skin: Skin is warm, dry and intact.   Psychiatric: She has a normal mood and affect. Her speech is normal and behavior is normal. Judgment and thought content normal. Cognition and memory are normal.   Nursing note and vitals reviewed.        Diagnoses and health risks identified today and associated recommendations/orders:    1. " Encounter for preventive health examination  Completed     2. Need for 23-polyvalent pneumococcal polysaccharide vaccine  Given per proctol     3. Essential hypertension  Chronic and Stable on Dyzazide. Continue current treatment plan as previously prescribed with your PCP    4. Hyperlipidemia, unspecified hyperlipidemia type  Component      Latest Ref Rng & Units 10/16/2019   Cholesterol      120 - 199 mg/dL 154   Triglycerides      30 - 150 mg/dL 97   HDL      40 - 75 mg/dL 60   LDL Cholesterol External      63.0 - 159.0 mg/dL 74.6   Hdl/Cholesterol Ratio      20.0 - 50.0 % 39.0   Total Cholesterol/HDL Ratio      2.0 - 5.0 2.6   Non-HDL Cholesterol      mg/dL 94   Chronic and Stable on Liptor Continue current treatment plan as previously prescribed with your PCP    5. Gastroesophageal reflux disease without esophagitis  Chronic and Stable. Continue current treatment plan as previously prescribed with your PCP    6. Primary osteoarthritis of both knees  Chronic and Stable. Tylenolol  Continue current treatment plan as previously prescribed with your PCP H    7DEXA   9/12/2017 repeat 5 year  Chronic and Stable. Start vitamin D 1000 IU daily  Continue current treatment plan as previously prescribed with your PCP    8. Chronic kidney disease, stage III (moderate)  eGFR if African American >60 mL/min/1.73 m^2 47.0Abnormal   47.4Abnormal   52.2Abnormal   43   Chronic and Stable renal function. Pt has appointment with renal md pn     Provided Mary with a 5-10 year written screening schedule and personal prevention plan. Recommendations were developed using the USPSTF age appropriate recommendations. Education, counseling, and referrals were provided as needed. After Visit Summary printed and given to patient which includes a list of additional screenings\tests needed.    I offered to discuss end of life issues, including information on how to make advance directives that the patient could use to name someone who would  make medical decisions on their behalf if they became too ill to make themselves.  _X_Patient is interested, I provided paper work and offered to discuss.    1 year follow up   Gricelda Gonzalez NP

## 2019-11-22 ENCOUNTER — TELEPHONE (OUTPATIENT)
Dept: DERMATOLOGY | Facility: CLINIC | Age: 74
End: 2019-11-22

## 2019-11-22 ENCOUNTER — OFFICE VISIT (OUTPATIENT)
Dept: DERMATOLOGY | Facility: CLINIC | Age: 74
End: 2019-11-22
Payer: MEDICARE

## 2019-11-22 DIAGNOSIS — L30.9 DERMATITIS: Primary | ICD-10-CM

## 2019-11-22 PROCEDURE — 99202 OFFICE O/P NEW SF 15 MIN: CPT | Mod: S$PBB,,, | Performed by: STUDENT IN AN ORGANIZED HEALTH CARE EDUCATION/TRAINING PROGRAM

## 2019-11-22 PROCEDURE — 99999 PR PBB SHADOW E&M-EST. PATIENT-LVL II: CPT | Mod: PBBFAC,,, | Performed by: STUDENT IN AN ORGANIZED HEALTH CARE EDUCATION/TRAINING PROGRAM

## 2019-11-22 PROCEDURE — 99202 PR OFFICE/OUTPT VISIT, NEW, LEVL II, 15-29 MIN: ICD-10-PCS | Mod: S$PBB,,, | Performed by: STUDENT IN AN ORGANIZED HEALTH CARE EDUCATION/TRAINING PROGRAM

## 2019-11-22 PROCEDURE — 99212 OFFICE O/P EST SF 10 MIN: CPT | Mod: PBBFAC | Performed by: STUDENT IN AN ORGANIZED HEALTH CARE EDUCATION/TRAINING PROGRAM

## 2019-11-22 PROCEDURE — 1159F MED LIST DOCD IN RCRD: CPT | Mod: ,,, | Performed by: STUDENT IN AN ORGANIZED HEALTH CARE EDUCATION/TRAINING PROGRAM

## 2019-11-22 PROCEDURE — 1159F PR MEDICATION LIST DOCUMENTED IN MEDICAL RECORD: ICD-10-PCS | Mod: ,,, | Performed by: STUDENT IN AN ORGANIZED HEALTH CARE EDUCATION/TRAINING PROGRAM

## 2019-11-22 PROCEDURE — 99999 PR PBB SHADOW E&M-EST. PATIENT-LVL II: ICD-10-PCS | Mod: PBBFAC,,, | Performed by: STUDENT IN AN ORGANIZED HEALTH CARE EDUCATION/TRAINING PROGRAM

## 2019-11-22 RX ORDER — DOXYCYCLINE 100 MG/1
100 CAPSULE ORAL DAILY
Qty: 14 CAPSULE | Refills: 0 | Status: SHIPPED | OUTPATIENT
Start: 2019-11-22 | End: 2020-01-03

## 2019-11-22 RX ORDER — HYDROCORTISONE 25 MG/G
CREAM TOPICAL 2 TIMES DAILY
Qty: 28 G | Refills: 0 | Status: SHIPPED | OUTPATIENT
Start: 2019-11-22 | End: 2020-09-21

## 2019-11-22 RX ORDER — DESONIDE 0.5 MG/G
CREAM TOPICAL 2 TIMES DAILY
Qty: 60 G | Refills: 0 | Status: SHIPPED | OUTPATIENT
Start: 2019-11-22 | End: 2020-09-21

## 2019-11-22 NOTE — TELEPHONE ENCOUNTER
----- Message from Monika Gonzalez sent at 11/22/2019  3:07 PM CST -----  Contact: pt  The pt states her Desonide medication is too expensive and another meds needs to be called into [facility], the pt can be reached at [phone]

## 2019-11-22 NOTE — PROGRESS NOTES
Subjective:       Patient ID:  Mary Flanagan is a 74 y.o. female who presents for   Chief Complaint   Patient presents with    Rash     red bumps on face along glasses     History of Present Illness: The patient presents with chief complaint of red bumpy rash on face.  Location: face  Duration: intermittent for 1 month  Signs/Symptoms: redness, some itching and irritation. No burning or blister formation  Prior treatments: neosporin        Review of Systems   Skin: Negative for activity-related sunscreen use.        Objective:    Physical Exam   Constitutional: She appears well-developed and well-nourished. No distress.   Neurological: She is alert and oriented to person, place, and time. She is not disoriented.   Psychiatric: She has a normal mood and affect.   Skin:   Areas Examined (abnormalities noted in diagram):   Head / Face Inspection Performed  Neck Inspection Performed              Diagram Legend     Erythematous scaling macule/papule c/w actinic keratosis       Vascular papule c/w angioma      Pigmented verrucoid papule/plaque c/w seborrheic keratosis      Yellow umbilicated papule c/w sebaceous hyperplasia      Irregularly shaped tan macule c/w lentigo     1-2 mm smooth white papules consistent with Milia      Movable subcutaneous cyst with punctum c/w epidermal inclusion cyst      Subcutaneous movable cyst c/w pilar cyst      Firm pink to brown papule c/w dermatofibroma      Pedunculated fleshy papule(s) c/w skin tag(s)      Evenly pigmented macule c/w junctional nevus     Mildly variegated pigmented, slightly irregular-bordered macule c/w mildly atypical nevus      Flesh colored to evenly pigmented papule c/w intradermal nevus       Pink pearly papule/plaque c/w basal cell carcinoma      Erythematous hyperkeratotic cursted plaque c/w SCC      Surgical scar with no sign of skin cancer recurrence      Open and closed comedones      Inflammatory papules and pustules      Verrucoid papule consistent  consistent with wart     Erythematous eczematous patches and plaques     Dystrophic onycholytic nail with subungual debris c/w onychomycosis     Umbilicated papule    Erythematous-base heme-crusted tan verrucoid plaque consistent with inflamed seborrheic keratosis     Erythematous Silvery Scaling Plaque c/w Psoriasis     See annotation      Assessment / Plan:        Dermatitis  -     desonide (DESOWEN) 0.05 % cream; Apply topically 2 (two) times daily.  Dispense: 60 g; Refill: 0  -     doxycycline (VIBRAMYCIN) 100 MG Cap; Take 1 capsule (100 mg total) by mouth once daily.  Dispense: 14 capsule; Refill: 0             Follow up in about 6 weeks (around 1/3/2020).

## 2019-12-04 ENCOUNTER — TELEPHONE (OUTPATIENT)
Dept: NEPHROLOGY | Facility: CLINIC | Age: 74
End: 2019-12-04

## 2019-12-04 NOTE — TELEPHONE ENCOUNTER
----- Message from Farhene Trejo sent at 12/4/2019  9:54 AM CST -----  Contact: Pt  Type:  Sooner Apoointment Request    Caller is requesting a sooner appointment.  Caller declined first available appointment listed below.  Caller will not accept being placed on the waitlist and is requesting a message be sent to doctor.  Name of Caller:Mary MARTHA Flanagan  When is the first available appointment?01/27/2020  Symptoms:Stage 2 chronic kidney disease  Would the patient rather a call back or a response via [facility]? Call Back  Best Call Back Number:[phone] (home) or [phone](mobile)  Additional Information:

## 2020-01-03 ENCOUNTER — OFFICE VISIT (OUTPATIENT)
Dept: DERMATOLOGY | Facility: CLINIC | Age: 75
End: 2020-01-03
Payer: MEDICARE

## 2020-01-03 DIAGNOSIS — L30.9 DERMATITIS: Primary | ICD-10-CM

## 2020-01-03 PROCEDURE — 99213 OFFICE O/P EST LOW 20 MIN: CPT | Mod: S$PBB,,, | Performed by: STUDENT IN AN ORGANIZED HEALTH CARE EDUCATION/TRAINING PROGRAM

## 2020-01-03 PROCEDURE — 1159F MED LIST DOCD IN RCRD: CPT | Mod: ,,, | Performed by: STUDENT IN AN ORGANIZED HEALTH CARE EDUCATION/TRAINING PROGRAM

## 2020-01-03 PROCEDURE — 99999 PR PBB SHADOW E&M-EST. PATIENT-LVL II: CPT | Mod: PBBFAC,,, | Performed by: STUDENT IN AN ORGANIZED HEALTH CARE EDUCATION/TRAINING PROGRAM

## 2020-01-03 PROCEDURE — 1126F PR PAIN SEVERITY QUANTIFIED, NO PAIN PRESENT: ICD-10-PCS | Mod: ,,, | Performed by: STUDENT IN AN ORGANIZED HEALTH CARE EDUCATION/TRAINING PROGRAM

## 2020-01-03 PROCEDURE — 99212 OFFICE O/P EST SF 10 MIN: CPT | Mod: PBBFAC | Performed by: STUDENT IN AN ORGANIZED HEALTH CARE EDUCATION/TRAINING PROGRAM

## 2020-01-03 PROCEDURE — 1159F PR MEDICATION LIST DOCUMENTED IN MEDICAL RECORD: ICD-10-PCS | Mod: ,,, | Performed by: STUDENT IN AN ORGANIZED HEALTH CARE EDUCATION/TRAINING PROGRAM

## 2020-01-03 PROCEDURE — 99999 PR PBB SHADOW E&M-EST. PATIENT-LVL II: ICD-10-PCS | Mod: PBBFAC,,, | Performed by: STUDENT IN AN ORGANIZED HEALTH CARE EDUCATION/TRAINING PROGRAM

## 2020-01-03 PROCEDURE — 1126F AMNT PAIN NOTED NONE PRSNT: CPT | Mod: ,,, | Performed by: STUDENT IN AN ORGANIZED HEALTH CARE EDUCATION/TRAINING PROGRAM

## 2020-01-03 PROCEDURE — 99213 PR OFFICE/OUTPT VISIT, EST, LEVL III, 20-29 MIN: ICD-10-PCS | Mod: S$PBB,,, | Performed by: STUDENT IN AN ORGANIZED HEALTH CARE EDUCATION/TRAINING PROGRAM

## 2020-01-03 RX ORDER — CLINDAMYCIN PHOSPHATE 11.9 MG/ML
SOLUTION TOPICAL 2 TIMES DAILY
Qty: 60 ML | Refills: 1 | Status: SHIPPED | OUTPATIENT
Start: 2020-01-03 | End: 2020-09-21

## 2020-01-03 NOTE — PROGRESS NOTES
Subjective:       Patient ID:  Mary Flanagan is a 74 y.o. female who presents for   Chief Complaint   Patient presents with    Rash     History of Present Illness: The patient presents for follow up of a rash, last seen on 11/22/19 where she was started on doxycycline and hydrocortisone cream for erythematous itchy papules on the nose and cheek. Reports much improvement in symptoms, however, continues to have a few occasional bumps that develop. Reports that doxycycline caused dizziness and therefore, did not use for long, only using hydrocortisone. Denies any similar rash elsewhere.         Review of Systems   Constitutional: Negative for fever and chills.        Objective:    Physical Exam   Constitutional: She appears well-developed and well-nourished. No distress.   Neurological: She is alert and oriented to person, place, and time. She is not disoriented.   Psychiatric: She has a normal mood and affect.   Skin:   Areas Examined (abnormalities noted in diagram):   Head / Face Inspection Performed  Neck Inspection Performed              Diagram Legend     Erythematous scaling macule/papule c/w actinic keratosis       Vascular papule c/w angioma      Pigmented verrucoid papule/plaque c/w seborrheic keratosis      Yellow umbilicated papule c/w sebaceous hyperplasia      Irregularly shaped tan macule c/w lentigo     1-2 mm smooth white papules consistent with Milia      Movable subcutaneous cyst with punctum c/w epidermal inclusion cyst      Subcutaneous movable cyst c/w pilar cyst      Firm pink to brown papule c/w dermatofibroma      Pedunculated fleshy papule(s) c/w skin tag(s)      Evenly pigmented macule c/w junctional nevus     Mildly variegated pigmented, slightly irregular-bordered macule c/w mildly atypical nevus      Flesh colored to evenly pigmented papule c/w intradermal nevus       Pink pearly papule/plaque c/w basal cell carcinoma      Erythematous hyperkeratotic cursted plaque c/w SCC       Surgical scar with no sign of skin cancer recurrence      Open and closed comedones      Inflammatory papules and pustules      Verrucoid papule consistent consistent with wart     Erythematous eczematous patches and plaques     Dystrophic onycholytic nail with subungual debris c/w onychomycosis     Umbilicated papule    Erythematous-base heme-crusted tan verrucoid plaque consistent with inflamed seborrheic keratosis     Erythematous Silvery Scaling Plaque c/w Psoriasis     See annotation      Assessment / Plan:        Dermatitis - improvement, however, still with few lesions on the nose. Patient intolerant to doxycycline; will add topical clindamycin for inflammation properties.   -     clindamycin (CLEOCIN T) 1 % external solution; Apply topically 2 (two) times daily.  Dispense: 60 mL; Refill: 1  -     Continue hydrocortisone cream to the area.         Follow up in about 3 months (around 4/3/2020).

## 2020-01-08 ENCOUNTER — TELEPHONE (OUTPATIENT)
Dept: FAMILY MEDICINE | Facility: CLINIC | Age: 75
End: 2020-01-08

## 2020-01-08 ENCOUNTER — HOSPITAL ENCOUNTER (OUTPATIENT)
Dept: RADIOLOGY | Facility: HOSPITAL | Age: 75
Discharge: HOME | End: 2020-01-08
Attending: INTERNAL MEDICINE
Payer: MEDICARE

## 2020-01-08 ENCOUNTER — OFFICE VISIT (OUTPATIENT)
Dept: FAMILY MEDICINE | Facility: CLINIC | Age: 75
End: 2020-01-08
Payer: MEDICARE

## 2020-01-08 VITALS
SYSTOLIC BLOOD PRESSURE: 127 MMHG | OXYGEN SATURATION: 98 % | WEIGHT: 132.19 LBS | HEART RATE: 74 BPM | DIASTOLIC BLOOD PRESSURE: 72 MMHG | HEIGHT: 62 IN | BODY MASS INDEX: 24.33 KG/M2 | TEMPERATURE: 98 F | RESPIRATION RATE: 16 BRPM

## 2020-01-08 DIAGNOSIS — R10.9 ABDOMINAL PAIN, UNSPECIFIED ABDOMINAL LOCATION: ICD-10-CM

## 2020-01-08 DIAGNOSIS — M54.50 CHRONIC MIDLINE LOW BACK PAIN WITHOUT SCIATICA: ICD-10-CM

## 2020-01-08 DIAGNOSIS — G89.29 CHRONIC MIDLINE LOW BACK PAIN WITHOUT SCIATICA: ICD-10-CM

## 2020-01-08 DIAGNOSIS — G89.29 CHRONIC MIDLINE LOW BACK PAIN WITHOUT SCIATICA: Primary | ICD-10-CM

## 2020-01-08 DIAGNOSIS — M54.50 CHRONIC MIDLINE LOW BACK PAIN WITHOUT SCIATICA: Primary | ICD-10-CM

## 2020-01-08 PROCEDURE — 1125F AMNT PAIN NOTED PAIN PRSNT: CPT | Mod: ,,, | Performed by: INTERNAL MEDICINE

## 2020-01-08 PROCEDURE — 99215 OFFICE O/P EST HI 40 MIN: CPT | Mod: PBBFAC,PO,25 | Performed by: INTERNAL MEDICINE

## 2020-01-08 PROCEDURE — 72100 X-RAY EXAM L-S SPINE 2/3 VWS: CPT | Mod: TC,FY,PO

## 2020-01-08 PROCEDURE — 74019 RADEX ABDOMEN 2 VIEWS: CPT | Mod: 26,,, | Performed by: RADIOLOGY

## 2020-01-08 PROCEDURE — 99999 PR PBB SHADOW E&M-EST. PATIENT-LVL V: ICD-10-PCS | Mod: PBBFAC,,, | Performed by: INTERNAL MEDICINE

## 2020-01-08 PROCEDURE — 99214 OFFICE O/P EST MOD 30 MIN: CPT | Mod: S$PBB,,, | Performed by: INTERNAL MEDICINE

## 2020-01-08 PROCEDURE — 74019 RADEX ABDOMEN 2 VIEWS: CPT | Mod: TC,FY,PO

## 2020-01-08 PROCEDURE — 1159F PR MEDICATION LIST DOCUMENTED IN MEDICAL RECORD: ICD-10-PCS | Mod: ,,, | Performed by: INTERNAL MEDICINE

## 2020-01-08 PROCEDURE — 71046 X-RAY EXAM CHEST 2 VIEWS: CPT | Mod: 26,,, | Performed by: RADIOLOGY

## 2020-01-08 PROCEDURE — 1125F PR PAIN SEVERITY QUANTIFIED, PAIN PRESENT: ICD-10-PCS | Mod: ,,, | Performed by: INTERNAL MEDICINE

## 2020-01-08 PROCEDURE — 1159F MED LIST DOCD IN RCRD: CPT | Mod: ,,, | Performed by: INTERNAL MEDICINE

## 2020-01-08 PROCEDURE — 99999 PR PBB SHADOW E&M-EST. PATIENT-LVL V: CPT | Mod: PBBFAC,,, | Performed by: INTERNAL MEDICINE

## 2020-01-08 PROCEDURE — 74019 XR ABDOMEN FLAT AND ERECT: ICD-10-PCS | Mod: 26,,, | Performed by: RADIOLOGY

## 2020-01-08 PROCEDURE — 99214 PR OFFICE/OUTPT VISIT, EST, LEVL IV, 30-39 MIN: ICD-10-PCS | Mod: S$PBB,,, | Performed by: INTERNAL MEDICINE

## 2020-01-08 PROCEDURE — 71046 XR CHEST PA AND LATERAL: ICD-10-PCS | Mod: 26,,, | Performed by: RADIOLOGY

## 2020-01-08 PROCEDURE — 71046 X-RAY EXAM CHEST 2 VIEWS: CPT | Mod: TC,FY,PO

## 2020-01-08 PROCEDURE — 72100 X-RAY EXAM L-S SPINE 2/3 VWS: CPT | Mod: 26,,, | Performed by: RADIOLOGY

## 2020-01-08 PROCEDURE — 72100 XR LUMBAR SPINE AP AND LATERAL: ICD-10-PCS | Mod: 26,,, | Performed by: RADIOLOGY

## 2020-01-08 RX ORDER — PANTOPRAZOLE SODIUM 20 MG/1
20 TABLET, DELAYED RELEASE ORAL DAILY
Qty: 30 TABLET | Refills: 1 | Status: SHIPPED | OUTPATIENT
Start: 2020-01-08 | End: 2020-09-21

## 2020-01-08 RX ORDER — GABAPENTIN 100 MG/1
100 CAPSULE ORAL NIGHTLY
Qty: 30 CAPSULE | Refills: 1 | Status: SHIPPED | OUTPATIENT
Start: 2020-01-08 | End: 2020-08-21

## 2020-01-08 NOTE — TELEPHONE ENCOUNTER
Xray back shows deg disc disease--try gabapentin-..       xray abdomen shows constipation------try miralax---.

## 2020-01-08 NOTE — PROGRESS NOTES
Subjective:       Patient ID: Mary Flanagan is a 74 y.o. female.    Chief Complaint: Back Pain and Abdominal Pain    Back Pain   This is a chronic problem. The current episode started more than 1 month ago. The problem has been gradually worsening since onset. The pain is present in the lumbar spine and thoracic spine. Associated symptoms include abdominal pain and a fever. Pertinent negatives include no chest pain, dysuria, headaches or weakness.   Abdominal Pain   This is a recurrent problem. The current episode started more than 1 month ago. The problem occurs intermittently. The problem has been gradually worsening. Associated symptoms include arthralgias, a fever and myalgias. Pertinent negatives include no constipation, diarrhea, dysuria, headaches, hematuria, nausea or vomiting.     Past Medical History:   Diagnosis Date    Cataract     Chest pain     CKD (chronic kidney disease) stage 3, GFR 30-59 ml/min     Dry eyes     Gastritis     Hyperlipidemia     Hypertension     Insomnia     Osteopenia      Past Surgical History:   Procedure Laterality Date    BREAST BIOPSY Left     , benign     SECTION      x 1    KNEE ARTHROSCOPY Left  approx    KNEE SURGERY      left arm surgery      left knee surgery       Family History   Problem Relation Age of Onset    Hypertension Mother     Hypertension Father     Breast cancer Neg Hx     Colon cancer Neg Hx     Ovarian cancer Neg Hx     Thrombophilia Neg Hx     Strabismus Neg Hx     Macular degeneration Neg Hx     Retinal detachment Neg Hx     Glaucoma Neg Hx     Blindness Neg Hx     Amblyopia Neg Hx      Social History     Socioeconomic History    Marital status:      Spouse name: Not on file    Number of children: Not on file    Years of education: Not on file    Highest education level: Not on file   Occupational History    Not on file   Social Needs    Financial resource strain: Not on file    Food insecurity:      Worry: Not on file     Inability: Not on file    Transportation needs:     Medical: Not on file     Non-medical: Not on file   Tobacco Use    Smoking status: Never Smoker    Smokeless tobacco: Never Used   Substance and Sexual Activity    Alcohol use: No     Alcohol/week: 0.0 standard drinks    Drug use: No    Sexual activity: Not Currently     Partners: Male     Birth control/protection: None     Comment: mut monog   Lifestyle    Physical activity:     Days per week: Not on file     Minutes per session: Not on file    Stress: Not on file   Relationships    Social connections:     Talks on phone: Not on file     Gets together: Not on file     Attends Zoroastrianism service: Not on file     Active member of club or organization: Not on file     Attends meetings of clubs or organizations: Not on file     Relationship status: Not on file   Other Topics Concern    Not on file   Social History Narrative    Not on file     Review of Systems   Constitutional: Positive for fever. Negative for chills.   HENT: Negative for congestion.    Respiratory: Negative for apnea, cough, choking, chest tightness, shortness of breath, wheezing and stridor.    Cardiovascular: Negative for chest pain and palpitations.   Gastrointestinal: Positive for abdominal pain. Negative for constipation, diarrhea, nausea and vomiting.   Genitourinary: Negative for dysuria, hematuria and urgency.   Musculoskeletal: Positive for arthralgias, back pain and myalgias.   Neurological: Negative for dizziness, weakness, light-headedness and headaches.   Psychiatric/Behavioral: Negative for agitation, behavioral problems and confusion.       Objective:      Physical Exam   Constitutional: She is oriented to person, place, and time. She appears well-developed and well-nourished. No distress.   HENT:   Head: Normocephalic and atraumatic.   Neck: Normal range of motion. Neck supple. No JVD present. Carotid bruit is not present. No tracheal deviation  present. No thyromegaly present.   Cardiovascular: Normal rate, regular rhythm, normal heart sounds and intact distal pulses.   Pulmonary/Chest: Effort normal and breath sounds normal. No respiratory distress. She has no wheezes. She has no rales. She exhibits no tenderness.   Abdominal: Soft. Bowel sounds are normal. She exhibits no distension. There is no tenderness. There is no rebound and no guarding.   Musculoskeletal: Normal range of motion. She exhibits no edema or tenderness.   Lymphadenopathy:     She has no cervical adenopathy.   Neurological: She is alert and oriented to person, place, and time.   Skin: Skin is warm and dry. No rash noted. She is not diaphoretic. No erythema. No pallor.   Psychiatric: She has a normal mood and affect. Her behavior is normal. Judgment and thought content normal.   Nursing note and vitals reviewed.      CMP  Sodium   Date Value Ref Range Status   10/16/2019 139 136 - 145 mmol/L Final     Potassium   Date Value Ref Range Status   10/16/2019 4.1 3.5 - 5.1 mmol/L Final     Chloride   Date Value Ref Range Status   10/16/2019 101 95 - 110 mmol/L Final     CO2   Date Value Ref Range Status   10/16/2019 29 23 - 29 mmol/L Final     Glucose   Date Value Ref Range Status   10/16/2019 87 70 - 110 mg/dL Final     BUN, Bld   Date Value Ref Range Status   10/16/2019 19 8 - 23 mg/dL Final     Creatinine   Date Value Ref Range Status   10/16/2019 1.3 0.5 - 1.4 mg/dL Final     Calcium   Date Value Ref Range Status   10/16/2019 10.9 (H) 8.7 - 10.5 mg/dL Final     Total Protein   Date Value Ref Range Status   10/16/2019 7.4 6.0 - 8.4 g/dL Final     Albumin   Date Value Ref Range Status   10/16/2019 4.1 3.5 - 5.2 g/dL Final     Total Bilirubin   Date Value Ref Range Status   10/16/2019 0.7 0.1 - 1.0 mg/dL Final     Comment:     For infants and newborns, interpretation of results should be based  on gestational age, weight and in agreement with clinical  observations.  Premature Infant  recommended reference ranges:  Up to 24 hours.............<8.0 mg/dL  Up to 48 hours............<12.0 mg/dL  3-5 days..................<15.0 mg/dL  6-29 days.................<15.0 mg/dL       Alkaline Phosphatase   Date Value Ref Range Status   10/16/2019 95 55 - 135 U/L Final     AST   Date Value Ref Range Status   10/16/2019 19 10 - 40 U/L Final     ALT   Date Value Ref Range Status   10/16/2019 11 10 - 44 U/L Final     Anion Gap   Date Value Ref Range Status   10/16/2019 9 8 - 16 mmol/L Final     eGFR if    Date Value Ref Range Status   10/16/2019 47.0 (A) >60 mL/min/1.73 m^2 Final     eGFR if non    Date Value Ref Range Status   10/16/2019 40.8 (A) >60 mL/min/1.73 m^2 Final     Comment:     Calculation used to obtain the estimated glomerular filtration  rate (eGFR) is the CKD-EPI equation.        Lab Results   Component Value Date    WBC 5.37 05/13/2019    HGB 12.2 05/13/2019    HCT 39.7 05/13/2019    MCV 98 05/13/2019     05/13/2019     Lab Results   Component Value Date    CHOL 154 10/16/2019     Lab Results   Component Value Date    HDL 60 10/16/2019     Lab Results   Component Value Date    LDLCALC 74.6 10/16/2019     Lab Results   Component Value Date    TRIG 97 10/16/2019     Lab Results   Component Value Date    CHOLHDL 39.0 10/16/2019     Lab Results   Component Value Date    TSH 2.548 11/13/2018     No results found for: LABA1C, HGBA1C  Assessment:       1. Chronic midline low back pain without sciatica    2. Abdominal pain, unspecified abdominal location        Plan:   Chronic midline low back pain without sciatica  -     X-Ray Chest PA And Lateral; Future; Expected date: 01/08/2020  -     X-Ray Lumbar Spine AP And Lateral; Future; Expected date: 01/08/2020  -     gabapentin (NEURONTIN) 100 MG capsule; Take 1 capsule (100 mg total) by mouth every evening.  Dispense: 30 capsule; Refill: 1    Abdominal pain, unspecified abdominal location  -     Comprehensive  metabolic panel; Future; Expected date: 01/08/2020  -     CBC auto differential; Future; Expected date: 01/08/2020  -     Urinalysis; Future; Expected date: 01/08/2020  -     Urine culture; Future; Expected date: 01/08/2020  -     X-Ray Abdomen Flat And Erect; Future; Expected date: 01/08/2020  -     Lipase; Future; Expected date: 01/08/2020  -     pantoprazole (PROTONIX) 20 MG tablet; Take 1 tablet (20 mg total) by mouth once daily.  Dispense: 30 tablet; Refill: 1    F/u 1 month--------

## 2020-01-09 NOTE — TELEPHONE ENCOUNTER
----- Message from Cherry Olson sent at 1/9/2020  8:48 AM CST -----  Contact: Pt  Pt asked that nurse return her call regarding lab results. [phone]

## 2020-01-09 NOTE — TELEPHONE ENCOUNTER
Called pt   Explained results and orders and otc med for constipation  Pt verbalized understanding

## 2020-01-14 ENCOUNTER — OFFICE VISIT (OUTPATIENT)
Dept: NEPHROLOGY | Facility: CLINIC | Age: 75
End: 2020-01-14
Payer: MEDICARE

## 2020-01-14 VITALS
BODY MASS INDEX: 24.26 KG/M2 | HEIGHT: 62 IN | SYSTOLIC BLOOD PRESSURE: 128 MMHG | WEIGHT: 131.81 LBS | OXYGEN SATURATION: 100 % | DIASTOLIC BLOOD PRESSURE: 74 MMHG | HEART RATE: 73 BPM

## 2020-01-14 DIAGNOSIS — N18.30 CHRONIC KIDNEY DISEASE (CKD), STAGE III (MODERATE): Primary | ICD-10-CM

## 2020-01-14 PROCEDURE — 99999 PR PBB SHADOW E&M-EST. PATIENT-LVL III: ICD-10-PCS | Mod: PBBFAC,,, | Performed by: INTERNAL MEDICINE

## 2020-01-14 PROCEDURE — 99213 OFFICE O/P EST LOW 20 MIN: CPT | Mod: PBBFAC | Performed by: INTERNAL MEDICINE

## 2020-01-14 PROCEDURE — 99214 PR OFFICE/OUTPT VISIT, EST, LEVL IV, 30-39 MIN: ICD-10-PCS | Mod: S$PBB,,, | Performed by: INTERNAL MEDICINE

## 2020-01-14 PROCEDURE — 99214 OFFICE O/P EST MOD 30 MIN: CPT | Mod: S$PBB,,, | Performed by: INTERNAL MEDICINE

## 2020-01-14 PROCEDURE — 99999 PR PBB SHADOW E&M-EST. PATIENT-LVL III: CPT | Mod: PBBFAC,,, | Performed by: INTERNAL MEDICINE

## 2020-01-14 PROCEDURE — 1126F PR PAIN SEVERITY QUANTIFIED, NO PAIN PRESENT: ICD-10-PCS | Mod: ,,, | Performed by: INTERNAL MEDICINE

## 2020-01-14 PROCEDURE — 1159F MED LIST DOCD IN RCRD: CPT | Mod: ,,, | Performed by: INTERNAL MEDICINE

## 2020-01-14 PROCEDURE — 1159F PR MEDICATION LIST DOCUMENTED IN MEDICAL RECORD: ICD-10-PCS | Mod: ,,, | Performed by: INTERNAL MEDICINE

## 2020-01-14 PROCEDURE — 1126F AMNT PAIN NOTED NONE PRSNT: CPT | Mod: ,,, | Performed by: INTERNAL MEDICINE

## 2020-01-14 NOTE — PATIENT INSTRUCTIONS
Avoid NSAID pain medications such as advil, aleve, motrin, ibuprofen, naprosyn, meloxicam, diclofenac, mobic.     Change calcium supplements to every other day

## 2020-01-14 NOTE — PROGRESS NOTES
Subjective:       Patient ID: Mary Flanagan is a 74 y.o.   female who presents for follow-up evaluation of HTN , CKD stage 3 ,     Raul Hill MD      HPI : Mary Flanagan is a pleasant 74-year-old  woman seen in office today in f/u  for above medical problems . Patient has history of hypertension that is well controlled on 1 medication, she has chronic kidney disease stage 3, recent serum creatinine is 1.2 mg/dL with a GFR of about 50 mL/minute.  Patient denies NSAID use.  No family history of kidney disease.          Past Medical History:   Diagnosis Date    Cataract     Chest pain     CKD (chronic kidney disease) stage 3, GFR 30-59 ml/min     Dry eyes     Gastritis     Hyperlipidemia     Hypertension     Insomnia     Osteopenia        Current Outpatient Medications on File Prior to Visit   Medication Sig Dispense Refill    atorvastatin (LIPITOR) 10 MG tablet Take 1 tablet (10 mg total) by mouth once daily. 90 tablet 3    CALCIUM CARBONATE (CALCIUM 600 ORAL) Take by mouth once daily.      clindamycin (CLEOCIN T) 1 % external solution Apply topically 2 (two) times daily. 60 mL 1    desonide (DESOWEN) 0.05 % cream Apply topically 2 (two) times daily. 60 g 0    gabapentin (NEURONTIN) 100 MG capsule Take 1 capsule (100 mg total) by mouth every evening. 30 capsule 1    hydrocortisone 2.5 % cream Apply topically 2 (two) times daily. 28 g 0    pantoprazole (PROTONIX) 20 MG tablet Take 1 tablet (20 mg total) by mouth once daily. 30 tablet 1    triamterene-hydrochlorothiazide 37.5-25 mg (DYAZIDE) 37.5-25 mg per capsule TAKE 1 CAPSULE BY MOUTH ONCE DAILY IN THE MORNING 90 capsule 3    VIT A/C/E AC/ZNOX/CUPRIC OXIDE (EYE VITAMIN AND MINERALS ORAL) Take by mouth.       No current facility-administered medications on file prior to visit.      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   HENT: Negative for congestion, facial swelling, sore throat, trouble swallowing and voice change.    Eyes: Negative for redness and visual disturbance.   Respiratory: Negative for apnea, cough, chest tightness, shortness of breath and wheezing.    Cardiovascular: Negative for chest pain, palpitations and leg swelling.   Gastrointestinal: Negative for abdominal distention, abdominal pain, blood in stool, constipation, diarrhea, nausea and vomiting.   Genitourinary: Negative for decreased urine volume, difficulty urinating, dysuria, flank pain, frequency, hematuria, pelvic pain and urgency.   Musculoskeletal: Negative for back pain, gait problem and joint swelling.   Skin: Negative for color change and rash.   Neurological: Negative for dizziness, syncope, weakness and headaches.   Hematological: Does not bruise/bleed easily.   Psychiatric/Behavioral: Negative for agitation, behavioral problems and confusion. The patient is not nervous/anxious.        Objective:         Vitals:    01/14/20 0821   BP: 128/74   Pulse: 73       Weight 131 lbs, stable       Physical Exam  :      Constitutional: She is oriented to person, place, and time. She appears well-developed and well-nourished. No distress.   HENT: Head: Normocephalic and atraumatic.   Mouth/Throat: Oropharynx is clear and moist. No oropharyngeal exudate.   Eyes: Conjunctivae and EOM are normal. Pupils are equal, round, and reactive to light.   Neck: Normal range of motion. Neck supple. No JVD present. Carotid bruit is not present. No tracheal deviation present. No thyroid mass and no thyromegaly present.   Cardiovascular: Normal rate, regular rhythm, normal heart sounds and intact distal pulses. Exam reveals no gallop and no friction rub.   No murmur heard.  Pulmonary/Chest: Effort normal and breath sounds normal. No respiratory distress. She has no wheezes. She has  no rales. She exhibits no tenderness.   Abdominal: Soft. Bowel sounds are normal. She exhibits no distension, no abdominal bruit, no ascites and no mass. There is no hepatosplenomegaly. There is no tenderness. There is no rebound, no guarding and no CVA tenderness.   Musculoskeletal: Normal range of motion. She exhibits no edema or tenderness.   Lymphadenopathy:   She has no cervical adenopathy.   Neurological: She is alert and oriented to person, place, and time. No cranial nerve deficit. She exhibits normal muscle tone. Coordination normal.   Skin: Skin is warm and intact. No rash noted. No erythema. No pallor.   Psychiatric: She has a normal mood and affect. Her behavior is normal. Judgment normal.       Labs:    Lab Results   Component Value Date    CREATININE 1.2 01/08/2020    BUN 16 01/08/2020     01/08/2020    K 3.9 01/08/2020     01/08/2020    CO2 29 01/08/2020       Lab Results   Component Value Date    WBC 6.51 01/08/2020    HGB 12.1 01/08/2020    HCT 39.9 01/08/2020     (H) 01/08/2020     01/08/2020         Lab Results   Component Value Date    CALCIUM 10.3 01/08/2020    PHOS 3.5 01/08/2020       Lab Results   Component Value Date    ALBUMIN 4.1 01/08/2020       Impression and Plan :  74-year-old  woman seen in office today in f/u  for following medical problems     1.  Chronic kidney disease stage 3 - recent lab results reviewed and discussed with the patient, serum creatinine is 1.2 mg/dL and GFR of about 50 mL/minute.  Age related loss in renal function.  Advised patient to minimize/avoid NSAID use.     2.  Hypertension - blood pressure is controlled is controlled on Dyazide,     3.  Volume - euvolemic on exam,     4.  urinalysis to evaluate for proteinuria and hematuria    5. Anemia - stable Hb ,     6.  Mild hypercalcemia noted, advised patient to reduce calcium supplements to every other day,     Return to clinic in 1 year, more than 25 min of face-to-face  time was spent with the patient discussing labs and plan of care,      Sascha Dean MD

## 2020-01-16 ENCOUNTER — TELEPHONE (OUTPATIENT)
Dept: ORTHOPEDICS | Facility: CLINIC | Age: 75
End: 2020-01-16

## 2020-01-16 NOTE — TELEPHONE ENCOUNTER
Left message for pt to call back regarding apt.          ----- Message from Kim La sent at 1/16/2020  4:29 PM CST -----  Contact: pt  Pt has appointment on 1/30/20 however needs earlier due to hardly can walk from hip pain. Pt can be reached at [phone]      Thanks,  Kim La

## 2020-01-24 DIAGNOSIS — M25.552 PAIN OF BOTH HIP JOINTS: Primary | ICD-10-CM

## 2020-01-24 DIAGNOSIS — M25.551 PAIN OF BOTH HIP JOINTS: Primary | ICD-10-CM

## 2020-01-27 ENCOUNTER — HOSPITAL ENCOUNTER (OUTPATIENT)
Dept: RADIOLOGY | Facility: HOSPITAL | Age: 75
Discharge: HOME | End: 2020-01-27
Attending: PHYSICIAN ASSISTANT
Payer: MEDICARE

## 2020-01-27 ENCOUNTER — OFFICE VISIT (OUTPATIENT)
Dept: ORTHOPEDICS | Facility: CLINIC | Age: 75
End: 2020-01-27
Payer: MEDICARE

## 2020-01-27 VITALS
DIASTOLIC BLOOD PRESSURE: 77 MMHG | HEART RATE: 67 BPM | HEIGHT: 62 IN | SYSTOLIC BLOOD PRESSURE: 128 MMHG | BODY MASS INDEX: 24.11 KG/M2 | WEIGHT: 131 LBS

## 2020-01-27 DIAGNOSIS — N18.30 CHRONIC KIDNEY DISEASE, STAGE III (MODERATE): ICD-10-CM

## 2020-01-27 DIAGNOSIS — M76.31 ILIOTIBIAL BAND SYNDROME OF RIGHT SIDE: Primary | ICD-10-CM

## 2020-01-27 DIAGNOSIS — M25.551 PAIN OF BOTH HIP JOINTS: ICD-10-CM

## 2020-01-27 DIAGNOSIS — M53.3 CHRONIC RIGHT SI JOINT PAIN: ICD-10-CM

## 2020-01-27 DIAGNOSIS — M25.552 PAIN OF BOTH HIP JOINTS: ICD-10-CM

## 2020-01-27 DIAGNOSIS — M70.61 GREATER TROCHANTERIC BURSITIS OF RIGHT HIP: ICD-10-CM

## 2020-01-27 DIAGNOSIS — G89.29 CHRONIC RIGHT SI JOINT PAIN: ICD-10-CM

## 2020-01-27 PROCEDURE — 1125F AMNT PAIN NOTED PAIN PRSNT: CPT | Mod: ,,, | Performed by: PHYSICIAN ASSISTANT

## 2020-01-27 PROCEDURE — 99999 PR PBB SHADOW E&M-EST. PATIENT-LVL IV: ICD-10-PCS | Mod: PBBFAC,,, | Performed by: PHYSICIAN ASSISTANT

## 2020-01-27 PROCEDURE — 99214 OFFICE O/P EST MOD 30 MIN: CPT | Mod: S$PBB,,, | Performed by: PHYSICIAN ASSISTANT

## 2020-01-27 PROCEDURE — 99999 PR PBB SHADOW E&M-EST. PATIENT-LVL IV: CPT | Mod: PBBFAC,,, | Performed by: PHYSICIAN ASSISTANT

## 2020-01-27 PROCEDURE — 1125F PR PAIN SEVERITY QUANTIFIED, PAIN PRESENT: ICD-10-PCS | Mod: ,,, | Performed by: PHYSICIAN ASSISTANT

## 2020-01-27 PROCEDURE — 1159F MED LIST DOCD IN RCRD: CPT | Mod: ,,, | Performed by: PHYSICIAN ASSISTANT

## 2020-01-27 PROCEDURE — 1159F PR MEDICATION LIST DOCUMENTED IN MEDICAL RECORD: ICD-10-PCS | Mod: ,,, | Performed by: PHYSICIAN ASSISTANT

## 2020-01-27 PROCEDURE — 73521 X-RAY EXAM HIPS BI 2 VIEWS: CPT | Mod: 26,,, | Performed by: RADIOLOGY

## 2020-01-27 PROCEDURE — 99214 PR OFFICE/OUTPT VISIT, EST, LEVL IV, 30-39 MIN: ICD-10-PCS | Mod: S$PBB,,, | Performed by: PHYSICIAN ASSISTANT

## 2020-01-27 PROCEDURE — 73521 X-RAY EXAM HIPS BI 2 VIEWS: CPT | Mod: TC

## 2020-01-27 PROCEDURE — 73521 XR HIPS BILATERAL 2 VIEW INCL AP PELVIS: ICD-10-PCS | Mod: 26,,, | Performed by: RADIOLOGY

## 2020-01-27 PROCEDURE — 99214 OFFICE O/P EST MOD 30 MIN: CPT | Mod: PBBFAC,25 | Performed by: PHYSICIAN ASSISTANT

## 2020-01-27 RX ORDER — METHYLPREDNISOLONE 4 MG/1
TABLET ORAL
Qty: 1 PACKAGE | Refills: 0 | Status: SHIPPED | OUTPATIENT
Start: 2020-01-27 | End: 2020-04-13

## 2020-01-27 NOTE — PROGRESS NOTES
Patient ID: Mary Flanagan is a 74 y.o. female.    Chief Complaint: Pain of the Right Hip      HPI: Mary Flanagan  is a 74 y.o. female who c/o Pain of the Right Hip   for duration of a couple of years, but this episode has been going on for about a month now.  Pain level is 6/10 in severity.  Quality is shooting intermittent.  She points to the right trochanteric bursa is to wear pain is located in states it radiates down the lateral thigh past the knee into the mid calf.  She denies symptoms radiating into the foot.  Alleviating factors include rest.  Aggravating factors include laying down at nighttime, walking.  She did physical therapy about a year and a half ago.  She did reasonably well with this.  She has worked on exercises since then.  She states that they did deep tissue massage which really was helpful for her at that time.    Past Medical History:   Diagnosis Date    Cataract     Chest pain     CKD (chronic kidney disease) stage 3, GFR 30-59 ml/min     Dry eyes     Gastritis     Hyperlipidemia     Hypertension     Insomnia     Osteopenia      Past Surgical History:   Procedure Laterality Date    BREAST BIOPSY Left     , benign     SECTION      x 1    KNEE ARTHROSCOPY Left  approx    KNEE SURGERY      left arm surgery      left knee surgery       Family History   Problem Relation Age of Onset    Hypertension Mother     Hypertension Father     Breast cancer Neg Hx     Colon cancer Neg Hx     Ovarian cancer Neg Hx     Thrombophilia Neg Hx     Strabismus Neg Hx     Macular degeneration Neg Hx     Retinal detachment Neg Hx     Glaucoma Neg Hx     Blindness Neg Hx     Amblyopia Neg Hx      Social History     Socioeconomic History    Marital status:      Spouse name: Not on file    Number of children: Not on file    Years of education: Not on file    Highest education level: Not on file   Occupational History    Not on file   Social Needs    Financial  resource strain: Not on file    Food insecurity:     Worry: Not on file     Inability: Not on file    Transportation needs:     Medical: Not on file     Non-medical: Not on file   Tobacco Use    Smoking status: Never Smoker    Smokeless tobacco: Never Used   Substance and Sexual Activity    Alcohol use: No     Alcohol/week: 0.0 standard drinks    Drug use: No    Sexual activity: Not Currently     Partners: Male     Birth control/protection: None     Comment: mut monog   Lifestyle    Physical activity:     Days per week: Not on file     Minutes per session: Not on file    Stress: Not on file   Relationships    Social connections:     Talks on phone: Not on file     Gets together: Not on file     Attends Confucianist service: Not on file     Active member of club or organization: Not on file     Attends meetings of clubs or organizations: Not on file     Relationship status: Not on file   Other Topics Concern    Not on file   Social History Narrative    Not on file     Medication List with Changes/Refills   New Medications    METHYLPREDNISOLONE (MEDROL DOSEPACK) 4 MG TABLET    use as directed   Current Medications    ATORVASTATIN (LIPITOR) 10 MG TABLET    Take 1 tablet (10 mg total) by mouth once daily.    CALCIUM CARBONATE (CALCIUM 600 ORAL)    Take by mouth once daily.    CLINDAMYCIN (CLEOCIN T) 1 % EXTERNAL SOLUTION    Apply topically 2 (two) times daily.    DESONIDE (DESOWEN) 0.05 % CREAM    Apply topically 2 (two) times daily.    GABAPENTIN (NEURONTIN) 100 MG CAPSULE    Take 1 capsule (100 mg total) by mouth every evening.    HYDROCORTISONE 2.5 % CREAM    Apply topically 2 (two) times daily.    PANTOPRAZOLE (PROTONIX) 20 MG TABLET    Take 1 tablet (20 mg total) by mouth once daily.    TRIAMTERENE-HYDROCHLOROTHIAZIDE 37.5-25 MG (DYAZIDE) 37.5-25 MG PER CAPSULE    TAKE 1 CAPSULE BY MOUTH ONCE DAILY IN THE MORNING    VIT A/C/E AC/ZNOX/CUPRIC OXIDE (EYE VITAMIN AND MINERALS ORAL)    Take by mouth.     Review  of patient's allergies indicates:   Allergen Reactions    No known drug allergies        Objective:        General    Nursing note and vitals reviewed.  Constitutional: She is oriented to person, place, and time. She appears well-developed and well-nourished.   HENT:   Head: Normocephalic and atraumatic.   Eyes: EOM are normal.   Cardiovascular: Normal rate and regular rhythm.    Pulmonary/Chest: Effort normal.   Abdominal: Soft.   Neurological: She is alert and oriented to person, place, and time.   Psychiatric: She has a normal mood and affect. Her behavior is normal.             Right Hip Exam     Inspection   Scars: absent  Swelling: absent  Bruising: absent  No deformity of hip.  Quadriceps Atrophy:  Negative  Erythema: absent    Tenderness   The patient tender to palpation of the trochanteric bursa and SI joint.    Range of Motion   Abduction: normal   Adduction: normal   Extension: normal   Flexion: normal   External rotation: normal   Internal rotation: normal     Tests   Pain w/ forced internal rotation (RANJIT): present  Pain w/ forced external rotation (FADIR): absent  Brittany: positive  Log Roll: negative    Other   Sensation: normal    Comments:  TTP GTB, IT band, and SI jt  Motor intact to EHL/FHL/GS/TA  Sensation intact to the S/S/SPN/DPN/Tib   (-)SLR test  Left Hip Exam     Other   Sensation: normal    Comments:  Motor intact to EHL/FHL/GS/TA  Sensation intact to the S/S/SPN/DPN/Tib   (-)SLR test          Muscle Strength   Right Lower Extremity   Hip Abduction: 5/5   Hip Adduction: 5/5   Hip Flexion: 5/5   Ankle Dorsiflexion:  5/5   Left Lower Extremity   Hip Abduction: 5/5   Hip Adduction: 5/5   Hip Flexion: 5/5   Ankle Dorsiflexion:  5/5     Reflexes     Left Side  Quadriceps:  2+  Achilles:  2+    Right Side   Quadriceps:  2+  Achilles:  2+    Vascular Exam     Right Pulses  Dorsalis Pedis:      2+          Capillary Refill  Right Hand: normal capillary refill    Edema  Right Upper Leg:  absent      Xray Images and report were reviewed today.  I agree with the radiologist's interpretation.    X-Ray Hips Bilateral 2 View Incl AP Pelvis  Narrative: EXAMINATION:  XR HIPS BILATERAL 2 VIEW INCL AP PELVIS    CLINICAL HISTORY:  Pain in right hip    TECHNIQUE:  AP view of the pelvis and frogleg lateral views of both hips were performed.    COMPARISON:  None.    FINDINGS:  Mild symmetric degenerative changes of the hips.  No evidence of AVN.  No fracture.  No dislocation.  Pelvic phleboliths are incidentally noted.  Impression: As above    Electronically signed by: Marcelino Jaime MD  Date:    01/27/2020  Time:    08:09        Assessment:       Encounter Diagnoses   Name Primary?    Iliotibial band syndrome of right side Yes    Greater trochanteric bursitis of right hip     Chronic kidney disease, stage III (moderate)     Chronic right SI joint pain           Plan:       Mary was seen today for pain.    Diagnoses and all orders for this visit:    Iliotibial band syndrome of right side  -     Ambulatory Referral to Physical/Occupational Therapy  -     methylPREDNISolone (MEDROL DOSEPACK) 4 mg tablet; use as directed    Greater trochanteric bursitis of right hip  -     Ambulatory Referral to Physical/Occupational Therapy  -     methylPREDNISolone (MEDROL DOSEPACK) 4 mg tablet; use as directed    Chronic kidney disease, stage III (moderate)  -     Ambulatory Referral to Physical/Occupational Therapy  -     methylPREDNISolone (MEDROL DOSEPACK) 4 mg tablet; use as directed    Chronic right SI joint pain  -     Ambulatory Referral to Physical/Occupational Therapy  -     methylPREDNISolone (MEDROL DOSEPACK) 4 mg tablet; use as directed        Mary THOMAS Masha is an established pt who comes in today for worsening symptoms of an established problem.  I would recommend she get back into formal physical therapy for dry needling and a stem of along the IT band.  I have also given her a Medrol Dosepak.  She does have  some discomfort over the SI joint. If she does not improve, she may benefit from injections in the SI joint as well as the trochanteric bursa.  I would like to re-evaluate her in 6-8 weeks.  If she has problems before then, she will notify the office.  She verbalized understanding and agrees.    Follow up in about 6 weeks (around 3/9/2020) for no xray.    The patient understands, chooses and consents to this plan and accepts all   the risks which include but are not limited to the risks mentioned above.     Disclaimer: This note was prepared using a voice recognition system and is likely to have sound alike errors within the text.

## 2020-03-09 ENCOUNTER — OFFICE VISIT (OUTPATIENT)
Dept: ORTHOPEDICS | Facility: CLINIC | Age: 75
End: 2020-03-09
Payer: MEDICARE

## 2020-03-09 VITALS
BODY MASS INDEX: 24.11 KG/M2 | HEART RATE: 72 BPM | SYSTOLIC BLOOD PRESSURE: 138 MMHG | HEIGHT: 62 IN | DIASTOLIC BLOOD PRESSURE: 73 MMHG | WEIGHT: 131 LBS

## 2020-03-09 DIAGNOSIS — M70.61 GREATER TROCHANTERIC BURSITIS OF RIGHT HIP: Primary | ICD-10-CM

## 2020-03-09 DIAGNOSIS — M53.3 CHRONIC RIGHT SI JOINT PAIN: ICD-10-CM

## 2020-03-09 DIAGNOSIS — G89.29 CHRONIC RIGHT SI JOINT PAIN: ICD-10-CM

## 2020-03-09 DIAGNOSIS — M76.31 ILIOTIBIAL BAND SYNDROME OF RIGHT SIDE: ICD-10-CM

## 2020-03-09 DIAGNOSIS — N18.30 CHRONIC KIDNEY DISEASE, STAGE III (MODERATE): ICD-10-CM

## 2020-03-09 PROCEDURE — 99999 PR PBB SHADOW E&M-EST. PATIENT-LVL III: CPT | Mod: PBBFAC,,, | Performed by: PHYSICIAN ASSISTANT

## 2020-03-09 PROCEDURE — 99214 PR OFFICE/OUTPT VISIT, EST, LEVL IV, 30-39 MIN: ICD-10-PCS | Mod: 25,S$PBB,, | Performed by: PHYSICIAN ASSISTANT

## 2020-03-09 PROCEDURE — 99999 PR PBB SHADOW E&M-EST. PATIENT-LVL III: ICD-10-PCS | Mod: PBBFAC,,, | Performed by: PHYSICIAN ASSISTANT

## 2020-03-09 PROCEDURE — 99213 OFFICE O/P EST LOW 20 MIN: CPT | Mod: PBBFAC,25 | Performed by: PHYSICIAN ASSISTANT

## 2020-03-09 PROCEDURE — 20610 DRAIN/INJ JOINT/BURSA W/O US: CPT | Mod: PBBFAC | Performed by: PHYSICIAN ASSISTANT

## 2020-03-09 PROCEDURE — 20610 DRAIN/INJ JOINT/BURSA W/O US: CPT | Mod: S$PBB,RT,, | Performed by: PHYSICIAN ASSISTANT

## 2020-03-09 PROCEDURE — 20610 LARGE JOINT ASPIRATION/INJECTION: R GREATER TROCHANTERIC BURSA: ICD-10-PCS | Mod: S$PBB,RT,, | Performed by: PHYSICIAN ASSISTANT

## 2020-03-09 PROCEDURE — 99214 OFFICE O/P EST MOD 30 MIN: CPT | Mod: 25,S$PBB,, | Performed by: PHYSICIAN ASSISTANT

## 2020-03-09 RX ORDER — METHYLPREDNISOLONE ACETATE 80 MG/ML
80 INJECTION, SUSPENSION INTRA-ARTICULAR; INTRALESIONAL; INTRAMUSCULAR; SOFT TISSUE
Status: DISCONTINUED | OUTPATIENT
Start: 2020-03-09 | End: 2020-03-09 | Stop reason: HOSPADM

## 2020-03-09 RX ADMIN — METHYLPREDNISOLONE ACETATE 80 MG: 80 INJECTION, SUSPENSION INTRALESIONAL; INTRAMUSCULAR; INTRASYNOVIAL; SOFT TISSUE at 08:03

## 2020-03-09 NOTE — PROCEDURES
Large Joint Aspiration/Injection: R greater trochanteric bursa  Performed by: Natali Glover PA-C  Authorized by: Natali Glover PA-C  Date/Time: 3/9/2020 8:00 AM    Consent Done?:  Yes (Verbal)  Indications:  pain  Timeout: Immediately prior to procedure a time out was called to verify the correct patient, procedure, equipment, support staff and site/side marked as required.  Prep:Patient was prepped and draped in the usual sterile fashion.  Procedure site marked: Yes     Anesthesia  Local anesthesia used  Anesthetic: lidocaine 1% without epinephrine  Anesthetic total: 2mL    Details:   Needle size: 22 G   Approach: lateral  Location:  Hip  Site:  R greater trochanteric bursa    Medications: 80 mg methylPREDNISolone acetate 80 mg/mL  Patient tolerance:  patient tolerated the procedure well with no immediate complications    Treatment options were discussed.  After time out was performed and patient ID, side, and site were verified, the right hip was sterilly prepped in the standard fashion.  A 22-gauge needle was introduced into the right trochanteric bursa without complication. The bursa was then injected with 20 mg lidocaine plain and 80 mg depomedrol.  A sterile bandaid was applied.  The patient was informed that they may resume activities as tolerated.  Elevation of glucose in diabetic patients was discussed.  The patient was instructed to call if there were any problems at the injection sight.

## 2020-03-09 NOTE — PROGRESS NOTES
Patient ID: Mary Flanagan is a 74 y.o. female.    Chief Complaint: Pain of the Right Hip      HPI: Mary Flanagan  is a 74 y.o. female who c/o Pain of the Right Hip   for duration of a year but worse over the last couple of months.  I saw her 6 weeks ago and got her started with physical therapy for IT band syndrome, right-sided SI joint pain, and right trochanteric bursitis.  She comes in today for routine follow-up.  The pain along IT band has improved as well as the pain in the right SI joint.  She does still have some discomfort posteriorly.  However her main complaint today is the trochanteric bursa.  She denies groin pain.  4/10 in severity.  Quality is intermittent soreness.  Worsened when laying on that side as well as walking.  Alleviating factors include physical therapy and Tylenol.  She denies radicular symptoms.    Past Medical History:   Diagnosis Date    Cataract     Chest pain     CKD (chronic kidney disease) stage 3, GFR 30-59 ml/min     Dry eyes     Gastritis     Hyperlipidemia     Hypertension     Insomnia     Osteopenia      Past Surgical History:   Procedure Laterality Date    BREAST BIOPSY Left     , benign     SECTION      x 1    KNEE ARTHROSCOPY Left 2014 approx    KNEE SURGERY      left arm surgery      left knee surgery       Family History   Problem Relation Age of Onset    Hypertension Mother     Hypertension Father     Breast cancer Neg Hx     Colon cancer Neg Hx     Ovarian cancer Neg Hx     Thrombophilia Neg Hx     Strabismus Neg Hx     Macular degeneration Neg Hx     Retinal detachment Neg Hx     Glaucoma Neg Hx     Blindness Neg Hx     Amblyopia Neg Hx      Social History     Socioeconomic History    Marital status:      Spouse name: Not on file    Number of children: Not on file    Years of education: Not on file    Highest education level: Not on file   Occupational History    Not on file   Social Needs    Financial resource  strain: Not on file    Food insecurity:     Worry: Not on file     Inability: Not on file    Transportation needs:     Medical: Not on file     Non-medical: Not on file   Tobacco Use    Smoking status: Never Smoker    Smokeless tobacco: Never Used   Substance and Sexual Activity    Alcohol use: No     Alcohol/week: 0.0 standard drinks    Drug use: No    Sexual activity: Not Currently     Partners: Male     Birth control/protection: None     Comment: mut monog   Lifestyle    Physical activity:     Days per week: Not on file     Minutes per session: Not on file    Stress: Not on file   Relationships    Social connections:     Talks on phone: Not on file     Gets together: Not on file     Attends Scientologist service: Not on file     Active member of club or organization: Not on file     Attends meetings of clubs or organizations: Not on file     Relationship status: Not on file   Other Topics Concern    Not on file   Social History Narrative    Not on file     Medication List with Changes/Refills   Current Medications    ATORVASTATIN (LIPITOR) 10 MG TABLET    Take 1 tablet (10 mg total) by mouth once daily.    CALCIUM CARBONATE (CALCIUM 600 ORAL)    Take by mouth once daily.    CLINDAMYCIN (CLEOCIN T) 1 % EXTERNAL SOLUTION    Apply topically 2 (two) times daily.    DESONIDE (DESOWEN) 0.05 % CREAM    Apply topically 2 (two) times daily.    GABAPENTIN (NEURONTIN) 100 MG CAPSULE    Take 1 capsule (100 mg total) by mouth every evening.    HYDROCORTISONE 2.5 % CREAM    Apply topically 2 (two) times daily.    METHYLPREDNISOLONE (MEDROL DOSEPACK) 4 MG TABLET    use as directed    PANTOPRAZOLE (PROTONIX) 20 MG TABLET    Take 1 tablet (20 mg total) by mouth once daily.    TRIAMTERENE-HYDROCHLOROTHIAZIDE 37.5-25 MG (DYAZIDE) 37.5-25 MG PER CAPSULE    TAKE 1 CAPSULE BY MOUTH ONCE DAILY IN THE MORNING    VIT A/C/E AC/ZNOX/CUPRIC OXIDE (EYE VITAMIN AND MINERALS ORAL)    Take by mouth.     Review of patient's allergies  indicates:   Allergen Reactions    No known drug allergies        Objective:        General    Nursing note and vitals reviewed.  Constitutional: She is oriented to person, place, and time. She appears well-developed and well-nourished.   HENT:   Head: Normocephalic and atraumatic.   Eyes: EOM are normal.   Cardiovascular: Normal rate and regular rhythm.    Pulmonary/Chest: Effort normal.   Abdominal: Soft.   Neurological: She is alert and oriented to person, place, and time.   Psychiatric: She has a normal mood and affect. Her behavior is normal.             Right Hip Exam     Inspection   Scars: absent  Swelling: absent  Bruising: absent  No deformity of hip.  Quadriceps Atrophy:  Negative  Erythema: absent    Tenderness   The patient tender to palpation of the trochanteric bursa and SI joint.    Range of Motion   Abduction: normal   Adduction: normal   Extension: normal   Flexion: normal   External rotation: normal   Internal rotation: normal     Tests   Pain w/ forced internal rotation (RANJIT): present  Pain w/ forced external rotation (FADIR): absent  Brittany: positive  Log Roll: negative    Other   Sensation: normal    Comments:  TTP GTB and SI jt  Motor intact to EHL/FHL/GS/TA  Sensation intact to the S/S/SPN/DPN/Tib   (-)SLR test  Left Hip Exam     Other   Sensation: normal    Comments:  Motor intact to EHL/FHL/GS/TA  Sensation intact to the S/S/SPN/DPN/Tib   (-)SLR test          Muscle Strength   Right Lower Extremity   Hip Abduction: 5/5   Hip Adduction: 5/5   Hip Flexion: 5/5   Ankle Dorsiflexion:  5/5   Left Lower Extremity   Hip Abduction: 5/5   Hip Adduction: 5/5   Hip Flexion: 5/5   Ankle Dorsiflexion:  5/5     Reflexes     Left Side  Quadriceps:  2+  Achilles:  2+    Right Side   Quadriceps:  2+  Achilles:  2+    Vascular Exam     Right Pulses  Dorsalis Pedis:      2+          Capillary Refill  Right Hand: normal capillary refill    Edema  Right Upper Leg: absent      Assessment:       Encounter  Diagnoses   Name Primary?    Greater trochanteric bursitis of right hip Yes    Iliotibial band syndrome of right side     Chronic right SI joint pain     Chronic kidney disease, stage III (moderate)           Plan:       Mary was seen today for pain.    Diagnoses and all orders for this visit:    Greater trochanteric bursitis of right hip    Iliotibial band syndrome of right side    Chronic right SI joint pain    Chronic kidney disease, stage III (moderate)        Mary Flanagan is an established pt here for follow-up of the above.  We have discussed risks and benefits of a corticosteroid injection to the trochanteric bursa.  She wishes to proceed.  She will also continue with physical therapy.  I will see her back in the office in 6 weeks.  If she still having problems in the SI joint, I would recommend referral to interventional pain management for consideration of injection of the SI joint.  I offered that to her today, but she declined that stating she would like to do the trochanteric bursa injection 1st.  She verbalizes understanding and agrees.    Follow up in about 6 weeks (around 4/20/2020) for no xray.    The patient understands, chooses and consents to this plan and accepts all   the risks which include but are not limited to the risks mentioned above.     Disclaimer: This note was prepared using a voice recognition system and is likely to have sound alike errors within the text.

## 2020-03-10 ENCOUNTER — OFFICE VISIT (OUTPATIENT)
Dept: OPHTHALMOLOGY | Facility: CLINIC | Age: 75
End: 2020-03-10
Payer: MEDICARE

## 2020-03-10 DIAGNOSIS — H25.11 SENILE NUCLEAR CATARACT, RIGHT: ICD-10-CM

## 2020-03-10 DIAGNOSIS — H04.123 DRY EYE SYNDROME, BILATERAL: ICD-10-CM

## 2020-03-10 DIAGNOSIS — H25.12 SENILE NUCLEAR CATARACT, LEFT: ICD-10-CM

## 2020-03-10 DIAGNOSIS — H02.9 LESION OF RIGHT LOWER EYELID: Primary | ICD-10-CM

## 2020-03-10 PROCEDURE — 99212 OFFICE O/P EST SF 10 MIN: CPT | Mod: PBBFAC | Performed by: OPHTHALMOLOGY

## 2020-03-10 PROCEDURE — 92012 PR EYE EXAM, EST PATIENT,INTERMED: ICD-10-PCS | Mod: S$PBB,,, | Performed by: OPHTHALMOLOGY

## 2020-03-10 PROCEDURE — 99999 PR PBB SHADOW E&M-EST. PATIENT-LVL II: ICD-10-PCS | Mod: PBBFAC,,, | Performed by: OPHTHALMOLOGY

## 2020-03-10 PROCEDURE — 99999 PR PBB SHADOW E&M-EST. PATIENT-LVL II: CPT | Mod: PBBFAC,,, | Performed by: OPHTHALMOLOGY

## 2020-03-10 PROCEDURE — 92012 INTRM OPH EXAM EST PATIENT: CPT | Mod: S$PBB,,, | Performed by: OPHTHALMOLOGY

## 2020-03-10 NOTE — PROGRESS NOTES
HPI     Eye Problem     Comments: Growth on RLL              Comments     Pt C/O: Notice last week a dark small mole on RLL along with a FB   sensation, symptoms have improved this week but are still bothersome.    Previously seen by [facility] 2015    1. NS Early  2. Dry Eyes  Systane BID OU  Eye vitamins          Last edited by Paty Ramirez, Patient Care Assistant on 3/10/2020 10:51   AM. (History)            Assessment /Plan     For exam results, see Encounter Report.      ICD-10-CM ICD-9-CM    1. Lesion of right lower eyelid H02.9 373.9 irregular pigmented lesion RLL - will consult to Plastics for Eval    2. Dry eye syndrome, bilateral H04.123 375.15 Increase Dry eye Tx to QID    3. Senile nuclear cataract, right H25.11 366.16 Follow    4. Senile nuclear cataract, left H25.12 366.16 Follow        RETURN TO CLINIC with Plastics for Eval

## 2020-03-10 NOTE — LETTER
[facility]  [address] [address] [address]  Phone: [phone]  Fax: [phone]   March 10, 2020    Jose Chester MD  [address] [facility] [address]    Patient: Mary Flanagan   MR Number: [id number]   YOB: 1945   Date of Visit: 3/10/2020       Dear Dr. Chester:    I am  referring Mary Flanagan to you  for evaluation. Here is my assessment and plan of care  The patient can be reached at [phone] or [phone]      Assessment:  /    Plan:  For exam results, see Encounter Report.      ICD-10-CM ICD-9-CM    1. Lesion of right lower eyelid H02.9 373.9 irregular pigmented lesion RLL - will consult to Plastics for Eval    2. Dry eye syndrome, bilateral H04.123 375.15 Increase Dry eye Tx to QID    3. Senile nuclear cataract, right H25.11 366.16 Follow    4. Senile nuclear cataract, left H25.12 366.16 Follow        RETURN TO CLINIC with Plastics for Eval         Below you will find my full exam findings. If you have questions, please do not hesitate to call me. I look forward to following Ms. Mary Flanagan along with you.    Sincerely,        Brice Ward MD       CC  No Recipients             Base Eye Exam     Visual Acuity (Snellen - Linear)       Right Left    Dist cc 20/25 20/20    Correction:  Glasses          Neuro/Psych     Oriented x3:  Yes    Mood/Affect:  Normal            Slit Lamp and Fundus Exam     External Exam       Right Left    External Normal Normal          Slit Lamp Exam       Right Left    Lids/Lashes  smooth surface irregularly  pigmented lesion 2 mm by 1.6 Normal    Conjunctiva/Sclera White and quiet White and quiet    Cornea Inferior-temporal 1+ Punctate epithelial erosions Inferior 1+ Punctate epithelial erosions    Anterior Chamber Deep and quiet Deep and quiet    Iris Round and reactive Round and reactive    Lens 1+ Nuclear sclerosis 1+ Nuclear sclerosis

## 2020-03-12 ENCOUNTER — TELEPHONE (OUTPATIENT)
Dept: OPHTHALMOLOGY | Facility: CLINIC | Age: 75
End: 2020-03-12

## 2020-03-12 NOTE — TELEPHONE ENCOUNTER
----- Message from Monika Gonzalez sent at 3/12/2020  4:18 PM CDT -----  Contact: pt  The pt request a call from Carmen concerning the discomfort with her eye's, the pt can be reached at [phone]///[id number]

## 2020-03-12 NOTE — TELEPHONE ENCOUNTER
----- Message from Chetna Gonzalez sent at 3/12/2020  9:08 AM CDT -----  Contact: pt  Type:  Patient Returning Call     Who Called: pt   Who Left Message for Patient:Carmen   Does the patient know what this is regarding?:   Would the patient rather a call back or a response via [facility]? Call back   Best Call Back Number: [phone] ( home ) [phone]   Additional Information: Pt is returning a call from  Dr Henry Hernandez nurse

## 2020-04-01 ENCOUNTER — TELEPHONE (OUTPATIENT)
Dept: ORTHOPEDICS | Facility: CLINIC | Age: 75
End: 2020-04-01

## 2020-04-01 NOTE — TELEPHONE ENCOUNTER
Called patient to offer her the option to do a virtual visit in place of her face to face visit with Natali Glover on 4/21. Patient stated that she is not very good with the technology and can cancel. Patient states that she is doing much better since her injection and that she will call back when thinks calm down to reschedule if she needs to. Patient's appointment is cancelled and patient stated understanding and was thankful for call.

## 2020-04-07 ENCOUNTER — TELEPHONE (OUTPATIENT)
Dept: ORTHOPEDICS | Facility: CLINIC | Age: 75
End: 2020-04-07

## 2020-04-07 NOTE — TELEPHONE ENCOUNTER
----- Message from Smita Mabry sent at 4/7/2020 10:57 AM CDT -----  Contact: [facility]  Calling to check on status of POC that was faxed over, please call her back at [phone]. Thank you

## 2020-04-07 NOTE — TELEPHONE ENCOUNTER
Spoke with [facility] and informed her that the POC has been received and will be faxed back once it is signed by Dr Brasher on Thursday.

## 2020-04-09 ENCOUNTER — TELEPHONE (OUTPATIENT)
Dept: INTERNAL MEDICINE | Facility: CLINIC | Age: 75
End: 2020-04-09

## 2020-04-09 NOTE — TELEPHONE ENCOUNTER
----- Message from Farheen Trejo sent at 4/9/2020  1:26 PM CDT -----  Contact: Pt   Pt called in regards to scheduling a audio visit. Pt stated that she is dizzy. Pt can be reached at [phone] (home) or mobile [phone].

## 2020-04-09 NOTE — TELEPHONE ENCOUNTER
Spoke with pt, let her know she will need to be seen before anything could be called in for dizziness. Pt wanted to see . Informed her he is out for the weekend but offered virtual with balaji wheatley- pt denied virtual and scheduled for Monday. Informed her if dizziness or weakness gets bad over the weekend then she needs to see . Pt verbalized understanding.

## 2020-04-13 ENCOUNTER — OFFICE VISIT (OUTPATIENT)
Dept: FAMILY MEDICINE | Facility: CLINIC | Age: 75
End: 2020-04-13
Payer: MEDICARE

## 2020-04-13 ENCOUNTER — TELEPHONE (OUTPATIENT)
Dept: CARDIOLOGY | Facility: CLINIC | Age: 75
End: 2020-04-13

## 2020-04-13 ENCOUNTER — LAB VISIT (OUTPATIENT)
Dept: LAB | Facility: HOSPITAL | Age: 75
End: 2020-04-13
Attending: INTERNAL MEDICINE
Payer: MEDICARE

## 2020-04-13 VITALS
HEART RATE: 89 BPM | DIASTOLIC BLOOD PRESSURE: 76 MMHG | OXYGEN SATURATION: 99 % | SYSTOLIC BLOOD PRESSURE: 137 MMHG | RESPIRATION RATE: 16 BRPM | BODY MASS INDEX: 23.89 KG/M2 | TEMPERATURE: 99 F | WEIGHT: 130.63 LBS

## 2020-04-13 DIAGNOSIS — H61.20 EXCESSIVE CERUMEN IN EAR CANAL, UNSPECIFIED LATERALITY: ICD-10-CM

## 2020-04-13 DIAGNOSIS — R55 PRE-SYNCOPE: Primary | ICD-10-CM

## 2020-04-13 DIAGNOSIS — I10 ESSENTIAL HYPERTENSION: ICD-10-CM

## 2020-04-13 DIAGNOSIS — R42 DIZZINESS: ICD-10-CM

## 2020-04-13 DIAGNOSIS — R55 PRE-SYNCOPE: ICD-10-CM

## 2020-04-13 LAB
ALBUMIN SERPL BCP-MCNC: 4.1 G/DL (ref 3.5–5.2)
ALP SERPL-CCNC: 88 U/L (ref 55–135)
ALT SERPL W/O P-5'-P-CCNC: 12 U/L (ref 10–44)
ANION GAP SERPL CALC-SCNC: 7 MMOL/L (ref 8–16)
AST SERPL-CCNC: 20 U/L (ref 10–40)
BASOPHILS # BLD AUTO: 0.05 K/UL (ref 0–0.2)
BASOPHILS NFR BLD: 0.8 % (ref 0–1.9)
BILIRUB SERPL-MCNC: 0.8 MG/DL (ref 0.1–1)
BUN SERPL-MCNC: 16 MG/DL (ref 8–23)
CALCIUM SERPL-MCNC: 10.2 MG/DL (ref 8.7–10.5)
CHLORIDE SERPL-SCNC: 103 MMOL/L (ref 95–110)
CO2 SERPL-SCNC: 29 MMOL/L (ref 23–29)
CREAT SERPL-MCNC: 1.2 MG/DL (ref 0.5–1.4)
DIFFERENTIAL METHOD: ABNORMAL
EOSINOPHIL # BLD AUTO: 0.1 K/UL (ref 0–0.5)
EOSINOPHIL NFR BLD: 1 % (ref 0–8)
ERYTHROCYTE [DISTWIDTH] IN BLOOD BY AUTOMATED COUNT: 13.5 % (ref 11.5–14.5)
EST. GFR  (AFRICAN AMERICAN): 51.8 ML/MIN/1.73 M^2
EST. GFR  (NON AFRICAN AMERICAN): 44.9 ML/MIN/1.73 M^2
GLUCOSE SERPL-MCNC: 91 MG/DL (ref 70–110)
HCT VFR BLD AUTO: 39.3 % (ref 37–48.5)
HGB BLD-MCNC: 11.8 G/DL (ref 12–16)
IMM GRANULOCYTES # BLD AUTO: 0.01 K/UL (ref 0–0.04)
IMM GRANULOCYTES NFR BLD AUTO: 0.2 % (ref 0–0.5)
LYMPHOCYTES # BLD AUTO: 2.2 K/UL (ref 1–4.8)
LYMPHOCYTES NFR BLD: 36.3 % (ref 18–48)
MCH RBC QN AUTO: 30.3 PG (ref 27–31)
MCHC RBC AUTO-ENTMCNC: 30 G/DL (ref 32–36)
MCV RBC AUTO: 101 FL (ref 82–98)
MONOCYTES # BLD AUTO: 0.4 K/UL (ref 0.3–1)
MONOCYTES NFR BLD: 6 % (ref 4–15)
NEUTROPHILS # BLD AUTO: 3.3 K/UL (ref 1.8–7.7)
NEUTROPHILS NFR BLD: 55.7 % (ref 38–73)
NRBC BLD-RTO: 0 /100 WBC
PLATELET # BLD AUTO: 217 K/UL (ref 150–350)
PMV BLD AUTO: 11.8 FL (ref 9.2–12.9)
POTASSIUM SERPL-SCNC: 4.3 MMOL/L (ref 3.5–5.1)
PROT SERPL-MCNC: 7.4 G/DL (ref 6–8.4)
RBC # BLD AUTO: 3.89 M/UL (ref 4–5.4)
SODIUM SERPL-SCNC: 139 MMOL/L (ref 136–145)
TSH SERPL DL<=0.005 MIU/L-ACNC: 2.39 UIU/ML (ref 0.4–4)
WBC # BLD AUTO: 5.97 K/UL (ref 3.9–12.7)

## 2020-04-13 PROCEDURE — 99213 OFFICE O/P EST LOW 20 MIN: CPT | Mod: S$PBB,,, | Performed by: INTERNAL MEDICINE

## 2020-04-13 PROCEDURE — 99213 PR OFFICE/OUTPT VISIT, EST, LEVL III, 20-29 MIN: ICD-10-PCS | Mod: S$PBB,,, | Performed by: INTERNAL MEDICINE

## 2020-04-13 PROCEDURE — 99214 OFFICE O/P EST MOD 30 MIN: CPT | Mod: PBBFAC,PO | Performed by: INTERNAL MEDICINE

## 2020-04-13 PROCEDURE — 99999 PR PBB SHADOW E&M-EST. PATIENT-LVL IV: CPT | Mod: PBBFAC,,, | Performed by: INTERNAL MEDICINE

## 2020-04-13 PROCEDURE — 36415 COLL VENOUS BLD VENIPUNCTURE: CPT | Mod: PO

## 2020-04-13 PROCEDURE — 84443 ASSAY THYROID STIM HORMONE: CPT

## 2020-04-13 PROCEDURE — 99999 PR PBB SHADOW E&M-EST. PATIENT-LVL IV: ICD-10-PCS | Mod: PBBFAC,,, | Performed by: INTERNAL MEDICINE

## 2020-04-13 PROCEDURE — 80053 COMPREHEN METABOLIC PANEL: CPT

## 2020-04-13 PROCEDURE — 85025 COMPLETE CBC W/AUTO DIFF WBC: CPT

## 2020-04-13 NOTE — PROGRESS NOTES
Subjective:       Patient ID: Mary Flanagan is a 74 y.o. female.    Chief Complaint: Dizziness    Dizziness:   Chronicity:  New  Onset:  More than 1 month ago  Progression since onset:  Gradually worsening  Severity:  Moderate  Duration:  Very brief  Dizziness characteristics:  Off-balance and lightheaded/impending faint  Frequency of Spells:  Daily   Associated symptoms: weakness.no fever, no headaches, no nausea, no vomiting, no visual disturbances, no palpitations, no facial weakness, no slurred speech, no numbness in extremities and no chest pain.    Past Medical History:   Diagnosis Date    Cataract     Chest pain     CKD (chronic kidney disease) stage 3, GFR 30-59 ml/min     Dry eyes     Gastritis     Hyperlipidemia     Hypertension     Insomnia     Osteopenia      Past Surgical History:   Procedure Laterality Date    BREAST BIOPSY Left     , benign     SECTION      x 1    KNEE ARTHROSCOPY Left  approx    KNEE SURGERY      left arm surgery      left knee surgery       Family History   Problem Relation Age of Onset    Hypertension Mother     Hypertension Father     Breast cancer Neg Hx     Colon cancer Neg Hx     Ovarian cancer Neg Hx     Thrombophilia Neg Hx     Strabismus Neg Hx     Macular degeneration Neg Hx     Retinal detachment Neg Hx     Glaucoma Neg Hx     Blindness Neg Hx     Amblyopia Neg Hx      Social History     Socioeconomic History    Marital status:      Spouse name: Not on file    Number of children: Not on file    Years of education: Not on file    Highest education level: Not on file   Occupational History    Not on file   Social Needs    Financial resource strain: Not on file    Food insecurity:     Worry: Not on file     Inability: Not on file    Transportation needs:     Medical: Not on file     Non-medical: Not on file   Tobacco Use    Smoking status: Never Smoker    Smokeless tobacco: Never Used   Substance and Sexual  Activity    Alcohol use: No     Alcohol/week: 0.0 standard drinks    Drug use: No    Sexual activity: Not Currently     Partners: Male     Birth control/protection: None     Comment: mut monog   Lifestyle    Physical activity:     Days per week: Not on file     Minutes per session: Not on file    Stress: Not on file   Relationships    Social connections:     Talks on phone: Not on file     Gets together: Not on file     Attends Rastafarian service: Not on file     Active member of club or organization: Not on file     Attends meetings of clubs or organizations: Not on file     Relationship status: Not on file   Other Topics Concern    Not on file   Social History Narrative    Not on file     Review of Systems   Constitutional: Negative for chills and fever.   HENT: Negative.    Respiratory: Negative for apnea, cough, choking, chest tightness, shortness of breath, wheezing and stridor.    Cardiovascular: Negative for chest pain and palpitations.   Gastrointestinal: Negative for abdominal pain, nausea and vomiting.   Genitourinary: Negative for dysuria and urgency.   Neurological: Positive for dizziness and weakness. Negative for seizures, syncope, speech difficulty, numbness and headaches.   Psychiatric/Behavioral: Negative for agitation, behavioral problems and confusion.       Objective:      Physical Exam   Constitutional: She is oriented to person, place, and time. She appears well-developed and well-nourished.   HENT:   Head: Normocephalic.   Cardiovascular: Normal rate, regular rhythm, normal heart sounds and intact distal pulses.   Pulmonary/Chest: Effort normal and breath sounds normal. No stridor. No respiratory distress. She has no wheezes. She has no rales. She exhibits no tenderness.   Abdominal: Soft. Bowel sounds are normal.   Neurological: She is alert and oriented to person, place, and time. No cranial nerve deficit. Coordination normal.   Psychiatric: She has a normal mood and affect. Her  behavior is normal. Judgment and thought content normal.   Nursing note and vitals reviewed.      CMP  Sodium   Date Value Ref Range Status   01/08/2020 140 136 - 145 mmol/L Final     Potassium   Date Value Ref Range Status   01/08/2020 3.9 3.5 - 5.1 mmol/L Final     Chloride   Date Value Ref Range Status   01/08/2020 101 95 - 110 mmol/L Final     CO2   Date Value Ref Range Status   01/08/2020 29 23 - 29 mmol/L Final     Glucose   Date Value Ref Range Status   01/08/2020 85 70 - 110 mg/dL Final     BUN, Bld   Date Value Ref Range Status   01/08/2020 16 8 - 23 mg/dL Final     Creatinine   Date Value Ref Range Status   01/08/2020 1.2 0.5 - 1.4 mg/dL Final     Calcium   Date Value Ref Range Status   01/08/2020 10.3 8.7 - 10.5 mg/dL Final     Total Protein   Date Value Ref Range Status   01/08/2020 7.5 6.0 - 8.4 g/dL Final     Albumin   Date Value Ref Range Status   01/08/2020 4.1 3.5 - 5.2 g/dL Final     Total Bilirubin   Date Value Ref Range Status   01/08/2020 0.6 0.1 - 1.0 mg/dL Final     Comment:     For infants and newborns, interpretation of results should be based  on gestational age, weight and in agreement with clinical  observations.  Premature Infant recommended reference ranges:  Up to 24 hours.............<8.0 mg/dL  Up to 48 hours............<12.0 mg/dL  3-5 days..................<15.0 mg/dL  6-29 days.................<15.0 mg/dL       Alkaline Phosphatase   Date Value Ref Range Status   01/08/2020 93 55 - 135 U/L Final     AST   Date Value Ref Range Status   01/08/2020 20 10 - 40 U/L Final     ALT   Date Value Ref Range Status   01/08/2020 13 10 - 44 U/L Final     Anion Gap   Date Value Ref Range Status   01/08/2020 10 8 - 16 mmol/L Final     eGFR if    Date Value Ref Range Status   01/08/2020 51.8 (A) >60 mL/min/1.73 m^2 Final     eGFR if non    Date Value Ref Range Status   01/08/2020 44.9 (A) >60 mL/min/1.73 m^2 Final     Comment:     Calculation used to obtain the  estimated glomerular filtration  rate (eGFR) is the CKD-EPI equation.        Lab Results   Component Value Date    WBC 6.51 01/08/2020    HGB 12.1 01/08/2020    HCT 39.9 01/08/2020     (H) 01/08/2020     01/08/2020     Lab Results   Component Value Date    CHOL 154 10/16/2019     Lab Results   Component Value Date    HDL 60 10/16/2019     Lab Results   Component Value Date    LDLCALC 74.6 10/16/2019     Lab Results   Component Value Date    TRIG 97 10/16/2019     Lab Results   Component Value Date    CHOLHDL 39.0 10/16/2019     Lab Results   Component Value Date    TSH 2.548 11/13/2018     No results found for: LABA1C, HGBA1C  Assessment:       1. Pre-syncope    2. Dizziness    3. Excessive cerumen in ear canal, unspecified laterality    4. Essential hypertension        Plan:   Pre-syncope  -     Ambulatory referral/consult to Cardiology; Future; Expected date: 04/20/2020  -     Comprehensive metabolic panel; Future; Expected date: 04/13/2020  -     CBC auto differential; Future; Expected date: 04/13/2020    Dizziness  -     Ambulatory referral/consult to ENT; Future; Expected date: 04/20/2020    Excessive cerumen in ear canal, unspecified laterality  -     Ambulatory referral/consult to ENT; Future; Expected date: 04/20/2020    Essential hypertension  -     TSH; Future; Expected date: 04/13/2020    F/u 1 month---------

## 2020-04-13 NOTE — TELEPHONE ENCOUNTER
----- Message from Jazmine Farris sent at 4/13/2020  4:00 PM CDT -----  Contact: self  Pt requesting a call back to know if she can get an earlier appt. She would like to have the appt as soon as possible. Please call pt back at [phone]

## 2020-04-13 NOTE — TELEPHONE ENCOUNTER
"She saw Dr Hill this am. Having symptoms of Feeling faint. Has to hold on so she doesn't fall. "near syncope" she says she does not think she has lost consciousness. Symptoms are frequent and she does not feel she can wait until may 11 to be see.   I let her know the Dr was out of the office but I will check with him in the AM to see about moving appt up sooner  "

## 2020-04-14 ENCOUNTER — TELEPHONE (OUTPATIENT)
Dept: HEPATOLOGY | Facility: HOSPITAL | Age: 75
End: 2020-04-14

## 2020-04-14 ENCOUNTER — TELEPHONE (OUTPATIENT)
Dept: CARDIOLOGY | Facility: CLINIC | Age: 75
End: 2020-04-14

## 2020-04-14 NOTE — TELEPHONE ENCOUNTER
----- Message from Diana Loya MA sent at 4/13/2020  4:40 PM CDT -----  Contact: self  Good afternoon,     This patient would like to have a sooner face to face appt.  She has fallen several times and is now very concerned for her safety.  She can not do telemed appts due to tech restraints. Please advise.    ----- Message -----  From: Jazmine Farris  Sent: 4/13/2020   3:59 PM CDT  To: Mauricio Lucero Staff    Pt requesting a call back to know if she can get an earlier appt. She would like to have the appt as soon as possible. Please call pt back at [phone]

## 2020-04-14 NOTE — TELEPHONE ENCOUNTER
Called pt back to schedule virtual appt. Pt unable to login to [facility] while on the phone. Referred pt to call online technical support for more assistance and then call back to schedule virtual appt.

## 2020-04-14 NOTE — TELEPHONE ENCOUNTER
----- Message from Makeda Bennett sent at 4/14/2020  1:53 PM CDT -----  Contact: PT  Type:  Patient Returning Call    Who Called:PT  Who Left Message for Patient:MARQUITA  Does the patient know what this is regarding?:YES  Would the patient rather a call back or a response via [facility]? CALL BACK  Best Call Back Number:[phone]  Additional Information:

## 2020-04-14 NOTE — TELEPHONE ENCOUNTER
Please call and clarify what her technical issues are, she has MyChart.  Dizziness is an issue that we can often appropriately evaluate via telemedicine.  If she truly cannot do telemedicine, I would recommend waiting until after she sees cardiology to consider a face to face encounter as from her chart I do not think that this is an ear issue.  Ears can cause a spinning sensation, but will not make you feel lightheaded or as if she is going to pass out.    Due to her age, she would be a high risk group with COVID and I would not recommend her exposing herself without a clear benefit.

## 2020-04-15 ENCOUNTER — OFFICE VISIT (OUTPATIENT)
Dept: CARDIOLOGY | Facility: CLINIC | Age: 75
End: 2020-04-15
Payer: MEDICARE

## 2020-04-15 DIAGNOSIS — R42 DIZZINESS: Primary | ICD-10-CM

## 2020-04-15 DIAGNOSIS — I10 ESSENTIAL HYPERTENSION: ICD-10-CM

## 2020-04-15 PROCEDURE — 99213 OFFICE O/P EST LOW 20 MIN: CPT | Mod: 95,,, | Performed by: NUCLEAR MEDICINE

## 2020-04-15 PROCEDURE — 99211 OFF/OP EST MAY X REQ PHY/QHP: CPT

## 2020-04-15 PROCEDURE — 99213 PR OFFICE/OUTPT VISIT, EST, LEVL III, 20-29 MIN: ICD-10-PCS | Mod: 95,,, | Performed by: NUCLEAR MEDICINE

## 2020-04-15 PROCEDURE — G0463 HOSPITAL OUTPT CLINIC VISIT: HCPCS

## 2020-04-15 NOTE — PROGRESS NOTES
The patient location is: HOME  The chief complaint leading to consultation is: POSITIONAL DIZZINESS AND UNCONTROLLED HYPERTENSION  Visit type: audiovisual  Total time spent with patient: 15 MINUTES  Each patient to whom he or she provides medical services by telemedicine is:  (1) informed of the relationship between the physician and patient and the respective role of any other health care provider with respect to management of the patient; and (2) notified that he or she may decline to receive medical services by telemedicine and may withdraw from such care at any time.    Notes:  SINCE LAST December, HAS NOTICED RECURRENT EPISODES OF POSITIONAL LIGHTHEADEDNESS AND DIZZINESS., LASTING FEW MINUTES , SOMETIMES OCCURRING WITH AMBULATION.  NO PRE MONITORING SYMPTOMS - NAUSEA, PALPITATIONS, FLUSHING, PERSPIRATION. URINARY URGENCY, ABDOMINAL DISCOMFORT  NO ASSOCIATED FOCAL CNS SYMPTOMS OR SINGS= AMAUROSIS FUGAX, HEADACHE, DOUBLE VISION.  VISUAL SCOTOMAS., LOCAL WEAKNESS OR SENSORY ABNORMALITIES  NO CHEST DISCOMFORT. NO SOB  NO SYNCOPE OR FALLS  EPISODES HAVE INCREASED IN FREQUENCY-  OCCURRING DAILY  HAS NOTICED ELEVATION OF -160  FOR LAST 2 MONTHS.  GOOD COMPLIANCE WITH CARD MED-  NO SIDE EFFECTS  NO ABDOMINAL DISCOMFORT  NO EDEMA. NO CALVE TENDERNESS    CARD MED WERE REVIEWED     BP TODAY- 150/75    ASSESSMENT-- MILDLY UNCONTROLLED SBP    RECOMMENDATIONS-- START ARB-  LOSARTAN 50 MG DAILY.      KEEP HOME BP RECORDING- AM, BEFORE BREAKFAST AND BEDTIME    KEEP APPT WITH ENT     PHONE CALL IN ONE MONTH TO CHECK PROGRESS AND FURTHER RECOMMENDATIONS

## 2020-04-16 ENCOUNTER — TELEPHONE (OUTPATIENT)
Dept: OTOLARYNGOLOGY | Facility: CLINIC | Age: 75
End: 2020-04-16

## 2020-04-16 NOTE — TELEPHONE ENCOUNTER
"Spoke with patient and explained that I sent her message to Dr. Israel.  Dr. Israel replied with a recommendation that the patient needed a VNG done.  I explained that our Audiologists are not performing that particular test right now due to the Covid-19 situation and would not begin to perform that test again until June.  She was very upset and angery that she cannot have the VNG now and that we would not move up her face to face appt.        ----- Message from Rohit Israel MD sent at 4/16/2020  1:24 PM CDT -----  Contact: Pt   She needs a VNG.       ----- Message -----  From: Diana Loya MA  Sent: 4/15/2020   4:01 PM CDT  To: Rohit Israel MD    This patient is wanting a face to face for dizzy spells that she is "blacking out" for and falling.  She had a video visit with her cardiologist and he told her it was an inner ear issue.  She is very upset that her appt is scheduled for 05/11/2020 and wants to be seen tomorrow.  Please advise.        ----- Message -----  From: Farheen Trejo  Sent: 4/15/2020   3:43 PM CDT  To: [facility]icio [facility] Staff    Type:  Sooner Apoointment Request    Caller is requesting a sooner appointment.  Caller declined first available appointment listed below.  Caller will not accept being placed on the waitlist and is requesting a message be sent to doctor.  Name of Caller:Mary Flanagan  When is the first available appointment?05/2020  Symptoms:Pre-syncope  Would the patient rather a call back or a response via [facility]? Call Back  Best Call Back Number:[phone] (mobile) [phone] (home)   Additional Information: Pt stated that they were told they could have a sooner appt due to VS.           "

## 2020-05-07 RX ORDER — TRIAMTERENE AND HYDROCHLOROTHIAZIDE 37.5; 25 MG/1; MG/1
CAPSULE ORAL
Qty: 90 CAPSULE | Refills: 3 | Status: SHIPPED | OUTPATIENT
Start: 2020-05-07 | End: 2020-06-04

## 2020-05-11 ENCOUNTER — OFFICE VISIT (OUTPATIENT)
Dept: OTOLARYNGOLOGY | Facility: CLINIC | Age: 75
End: 2020-05-11
Payer: MEDICARE

## 2020-05-11 VITALS
DIASTOLIC BLOOD PRESSURE: 79 MMHG | WEIGHT: 135.38 LBS | HEART RATE: 79 BPM | SYSTOLIC BLOOD PRESSURE: 143 MMHG | BODY MASS INDEX: 24.76 KG/M2

## 2020-05-11 DIAGNOSIS — R42 DIZZINESS: ICD-10-CM

## 2020-05-11 DIAGNOSIS — H61.23 BILATERAL IMPACTED CERUMEN: Primary | ICD-10-CM

## 2020-05-11 PROCEDURE — 99213 PR OFFICE/OUTPT VISIT, EST, LEVL III, 20-29 MIN: ICD-10-PCS | Mod: 25,S$PBB,, | Performed by: PHYSICIAN ASSISTANT

## 2020-05-11 PROCEDURE — 69210 PR REMOVAL IMPACTED CERUMEN REQUIRING INSTRUMENTATION, UNILATERAL: ICD-10-PCS | Mod: S$PBB,,, | Performed by: PHYSICIAN ASSISTANT

## 2020-05-11 PROCEDURE — 69210 REMOVE IMPACTED EAR WAX UNI: CPT | Mod: PBBFAC | Performed by: PHYSICIAN ASSISTANT

## 2020-05-11 PROCEDURE — 99214 OFFICE O/P EST MOD 30 MIN: CPT | Mod: PBBFAC | Performed by: PHYSICIAN ASSISTANT

## 2020-05-11 PROCEDURE — 99999 PR PBB SHADOW E&M-EST. PATIENT-LVL IV: ICD-10-PCS | Mod: PBBFAC,,, | Performed by: PHYSICIAN ASSISTANT

## 2020-05-11 PROCEDURE — 99213 OFFICE O/P EST LOW 20 MIN: CPT | Mod: 25,S$PBB,, | Performed by: PHYSICIAN ASSISTANT

## 2020-05-11 PROCEDURE — 69210 REMOVE IMPACTED EAR WAX UNI: CPT | Mod: S$PBB,,, | Performed by: PHYSICIAN ASSISTANT

## 2020-05-11 PROCEDURE — 99999 PR PBB SHADOW E&M-EST. PATIENT-LVL IV: CPT | Mod: PBBFAC,,, | Performed by: PHYSICIAN ASSISTANT

## 2020-05-11 NOTE — PROGRESS NOTES
"Referring Provider:    Raul Hill Md  [address] [place] [address]  Subjective:   Patient: Mary Flanagan [id number], :1945   Visit date:2020 12:05 PM    Chief Complaint:  Dizziness (1 month )    HPI:  Mary is a 74 y.o. female who I was asked to see in consultation for evaluation of the following issue(s):    Dizziness/Vertigo:  Onset:  1 month(s)  Total number of episodes:  daily  Duration of individual episodes: minutes  Severity: moderate  Exacerbating factors: standing up  Prior Medications: nothing  Relieving factors:  remaining motionless  Associated signs and symptoms:  "feels like I am blacking out"  Quality:   Spinning- No   Light headedness- Yes   Sensation that room is moving but patient is motionless- No  Prior history of similar events: No    Seen by cardiology for episodes of syncope, started with trial on Cozaar.     Review of Systems:  Negative unless checked off.  Gen:  []fever   []fatigue  HENT:  []nosebleeds  []dental problem   Eyes:  []photophobia  []visual disturbance  Resp:  []chest tightness []wheezing  Card:  []chest pain  []leg swelling  GI:  []abdominal pain []blood in stool  :  []dysuria  []hematuria  Musc:  []joint swelling  []gait problem  Skin:  []color change  []pallor  Neuro:  []seizures  []numbness  Hem:  []bruise/bleed easily  Psych:  []hallucinations  []behavioral problems  Allergy/Imm: is allergic to no known drug allergies.    Her meds, allergies, medical, surgical, social & family histories were reviewed & updated:  -     She has a current medication list which includes the following prescription(s): atorvastatin, calcium carbonate, clindamycin, desonide, hydrocortisone, inv losartan, triamterene-hydrochlorothiazide 37.5-25 mg, vit a/c/e ac/znox/cupric oxide, gabapentin, and pantoprazole.  -     She  has a past medical history of Cataract, Chest pain, CKD (chronic kidney disease) stage 3, GFR 30-59 ml/min, Dry eyes, Gastritis, " Hyperlipidemia, Hypertension, Insomnia, and Osteopenia.   -     She does not have any pertinent problems on file.   -     She  has a past surgical history that includes left knee surgery; left arm surgery;  section; Knee surgery; Knee arthroscopy (Left,  approx); and Breast biopsy (Left).  -     She  reports that she has never smoked. She has never used smokeless tobacco. She reports that she does not drink alcohol or use drugs.  -     Her family history includes Hypertension in her father and mother.  -     She is allergic to no known drug allergies.    Objective:     Physical Exam: (abnormal findings indicated in BOLD)    Physical Exam:  Vitals:  BP (!) 143/79   Pulse 79   Wt 61.4 kg (135 lb 5.8 oz)   BMI 24.76 kg/m²   General appearance:  Well developed, well nourished    Eyes:  Extraocular motions intact, PERRL    Communication:  no hoarseness, no dysphonia    Ears:  Otoscopy of external auditory canals and tympanic membranes was normal, clinical speech reception thresholds grossly intact, no mass/lesion of auricle.  Nose:  No masses/lesions of external nose, nasal mucosa, septum, and turbinates were within normal limits.  Mouth:  No mass/lesion of lips, teeth, gums, hard/soft palate, tongue, tonsils, or oropharynx.    Cardiovascular:  No pedal edema; Radial Pulses +2     Neck & Lymphatics:  No cervical lymphadenopathy, no neck mass/crepitus/ asymmetry, trachea is midline, no thyroid enlargement/tenderness/mass.    Psych: Oriented x3,  Alert with normal mood and affect.     Respiration/Chest:  Symmetric expansion during respiration, normal respiratory effort.    Skin:  Warm and intact. No ulcerations of face, scalp, neck.    CRANIAL NERVES:  III, IV, VI:  Extraocular muscles intact.  Pupils equally reactive to light and accommodation.  V: Facial sensory function to light touch intact and symmetrical.  No evidence of atrophy of the masseter and temporal muscles.  VII:  Facial strength intact and  symmetrical.  IX:  Soft palate elevates in the midline.  XI:  Shoulders motility and strength intact and symmetrical. No atrophy of sternocleidomastoid and trapezius muscles.  XII:  Tongue motility and strength intact.  No spontaneous or gaze nystagmus.    [place]-Hallpike test:  No vertigo or nystagmus.    Head-shaking test:  No nystagmus.  Vestibulo-ocular reflex:  No evidence of catch-up saccades to bilateral head turns.  Oculo-counter torsion:  Intact to bilateral head tilts.  Romberg test:  No abnormal sway or falls with eyes open and closed.    Fukuda stepping test:  No abnormal sway.    Gait:  No ataxia and can tandem gait 6 steps.    Finger-to-nose testing intact without dysmetria.  Deviation of index.  Eyes closed:  No deviation.  MOTOR STRENGTH:  Strength 5/5 throughout.  SENSORY:  Sensory function to light touch and proprioception intact throughout.    DH: Negative bilaterally.     Assessment & Plan:   Mary was seen today for dizziness.    Diagnoses and all orders for this visit:    Bilateral impacted cerumen    Dizziness  -     Ambulatory referral/consult to ENT      DIZZINESS-   Dizziness is an extremely complex problem that may arise from many sources.  I requires the coordination between the visual system, the vestibular system as well as the proprioreceptive system.  Additionally, balance is compromised in the setting of musculoskeletal, cerebral, cardiac, and numerous physiologic disorders.  The complex interplay between these systems may also lead to dizziness if there is dysynchrony between the bilateral vestibular symptoms.    Central vestibular symptoms can generally be distinguished from peripheral vestibulopathies by the presence of other non vestibular neurologic symptoms (focal weakness, headache), light headedness (rather than true vertigo), near syncope, weak limbs, panic, fuzziness/cloudiness in mentation, and clumsiness.  Peripheral vestibulopathies are generally, at some point during  their course, characterized by a true vertiginous sensation of movement, difficulty with sudden head movements, nausea, difficulty with sudden head movement, and possibly oscicllopsia.    BPPV is the most common cause of episodic vertigo.  Symptoms generally last for seconds then resolve when motionless, otitic symptoms are absent and may be provoked with sudden head motion.  This may occur spontaneously, but frequently follow head trauma or recent vestibular neuronitis.  Nystagmus is typically geotropic and directed toward the affected ear (hyperactive input to the inferior vestibular nerve via the Singular nerve). Canalith repositioning maneuvers are the method of choice for treating this condition.  Mild imbalance following is common and may last 1-2 weeks.    Vestibular neuronitis and labyrinthitis can be distinguished by the presence of sensorineural hearing loss in labyrinthitis, but during their active phase, vertigo is constant.   MRI may be necessary during this phase to exclude CNS pathology.  Frequently this is preceded by a viral illness. Both result in permanent partial end organ weakness of the affected vestibular nerve (usually superior).  Otherwise, they are essentially the same.  The early (vertiginous, 1-3 days) phase is characterized by acute onset of vertigo that is essentially constant and present even in the absence of motion.  Nystagmus is directed away from the affected ear (hypoactive).  In the acute phase, steroids, limited use of vestibular suppressants and hydration are the most effective treatment. Patients then enter the second phase of uncompensated vestibulopathy where they have a general sense of imbalance.  The duration of this phase depends on several factors, but is generally delayed in the presence of vestibular suppressants, advanced age, central/systemic balance issues and sessile behavior.   Phase 3 is compensated vestibulopathy where symptoms generally only occur with sudden  head movements and can be seen with catch up saccades on head thrust.   However, in the presence of bilateral VN, oscillopsia is the hallmark of the disease and compensation is frequently very delayed and poor.    Other causes of vertigo that are typically constant are vestibulotoxic medications and autoimmune inner ear disease and these are generally bilateral in nature.    Meniere's disease is typically (85%) unilateral and defined by 2 spontaneous vertigo attacks lasting 20 min or more, documented hearing loss (typically low tone, frequently fluctuating) and otic fullness or tinnitus in the affected ear. However, the presence of endolymphatic hydrops may result in similar symptoms, not meeting these strict criteria.  This disease can be difficult to distinguish from vestibular migraines, which are not always associated with headaches.    Superior canal dehiscence presents with episodic vertigo lasting seconds to minutes, aural fullness, autophony, hyperacusis and tinnitus (shayna pulsatile).  Aural pressure is the most common presenting symptom.  Symptoms can be provoked with Valsalva.  Bone conduction thresholds are supranormal.    Perilymph fistula presents with fluctuating hearing loss, episodic vertigo lasting seconds to minutes and frequently is associated with prior barotrauma or otologic surgery.  Conservative measures such as stool softeners and bedrest frequently lead to resolution.  In cases of persistent symptoms, unfortunately there is no noninvasive diagnostic test with high specificity, and surgical exploration is sometimes necessary when this is expected.    Vestibular schwannomas may have constant or episodic vertigo, frequently SNHL and sometimes may be accompanied by headache or facial numbness.    The diagnosis and options of management were discussed at length with the patient, including hearing, balance function and the risks associated with my recommendations. We spent a considerable amount of  time discussing strategies to cope with dizziness. I emphasized the great importance of fall avoidance and activities that may be dangerous if Mary has an episode of dizziness.  I answered all questions in layman's terms. Based on the history, physical exam and imaging studies there is little evidence of a vestibular dysfunction.      I recommend:    Bilateral cerumen impactions removed w/o difficulty.   VNG and audiogram. If VNG insignificant, advised pt to f/u with her PCP and cardiologist.     We discussed her medical conditions, treatments and plan.  Mary should return to clinic if any issues arise (symptoms worsen or persist), otherwise we will see her back in the clinic after labs and/or imaging, consults, etc. have been completed.    Thank you for allowing me to participate in the care of Mary.      Jazmine Martínez PA-C  [facility]   [facility]  [address]  [place] [address]  P: [phone]  F: [phone]      Patient: Mary Flanagan [id number], :1945  Procedure date:2020  Patient's medications, allergies, past medical, surgical, social and family histories were reviewed and updated as appropriate.  Chief Complaint:  Dizziness (1 month )    HPI:  Mary is a 74 y.o. female with the history of present illness as discussed in the clinic note from today.  During this unrelated patient encounter I observed impacted cerumen.  The otoscopic examination of the tympanic membrane was not possible due to copious cerumen impaction.      Procedure: The patient was in agreement with the examination and debridement of the ears. Removal of the cerumen required a high level of expertise and use of an operating microscope and multiple micro-instruments.     With the patient in the supine position, we used the operating microscope to examine both ears with the appropriate sized ear speculum.  A variety of sterile, micro-instruments were utilized to remove the  cerumen atraumatically.  I performed the procedure which required a significant amount of time and effort. The tympanic membrane was then well visualized.  The patient tolerated the procedure well and there were no complications.    Findings:   Right ear had significant wax, the EAC was normal, and the tympanic membrane was intact with no evidence of middle ear fluid.    Left ear had significant wax, the EAC was normal, and the tympanic membrane was intact with no evidence of middle ear fluid.

## 2020-05-11 NOTE — LETTER
May 11, 2020      Raul Hill MD  [address]  [place] [address]           [facility]  [address] [facility]  [place] [address]  Phone: [phone]  Fax: [phone]          Patient: Mary Flanagan   MR Number: [id number]   YOB: 1945   Date of Visit: 5/11/2020       Dear Dr. Raul Hill:    Thank you for referring Mary Flanagan to me for evaluation. Attached you will find relevant portions of my assessment and plan of care.    If you have questions, please do not hesitate to call me. I look forward to following Mary Flanagan along with you.    Sincerely,    JAYLEEN Daugherty  CC:  No Recipients    If you would like to receive this communication electronically, please contact [email or web address] or [phone] to request more information on [facility] Link access.    For providers and/or their staff who would like to refer a patient to [facility], please contact us through our one-stop-shop provider referral line, [facility] , at [phone].    If you feel you have received this communication in error or would no longer like to receive these types of communications, please e-mail [email or web address]

## 2020-05-12 ENCOUNTER — CLINICAL SUPPORT (OUTPATIENT)
Dept: AUDIOLOGY | Facility: CLINIC | Age: 75
End: 2020-05-12
Payer: MEDICARE

## 2020-05-12 DIAGNOSIS — R42 LIGHTHEADEDNESS: Primary | ICD-10-CM

## 2020-05-12 PROCEDURE — 92540 PR VESTIBULAR EVAL NYSTAG FOVL&PERPH STIM OSCIL TRACKING: ICD-10-PCS | Mod: 26,S$PBB,, | Performed by: AUDIOLOGIST-HEARING AID FITTER

## 2020-05-12 PROCEDURE — 92540 BASIC VESTIBULAR EVALUATION: CPT | Mod: PBBFAC | Performed by: AUDIOLOGIST-HEARING AID FITTER

## 2020-05-12 PROCEDURE — 92537 CALORIC VSTBLR TEST W/REC: CPT | Mod: PBBFAC | Performed by: AUDIOLOGIST-HEARING AID FITTER

## 2020-05-12 PROCEDURE — 92567 TYMPANOMETRY: CPT | Mod: PBBFAC | Performed by: AUDIOLOGIST-HEARING AID FITTER

## 2020-05-12 PROCEDURE — 92540 BASIC VESTIBULAR EVALUATION: CPT | Mod: 26,S$PBB,, | Performed by: AUDIOLOGIST-HEARING AID FITTER

## 2020-05-12 PROCEDURE — 92557 COMPREHENSIVE HEARING TEST: CPT | Mod: PBBFAC | Performed by: AUDIOLOGIST-HEARING AID FITTER

## 2020-05-12 PROCEDURE — 92537 CALORIC VSTBLR TEST W/REC: CPT | Mod: 26,S$PBB,, | Performed by: AUDIOLOGIST-HEARING AID FITTER

## 2020-05-12 PROCEDURE — 92537 PR CALORIC VSTBLR TEST W/REC BITHERMAL: ICD-10-PCS | Mod: 26,S$PBB,, | Performed by: AUDIOLOGIST-HEARING AID FITTER

## 2020-05-12 NOTE — PROGRESS NOTES
Referring provider: JAYLEEN [place] Flanagan was seen 05/12/2020 for an audiological and vestibular evaluation.  Patient complains of lightheadedness and weakness when standing for a prolonged period of time.  She then feels sensation of weakness in her left leg and knee, noting her leg will shake then feels like she will fall.  This started about five months ago with gradual occurrence but has now progressed to daily occurrence.  No problems when sitting, supine or walking.  She denies dizziness, spinning or illusion the world is moving.  She has history of vertigo many years ago.  She denies hearing loss or tinnitus.     Audiology Report:  Results reveal normal hearing 250-4000 Hz sloping to a mild sensorineural hearing loss 3104-3617 Hz, bilaterally.  Speech Reception Thresholds were 15 dBHL for the right ear and 15 dBHL for the left ear.   Word recognition scores were excellent for the right ear and excellent for the left ear.   Tympanograms were Type As for the right ear and Type As for the left ear.      Videonystagmography Report (VNG):  Oculomotor function tests (sinusoidal tracking, saccade, optokinetic) were normal and symmetric.  Gaze test was absent for nystagmus.  Spontaneous test was absent for nystagmus.  Head-shake test was absent for after head-shake nystagmus.  Static Positional test revealed left-beating nystagmus that suppressed with fixation:   Head Center: 6 d/s LB   Head Right: 6 d/s LB   Body Right: 6 d/s LB    Head Left: 6 d/s LB   Body Left: 5 d/s RB  [place]-Hallpike Right was negative for BPPV.  There was 5 d/s left-beating positional nystagmus that suppressed with fixation.  [place]-Hallpike Left was negative for BPPV.  There was 3-4 d/s right-beating positional nystagmus that suppressed with fixation.   Bi-thermal caloric test was Normal.  Fixation suppression following caloric irrigations was normal.  The following values were obtained:  Unilateral weakness (UW): 4% left  ear  Directional preponderance (DP): 25% left beating  RC: 18 d/s  LC: 10 d/s  RW: 11 d/s  LW: 17 d/s    Summary: Normal VNG.  Negative for BPPV.  The presence of static positional nystagmus may suggest a previous non-localized vestibular insult that is incompletely compensated.     Recommendations:  1. ENT review    Patient was counseled on the above findings.  Tracings are to be scanned.

## 2020-05-12 NOTE — Clinical Note
Normal VNG, no evidence of acute vestibular dysfunction.  Please contact patient with your recommendations.  I discussed return to cardiology for work-up; had virtual visit.

## 2020-05-14 ENCOUNTER — TELEPHONE (OUTPATIENT)
Dept: CARDIOLOGY | Facility: CLINIC | Age: 75
End: 2020-05-14

## 2020-05-14 NOTE — TELEPHONE ENCOUNTER
----- Message from Kellee Maloney LPN sent at 4/15/2020 11:58 AM CDT -----  Progress check/bp check

## 2020-05-15 NOTE — TELEPHONE ENCOUNTER
5/15  153/79 78   105/54 82  514  155/78 71   116/76 76  5/13 130/63 86   139/74 76  5/12 148/78 81   147/86 74  5/11 145/77    129/67 77  5/10 135/71 76  The patient still feels lightheaded and she woke up at night with numb arms the past two days. Once she starts exercising her headache goes away. The patient discontinued her Losartan because her blood pressure went to 106/55 124/62 78.    This has been reviewed with Dr. Ochoa.   Orders:   1. Notify the patient her blood pressure should be no greater than 130/80.  2. Start taking Losartan 25 mg daily  3. VV or audio in 1 week

## 2020-05-15 NOTE — TELEPHONE ENCOUNTER
The patient has been notified of this information and all questions answered.     The patient did want to verbalize her worry about possibly falling because of her lightheaded ness. I notified her I would speak to Dr. Ochoa again for any suggestions. Patient was notified that if she has any severe lightheadedness, chest pain, shortness of breath, or any other cardiac sx to go to the hospital.

## 2020-05-18 ENCOUNTER — TELEPHONE (OUTPATIENT)
Dept: CARDIOLOGY | Facility: CLINIC | Age: 75
End: 2020-05-18

## 2020-05-18 NOTE — TELEPHONE ENCOUNTER
"Feels like near syncopal spells have felt worse.   "feels like I'm blacking out"  Left leg swelling.   Saw ent and states it is not from inner ear.   Needs to rule out further cardiac issues.   appt scheduled for pt to be seen in clinic this week.  Er if symptoms worsen  "

## 2020-05-20 ENCOUNTER — OFFICE VISIT (OUTPATIENT)
Dept: CARDIOLOGY | Facility: CLINIC | Age: 75
End: 2020-05-20
Payer: MEDICARE

## 2020-05-20 VITALS
SYSTOLIC BLOOD PRESSURE: 186 MMHG | OXYGEN SATURATION: 99 % | DIASTOLIC BLOOD PRESSURE: 77 MMHG | BODY MASS INDEX: 24.29 KG/M2 | HEART RATE: 82 BPM | WEIGHT: 132.81 LBS

## 2020-05-20 DIAGNOSIS — I49.3 PVC (PREMATURE VENTRICULAR CONTRACTION): ICD-10-CM

## 2020-05-20 DIAGNOSIS — I10 ESSENTIAL HYPERTENSION: ICD-10-CM

## 2020-05-20 DIAGNOSIS — R42 DIZZINESS: ICD-10-CM

## 2020-05-20 DIAGNOSIS — R22.42 LOCALIZED SWELLING OF LEFT LOWER LEG: ICD-10-CM

## 2020-05-20 DIAGNOSIS — I95.1 ORTHOSTATIC HYPOTENSION: Primary | ICD-10-CM

## 2020-05-20 DIAGNOSIS — E78.2 MIXED HYPERLIPIDEMIA: ICD-10-CM

## 2020-05-20 PROCEDURE — 99999 PR PBB SHADOW E&M-EST. PATIENT-LVL III: ICD-10-PCS | Mod: PBBFAC,,, | Performed by: INTERNAL MEDICINE

## 2020-05-20 PROCEDURE — 93010 ELECTROCARDIOGRAM REPORT: CPT | Mod: S$PBB,,, | Performed by: INTERNAL MEDICINE

## 2020-05-20 PROCEDURE — 99215 PR OFFICE/OUTPT VISIT, EST, LEVL V, 40-54 MIN: ICD-10-PCS | Mod: S$PBB,,, | Performed by: INTERNAL MEDICINE

## 2020-05-20 PROCEDURE — 99999 PR PBB SHADOW E&M-EST. PATIENT-LVL III: CPT | Mod: PBBFAC,,, | Performed by: INTERNAL MEDICINE

## 2020-05-20 PROCEDURE — 99213 OFFICE O/P EST LOW 20 MIN: CPT | Mod: PBBFAC,PO,25 | Performed by: INTERNAL MEDICINE

## 2020-05-20 PROCEDURE — 93010 EKG 12-LEAD: ICD-10-PCS | Mod: S$PBB,,, | Performed by: INTERNAL MEDICINE

## 2020-05-20 PROCEDURE — 93005 ELECTROCARDIOGRAM TRACING: CPT | Mod: PBBFAC,PO | Performed by: INTERNAL MEDICINE

## 2020-05-20 PROCEDURE — 99215 OFFICE O/P EST HI 40 MIN: CPT | Mod: S$PBB,,, | Performed by: INTERNAL MEDICINE

## 2020-05-20 NOTE — PROGRESS NOTES
Subjective:   Patient ID:  Mary Flanagan is a 74 y.o. female who presents for follow up of Fatigue (left side) and Dizziness      73 yo female, referred for faint  PMH h/o LLE SVT, HTN HLD, GERD  Faint started 6 months, once a week, usually standing for few minutes, sarted with blurred or black vision, leg side shaking, no cold sweating and nausea, and felt better after sitting  Took Dyazide over 10 yrs   Walked 2 miles a day  No chest pain, dyspnea, palpitation, orthopnea,   Mild leg swelling L > R  2018 LE venous US GSV and LSV old thrombus  Today in the office POSITIVE orthostatic sign  Lying /77 mmHG and lying 159/70 mmHG      Past Medical History:   Diagnosis Date    Cataract     Chest pain     CKD (chronic kidney disease) stage 3, GFR 30-59 ml/min     Dry eyes     Gastritis     Hyperlipidemia     Hypertension     Insomnia     Osteopenia        Past Surgical History:   Procedure Laterality Date    BREAST BIOPSY Left     , benign     SECTION      x 1    KNEE ARTHROSCOPY Left  approx    KNEE SURGERY      left arm surgery      left knee surgery         Social History     Tobacco Use    Smoking status: Never Smoker    Smokeless tobacco: Never Used   Substance Use Topics    Alcohol use: No     Alcohol/week: 0.0 standard drinks    Drug use: No       Family History   Problem Relation Age of Onset    Hypertension Mother     Hypertension Father     Breast cancer Neg Hx     Colon cancer Neg Hx     Ovarian cancer Neg Hx     Thrombophilia Neg Hx     Strabismus Neg Hx     Macular degeneration Neg Hx     Retinal detachment Neg Hx     Glaucoma Neg Hx     Blindness Neg Hx     Amblyopia Neg Hx          Review of Systems   Constitution: Negative for decreased appetite, diaphoresis, fever, malaise/fatigue and night sweats.   HENT: Negative for nosebleeds.    Eyes: Negative for blurred vision and double vision.   Cardiovascular: Positive for near-syncope. Negative for chest  pain, claudication, dyspnea on exertion, irregular heartbeat, leg swelling, orthopnea, palpitations, paroxysmal nocturnal dyspnea and syncope.   Respiratory: Negative for cough, shortness of breath, sleep disturbances due to breathing, snoring, sputum production and wheezing.    Endocrine: Negative for cold intolerance and polyuria.   Hematologic/Lymphatic: Does not bruise/bleed easily.   Skin: Negative for rash.   Musculoskeletal: Negative for back pain, falls, joint pain, joint swelling and neck pain.   Gastrointestinal: Negative for abdominal pain, heartburn, nausea and vomiting.   Genitourinary: Negative for dysuria, frequency and hematuria.   Neurological: Positive for dizziness. Negative for difficulty with concentration, focal weakness, headaches, light-headedness, numbness, seizures and weakness.   Psychiatric/Behavioral: Negative for depression, memory loss and substance abuse. The patient does not have insomnia.    Allergic/Immunologic: Negative for HIV exposure and hives.       Objective:   Physical Exam   Constitutional: She is oriented to person, place, and time. She appears well-nourished.   HENT:   Head: Normocephalic.   Eyes: Pupils are equal, round, and reactive to light.   Neck: Normal carotid pulses and no JVD present. Carotid bruit is not present. No thyromegaly present.   Cardiovascular: Normal rate, regular rhythm, normal heart sounds and normal pulses.  No extrasystoles are present. PMI is not displaced. Exam reveals no gallop and no S3.   No murmur heard.  Pulmonary/Chest: Breath sounds normal. No stridor. No respiratory distress.   Abdominal: Soft. Bowel sounds are normal. There is no tenderness. There is no rebound.   Musculoskeletal: Normal range of motion. She exhibits edema (+ RLE>LLE edema).   Neurological: She is alert and oriented to person, place, and time.   Skin: Skin is intact. No rash noted.   Psychiatric: Her behavior is normal.       Lab Results   Component Value Date    CHOL  154 10/16/2019    CHOL 147 05/13/2019    CHOL 153 11/13/2018     Lab Results   Component Value Date    HDL 60 10/16/2019    HDL 58 05/13/2019    HDL 60 11/13/2018     Lab Results   Component Value Date    LDLCALC 74.6 10/16/2019    LDLCALC 68.2 05/13/2019    LDLCALC 76.2 11/13/2018     Lab Results   Component Value Date    TRIG 97 10/16/2019    TRIG 104 05/13/2019    TRIG 84 11/13/2018     Lab Results   Component Value Date    CHOLHDL 39.0 10/16/2019    CHOLHDL 39.5 05/13/2019    CHOLHDL 39.2 11/13/2018       Chemistry        Component Value Date/Time     04/13/2020 1110    K 4.3 04/13/2020 1110     04/13/2020 1110    CO2 29 04/13/2020 1110    BUN 16 04/13/2020 1110    CREATININE 1.2 04/13/2020 1110    GLU 91 04/13/2020 1110        Component Value Date/Time    CALCIUM 10.2 04/13/2020 1110    ALKPHOS 88 04/13/2020 1110    AST 20 04/13/2020 1110    ALT 12 04/13/2020 1110    BILITOT 0.8 04/13/2020 1110    ESTGFRAFRICA 51.8 (A) 04/13/2020 1110    EGFRNONAA 44.9 (A) 04/13/2020 1110          No results found for: LABA1C, HGBA1C  Lab Results   Component Value Date    TSH 2.393 04/13/2020     No results found for: INR, PROTIME  Lab Results   Component Value Date    WBC 5.97 04/13/2020    HGB 11.8 (L) 04/13/2020    HCT 39.3 04/13/2020     (H) 04/13/2020     04/13/2020     BMP  Sodium   Date Value Ref Range Status   04/13/2020 139 136 - 145 mmol/L Final     Potassium   Date Value Ref Range Status   04/13/2020 4.3 3.5 - 5.1 mmol/L Final     Chloride   Date Value Ref Range Status   04/13/2020 103 95 - 110 mmol/L Final     CO2   Date Value Ref Range Status   04/13/2020 29 23 - 29 mmol/L Final     BUN, Bld   Date Value Ref Range Status   04/13/2020 16 8 - 23 mg/dL Final     Creatinine   Date Value Ref Range Status   04/13/2020 1.2 0.5 - 1.4 mg/dL Final     Calcium   Date Value Ref Range Status   04/13/2020 10.2 8.7 - 10.5 mg/dL Final     Anion Gap   Date Value Ref Range Status   04/13/2020 7 (L) 8 - 16  mmol/L Final     eGFR if    Date Value Ref Range Status   04/13/2020 51.8 (A) >60 mL/min/1.73 m^2 Final     eGFR if non    Date Value Ref Range Status   04/13/2020 44.9 (A) >60 mL/min/1.73 m^2 Final     Comment:     Calculation used to obtain the estimated glomerular filtration  rate (eGFR) is the CKD-EPI equation.        BNP  @LABRCNTIP(BNP,BNPTRIAGEBLO)@  @LABRCNTIP(troponini)@  CrCl cannot be calculated (Patient's most recent lab result is older than the maximum 7 days allowed.).  No results found in the last 24 hours.  No results found in the last 24 hours.  No results found in the last 24 hours.    Assessment:      1. Orthostatic hypotension    2. Essential hypertension    3. Mixed hyperlipidemia    4. Dizziness    5. PVC (premature ventricular contraction)    6. Localized swelling of left lower leg      Positive orthostatic hypotension  PVCs on ekg, monophasic LBBB pattern inferior axis  H/o VTE in LLE GSV and LVG in 2018 no Rx.    Plan:   holter for PVC load  Echo and phar. MPI for PVCs, orthostatic hypotension, faint  LE venous US  Advise RLE Pressure sock while standing up  Avoid long time standing up  Continue Dyazide and statin  F/u as needed after the tests done

## 2020-05-22 ENCOUNTER — TELEPHONE (OUTPATIENT)
Dept: CARDIOLOGY | Facility: CLINIC | Age: 75
End: 2020-05-22

## 2020-05-22 DIAGNOSIS — I49.3 PVC (PREMATURE VENTRICULAR CONTRACTION): Primary | ICD-10-CM

## 2020-05-22 NOTE — TELEPHONE ENCOUNTER
----- Message from Rebecca Burleson MA sent at 5/22/2020  2:57 PM CDT -----  Regarding: Please place order  Please place order for CV US venous Unilateral Right leg please, fo scheduling with left leg study. Thanks

## 2020-05-29 ENCOUNTER — HOSPITAL ENCOUNTER (OUTPATIENT)
Dept: CARDIOLOGY | Facility: HOSPITAL | Age: 75
Discharge: HOME | End: 2020-05-29
Attending: INTERNAL MEDICINE
Payer: MEDICARE

## 2020-05-29 VITALS
SYSTOLIC BLOOD PRESSURE: 186 MMHG | HEIGHT: 62 IN | HEART RATE: 75 BPM | BODY MASS INDEX: 24.29 KG/M2 | DIASTOLIC BLOOD PRESSURE: 77 MMHG | WEIGHT: 132 LBS

## 2020-05-29 VITALS — BODY MASS INDEX: 24.29 KG/M2 | WEIGHT: 132 LBS | HEIGHT: 62 IN

## 2020-05-29 DIAGNOSIS — R42 DIZZINESS: ICD-10-CM

## 2020-05-29 DIAGNOSIS — I10 ESSENTIAL HYPERTENSION: ICD-10-CM

## 2020-05-29 DIAGNOSIS — I49.3 PVC (PREMATURE VENTRICULAR CONTRACTION): ICD-10-CM

## 2020-05-29 DIAGNOSIS — R22.42 LOCALIZED SWELLING OF LEFT LOWER LEG: ICD-10-CM

## 2020-05-29 LAB
AORTIC ROOT ANNULUS: 2.37 CM
ASCENDING AORTA: 2.55 CM
AV INDEX (PROSTH): 0.66
AV MEAN GRADIENT: 9 MMHG
AV PEAK GRADIENT: 17 MMHG
AV VALVE AREA: 1.78 CM2
AV VELOCITY RATIO: 0.69
BSA FOR ECHO PROCEDURE: 1.62 M2
CV ECHO LV RWT: 0.55 CM
DOP CALC AO PEAK VEL: 2.07 M/S
DOP CALC AO VTI: 42.56 CM
DOP CALC LVOT AREA: 2.7 CM2
DOP CALC LVOT DIAMETER: 1.86 CM
DOP CALC LVOT PEAK VEL: 1.42 M/S
DOP CALC LVOT STROKE VOLUME: 75.82 CM3
DOP CALCLVOT PEAK VEL VTI: 27.92 CM
E WAVE DECELERATION TIME: 187.35 MSEC
E/A RATIO: 1.06
E/E' RATIO: 11.89 M/S
ECHO LV POSTERIOR WALL: 0.89 CM (ref 0.6–1.1)
FRACTIONAL SHORTENING: 33 % (ref 28–44)
INTERVENTRICULAR SEPTUM: 0.99 CM (ref 0.6–1.1)
IVRT: 83.04 MSEC
LA MAJOR: 3.79 CM
LA MINOR: 3.98 CM
LA WIDTH: 2.81 CM
LEFT ATRIUM SIZE: 2.87 CM
LEFT ATRIUM VOLUME INDEX: 16.6 ML/M2
LEFT ATRIUM VOLUME: 26.62 CM3
LEFT INTERNAL DIMENSION IN SYSTOLE: 2.16 CM (ref 2.1–4)
LEFT VENTRICLE DIASTOLIC VOLUME INDEX: 25.96 ML/M2
LEFT VENTRICLE DIASTOLIC VOLUME: 41.59 ML
LEFT VENTRICLE MASS INDEX: 52 G/M2
LEFT VENTRICLE SYSTOLIC VOLUME INDEX: 9.7 ML/M2
LEFT VENTRICLE SYSTOLIC VOLUME: 15.52 ML
LEFT VENTRICULAR INTERNAL DIMENSION IN DIASTOLE: 3.22 CM (ref 3.5–6)
LEFT VENTRICULAR MASS: 83.19 G
LV LATERAL E/E' RATIO: 13.38 M/S
LV SEPTAL E/E' RATIO: 10.7 M/S
MV PEAK A VEL: 1.01 M/S
MV PEAK E VEL: 1.07 M/S
PISA TR MAX VEL: 2.8 M/S
PULM VEIN S/D RATIO: 1.61
PV PEAK D VEL: 0.56 M/S
PV PEAK S VEL: 0.9 M/S
PV PEAK VELOCITY: 0.85 CM/S
RA MAJOR: 3.28 CM
RA PRESSURE: 3 MMHG
RA WIDTH: 3.02 CM
RIGHT VENTRICULAR END-DIASTOLIC DIMENSION: 2.67 CM
SINUS: 2.2 CM
STJ: 1.74 CM
TDI LATERAL: 0.08 M/S
TDI SEPTAL: 0.1 M/S
TDI: 0.09 M/S
TR MAX PG: 31 MMHG
TRICUSPID ANNULAR PLANE SYSTOLIC EXCURSION: 1.56 CM
TV REST PULMONARY ARTERY PRESSURE: 34 MMHG

## 2020-05-29 PROCEDURE — 93970 CV US DOPPLER VENOUS LEGS BILATERAL (CUPID ONLY): ICD-10-PCS | Mod: 26,,, | Performed by: INTERNAL MEDICINE

## 2020-05-29 PROCEDURE — 93970 EXTREMITY STUDY: CPT | Mod: 26,,, | Performed by: INTERNAL MEDICINE

## 2020-05-29 PROCEDURE — 93970 EXTREMITY STUDY: CPT

## 2020-05-29 PROCEDURE — 93306 TTE W/DOPPLER COMPLETE: CPT

## 2020-05-29 PROCEDURE — 93227 HOLTER MONITOR - 48 HOUR (CUPID ONLY): ICD-10-PCS | Mod: ,,, | Performed by: INTERNAL MEDICINE

## 2020-05-29 PROCEDURE — 93226 XTRNL ECG REC<48 HR SCAN A/R: CPT

## 2020-05-29 PROCEDURE — 93227 XTRNL ECG REC<48 HR R&I: CPT | Mod: ,,, | Performed by: INTERNAL MEDICINE

## 2020-05-29 PROCEDURE — 93306 TTE W/DOPPLER COMPLETE: CPT | Mod: 26,,, | Performed by: INTERNAL MEDICINE

## 2020-05-29 PROCEDURE — 93306 ECHO (CUPID ONLY): ICD-10-PCS | Mod: 26,,, | Performed by: INTERNAL MEDICINE

## 2020-06-02 ENCOUNTER — HOSPITAL ENCOUNTER (OUTPATIENT)
Dept: RADIOLOGY | Facility: HOSPITAL | Age: 75
Discharge: HOME | End: 2020-06-02
Attending: INTERNAL MEDICINE
Payer: MEDICARE

## 2020-06-02 ENCOUNTER — HOSPITAL ENCOUNTER (OUTPATIENT)
Dept: PULMONOLOGY | Facility: HOSPITAL | Age: 75
Discharge: HOME | End: 2020-06-02
Attending: INTERNAL MEDICINE
Payer: MEDICARE

## 2020-06-02 VITALS
HEART RATE: 63 BPM | WEIGHT: 130 LBS | SYSTOLIC BLOOD PRESSURE: 122 MMHG | BODY MASS INDEX: 23.04 KG/M2 | DIASTOLIC BLOOD PRESSURE: 88 MMHG | HEIGHT: 63 IN

## 2020-06-02 DIAGNOSIS — R42 DIZZINESS: ICD-10-CM

## 2020-06-02 DIAGNOSIS — I10 ESSENTIAL HYPERTENSION: ICD-10-CM

## 2020-06-02 DIAGNOSIS — I49.3 PVC (PREMATURE VENTRICULAR CONTRACTION): ICD-10-CM

## 2020-06-02 LAB
CV STRESS BASE HR: 67 BPM
DIASTOLIC BLOOD PRESSURE: 61 MMHG
EJECTION FRACTION- HIGH: 65 %
END DIASTOLIC INDEX-HIGH: 158 ML/M2
END DIASTOLIC INDEX-LOW: 94 ML/M2
END SYSTOLIC INDEX-HIGH: 71 ML/M2
END SYSTOLIC INDEX-LOW: 33 ML/M2
NUC REST DIASTOLIC VOLUME INDEX: 33
NUC REST EJECTION FRACTION: 89
NUC REST SYSTOLIC VOLUME INDEX: 4
NUC STRESS DIASTOLIC VOLUME INDEX: 26
NUC STRESS EJECTION FRACTION: 85 %
NUC STRESS SYSTOLIC VOLUME INDEX: 4
OHS CV CPX 85 PERCENT MAX PREDICTED HEART RATE MALE: 120
OHS CV CPX MAX PREDICTED HEART RATE: 142
OHS CV CPX PATIENT IS FEMALE: 1
OHS CV CPX PATIENT IS MALE: 0
OHS CV CPX PEAK DIASTOLIC BLOOD PRESSURE: 61 MMHG
OHS CV CPX PEAK HEAR RATE: 85 BPM
OHS CV CPX PEAK RATE PRESSURE PRODUCT: NORMAL
OHS CV CPX PEAK SYSTOLIC BLOOD PRESSURE: 162 MMHG
OHS CV CPX PERCENT MAX PREDICTED HEART RATE ACHIEVED: 60
OHS CV CPX RATE PRESSURE PRODUCT PRESENTING: NORMAL
RETIRED EF AND QEF - SEE NOTES: 53 %
STRESS ECHO TARGET HR: 125 BPM
SYSTOLIC BLOOD PRESSURE: 162 MMHG

## 2020-06-02 PROCEDURE — 93017 CV STRESS TEST TRACING ONLY: CPT

## 2020-06-02 PROCEDURE — A9502 TC99M TETROFOSMIN: HCPCS

## 2020-06-02 PROCEDURE — 63600175 PHARM REV CODE 636 W HCPCS: Performed by: NURSE PRACTITIONER

## 2020-06-02 RX ORDER — REGADENOSON 0.08 MG/ML
0.4 INJECTION, SOLUTION INTRAVENOUS ONCE
Status: COMPLETED | OUTPATIENT
Start: 2020-06-02 | End: 2020-06-02

## 2020-06-02 RX ADMIN — REGADENOSON 0.4 MG: 0.08 INJECTION, SOLUTION INTRAVENOUS at 02:06

## 2020-06-03 ENCOUNTER — TELEPHONE (OUTPATIENT)
Dept: CARDIOLOGY | Facility: CLINIC | Age: 75
End: 2020-06-03

## 2020-06-03 NOTE — TELEPHONE ENCOUNTER
Patient was contacted and was advised that her:  [facility] stress test normal  Echo showed normal EF. Mild AI and AS   The patient stated understanding with no questions or concern.

## 2020-06-04 ENCOUNTER — OFFICE VISIT (OUTPATIENT)
Dept: CARDIOLOGY | Facility: CLINIC | Age: 75
End: 2020-06-04
Payer: MEDICARE

## 2020-06-04 VITALS
BODY MASS INDEX: 24.1 KG/M2 | SYSTOLIC BLOOD PRESSURE: 126 MMHG | OXYGEN SATURATION: 99 % | HEART RATE: 89 BPM | DIASTOLIC BLOOD PRESSURE: 68 MMHG | WEIGHT: 136 LBS

## 2020-06-04 DIAGNOSIS — I49.3 PVC (PREMATURE VENTRICULAR CONTRACTION): Primary | ICD-10-CM

## 2020-06-04 DIAGNOSIS — R42 DIZZINESS: ICD-10-CM

## 2020-06-04 DIAGNOSIS — I82.512 CHRONIC DEEP VEIN THROMBOSIS (DVT) OF FEMORAL VEIN OF LEFT LOWER EXTREMITY: ICD-10-CM

## 2020-06-04 DIAGNOSIS — I10 ESSENTIAL HYPERTENSION: ICD-10-CM

## 2020-06-04 DIAGNOSIS — E78.2 MIXED HYPERLIPIDEMIA: ICD-10-CM

## 2020-06-04 DIAGNOSIS — I77.89 OTHER SPECIFIED DISORDERS OF ARTERIES AND ARTERIOLES: ICD-10-CM

## 2020-06-04 DIAGNOSIS — I95.1 ORTHOSTATIC HYPOTENSION: ICD-10-CM

## 2020-06-04 DIAGNOSIS — R22.42 LOCALIZED SWELLING OF LEFT LOWER LEG: ICD-10-CM

## 2020-06-04 PROCEDURE — 99214 PR OFFICE/OUTPT VISIT, EST, LEVL IV, 30-39 MIN: ICD-10-PCS | Mod: S$PBB,,, | Performed by: INTERNAL MEDICINE

## 2020-06-04 PROCEDURE — 99213 OFFICE O/P EST LOW 20 MIN: CPT | Mod: PBBFAC | Performed by: INTERNAL MEDICINE

## 2020-06-04 PROCEDURE — 99999 PR PBB SHADOW E&M-EST. PATIENT-LVL III: CPT | Mod: PBBFAC,,, | Performed by: INTERNAL MEDICINE

## 2020-06-04 PROCEDURE — 99999 PR PBB SHADOW E&M-EST. PATIENT-LVL III: ICD-10-PCS | Mod: PBBFAC,,, | Performed by: INTERNAL MEDICINE

## 2020-06-04 PROCEDURE — 99214 OFFICE O/P EST MOD 30 MIN: CPT | Mod: S$PBB,,, | Performed by: INTERNAL MEDICINE

## 2020-06-04 RX ORDER — METOPROLOL SUCCINATE 25 MG/1
25 TABLET, EXTENDED RELEASE ORAL DAILY
Qty: 30 TABLET | Refills: 5 | Status: SHIPPED | OUTPATIENT
Start: 2020-06-04 | End: 2020-07-16 | Stop reason: SDUPTHER

## 2020-06-04 NOTE — PROGRESS NOTES
Subjective:   Patient ID:  Mary Flanagan is a 74 y.o. female who presents for follow up of Fatigue      73 yo female, referred for faint  PMH h/o LLE SVT, HTN HLD, GERD  Faint started 6 months, once a week, usually standing for few minutes, sarted with blurred or black vision, leg side shaking, no cold sweating and nausea, and felt better after sitting  Took Dyazide over 10 yrs   Walked 2 miles a day  No chest pain, dyspnea, palpitation, orthopnea,   Mild leg swelling L > R   LE venous US GSV and LSV old thrombus  2020 in the office POSITIVE orthostatic sign  Lying /77 mmHG and lying 159/70 mmHG    Today still has dizziness and lightheadedness at standing  HOLter showed PVCs 7%  Echo showed normal EF  MPI showed no ischemia  LE venous US left old GSV DVT      Past Medical History:   Diagnosis Date    Cataract     Chest pain     CKD (chronic kidney disease) stage 3, GFR 30-59 ml/min     Dry eyes     Gastritis     Hyperlipidemia     Hypertension     Insomnia     Osteopenia        Past Surgical History:   Procedure Laterality Date    BREAST BIOPSY Left     , benign     SECTION      x 1    KNEE ARTHROSCOPY Left  approx    KNEE SURGERY      left arm surgery      left knee surgery         Social History     Tobacco Use    Smoking status: Never Smoker    Smokeless tobacco: Never Used   Substance Use Topics    Alcohol use: No     Alcohol/week: 0.0 standard drinks    Drug use: No       Family History   Problem Relation Age of Onset    Hypertension Mother     Hypertension Father     Breast cancer Neg Hx     Colon cancer Neg Hx     Ovarian cancer Neg Hx     Thrombophilia Neg Hx     Strabismus Neg Hx     Macular degeneration Neg Hx     Retinal detachment Neg Hx     Glaucoma Neg Hx     Blindness Neg Hx     Amblyopia Neg Hx          Review of Systems   Constitution: Negative for decreased appetite, diaphoresis, fever, malaise/fatigue and night sweats.   HENT: Negative  for nosebleeds.    Eyes: Negative for blurred vision and double vision.   Cardiovascular: Positive for near-syncope. Negative for chest pain, claudication, dyspnea on exertion, irregular heartbeat, leg swelling, orthopnea, palpitations, paroxysmal nocturnal dyspnea and syncope.   Respiratory: Negative for cough, shortness of breath, sleep disturbances due to breathing, snoring, sputum production and wheezing.    Endocrine: Negative for cold intolerance and polyuria.   Hematologic/Lymphatic: Does not bruise/bleed easily.   Skin: Negative for rash.   Musculoskeletal: Negative for back pain, falls, joint pain, joint swelling and neck pain.   Gastrointestinal: Negative for abdominal pain, heartburn, nausea and vomiting.   Genitourinary: Negative for dysuria, frequency and hematuria.   Neurological: Positive for dizziness. Negative for difficulty with concentration, focal weakness, headaches, light-headedness, numbness, seizures and weakness.   Psychiatric/Behavioral: Negative for depression, memory loss and substance abuse. The patient does not have insomnia.    Allergic/Immunologic: Negative for HIV exposure and hives.       Objective:   Physical Exam   Constitutional: She is oriented to person, place, and time. She appears well-nourished.   HENT:   Head: Normocephalic.   Eyes: Pupils are equal, round, and reactive to light.   Neck: Normal carotid pulses and no JVD present. Carotid bruit is not present. No thyromegaly present.   Cardiovascular: Normal rate, regular rhythm, normal heart sounds and normal pulses.  No extrasystoles are present. PMI is not displaced. Exam reveals no gallop and no S3.   No murmur heard.  Pulmonary/Chest: Breath sounds normal. No stridor. No respiratory distress.   Abdominal: Soft. Bowel sounds are normal. There is no tenderness. There is no rebound.   Musculoskeletal: Normal range of motion. She exhibits edema (+ RLE>LLE edema).   Neurological: She is alert and oriented to person, place,  and time.   Skin: Skin is intact. No rash noted.   Psychiatric: Her behavior is normal.       Lab Results   Component Value Date    CHOL 154 10/16/2019    CHOL 147 05/13/2019    CHOL 153 11/13/2018     Lab Results   Component Value Date    HDL 60 10/16/2019    HDL 58 05/13/2019    HDL 60 11/13/2018     Lab Results   Component Value Date    LDLCALC 74.6 10/16/2019    LDLCALC 68.2 05/13/2019    LDLCALC 76.2 11/13/2018     Lab Results   Component Value Date    TRIG 97 10/16/2019    TRIG 104 05/13/2019    TRIG 84 11/13/2018     Lab Results   Component Value Date    CHOLHDL 39.0 10/16/2019    CHOLHDL 39.5 05/13/2019    CHOLHDL 39.2 11/13/2018       Chemistry        Component Value Date/Time     04/13/2020 1110    K 4.3 04/13/2020 1110     04/13/2020 1110    CO2 29 04/13/2020 1110    BUN 16 04/13/2020 1110    CREATININE 1.2 04/13/2020 1110    GLU 91 04/13/2020 1110        Component Value Date/Time    CALCIUM 10.2 04/13/2020 1110    ALKPHOS 88 04/13/2020 1110    AST 20 04/13/2020 1110    ALT 12 04/13/2020 1110    BILITOT 0.8 04/13/2020 1110    ESTGFRAFRICA 51.8 (A) 04/13/2020 1110    EGFRNONAA 44.9 (A) 04/13/2020 1110          No results found for: LABA1C, HGBA1C  Lab Results   Component Value Date    TSH 2.393 04/13/2020     No results found for: INR, PROTIME  Lab Results   Component Value Date    WBC 5.97 04/13/2020    HGB 11.8 (L) 04/13/2020    HCT 39.3 04/13/2020     (H) 04/13/2020     04/13/2020     BMP  Sodium   Date Value Ref Range Status   04/13/2020 139 136 - 145 mmol/L Final     Potassium   Date Value Ref Range Status   04/13/2020 4.3 3.5 - 5.1 mmol/L Final     Chloride   Date Value Ref Range Status   04/13/2020 103 95 - 110 mmol/L Final     CO2   Date Value Ref Range Status   04/13/2020 29 23 - 29 mmol/L Final     BUN, Bld   Date Value Ref Range Status   04/13/2020 16 8 - 23 mg/dL Final     Creatinine   Date Value Ref Range Status   04/13/2020 1.2 0.5 - 1.4 mg/dL Final     Calcium    Date Value Ref Range Status   04/13/2020 10.2 8.7 - 10.5 mg/dL Final     Anion Gap   Date Value Ref Range Status   04/13/2020 7 (L) 8 - 16 mmol/L Final     eGFR if    Date Value Ref Range Status   04/13/2020 51.8 (A) >60 mL/min/1.73 m^2 Final     eGFR if non    Date Value Ref Range Status   04/13/2020 44.9 (A) >60 mL/min/1.73 m^2 Final     Comment:     Calculation used to obtain the estimated glomerular filtration  rate (eGFR) is the CKD-EPI equation.        BNP  @LABRCNTIP(BNP,BNPTRIAGEBLO)@  @LABRCNTIP(troponini)@  CrCl cannot be calculated (Patient's most recent lab result is older than the maximum 7 days allowed.).  No results found in the last 24 hours.  No results found in the last 24 hours.  No results found in the last 24 hours.    Assessment:      1. PVC (premature ventricular contraction)    2. Essential hypertension    3. Orthostatic hypotension    4. Mixed hyperlipidemia    5. Localized swelling of left lower leg    6. Dizziness    7. Chronic deep vein thrombosis (DVT) of femoral vein of left lower extremity    8. Other specified disorders of arteries and arterioles         Plan:   D/c Losartan   Add ToprolXL 25 mg daily for HTN and PVC  Brain Ct and carotid US for dizziness  Pressure stocking  Fall precaution  RTC in 6 weeks

## 2020-06-05 ENCOUNTER — HOSPITAL ENCOUNTER (OUTPATIENT)
Dept: RADIOLOGY | Facility: HOSPITAL | Age: 75
Discharge: HOME | End: 2020-06-05
Attending: INTERNAL MEDICINE
Payer: MEDICARE

## 2020-06-05 DIAGNOSIS — R42 DIZZINESS: ICD-10-CM

## 2020-06-05 PROCEDURE — 70450 CT HEAD/BRAIN W/O DYE: CPT | Mod: 26,,, | Performed by: RADIOLOGY

## 2020-06-05 PROCEDURE — 70450 CT HEAD/BRAIN W/O DYE: CPT | Mod: TC

## 2020-06-05 PROCEDURE — 70450 CT HEAD WITHOUT CONTRAST: ICD-10-PCS | Mod: 26,,, | Performed by: RADIOLOGY

## 2020-06-06 LAB
OHS CV EVENT MONITOR DAY: 0
OHS CV HOLTER LENGTH DECIMAL HOURS: 48
OHS CV HOLTER LENGTH HOURS: 48
OHS CV HOLTER LENGTH MINUTES: 0

## 2020-06-08 ENCOUNTER — TELEPHONE (OUTPATIENT)
Dept: CARDIOLOGY | Facility: CLINIC | Age: 75
End: 2020-06-08

## 2020-06-12 ENCOUNTER — HOSPITAL ENCOUNTER (OUTPATIENT)
Dept: CARDIOLOGY | Facility: HOSPITAL | Age: 75
Discharge: HOME | End: 2020-06-12
Attending: INTERNAL MEDICINE
Payer: MEDICARE

## 2020-06-12 VITALS — WEIGHT: 136 LBS | BODY MASS INDEX: 24.1 KG/M2 | HEIGHT: 63 IN

## 2020-06-12 DIAGNOSIS — I77.89 OTHER SPECIFIED DISORDERS OF ARTERIES AND ARTERIOLES: ICD-10-CM

## 2020-06-12 DIAGNOSIS — R42 DIZZINESS: ICD-10-CM

## 2020-06-12 LAB
LEFT ARM DIASTOLIC BLOOD PRESSURE: 70 MMHG
LEFT ARM SYSTOLIC BLOOD PRESSURE: 126 MMHG
LEFT CBA DIAS: 10 CM/S
LEFT CBA SYS: 82 CM/S
LEFT CCA DIST DIAS: 11 CM/S
LEFT CCA DIST SYS: 108 CM/S
LEFT CCA MID DIAS: 12 CM/S
LEFT CCA MID SYS: 111 CM/S
LEFT CCA PROX DIAS: 11 CM/S
LEFT CCA PROX SYS: 137 CM/S
LEFT ECA DIAS: 5 CM/S
LEFT ECA SYS: 70 CM/S
LEFT ICA DIST DIAS: 20 CM/S
LEFT ICA DIST SYS: 91 CM/S
LEFT ICA MID DIAS: 14 CM/S
LEFT ICA MID SYS: 104 CM/S
LEFT ICA PROX DIAS: 12 CM/S
LEFT ICA PROX SYS: 87 CM/S
LEFT VERTEBRAL DIAS: 11 CM/S
LEFT VERTEBRAL SYS: 84 CM/S
OHS CV CAROTID RIGHT ICA EDV HIGHEST: 23
OHS CV CAROTID ULTRASOUND LEFT ICA/CCA RATIO: 0.76
OHS CV CAROTID ULTRASOUND RIGHT ICA/CCA RATIO: 1.07
OHS CV PV CAROTID LEFT HIGHEST CCA: 137
OHS CV PV CAROTID LEFT HIGHEST ICA: 104
OHS CV PV CAROTID RIGHT HIGHEST CCA: 102
OHS CV PV CAROTID RIGHT HIGHEST ICA: 109
OHS CV US CAROTID LEFT HIGHEST EDV: 20
RIGHT ARM DIASTOLIC BLOOD PRESSURE: 78 MMHG
RIGHT ARM SYSTOLIC BLOOD PRESSURE: 120 MMHG
RIGHT CBA DIAS: 14 CM/S
RIGHT CBA SYS: 90 CM/S
RIGHT CCA DIST DIAS: 14 CM/S
RIGHT CCA DIST SYS: 92 CM/S
RIGHT CCA MID DIAS: 10 CM/S
RIGHT CCA MID SYS: 102 CM/S
RIGHT CCA PROX DIAS: 9 CM/S
RIGHT CCA PROX SYS: 98 CM/S
RIGHT ECA DIAS: 6 CM/S
RIGHT ECA SYS: 85 CM/S
RIGHT ICA DIST DIAS: 23 CM/S
RIGHT ICA DIST SYS: 109 CM/S
RIGHT ICA MID DIAS: 20 CM/S
RIGHT ICA MID SYS: 93 CM/S
RIGHT ICA PROX DIAS: 18 CM/S
RIGHT ICA PROX SYS: 88 CM/S
RIGHT VERTEBRAL DIAS: 14 CM/S
RIGHT VERTEBRAL SYS: 80 CM/S

## 2020-06-12 PROCEDURE — 93880 EXTRACRANIAL BILAT STUDY: CPT | Mod: 26,,, | Performed by: INTERNAL MEDICINE

## 2020-06-12 PROCEDURE — 93880 CV US DOPPLER CAROTID (CUPID ONLY): ICD-10-PCS | Mod: 26,,, | Performed by: INTERNAL MEDICINE

## 2020-06-12 PROCEDURE — 93880 EXTRACRANIAL BILAT STUDY: CPT

## 2020-06-15 ENCOUNTER — TELEPHONE (OUTPATIENT)
Dept: CARDIOLOGY | Facility: CLINIC | Age: 75
End: 2020-06-15

## 2020-06-15 NOTE — TELEPHONE ENCOUNTER
----- Message from Adrien Ibrahim MD sent at 6/12/2020  4:50 PM CDT -----  Carotid US showed mild blockage

## 2020-06-15 NOTE — TELEPHONE ENCOUNTER
I spoke with Ms Hernandez and gave her the test results, Carotid US showed mild blockage.    Ms Hernandez is concerned and afraid of the side effects of taking metoprolol and has not begun taking the medication. She wants to know if there is another medication she can be put on instead.

## 2020-06-15 NOTE — TELEPHONE ENCOUNTER
The patient has been notified of this information and all questions answered.    Metoprolol is safest and most appropriate now for PVCs

## 2020-06-23 ENCOUNTER — TELEPHONE (OUTPATIENT)
Dept: CARDIOLOGY | Facility: CLINIC | Age: 75
End: 2020-06-23

## 2020-06-23 NOTE — TELEPHONE ENCOUNTER
Ms Hernandez stated that since she continues to take losartan  At 9:30 am her BP 98/60 HR 79 at 11:55 am /72 HR 77. She's feeling weak and she never started taking metoprolol.

## 2020-06-23 NOTE — TELEPHONE ENCOUNTER
I advised Ms Hernandez per Dr Ibrahim that she is to discontinue taking the losartan as instructed on 6/12/20. She is to take 1/2 tablet of metoprolol 25 mg once a day.

## 2020-07-09 ENCOUNTER — TELEPHONE (OUTPATIENT)
Dept: CARDIOLOGY | Facility: CLINIC | Age: 75
End: 2020-07-09

## 2020-07-09 DIAGNOSIS — E78.2 MIXED HYPERLIPIDEMIA: Primary | ICD-10-CM

## 2020-07-09 NOTE — TELEPHONE ENCOUNTER
Pt is requesting to have lab work drawn before her visit with you on 7/16   She wants to check her lipids and she read that labs needed to be checked for being on metoprolol - indtructed pt that there were no current orders for that now but would ask you   Thanks

## 2020-07-09 NOTE — TELEPHONE ENCOUNTER
----- Message from Janette Gutierrez sent at 7/9/2020  2:28 PM CDT -----  Regarding: Self/  [phone]  or [phone]  Type: Patient Call Back    Who called:  Patient    What is the request in detail:  Patient would like to have blood work done before appt.  She would like orders placed int he system to have labs drawn by the 14th.  She mainly wants the lipid panel done, but all others that's needed well be fine.  Thank you    Would the patient rather a call back or a response via [facility]?  Call back    Best call back number:  [phone]  or [phone]

## 2020-07-10 ENCOUNTER — LAB VISIT (OUTPATIENT)
Dept: LAB | Facility: HOSPITAL | Age: 75
End: 2020-07-10
Attending: INTERNAL MEDICINE
Payer: MEDICARE

## 2020-07-10 DIAGNOSIS — E78.2 MIXED HYPERLIPIDEMIA: ICD-10-CM

## 2020-07-10 PROCEDURE — 80053 COMPREHEN METABOLIC PANEL: CPT

## 2020-07-10 PROCEDURE — 80061 LIPID PANEL: CPT

## 2020-07-10 PROCEDURE — 36415 COLL VENOUS BLD VENIPUNCTURE: CPT | Mod: PO

## 2020-07-11 LAB
ALBUMIN SERPL BCP-MCNC: 3.7 G/DL (ref 3.5–5.2)
ALP SERPL-CCNC: 88 U/L (ref 55–135)
ALT SERPL W/O P-5'-P-CCNC: 9 U/L (ref 10–44)
ANION GAP SERPL CALC-SCNC: 9 MMOL/L (ref 8–16)
AST SERPL-CCNC: 17 U/L (ref 10–40)
BILIRUB SERPL-MCNC: 0.6 MG/DL (ref 0.1–1)
BUN SERPL-MCNC: 17 MG/DL (ref 8–23)
CALCIUM SERPL-MCNC: 9.6 MG/DL (ref 8.7–10.5)
CHLORIDE SERPL-SCNC: 108 MMOL/L (ref 95–110)
CHOLEST SERPL-MCNC: 135 MG/DL (ref 120–199)
CHOLEST/HDLC SERPL: 2.3 {RATIO} (ref 2–5)
CO2 SERPL-SCNC: 26 MMOL/L (ref 23–29)
CREAT SERPL-MCNC: 1.2 MG/DL (ref 0.5–1.4)
EST. GFR  (AFRICAN AMERICAN): 51.8 ML/MIN/1.73 M^2
EST. GFR  (NON AFRICAN AMERICAN): 44.9 ML/MIN/1.73 M^2
GLUCOSE SERPL-MCNC: 74 MG/DL (ref 70–110)
HDLC SERPL-MCNC: 59 MG/DL (ref 40–75)
HDLC SERPL: 43.7 % (ref 20–50)
LDLC SERPL CALC-MCNC: 63.4 MG/DL (ref 63–159)
NONHDLC SERPL-MCNC: 76 MG/DL
POTASSIUM SERPL-SCNC: 4.4 MMOL/L (ref 3.5–5.1)
PROT SERPL-MCNC: 6.6 G/DL (ref 6–8.4)
SODIUM SERPL-SCNC: 143 MMOL/L (ref 136–145)
TRIGL SERPL-MCNC: 63 MG/DL (ref 30–150)

## 2020-07-16 ENCOUNTER — OFFICE VISIT (OUTPATIENT)
Dept: CARDIOLOGY | Facility: CLINIC | Age: 75
End: 2020-07-16
Payer: MEDICARE

## 2020-07-16 VITALS
OXYGEN SATURATION: 98 % | DIASTOLIC BLOOD PRESSURE: 64 MMHG | BODY MASS INDEX: 24.5 KG/M2 | WEIGHT: 138.25 LBS | HEART RATE: 62 BPM | HEIGHT: 63 IN | SYSTOLIC BLOOD PRESSURE: 138 MMHG

## 2020-07-16 DIAGNOSIS — R42 DIZZINESS: ICD-10-CM

## 2020-07-16 DIAGNOSIS — I49.3 PVC (PREMATURE VENTRICULAR CONTRACTION): Primary | ICD-10-CM

## 2020-07-16 DIAGNOSIS — I82.512 CHRONIC DEEP VEIN THROMBOSIS (DVT) OF FEMORAL VEIN OF LEFT LOWER EXTREMITY: ICD-10-CM

## 2020-07-16 DIAGNOSIS — I10 ESSENTIAL HYPERTENSION: ICD-10-CM

## 2020-07-16 DIAGNOSIS — E78.2 MIXED HYPERLIPIDEMIA: ICD-10-CM

## 2020-07-16 PROCEDURE — 99214 OFFICE O/P EST MOD 30 MIN: CPT | Mod: S$PBB,,, | Performed by: INTERNAL MEDICINE

## 2020-07-16 PROCEDURE — 99999 PR PBB SHADOW E&M-EST. PATIENT-LVL III: ICD-10-PCS | Mod: PBBFAC,,, | Performed by: INTERNAL MEDICINE

## 2020-07-16 PROCEDURE — 99213 OFFICE O/P EST LOW 20 MIN: CPT | Mod: PBBFAC | Performed by: INTERNAL MEDICINE

## 2020-07-16 PROCEDURE — 99999 PR PBB SHADOW E&M-EST. PATIENT-LVL III: CPT | Mod: PBBFAC,,, | Performed by: INTERNAL MEDICINE

## 2020-07-16 PROCEDURE — 99214 PR OFFICE/OUTPT VISIT, EST, LEVL IV, 30-39 MIN: ICD-10-PCS | Mod: S$PBB,,, | Performed by: INTERNAL MEDICINE

## 2020-07-16 RX ORDER — METOPROLOL SUCCINATE 25 MG/1
12.5 TABLET, EXTENDED RELEASE ORAL DAILY
Qty: 30 TABLET | Refills: 5 | Status: SHIPPED | OUTPATIENT
Start: 2020-07-16 | End: 2020-08-21

## 2020-07-16 NOTE — PROGRESS NOTES
Subjective:   Patient ID:  Mary Flanagan is a 74 y.o. female who presents for follow up of Hyperlipidemia and Hypertension      75 yo female, 2 months f/u  PMH h/o LLE SVT, PVCs, HTN HLD, GERD  Faint started 6 months, once a week, usually standing for few minutes, sarted with blurred or black vision, leg side shaking, no cold sweating and nausea, and felt better after sitting   LE venous US GSV and LSV old thrombus  2020 in the office POSITIVE orthostatic sign  HOLter showed PVCs 7%  Echo showed normal EF  MPI showed no ischemia  LE venous US left old GSV DVT  Carotid US midl Dz  ENT wnl    No chest pain, dyspnea, palpitation, orthopnea,   Mild leg swelling L > R  Faint improved  Still dizziness after standing for a long time. No dizziness on the bed.  Fatigue and weak. And worse in PM  appettete ok  Sleeps with 2 pillows  Back pain   BP log reviewed        Past Medical History:   Diagnosis Date    Cataract     Chest pain     CKD (chronic kidney disease) stage 3, GFR 30-59 ml/min     Dry eyes     Gastritis     Hyperlipidemia     Hypertension     Insomnia     Osteopenia        Past Surgical History:   Procedure Laterality Date    BREAST BIOPSY Left     , benign     SECTION      x 1    KNEE ARTHROSCOPY Left 2014 approx    KNEE SURGERY      left arm surgery      left knee surgery         Social History     Tobacco Use    Smoking status: Never Smoker    Smokeless tobacco: Never Used   Substance Use Topics    Alcohol use: No     Alcohol/week: 0.0 standard drinks    Drug use: No       Family History   Problem Relation Age of Onset    Hypertension Mother     Hypertension Father     Breast cancer Neg Hx     Colon cancer Neg Hx     Ovarian cancer Neg Hx     Thrombophilia Neg Hx     Strabismus Neg Hx     Macular degeneration Neg Hx     Retinal detachment Neg Hx     Glaucoma Neg Hx     Blindness Neg Hx     Amblyopia Neg Hx          Review of Systems   Constitution: Positive  for malaise/fatigue. Negative for decreased appetite, diaphoresis, fever and night sweats.   HENT: Negative for nosebleeds.    Eyes: Negative for blurred vision and double vision.   Cardiovascular: Negative for chest pain, claudication, dyspnea on exertion, irregular heartbeat, leg swelling, orthopnea, palpitations, paroxysmal nocturnal dyspnea and syncope.   Respiratory: Negative for cough, shortness of breath, sleep disturbances due to breathing, snoring, sputum production and wheezing.    Endocrine: Negative for cold intolerance and polyuria.   Hematologic/Lymphatic: Does not bruise/bleed easily.   Skin: Negative for rash.   Musculoskeletal: Positive for muscle weakness. Negative for back pain, falls, joint pain, joint swelling and neck pain.   Gastrointestinal: Negative for abdominal pain, heartburn, nausea and vomiting.   Genitourinary: Negative for dysuria, frequency and hematuria.   Neurological: Positive for dizziness. Negative for difficulty with concentration, focal weakness, headaches, light-headedness, numbness, seizures and weakness.   Psychiatric/Behavioral: Negative for depression, memory loss and substance abuse. The patient does not have insomnia.    Allergic/Immunologic: Negative for HIV exposure and hives.       Objective:   Physical Exam   Constitutional: She is oriented to person, place, and time. She appears well-nourished.   HENT:   Head: Normocephalic.   Eyes: Pupils are equal, round, and reactive to light.   Neck: Normal carotid pulses and no JVD present. Carotid bruit is not present. No thyromegaly present.   Cardiovascular: Normal rate, regular rhythm, normal heart sounds and normal pulses.  No extrasystoles are present. PMI is not displaced. Exam reveals no gallop and no S3.   No murmur heard.  Pulmonary/Chest: Breath sounds normal. No stridor. No respiratory distress.   Abdominal: Soft. Bowel sounds are normal. There is no abdominal tenderness. There is no rebound.   Musculoskeletal:  Normal range of motion.         General: Edema (+ RLE>LLE edema) present.   Neurological: She is alert and oriented to person, place, and time.   Skin: Skin is intact. No rash noted.   Psychiatric: Her behavior is normal.       Lab Results   Component Value Date    CHOL 135 07/10/2020    CHOL 154 10/16/2019    CHOL 147 05/13/2019     Lab Results   Component Value Date    HDL 59 07/10/2020    HDL 60 10/16/2019    HDL 58 05/13/2019     Lab Results   Component Value Date    LDLCALC 63.4 07/10/2020    LDLCALC 74.6 10/16/2019    LDLCALC 68.2 05/13/2019     Lab Results   Component Value Date    TRIG 63 07/10/2020    TRIG 97 10/16/2019    TRIG 104 05/13/2019     Lab Results   Component Value Date    CHOLHDL 43.7 07/10/2020    CHOLHDL 39.0 10/16/2019    CHOLHDL 39.5 05/13/2019       Chemistry        Component Value Date/Time     07/10/2020 0825    K 4.4 07/10/2020 0825     07/10/2020 0825    CO2 26 07/10/2020 0825    BUN 17 07/10/2020 0825    CREATININE 1.2 07/10/2020 0825    GLU 74 07/10/2020 0825        Component Value Date/Time    CALCIUM 9.6 07/10/2020 0825    ALKPHOS 88 07/10/2020 0825    AST 17 07/10/2020 0825    ALT 9 (L) 07/10/2020 0825    BILITOT 0.6 07/10/2020 0825    ESTGFRAFRICA 51.8 (A) 07/10/2020 0825    EGFRNONAA 44.9 (A) 07/10/2020 0825          No results found for: LABA1C, HGBA1C  Lab Results   Component Value Date    TSH 2.393 04/13/2020     No results found for: INR, PROTIME  Lab Results   Component Value Date    WBC 5.97 04/13/2020    HGB 11.8 (L) 04/13/2020    HCT 39.3 04/13/2020     (H) 04/13/2020     04/13/2020     BMP  Sodium   Date Value Ref Range Status   07/10/2020 143 136 - 145 mmol/L Final     Potassium   Date Value Ref Range Status   07/10/2020 4.4 3.5 - 5.1 mmol/L Final     Chloride   Date Value Ref Range Status   07/10/2020 108 95 - 110 mmol/L Final     CO2   Date Value Ref Range Status   07/10/2020 26 23 - 29 mmol/L Final     BUN, Bld   Date Value Ref Range Status    07/10/2020 17 8 - 23 mg/dL Final     Creatinine   Date Value Ref Range Status   07/10/2020 1.2 0.5 - 1.4 mg/dL Final     Calcium   Date Value Ref Range Status   07/10/2020 9.6 8.7 - 10.5 mg/dL Final     Anion Gap   Date Value Ref Range Status   07/10/2020 9 8 - 16 mmol/L Final     eGFR if    Date Value Ref Range Status   07/10/2020 51.8 (A) >60 mL/min/1.73 m^2 Final     eGFR if non    Date Value Ref Range Status   07/10/2020 44.9 (A) >60 mL/min/1.73 m^2 Final     Comment:     Calculation used to obtain the estimated glomerular filtration  rate (eGFR) is the CKD-EPI equation.        BNP  @LABRCNTIP(BNP,BNPTRIAGEBLO)@  @LABRCNTIP(troponini)@  Estimated Creatinine Clearance: 34.5 mL/min (based on SCr of 1.2 mg/dL).  No results found in the last 24 hours.  No results found in the last 24 hours.  No results found in the last 24 hours.    Assessment:      1. PVC (premature ventricular contraction)    2. Essential hypertension    3. Mixed hyperlipidemia    4. Chronic deep vein thrombosis (DVT) of femoral vein of left lower extremity    5. Dizziness        Plan:   D/c Lipitor 10 mg due to wekaness and fatigue  contiuhe ToprolXL 12.5 mg daily  RTC in 2 months  F/u with pcp for neurology eval.

## 2020-07-29 DIAGNOSIS — Z12.31 ENCOUNTER FOR SCREENING MAMMOGRAM FOR BREAST CANCER: Primary | ICD-10-CM

## 2020-08-21 ENCOUNTER — LAB VISIT (OUTPATIENT)
Dept: LAB | Facility: HOSPITAL | Age: 75
End: 2020-08-21
Attending: INTERNAL MEDICINE
Payer: MEDICARE

## 2020-08-21 ENCOUNTER — OFFICE VISIT (OUTPATIENT)
Dept: FAMILY MEDICINE | Facility: CLINIC | Age: 75
End: 2020-08-21
Payer: MEDICARE

## 2020-08-21 VITALS
OXYGEN SATURATION: 98 % | BODY MASS INDEX: 23.99 KG/M2 | HEART RATE: 81 BPM | SYSTOLIC BLOOD PRESSURE: 140 MMHG | DIASTOLIC BLOOD PRESSURE: 70 MMHG | WEIGHT: 135.38 LBS | TEMPERATURE: 98 F | HEIGHT: 63 IN

## 2020-08-21 DIAGNOSIS — R53.1 WEAKNESS: Primary | ICD-10-CM

## 2020-08-21 DIAGNOSIS — E78.5 DYSLIPIDEMIA: ICD-10-CM

## 2020-08-21 DIAGNOSIS — D64.9 ANEMIA, UNSPECIFIED TYPE: ICD-10-CM

## 2020-08-21 DIAGNOSIS — R53.1 WEAKNESS: ICD-10-CM

## 2020-08-21 LAB
ALBUMIN SERPL BCP-MCNC: 4 G/DL (ref 3.5–5.2)
ALP SERPL-CCNC: 90 U/L (ref 55–135)
ALT SERPL W/O P-5'-P-CCNC: 9 U/L (ref 10–44)
ANION GAP SERPL CALC-SCNC: 9 MMOL/L (ref 8–16)
AST SERPL-CCNC: 18 U/L (ref 10–40)
BILIRUB SERPL-MCNC: 0.6 MG/DL (ref 0.1–1)
BUN SERPL-MCNC: 19 MG/DL (ref 8–23)
CALCIUM SERPL-MCNC: 10 MG/DL (ref 8.7–10.5)
CHLORIDE SERPL-SCNC: 106 MMOL/L (ref 95–110)
CHOLEST SERPL-MCNC: 205 MG/DL (ref 120–199)
CHOLEST/HDLC SERPL: 3.9 {RATIO} (ref 2–5)
CO2 SERPL-SCNC: 27 MMOL/L (ref 23–29)
CREAT SERPL-MCNC: 1.2 MG/DL (ref 0.5–1.4)
EST. GFR  (AFRICAN AMERICAN): 51.8 ML/MIN/1.73 M^2
EST. GFR  (NON AFRICAN AMERICAN): 44.9 ML/MIN/1.73 M^2
GLUCOSE SERPL-MCNC: 90 MG/DL (ref 70–110)
HDLC SERPL-MCNC: 53 MG/DL (ref 40–75)
HDLC SERPL: 25.9 % (ref 20–50)
IRON SERPL-MCNC: 82 UG/DL (ref 30–160)
LDLC SERPL CALC-MCNC: 121.4 MG/DL (ref 63–159)
NONHDLC SERPL-MCNC: 152 MG/DL
POTASSIUM SERPL-SCNC: 4.2 MMOL/L (ref 3.5–5.1)
PROT SERPL-MCNC: 7.1 G/DL (ref 6–8.4)
SATURATED IRON: 28 % (ref 20–50)
SODIUM SERPL-SCNC: 142 MMOL/L (ref 136–145)
TOTAL IRON BINDING CAPACITY: 292 UG/DL (ref 250–450)
TRANSFERRIN SERPL-MCNC: 197 MG/DL (ref 200–375)
TRIGL SERPL-MCNC: 153 MG/DL (ref 30–150)

## 2020-08-21 PROCEDURE — 82728 ASSAY OF FERRITIN: CPT

## 2020-08-21 PROCEDURE — 80053 COMPREHEN METABOLIC PANEL: CPT

## 2020-08-21 PROCEDURE — 84443 ASSAY THYROID STIM HORMONE: CPT

## 2020-08-21 PROCEDURE — 99999 PR PBB SHADOW E&M-EST. PATIENT-LVL IV: CPT | Mod: PBBFAC,,, | Performed by: INTERNAL MEDICINE

## 2020-08-21 PROCEDURE — 99214 PR OFFICE/OUTPT VISIT, EST, LEVL IV, 30-39 MIN: ICD-10-PCS | Mod: S$PBB,,, | Performed by: INTERNAL MEDICINE

## 2020-08-21 PROCEDURE — 36415 COLL VENOUS BLD VENIPUNCTURE: CPT | Mod: PO

## 2020-08-21 PROCEDURE — 83540 ASSAY OF IRON: CPT

## 2020-08-21 PROCEDURE — 99214 OFFICE O/P EST MOD 30 MIN: CPT | Mod: S$PBB,,, | Performed by: INTERNAL MEDICINE

## 2020-08-21 PROCEDURE — 80061 LIPID PANEL: CPT

## 2020-08-21 PROCEDURE — 99214 OFFICE O/P EST MOD 30 MIN: CPT | Mod: PBBFAC,PO | Performed by: INTERNAL MEDICINE

## 2020-08-21 PROCEDURE — 85652 RBC SED RATE AUTOMATED: CPT

## 2020-08-21 PROCEDURE — 99999 PR PBB SHADOW E&M-EST. PATIENT-LVL IV: ICD-10-PCS | Mod: PBBFAC,,, | Performed by: INTERNAL MEDICINE

## 2020-08-21 PROCEDURE — 85025 COMPLETE CBC W/AUTO DIFF WBC: CPT

## 2020-08-21 NOTE — PROGRESS NOTES
Subjective:       Patient ID: Mary Flanagan is a 74 y.o. female.    Chief Complaint: Follow-up and Fatigue    Follow-up  Associated symptoms include arthralgias, fatigue, myalgias and weakness. Pertinent negatives include no abdominal pain, chest pain, chills, congestion, coughing, diaphoresis, fever, headaches, joint swelling, nausea, neck pain, numbness, rash, sore throat or vomiting.   Fatigue  This is a chronic problem. The current episode started more than 1 month ago. The problem occurs intermittently. The problem has been waxing and waning. Associated symptoms include arthralgias, fatigue, myalgias and weakness. Pertinent negatives include no abdominal pain, chest pain, chills, congestion, coughing, diaphoresis, fever, headaches, joint swelling, nausea, neck pain, numbness, rash, sore throat or vomiting.     Past Medical History:   Diagnosis Date    Cataract     Chest pain     CKD (chronic kidney disease) stage 3, GFR 30-59 ml/min     Dry eyes     Gastritis     Hyperlipidemia     Hypertension     Insomnia     Osteopenia      Past Surgical History:   Procedure Laterality Date    BREAST BIOPSY Left     , benign     SECTION      x 1    KNEE ARTHROSCOPY Left  approx    KNEE SURGERY      left arm surgery      left knee surgery       Family History   Problem Relation Age of Onset    Hypertension Mother     Hypertension Father     Breast cancer Neg Hx     Colon cancer Neg Hx     Ovarian cancer Neg Hx     Thrombophilia Neg Hx     Strabismus Neg Hx     Macular degeneration Neg Hx     Retinal detachment Neg Hx     Glaucoma Neg Hx     Blindness Neg Hx     Amblyopia Neg Hx      Social History     Socioeconomic History    Marital status:      Spouse name: Not on file    Number of children: Not on file    Years of education: Not on file    Highest education level: Not on file   Occupational History    Not on file   Social Needs    Financial resource strain: Not on  file    Food insecurity     Worry: Not on file     Inability: Not on file    Transportation needs     Medical: Not on file     Non-medical: Not on file   Tobacco Use    Smoking status: Never Smoker    Smokeless tobacco: Never Used   Substance and Sexual Activity    Alcohol use: No     Alcohol/week: 0.0 standard drinks    Drug use: No    Sexual activity: Not Currently     Partners: Male     Birth control/protection: None     Comment: mut monog   Lifestyle    Physical activity     Days per week: Not on file     Minutes per session: Not on file    Stress: Not on file   Relationships    Social connections     Talks on phone: Not on file     Gets together: Not on file     Attends Yarsanism service: Not on file     Active member of club or organization: Not on file     Attends meetings of clubs or organizations: Not on file     Relationship status: Not on file   Other Topics Concern    Not on file   Social History Narrative    Not on file     Review of Systems   Constitutional: Positive for fatigue. Negative for activity change, appetite change, chills, diaphoresis, fever and unexpected weight change.   HENT: Negative for congestion, drooling, ear discharge, ear pain, facial swelling, hearing loss, mouth sores, nosebleeds, postnasal drip, rhinorrhea, sinus pressure, sneezing, sore throat, tinnitus, trouble swallowing and voice change.    Eyes: Negative for photophobia, redness and visual disturbance.   Respiratory: Negative for apnea, cough, choking, chest tightness, shortness of breath and wheezing.    Cardiovascular: Negative for chest pain and palpitations.   Gastrointestinal: Negative for abdominal distention, abdominal pain, blood in stool, constipation, diarrhea, nausea and vomiting.   Endocrine: Negative for cold intolerance, heat intolerance, polydipsia, polyphagia and polyuria.   Genitourinary: Negative for decreased urine volume, difficulty urinating, dysuria, flank pain, frequency, genital sores,  hematuria, pelvic pain, urgency and vaginal discharge.   Musculoskeletal: Positive for arthralgias and myalgias. Negative for back pain, gait problem, joint swelling, neck pain and neck stiffness.   Skin: Negative for color change, pallor, rash and wound.   Allergic/Immunologic: Negative for food allergies and immunocompromised state.   Neurological: Positive for weakness. Negative for dizziness, tremors, seizures, syncope, speech difficulty, light-headedness, numbness and headaches.   Hematological: Negative for adenopathy. Does not bruise/bleed easily.   Psychiatric/Behavioral: Negative for agitation, behavioral problems, confusion, decreased concentration, dysphoric mood, hallucinations, self-injury, sleep disturbance and suicidal ideas. The patient is not nervous/anxious and is not hyperactive.    All other systems reviewed and are negative.      Objective:      Physical Exam  Vitals signs and nursing note reviewed.   Constitutional:       General: She is not in acute distress.     Appearance: She is well-developed. She is not diaphoretic.   HENT:      Head: Normocephalic and atraumatic.      Mouth/Throat:      Pharynx: No oropharyngeal exudate.   Eyes:      General: No scleral icterus.  Neck:      Musculoskeletal: Normal range of motion and neck supple.      Thyroid: No thyromegaly.      Vascular: No carotid bruit or JVD.      Trachea: No tracheal deviation.   Cardiovascular:      Rate and Rhythm: Normal rate and regular rhythm.      Heart sounds: Normal heart sounds.   Pulmonary:      Effort: Pulmonary effort is normal. No respiratory distress.      Breath sounds: Normal breath sounds. No wheezing or rales.   Chest:      Chest wall: No tenderness.   Abdominal:      General: Bowel sounds are normal. There is no distension.      Palpations: Abdomen is soft.      Tenderness: There is no abdominal tenderness. There is no guarding or rebound.   Musculoskeletal: Normal range of motion.         General: No  tenderness.   Lymphadenopathy:      Cervical: No cervical adenopathy.   Skin:     General: Skin is warm and dry.      Coloration: Skin is not pale.      Findings: No erythema or rash.   Neurological:      Mental Status: She is alert and oriented to person, place, and time.      Coordination: Coordination normal.   Psychiatric:         Behavior: Behavior normal.         Thought Content: Thought content normal.         Judgment: Judgment normal.         CMP  Sodium   Date Value Ref Range Status   07/10/2020 143 136 - 145 mmol/L Final     Potassium   Date Value Ref Range Status   07/10/2020 4.4 3.5 - 5.1 mmol/L Final     Chloride   Date Value Ref Range Status   07/10/2020 108 95 - 110 mmol/L Final     CO2   Date Value Ref Range Status   07/10/2020 26 23 - 29 mmol/L Final     Glucose   Date Value Ref Range Status   07/10/2020 74 70 - 110 mg/dL Final     BUN, Bld   Date Value Ref Range Status   07/10/2020 17 8 - 23 mg/dL Final     Creatinine   Date Value Ref Range Status   07/10/2020 1.2 0.5 - 1.4 mg/dL Final     Calcium   Date Value Ref Range Status   07/10/2020 9.6 8.7 - 10.5 mg/dL Final     Total Protein   Date Value Ref Range Status   07/10/2020 6.6 6.0 - 8.4 g/dL Final     Albumin   Date Value Ref Range Status   07/10/2020 3.7 3.5 - 5.2 g/dL Final     Total Bilirubin   Date Value Ref Range Status   07/10/2020 0.6 0.1 - 1.0 mg/dL Final     Comment:     For infants and newborns, interpretation of results should be based  on gestational age, weight and in agreement with clinical  observations.  Premature Infant recommended reference ranges:  Up to 24 hours.............<8.0 mg/dL  Up to 48 hours............<12.0 mg/dL  3-5 days..................<15.0 mg/dL  6-29 days.................<15.0 mg/dL       Alkaline Phosphatase   Date Value Ref Range Status   07/10/2020 88 55 - 135 U/L Final     AST   Date Value Ref Range Status   07/10/2020 17 10 - 40 U/L Final     ALT   Date Value Ref Range Status   07/10/2020 9 (L) 10 - 44  U/L Final     Anion Gap   Date Value Ref Range Status   07/10/2020 9 8 - 16 mmol/L Final     eGFR if    Date Value Ref Range Status   07/10/2020 51.8 (A) >60 mL/min/1.73 m^2 Final     eGFR if non    Date Value Ref Range Status   07/10/2020 44.9 (A) >60 mL/min/1.73 m^2 Final     Comment:     Calculation used to obtain the estimated glomerular filtration  rate (eGFR) is the CKD-EPI equation.        Lab Results   Component Value Date    WBC 5.97 04/13/2020    HGB 11.8 (L) 04/13/2020    HCT 39.3 04/13/2020     (H) 04/13/2020     04/13/2020     Lab Results   Component Value Date    CHOL 135 07/10/2020     Lab Results   Component Value Date    HDL 59 07/10/2020     Lab Results   Component Value Date    LDLCALC 63.4 07/10/2020     Lab Results   Component Value Date    TRIG 63 07/10/2020     Lab Results   Component Value Date    CHOLHDL 43.7 07/10/2020     Lab Results   Component Value Date    TSH 2.393 04/13/2020     No results found for: LABA1C, HGBA1C  Assessment:       1. Weakness    2. Dyslipidemia    3. Anemia, unspecified type        Plan:   Weakness  -     Comprehensive metabolic panel; Future; Expected date: 08/21/2020  -     CBC auto differential; Future; Expected date: 08/21/2020  -     TSH; Future; Expected date: 08/21/2020  -     Sedimentation rate; Future; Expected date: 08/21/2020  -     Ferritin; Future; Expected date: 08/21/2020  -     Iron and TIBC; Future; Expected date: 08/21/2020  -     Ambulatory referral/consult to Neurology; Future; Expected date: 08/28/2020    Dyslipidemia  -     Lipid Panel; Future; Expected date: 08/21/2020    Anemia, unspecified type  -     Ferritin; Future; Expected date: 08/21/2020  -     Iron and TIBC; Future; Expected date: 08/21/2020    Cards w/u negative.                     F/u 1 month---------

## 2020-08-22 LAB
BASOPHILS # BLD AUTO: 0.04 K/UL (ref 0–0.2)
BASOPHILS NFR BLD: 0.7 % (ref 0–1.9)
DIFFERENTIAL METHOD: ABNORMAL
EOSINOPHIL # BLD AUTO: 0.1 K/UL (ref 0–0.5)
EOSINOPHIL NFR BLD: 1.8 % (ref 0–8)
ERYTHROCYTE [DISTWIDTH] IN BLOOD BY AUTOMATED COUNT: 13.9 % (ref 11.5–14.5)
ERYTHROCYTE [SEDIMENTATION RATE] IN BLOOD BY WESTERGREN METHOD: 32 MM/HR (ref 0–36)
FERRITIN SERPL-MCNC: 105 NG/ML (ref 20–300)
HCT VFR BLD AUTO: 38.2 % (ref 37–48.5)
HGB BLD-MCNC: 11.9 G/DL (ref 12–16)
IMM GRANULOCYTES # BLD AUTO: 0.01 K/UL (ref 0–0.04)
IMM GRANULOCYTES NFR BLD AUTO: 0.2 % (ref 0–0.5)
LYMPHOCYTES # BLD AUTO: 2 K/UL (ref 1–4.8)
LYMPHOCYTES NFR BLD: 36.1 % (ref 18–48)
MCH RBC QN AUTO: 30.2 PG (ref 27–31)
MCHC RBC AUTO-ENTMCNC: 31.2 G/DL (ref 32–36)
MCV RBC AUTO: 97 FL (ref 82–98)
MONOCYTES # BLD AUTO: 0.4 K/UL (ref 0.3–1)
MONOCYTES NFR BLD: 6.8 % (ref 4–15)
NEUTROPHILS # BLD AUTO: 3.1 K/UL (ref 1.8–7.7)
NEUTROPHILS NFR BLD: 54.4 % (ref 38–73)
NRBC BLD-RTO: 0 /100 WBC
PLATELET # BLD AUTO: 201 K/UL (ref 150–350)
PMV BLD AUTO: 13 FL (ref 9.2–12.9)
RBC # BLD AUTO: 3.94 M/UL (ref 4–5.4)
TSH SERPL DL<=0.005 MIU/L-ACNC: 3.6 UIU/ML (ref 0.4–4)
WBC # BLD AUTO: 5.62 K/UL (ref 3.9–12.7)

## 2020-09-14 ENCOUNTER — HOSPITAL ENCOUNTER (OUTPATIENT)
Dept: RADIOLOGY | Facility: HOSPITAL | Age: 75
Discharge: HOME | End: 2020-09-14
Attending: OBSTETRICS & GYNECOLOGY
Payer: MEDICARE

## 2020-09-14 DIAGNOSIS — Z12.31 ENCOUNTER FOR SCREENING MAMMOGRAM FOR BREAST CANCER: ICD-10-CM

## 2020-09-14 PROCEDURE — 77063 MAMMO DIGITAL SCREENING BILAT WITH TOMOSYNTHESIS_CAD: ICD-10-PCS | Mod: 26,,, | Performed by: RADIOLOGY

## 2020-09-14 PROCEDURE — 77067 SCR MAMMO BI INCL CAD: CPT | Mod: TC

## 2020-09-14 PROCEDURE — 77063 BREAST TOMOSYNTHESIS BI: CPT | Mod: 26,,, | Performed by: RADIOLOGY

## 2020-09-14 PROCEDURE — 77067 MAMMO DIGITAL SCREENING BILAT WITH TOMOSYNTHESIS_CAD: ICD-10-PCS | Mod: 26,,, | Performed by: RADIOLOGY

## 2020-09-14 PROCEDURE — 77067 SCR MAMMO BI INCL CAD: CPT | Mod: 26,,, | Performed by: RADIOLOGY

## 2020-09-16 DIAGNOSIS — I95.1 ORTHOSTATIC HYPOTENSION: Primary | ICD-10-CM

## 2020-09-18 ENCOUNTER — OFFICE VISIT (OUTPATIENT)
Dept: CARDIOLOGY | Facility: CLINIC | Age: 75
End: 2020-09-18
Payer: MEDICARE

## 2020-09-18 ENCOUNTER — OFFICE VISIT (OUTPATIENT)
Dept: FAMILY MEDICINE | Facility: CLINIC | Age: 75
End: 2020-09-18
Payer: MEDICARE

## 2020-09-18 VITALS
WEIGHT: 136.38 LBS | DIASTOLIC BLOOD PRESSURE: 70 MMHG | BODY MASS INDEX: 24.15 KG/M2 | SYSTOLIC BLOOD PRESSURE: 122 MMHG | HEART RATE: 86 BPM | TEMPERATURE: 97 F | OXYGEN SATURATION: 98 %

## 2020-09-18 VITALS
DIASTOLIC BLOOD PRESSURE: 72 MMHG | SYSTOLIC BLOOD PRESSURE: 138 MMHG | WEIGHT: 137.38 LBS | HEART RATE: 86 BPM | BODY MASS INDEX: 24.33 KG/M2 | OXYGEN SATURATION: 99 %

## 2020-09-18 DIAGNOSIS — I95.1 ORTHOSTATIC HYPOTENSION: ICD-10-CM

## 2020-09-18 DIAGNOSIS — R42 LIGHTHEADEDNESS: ICD-10-CM

## 2020-09-18 DIAGNOSIS — I10 ESSENTIAL HYPERTENSION: Primary | ICD-10-CM

## 2020-09-18 DIAGNOSIS — R53.1 WEAKNESS: ICD-10-CM

## 2020-09-18 DIAGNOSIS — E78.2 MIXED HYPERLIPIDEMIA: Primary | ICD-10-CM

## 2020-09-18 DIAGNOSIS — R29.898 WEAKNESS OF LEFT LOWER EXTREMITY: ICD-10-CM

## 2020-09-18 DIAGNOSIS — I49.3 PVC (PREMATURE VENTRICULAR CONTRACTION): ICD-10-CM

## 2020-09-18 DIAGNOSIS — E78.2 MIXED HYPERLIPIDEMIA: ICD-10-CM

## 2020-09-18 PROCEDURE — 99214 PR OFFICE/OUTPT VISIT, EST, LEVL IV, 30-39 MIN: ICD-10-PCS | Mod: S$PBB,,, | Performed by: INTERNAL MEDICINE

## 2020-09-18 PROCEDURE — 99999 PR PBB SHADOW E&M-EST. PATIENT-LVL III: ICD-10-PCS | Mod: PBBFAC,,, | Performed by: INTERNAL MEDICINE

## 2020-09-18 PROCEDURE — 99214 OFFICE O/P EST MOD 30 MIN: CPT | Mod: S$PBB,,, | Performed by: INTERNAL MEDICINE

## 2020-09-18 PROCEDURE — 99213 OFFICE O/P EST LOW 20 MIN: CPT | Mod: PBBFAC,PO | Performed by: INTERNAL MEDICINE

## 2020-09-18 PROCEDURE — 99213 OFFICE O/P EST LOW 20 MIN: CPT | Mod: PBBFAC,27 | Performed by: INTERNAL MEDICINE

## 2020-09-18 PROCEDURE — 99999 PR PBB SHADOW E&M-EST. PATIENT-LVL III: CPT | Mod: PBBFAC,,, | Performed by: INTERNAL MEDICINE

## 2020-09-18 RX ORDER — MULTIVITAMIN
1 TABLET ORAL DAILY
COMMUNITY

## 2020-09-18 RX ORDER — ATORVASTATIN CALCIUM 10 MG/1
10 TABLET, FILM COATED ORAL DAILY
COMMUNITY
End: 2020-09-18

## 2020-09-18 NOTE — PROGRESS NOTES
Subjective:       Patient ID: Mary Flanagan is a 75 y.o. female.    Chief Complaint: Follow-up, Hyperlipidemia, and Fatigue    Follow-up  Associated symptoms include fatigue, myalgias and weakness. Pertinent negatives include no abdominal pain, arthralgias, chest pain, chills, congestion, coughing, diaphoresis, fever, headaches, joint swelling, nausea, neck pain, numbness, rash, sore throat or vomiting.   Hyperlipidemia  Associated symptoms include myalgias. Pertinent negatives include no chest pain or shortness of breath.   Fatigue  Associated symptoms include fatigue, myalgias and weakness. Pertinent negatives include no abdominal pain, arthralgias, chest pain, chills, congestion, coughing, diaphoresis, fever, headaches, joint swelling, nausea, neck pain, numbness, rash, sore throat or vomiting.     Past Medical History:   Diagnosis Date    Cataract     Chest pain     CKD (chronic kidney disease) stage 3, GFR 30-59 ml/min     Dry eyes     Gastritis     Hyperlipidemia     Hypertension     Insomnia     Osteopenia      Past Surgical History:   Procedure Laterality Date    BREAST BIOPSY Left     , benign     SECTION      x 1    KNEE ARTHROSCOPY Left  approx    KNEE SURGERY      left arm surgery      left knee surgery       Family History   Problem Relation Age of Onset    Hypertension Mother     Hypertension Father     Breast cancer Neg Hx     Colon cancer Neg Hx     Ovarian cancer Neg Hx     Thrombophilia Neg Hx     Strabismus Neg Hx     Macular degeneration Neg Hx     Retinal detachment Neg Hx     Glaucoma Neg Hx     Blindness Neg Hx     Amblyopia Neg Hx      Social History     Socioeconomic History    Marital status:      Spouse name: Not on file    Number of children: Not on file    Years of education: Not on file    Highest education level: Not on file   Occupational History    Not on file   Social Needs    Financial resource strain: Not on file    Food  insecurity     Worry: Not on file     Inability: Not on file    Transportation needs     Medical: Not on file     Non-medical: Not on file   Tobacco Use    Smoking status: Never Smoker    Smokeless tobacco: Never Used   Substance and Sexual Activity    Alcohol use: No     Alcohol/week: 0.0 standard drinks    Drug use: No    Sexual activity: Not Currently     Partners: Male     Birth control/protection: None     Comment: mut monog   Lifestyle    Physical activity     Days per week: Not on file     Minutes per session: Not on file    Stress: Not on file   Relationships    Social connections     Talks on phone: Not on file     Gets together: Not on file     Attends [employer] service: Not on file     Active member of club or organization: Not on file     Attends meetings of clubs or organizations: Not on file     Relationship status: Not on file   Other Topics Concern    Not on file   Social History Narrative    Not on file     Review of Systems   Constitutional: Positive for activity change and fatigue. Negative for appetite change, chills, diaphoresis, fever and unexpected weight change.   HENT: Negative for congestion, drooling, ear discharge, ear pain, facial swelling, hearing loss, mouth sores, nosebleeds, postnasal drip, rhinorrhea, sinus pressure, sneezing, sore throat, tinnitus, trouble swallowing and voice change.    Eyes: Negative for photophobia, redness and visual disturbance.   Respiratory: Negative for apnea, cough, choking, chest tightness, shortness of breath and wheezing.    Cardiovascular: Negative for chest pain and palpitations.   Gastrointestinal: Negative for abdominal distention, abdominal pain, blood in stool, constipation, diarrhea, nausea and vomiting.   Endocrine: Negative for cold intolerance, heat intolerance, polydipsia, polyphagia and polyuria.   Genitourinary: Negative for decreased urine volume, difficulty urinating, dysuria, flank pain, frequency, genital sores, hematuria,  pelvic pain and urgency.   Musculoskeletal: Positive for myalgias. Negative for arthralgias, back pain, gait problem, joint swelling, neck pain and neck stiffness.   Skin: Negative for color change, pallor, rash and wound.   Allergic/Immunologic: Negative for food allergies and immunocompromised state.   Neurological: Positive for weakness and light-headedness. Negative for tremors, seizures, syncope, speech difficulty, numbness and headaches.   Hematological: Negative for adenopathy. Does not bruise/bleed easily.   Psychiatric/Behavioral: Negative for agitation, behavioral problems, confusion, decreased concentration, dysphoric mood, hallucinations, self-injury, sleep disturbance and suicidal ideas. The patient is not nervous/anxious and is not hyperactive.    All other systems reviewed and are negative.      Objective:      Physical Exam  Vitals signs and nursing note reviewed.   Constitutional:       General: She is not in acute distress.     Appearance: She is well-developed. She is not diaphoretic.   HENT:      Head: Normocephalic and atraumatic.      Mouth/Throat:      Pharynx: No oropharyngeal exudate.   Eyes:      General: No scleral icterus.  Neck:      Musculoskeletal: Normal range of motion and neck supple.      Thyroid: No thyromegaly.      Vascular: No carotid bruit or JVD.      Trachea: No tracheal deviation.   Cardiovascular:      Rate and Rhythm: Normal rate and regular rhythm.      Heart sounds: Normal heart sounds.   Pulmonary:      Effort: Pulmonary effort is normal. No respiratory distress.      Breath sounds: Normal breath sounds. No wheezing or rales.   Chest:      Chest wall: No tenderness.   Abdominal:      General: Bowel sounds are normal. There is no distension.      Palpations: Abdomen is soft.      Tenderness: There is no abdominal tenderness. There is no guarding or rebound.   Musculoskeletal: Normal range of motion.         General: No tenderness.   Lymphadenopathy:      Cervical: No  cervical adenopathy.   Skin:     General: Skin is warm and dry.      Coloration: Skin is not pale.      Findings: No erythema or rash.   Neurological:      Mental Status: She is alert and oriented to person, place, and time.      Coordination: Coordination normal.   Psychiatric:         Behavior: Behavior normal.         Thought Content: Thought content normal.         Judgment: Judgment normal.         CMP  Sodium   Date Value Ref Range Status   08/21/2020 142 136 - 145 mmol/L Final     Potassium   Date Value Ref Range Status   08/21/2020 4.2 3.5 - 5.1 mmol/L Final     Chloride   Date Value Ref Range Status   08/21/2020 106 95 - 110 mmol/L Final     CO2   Date Value Ref Range Status   08/21/2020 27 23 - 29 mmol/L Final     Glucose   Date Value Ref Range Status   08/21/2020 90 70 - 110 mg/dL Final     BUN, Bld   Date Value Ref Range Status   08/21/2020 19 8 - 23 mg/dL Final     Creatinine   Date Value Ref Range Status   08/21/2020 1.2 0.5 - 1.4 mg/dL Final     Calcium   Date Value Ref Range Status   08/21/2020 10.0 8.7 - 10.5 mg/dL Final     Total Protein   Date Value Ref Range Status   08/21/2020 7.1 6.0 - 8.4 g/dL Final     Albumin   Date Value Ref Range Status   08/21/2020 4.0 3.5 - 5.2 g/dL Final     Total Bilirubin   Date Value Ref Range Status   08/21/2020 0.6 0.1 - 1.0 mg/dL Final     Comment:     For infants and newborns, interpretation of results should be based  on gestational age, weight and in agreement with clinical  observations.  Premature Infant recommended reference ranges:  Up to 24 hours.............<8.0 mg/dL  Up to 48 hours............<12.0 mg/dL  3-5 days..................<15.0 mg/dL  6-29 days.................<15.0 mg/dL       Alkaline Phosphatase   Date Value Ref Range Status   08/21/2020 90 55 - 135 U/L Final     AST   Date Value Ref Range Status   08/21/2020 18 10 - 40 U/L Final     ALT   Date Value Ref Range Status   08/21/2020 9 (L) 10 - 44 U/L Final     Anion Gap   Date Value Ref Range  Status   08/21/2020 9 8 - 16 mmol/L Final     eGFR if    Date Value Ref Range Status   08/21/2020 51.8 (A) >60 mL/min/1.73 m^2 Final     eGFR if non    Date Value Ref Range Status   08/21/2020 44.9 (A) >60 mL/min/1.73 m^2 Final     Comment:     Calculation used to obtain the estimated glomerular filtration  rate (eGFR) is the CKD-EPI equation.        Lab Results   Component Value Date    WBC 5.62 08/21/2020    HGB 11.9 (L) 08/21/2020    HCT 38.2 08/21/2020    MCV 97 08/21/2020     08/21/2020     Lab Results   Component Value Date    CHOL 205 (H) 08/21/2020     Lab Results   Component Value Date    HDL 53 08/21/2020     Lab Results   Component Value Date    LDLCALC 121.4 08/21/2020     Lab Results   Component Value Date    TRIG 153 (H) 08/21/2020     Lab Results   Component Value Date    CHOLHDL 25.9 08/21/2020     Lab Results   Component Value Date    TSH 3.601 08/21/2020     No results found for: LABA1C, HGBA1C  Assessment:       1. Mixed hyperlipidemia    2. Weakness    3. Weakness of left lower extremity    4. Lightheadedness        Plan:   Mixed hyperlipidemia    Weakness    Weakness of left lower extremity  -     Ankle Brachial Indices (BO); Future    Lightheadedness    Seen by cards---w/u neg.                         Seeing neuro dr herndon----------w/u underway.                 F/u 3 months--------------

## 2020-09-18 NOTE — PROGRESS NOTES
Subjective:   Patient ID:  Mary Flanagan is a 75 y.o. female who presents for follow up of Heartburn      73 yo female, 2 months f/u  PMH h/o LLE SVT, PVCs, HTN HLD, GERD  Faint started 8 months, once a week, usually standing for few minutes, sarted with blurred or black vision, leg side shaking, no cold sweating and nausea, and felt better after sitting   LE venous US GSV and LSV old thrombus  2020 in the office POSITIVE orthostatic sign  HOLter showed PVCs 7%  Echo showed normal EF  MPI showed no ischemia  LE venous US left old GSV DVT  Carotid US midl Dz  ENT wnl    No chest pain, dyspnea, palpitation, orthopnea,   Mild leg swelling L > R  Faint improved  Still dizziness after standing for a long time. No dizziness on the bed.  Fatigue and weak. And worse in PM  appettete ok  Sleeps with 2 pillows  Back pain   BP log reviewed    F/u at [facility] [place] and suspect neck arthritis and spinal cord problem. MRI neck ordered   Stopped Lipitor due to concerning leg muscle ache  BO ordered          Past Medical History:   Diagnosis Date    Cataract     Chest pain     CKD (chronic kidney disease) stage 3, GFR 30-59 ml/min     Dry eyes     Gastritis     Hyperlipidemia     Hypertension     Insomnia     Osteopenia        Past Surgical History:   Procedure Laterality Date    BREAST BIOPSY Left     , benign     SECTION      x 1    KNEE ARTHROSCOPY Left  approx    KNEE SURGERY      left arm surgery      left knee surgery         Social History     Tobacco Use    Smoking status: Never Smoker    Smokeless tobacco: Never Used   Substance Use Topics    Alcohol use: No     Alcohol/week: 0.0 standard drinks    Drug use: No       Family History   Problem Relation Age of Onset    Hypertension Mother     Hypertension Father     Breast cancer Neg Hx     Colon cancer Neg Hx     Ovarian cancer Neg Hx     Thrombophilia Neg Hx     Strabismus Neg Hx     Macular degeneration Neg Hx     Retinal  detachment Neg Hx     Glaucoma Neg Hx     Blindness Neg Hx     Amblyopia Neg Hx          Review of Systems   Constitution: Negative for decreased appetite, diaphoresis, fever, malaise/fatigue and night sweats.   HENT: Negative for nosebleeds.    Eyes: Negative for blurred vision and double vision.   Cardiovascular: Negative for chest pain, claudication, dyspnea on exertion, irregular heartbeat, leg swelling, near-syncope, orthopnea, palpitations, paroxysmal nocturnal dyspnea and syncope.   Respiratory: Negative for cough, shortness of breath, sleep disturbances due to breathing, snoring, sputum production and wheezing.    Endocrine: Negative for cold intolerance and polyuria.   Hematologic/Lymphatic: Does not bruise/bleed easily.   Skin: Negative for rash.   Musculoskeletal: Negative for back pain, falls, joint pain, joint swelling and neck pain.   Gastrointestinal: Negative for abdominal pain, heartburn, nausea and vomiting.   Genitourinary: Negative for dysuria, frequency and hematuria.   Neurological: Negative for difficulty with concentration, dizziness, focal weakness, headaches, light-headedness, numbness, seizures and weakness.   Psychiatric/Behavioral: Negative for depression, memory loss and substance abuse. The patient does not have insomnia.    Allergic/Immunologic: Negative for HIV exposure and hives.       Objective:   Physical Exam   Constitutional: She is oriented to person, place, and time. She appears well-nourished.   HENT:   Head: Normocephalic.   Eyes: Pupils are equal, round, and reactive to light.   Neck: Normal carotid pulses and no JVD present. Carotid bruit is not present. No thyromegaly present.   Cardiovascular: Normal rate, regular rhythm, normal heart sounds and normal pulses.  No extrasystoles are present. PMI is not displaced. Exam reveals no gallop and no S3.   No murmur heard.  Pulmonary/Chest: Breath sounds normal. No stridor. No respiratory distress.   Abdominal: Soft. Bowel  sounds are normal. There is no abdominal tenderness. There is no rebound.   Musculoskeletal: Normal range of motion.         General: Edema (+ RLE>LLE edema) present.   Neurological: She is alert and oriented to person, place, and time.   Skin: Skin is intact. No rash noted.   Psychiatric: Her behavior is normal.       Lab Results   Component Value Date    CHOL 205 (H) 08/21/2020    CHOL 135 07/10/2020    CHOL 154 10/16/2019     Lab Results   Component Value Date    HDL 53 08/21/2020    HDL 59 07/10/2020    HDL 60 10/16/2019     Lab Results   Component Value Date    LDLCALC 121.4 08/21/2020    LDLCALC 63.4 07/10/2020    LDLCALC 74.6 10/16/2019     Lab Results   Component Value Date    TRIG 153 (H) 08/21/2020    TRIG 63 07/10/2020    TRIG 97 10/16/2019     Lab Results   Component Value Date    CHOLHDL 25.9 08/21/2020    CHOLHDL 43.7 07/10/2020    CHOLHDL 39.0 10/16/2019       Chemistry        Component Value Date/Time     08/21/2020 0952    K 4.2 08/21/2020 0952     08/21/2020 0952    CO2 27 08/21/2020 0952    BUN 19 08/21/2020 0952    CREATININE 1.2 08/21/2020 0952    GLU 90 08/21/2020 0952        Component Value Date/Time    CALCIUM 10.0 08/21/2020 0952    ALKPHOS 90 08/21/2020 0952    AST 18 08/21/2020 0952    ALT 9 (L) 08/21/2020 0952    BILITOT 0.6 08/21/2020 0952    ESTGFRAFRICA 51.8 (A) 08/21/2020 0952    EGFRNONAA 44.9 (A) 08/21/2020 0952          No results found for: LABA1C, HGBA1C  Lab Results   Component Value Date    TSH 3.601 08/21/2020     No results found for: INR, PROTIME  Lab Results   Component Value Date    WBC 5.62 08/21/2020    HGB 11.9 (L) 08/21/2020    HCT 38.2 08/21/2020    MCV 97 08/21/2020     08/21/2020     BMP  Sodium   Date Value Ref Range Status   08/21/2020 142 136 - 145 mmol/L Final     Potassium   Date Value Ref Range Status   08/21/2020 4.2 3.5 - 5.1 mmol/L Final     Chloride   Date Value Ref Range Status   08/21/2020 106 95 - 110 mmol/L Final     CO2   Date Value  Ref Range Status   08/21/2020 27 23 - 29 mmol/L Final     BUN, Bld   Date Value Ref Range Status   08/21/2020 19 8 - 23 mg/dL Final     Creatinine   Date Value Ref Range Status   08/21/2020 1.2 0.5 - 1.4 mg/dL Final     Calcium   Date Value Ref Range Status   08/21/2020 10.0 8.7 - 10.5 mg/dL Final     Anion Gap   Date Value Ref Range Status   08/21/2020 9 8 - 16 mmol/L Final     eGFR if    Date Value Ref Range Status   08/21/2020 51.8 (A) >60 mL/min/1.73 m^2 Final     eGFR if non    Date Value Ref Range Status   08/21/2020 44.9 (A) >60 mL/min/1.73 m^2 Final     Comment:     Calculation used to obtain the estimated glomerular filtration  rate (eGFR) is the CKD-EPI equation.        BNP  @LABRCNTIP(BNP,BNPTRIAGEBLO)@  @LABRCNTIP(troponini)@  CrCl cannot be calculated (Patient's most recent lab result is older than the maximum 7 days allowed.).  No results found in the last 24 hours.  No results found in the last 24 hours.  No results found in the last 24 hours.    Assessment:      1. Essential hypertension    2. PVC (premature ventricular contraction)    3. Mixed hyperlipidemia    4. Orthostatic hypotension        Plan:   Ok to hold Lipitor and Metoprolol  F[place] [facility] [place]  Counseled DASH  Check Lipid profile in 6 months  Recommend heart-healthy diet, weight control and regular exercise.  Lucy. Risk modification.   RTC in 6 months    I have reviewed all pertinent labs and cardiac studies independently. Plans and recommendations have been formulated under my direct supervision. All questions answered and patient voiced understanding.   If symptoms persist go to the ED

## 2020-09-19 ENCOUNTER — HOSPITAL ENCOUNTER (OUTPATIENT)
Dept: CARDIOLOGY | Facility: HOSPITAL | Age: 75
Discharge: HOME | End: 2020-09-19
Attending: INTERNAL MEDICINE
Payer: MEDICARE

## 2020-09-19 VITALS — WEIGHT: 136 LBS | HEIGHT: 63 IN | BODY MASS INDEX: 24.1 KG/M2

## 2020-09-19 DIAGNOSIS — R29.898 WEAKNESS OF LEFT LOWER EXTREMITY: ICD-10-CM

## 2020-09-19 PROCEDURE — 93922 UPR/L XTREMITY ART 2 LEVELS: CPT

## 2020-09-19 PROCEDURE — 93922 UPR/L XTREMITY ART 2 LEVELS: CPT | Mod: 26,,, | Performed by: INTERNAL MEDICINE

## 2020-09-19 PROCEDURE — 93922 ANKLE BRACHIAL INDICES (ABI): ICD-10-PCS | Mod: 26,,, | Performed by: INTERNAL MEDICINE

## 2020-09-21 ENCOUNTER — OFFICE VISIT (OUTPATIENT)
Dept: OBSTETRICS AND GYNECOLOGY | Facility: CLINIC | Age: 75
End: 2020-09-21
Payer: MEDICARE

## 2020-09-21 ENCOUNTER — PATIENT OUTREACH (OUTPATIENT)
Dept: ADMINISTRATIVE | Facility: OTHER | Age: 75
End: 2020-09-21

## 2020-09-21 VITALS
BODY MASS INDEX: 24.17 KG/M2 | WEIGHT: 136.44 LBS | DIASTOLIC BLOOD PRESSURE: 56 MMHG | SYSTOLIC BLOOD PRESSURE: 102 MMHG

## 2020-09-21 DIAGNOSIS — Z00.00 PREVENTATIVE HEALTH CARE: Primary | ICD-10-CM

## 2020-09-21 DIAGNOSIS — Z78.0 MENOPAUSE: ICD-10-CM

## 2020-09-21 DIAGNOSIS — Z01.419 GYNECOLOGIC EXAM NORMAL: ICD-10-CM

## 2020-09-21 LAB
LEFT ABI: 1.17
LEFT ARM BP: 155 MMHG
LEFT DORSALIS PEDIS: 182 MMHG
LEFT POSTERIOR TIBIAL: 172 MMHG
LEFT TBI: 0.97
LEFT TOE PRESSURE: 151 MMHG
RIGHT ABI: 1.01
RIGHT ARM BP: 140 MMHG
RIGHT DORSALIS PEDIS: 157 MMHG
RIGHT POSTERIOR TIBIAL: 156 MMHG
RIGHT TBI: 0.95
RIGHT TOE PRESSURE: 147 MMHG

## 2020-09-21 PROCEDURE — 99999 PR PBB SHADOW E&M-EST. PATIENT-LVL II: ICD-10-PCS | Mod: PBBFAC,,, | Performed by: NURSE PRACTITIONER

## 2020-09-21 PROCEDURE — G0101 CA SCREEN;PELVIC/BREAST EXAM: HCPCS | Mod: S$PBB,,, | Performed by: NURSE PRACTITIONER

## 2020-09-21 PROCEDURE — G0101 CA SCREEN;PELVIC/BREAST EXAM: HCPCS | Mod: PBBFAC

## 2020-09-21 PROCEDURE — 99999 PR PBB SHADOW E&M-EST. PATIENT-LVL II: CPT | Mod: PBBFAC,,, | Performed by: NURSE PRACTITIONER

## 2020-09-21 PROCEDURE — G0101 PR CA SCREEN;PELVIC/BREAST EXAM: ICD-10-PCS | Mod: S$PBB,,, | Performed by: NURSE PRACTITIONER

## 2020-09-21 PROCEDURE — 99212 OFFICE O/P EST SF 10 MIN: CPT | Mod: PBBFAC,25 | Performed by: NURSE PRACTITIONER

## 2020-09-21 NOTE — PROGRESS NOTES
CC: Well woman exam    Mary Flanagan is a 75 y.o. female  presents for well woman exam.  LMP: . No AUB. No pelvic pain. Last mammogram, , was normal.  No issues, problems, or complaints.Is sexually active. H/O Osteopenia.     Past Medical History:   Diagnosis Date    Cataract     Chest pain     CKD (chronic kidney disease) stage 3, GFR 30-59 ml/min     Dry eyes     Gastritis     Hyperlipidemia     Hypertension     Insomnia     Osteopenia      Past Surgical History:   Procedure Laterality Date    BREAST BIOPSY Left     , benign     SECTION      x 1    KNEE ARTHROSCOPY Left  approx    KNEE SURGERY      left arm surgery      left knee surgery       Social History     Socioeconomic History    Marital status:      Spouse name: Not on file    Number of children: Not on file    Years of education: Not on file    Highest education level: Not on file   Occupational History    Not on file   Social Needs    Financial resource strain: Not on file    Food insecurity     Worry: Not on file     Inability: Not on file    Transportation needs     Medical: Not on file     Non-medical: Not on file   Tobacco Use    Smoking status: Never Smoker    Smokeless tobacco: Never Used   Substance and Sexual Activity    Alcohol use: No     Alcohol/week: 0.0 standard drinks    Drug use: No    Sexual activity: Not Currently     Partners: Male     Birth control/protection: None     Comment: mut monog   Lifestyle    Physical activity     Days per week: Not on file     Minutes per session: Not on file    Stress: Not on file   Relationships    Social connections     Talks on phone: Not on file     Gets together: Not on file     Attends [employer] service: Not on file     Active member of club or organization: Not on file     Attends meetings of clubs or organizations: Not on file     Relationship status: Not on file   Other Topics Concern    Not on file   Social History Narrative     Not on file     Family History   Problem Relation Age of Onset    Hypertension Mother     Hypertension Father     Breast cancer Neg Hx     Colon cancer Neg Hx     Ovarian cancer Neg Hx     Thrombophilia Neg Hx     Strabismus Neg Hx     Macular degeneration Neg Hx     Retinal detachment Neg Hx     Glaucoma Neg Hx     Blindness Neg Hx     Amblyopia Neg Hx      OB History        3    Para   3    Term   3            AB        Living   3       SAB        TAB        Ectopic        Multiple        Live Births                     BP (!) 102/56   Wt 61.9 kg (136 lb 7.4 oz)   BMI 24.17 kg/m²       ROS:  GENERAL: Denies weight gain or weight loss. Feeling well overall.   SKIN: Denies rash or lesions.   HEAD: Denies head injury or headache.   NODES: Denies enlarged lymph nodes.   CHEST: Denies chest pain or shortness of breath.   CARDIOVASCULAR: Denies palpitations or left sided chest pain.   ABDOMEN: No abdominal pain, constipation, diarrhea, nausea, vomiting or rectal bleeding.   URINARY: No frequency, dysuria, hematuria, or burning on urination.  REPRODUCTIVE: See HPI.   BREASTS: The patient performs breast self-examination and denies pain, lumps, or nipple discharge.   HEMATOLOGIC: No easy bruisability or excessive bleeding.   MUSCULOSKELETAL: Denies joint pain or swelling.   NEUROLOGIC: Denies syncope or weakness.   PSYCHIATRIC: Denies depression, anxiety or mood swings.    PHYSICAL EXAM:  APPEARANCE: Well nourished, well developed, in no acute distress.  AFFECT: WNL, alert and oriented x 3  SKIN: No acne or hirsutism  NECK: Neck symmetric without masses or thyromegaly  NODES: No inguinal, cervical, axillary, or femoral lymph node enlargement  CHEST: Good respiratory effect  ABDOMEN: Soft.  No tenderness or masses.  No hepatosplenomegaly.  No hernias.  BREASTS: Symmetrical, no skin changes or visible lesions.  No palpable masses, nipple discharge bilaterally.  PELVIC: Normal external  genitalia without lesions.  Normal hair distribution.  Adequate perineal body, normal urethral meatus.  Vagina atrophy without lesions or discharge.  Cervix pink, without lesions, discharge or tenderness.  No significant cystocele or rectocele.  Bimanual exam shows uterus to be normal size, regular, mobile and nontender.  Adnexa without masses or tenderness.    EXTREMITIES: No edema.    1. Preventative health care  DXA Bone Density Spine And Hip   2. Gynecologic exam normal  DXA Bone Density Spine And Hip   3. Menopause  DXA Bone Density Spine And Hip    PLAN:  Dexa scan  Patient was counseled today on A.C.S. Pap guidelines and recommendations for yearly pelvic exams, mammograms and monthly self breast exams; to see her PCP for other health maintenance.

## 2020-09-21 NOTE — PROGRESS NOTES
Health Maintenance Due   Topic Date Due    Influenza Vaccine (1) 08/01/2020     Updates were requested from care everywhere.  Chart was reviewed for overdue Proactive [facility] Encounters (ENA) topics (CRS, Breast Cancer Screening, Eye exam)  Health Maintenance has been updated.  LINKS immunization registry triggered.  Immunizations were reconciled.

## 2020-09-22 ENCOUNTER — APPOINTMENT (OUTPATIENT)
Dept: RADIOLOGY | Facility: HOSPITAL | Age: 75
End: 2020-09-22
Attending: NURSE PRACTITIONER
Payer: MEDICARE

## 2020-09-22 DIAGNOSIS — Z01.419 GYNECOLOGIC EXAM NORMAL: ICD-10-CM

## 2020-09-22 DIAGNOSIS — Z00.00 PREVENTATIVE HEALTH CARE: ICD-10-CM

## 2020-09-22 DIAGNOSIS — Z78.0 MENOPAUSE: ICD-10-CM

## 2020-09-22 PROCEDURE — 77080 DXA BONE DENSITY AXIAL: CPT | Mod: 26,,, | Performed by: RADIOLOGY

## 2020-09-22 PROCEDURE — 77080 DXA BONE DENSITY AXIAL: CPT | Mod: TC

## 2020-09-22 PROCEDURE — 77080 DEXA BONE DENSITY SPINE HIP: ICD-10-PCS | Mod: 26,,, | Performed by: RADIOLOGY

## 2020-09-23 NOTE — PROGRESS NOTES
Please call patient and inform her that dexa scan indicated Osteopenia   Please educate her related to dx.

## 2020-09-29 ENCOUNTER — PATIENT MESSAGE (OUTPATIENT)
Dept: OTHER | Facility: OTHER | Age: 75
End: 2020-09-29

## 2020-10-15 ENCOUNTER — TELEPHONE (OUTPATIENT)
Dept: FAMILY MEDICINE | Facility: CLINIC | Age: 75
End: 2020-10-15

## 2020-10-15 DIAGNOSIS — E78.5 DYSLIPIDEMIA: Primary | ICD-10-CM

## 2020-10-15 NOTE — TELEPHONE ENCOUNTER
"----- Message from Robbie Grimm sent at 10/15/2020  9:51 AM CDT -----  Regarding: Advice  Contact: Patient [phone] or [phone]  Patient would like to get medical advice.    Comments: Patient would like to know if can have a Lipid Panel test done after now stop taking the "Cholesterol" call to inform.       Also wants to know if can get a flu shot done, wants to also have done the same time as the Lab test, will that be ok or should be done on another day?    Please call an advise  Thank you    "

## 2020-10-16 ENCOUNTER — TELEPHONE (OUTPATIENT)
Dept: FAMILY MEDICINE | Facility: CLINIC | Age: 75
End: 2020-10-16

## 2020-10-16 DIAGNOSIS — E78.5 DYSLIPIDEMIA: Primary | ICD-10-CM

## 2020-10-16 NOTE — TELEPHONE ENCOUNTER
----- Message from Dalila Moeller sent at 10/16/2020 12:29 PM CDT -----  States she stopped taken her cholesterol medicine 8/21. She would like to get some lab work done to check her cholesterol, too see if she needs to start taking the medicine again. Please call pt [phone] or [phone]. Thank you

## 2020-10-19 ENCOUNTER — LAB VISIT (OUTPATIENT)
Dept: LAB | Facility: HOSPITAL | Age: 75
End: 2020-10-19
Attending: INTERNAL MEDICINE
Payer: MEDICARE

## 2020-10-19 ENCOUNTER — IMMUNIZATION (OUTPATIENT)
Dept: FAMILY MEDICINE | Facility: CLINIC | Age: 75
End: 2020-10-19
Payer: MEDICARE

## 2020-10-19 DIAGNOSIS — E78.5 DYSLIPIDEMIA: ICD-10-CM

## 2020-10-19 LAB
CHOLEST SERPL-MCNC: 195 MG/DL (ref 120–199)
CHOLEST/HDLC SERPL: 3.4 {RATIO} (ref 2–5)
HDLC SERPL-MCNC: 57 MG/DL (ref 40–75)
HDLC SERPL: 29.2 % (ref 20–50)
LDLC SERPL CALC-MCNC: 116.4 MG/DL (ref 63–159)
NONHDLC SERPL-MCNC: 138 MG/DL
TRIGL SERPL-MCNC: 108 MG/DL (ref 30–150)

## 2020-10-19 PROCEDURE — G0008 ADMIN INFLUENZA VIRUS VAC: HCPCS | Mod: PBBFAC,PO

## 2020-10-19 PROCEDURE — 36415 COLL VENOUS BLD VENIPUNCTURE: CPT | Mod: PO

## 2020-10-19 PROCEDURE — 90694 VACC AIIV4 NO PRSRV 0.5ML IM: CPT | Mod: PBBFAC,PO

## 2020-10-19 PROCEDURE — 80061 LIPID PANEL: CPT

## 2020-11-04 ENCOUNTER — NURSE TRIAGE (OUTPATIENT)
Dept: ADMINISTRATIVE | Facility: CLINIC | Age: 75
End: 2020-11-04

## 2020-11-05 ENCOUNTER — OFFICE VISIT (OUTPATIENT)
Dept: CARDIOLOGY | Facility: CLINIC | Age: 75
End: 2020-11-05
Payer: MEDICARE

## 2020-11-05 VITALS
HEART RATE: 74 BPM | DIASTOLIC BLOOD PRESSURE: 76 MMHG | BODY MASS INDEX: 23.78 KG/M2 | SYSTOLIC BLOOD PRESSURE: 138 MMHG | OXYGEN SATURATION: 100 % | WEIGHT: 134.25 LBS

## 2020-11-05 DIAGNOSIS — I82.512 CHRONIC DEEP VEIN THROMBOSIS (DVT) OF FEMORAL VEIN OF LEFT LOWER EXTREMITY: ICD-10-CM

## 2020-11-05 DIAGNOSIS — I95.1 ORTHOSTATIC HYPOTENSION: Primary | ICD-10-CM

## 2020-11-05 DIAGNOSIS — E78.2 MIXED HYPERLIPIDEMIA: ICD-10-CM

## 2020-11-05 DIAGNOSIS — I49.3 PVC (PREMATURE VENTRICULAR CONTRACTION): ICD-10-CM

## 2020-11-05 DIAGNOSIS — I10 ESSENTIAL HYPERTENSION: ICD-10-CM

## 2020-11-05 PROCEDURE — 99213 OFFICE O/P EST LOW 20 MIN: CPT | Mod: PBBFAC | Performed by: INTERNAL MEDICINE

## 2020-11-05 PROCEDURE — 99999 PR PBB SHADOW E&M-EST. PATIENT-LVL III: ICD-10-PCS | Mod: PBBFAC,,, | Performed by: INTERNAL MEDICINE

## 2020-11-05 PROCEDURE — 99214 OFFICE O/P EST MOD 30 MIN: CPT | Mod: S$PBB,,, | Performed by: INTERNAL MEDICINE

## 2020-11-05 PROCEDURE — 99999 PR PBB SHADOW E&M-EST. PATIENT-LVL III: CPT | Mod: PBBFAC,,, | Performed by: INTERNAL MEDICINE

## 2020-11-05 PROCEDURE — 99214 PR OFFICE/OUTPT VISIT, EST, LEVL IV, 30-39 MIN: ICD-10-PCS | Mod: S$PBB,,, | Performed by: INTERNAL MEDICINE

## 2020-11-05 NOTE — PROGRESS NOTES
Subjective:   Patient ID:  Mary Flanagan is a 75 y.o. female who presents for follow up of No chief complaint on file.      75 yo female, 2 months f/u  PMH autonomic dysfunction, /o LLE SVT, PVCs, HTN HLD, GERD.   Faint for a yr, once a week, usually standing for few minutes, sarted with blurred or black vision, leg side shaking, no cold sweating and nausea, and felt better after sitting   LE venous US GSV and LSV old thrombus  2020 in the office POSITIVE orthostatic sign  HOLter showed PVCs 7%  Echo showed normal EF and no LVH  MPI showed no ischemia  LE venous US left old GSV DVT  Carotid US midl Dz  ENT wnl    No chest pain, dyspnea, palpitation, orthopnea,   Mild leg swelling L > R  Faint improved  Still dizziness after standing for a long time. No dizziness on the bed.  Fatigue and weak. And worse in PM  appettete ok  Sleeps with 2 pillows  Back pain   BP log reviewed    [place] [facility] [place] arthritis and spinal cord problem. MRI neck ordered DR. MIRI SHETTY[place] [facility] [place] [phone]    C/o NECK PAIN,   No lightheadedness, faint after held Lipitor and metorpolol  Now can walk 20 mins a day   Reviewed BP log.  Still positive for orthostatic hypotension  Standing  to 130 mmH  Lying  to 159 mmHG          Past Medical History:   Diagnosis Date    Cataract     Chest pain     CKD (chronic kidney disease) stage 3, GFR 30-59 ml/min     Dry eyes     Gastritis     Hyperlipidemia     Hypertension     Insomnia     Osteopenia        Past Surgical History:   Procedure Laterality Date    BREAST BIOPSY Left     , benign     SECTION      x 1    KNEE ARTHROSCOPY Left  approx    KNEE SURGERY      left arm surgery      left knee surgery         Social History     Tobacco Use    Smoking status: Never Smoker    Smokeless tobacco: Never Used   Substance Use Topics    Alcohol use: No     Alcohol/week: 0.0 standard drinks    Drug use: No       Family History    Problem Relation Age of Onset    Hypertension Mother     Hypertension Father     Breast cancer Neg Hx     Colon cancer Neg Hx     Ovarian cancer Neg Hx     Thrombophilia Neg Hx     Strabismus Neg Hx     Macular degeneration Neg Hx     Retinal detachment Neg Hx     Glaucoma Neg Hx     Blindness Neg Hx     Amblyopia Neg Hx          Review of Systems   Constitution: Negative for decreased appetite, diaphoresis, fever, malaise/fatigue and night sweats.   HENT: Negative for nosebleeds.    Eyes: Negative for blurred vision and double vision.   Cardiovascular: Negative for chest pain, claudication, dyspnea on exertion, irregular heartbeat, leg swelling, near-syncope, orthopnea, palpitations, paroxysmal nocturnal dyspnea and syncope.   Respiratory: Negative for cough, shortness of breath, sleep disturbances due to breathing, snoring, sputum production and wheezing.    Endocrine: Negative for cold intolerance and polyuria.   Hematologic/Lymphatic: Does not bruise/bleed easily.   Skin: Negative for rash.   Musculoskeletal: Negative for back pain, falls, joint pain, joint swelling and neck pain.   Gastrointestinal: Negative for abdominal pain, heartburn, nausea and vomiting.   Genitourinary: Negative for dysuria, frequency and hematuria.   Neurological: Negative for difficulty with concentration, dizziness, focal weakness, headaches, light-headedness, numbness, seizures and weakness.   Psychiatric/Behavioral: Negative for depression, memory loss and substance abuse. The patient does not have insomnia.    Allergic/Immunologic: Negative for HIV exposure and hives.       Objective:   Physical Exam   Constitutional: She is oriented to person, place, and time. She appears well-nourished.   HENT:   Head: Normocephalic.   Eyes: Pupils are equal, round, and reactive to light.   Neck: Normal carotid pulses and no JVD present. Carotid bruit is not present. No thyromegaly present.   Cardiovascular: Normal rate, regular  rhythm, normal heart sounds and normal pulses.  No extrasystoles are present. PMI is not displaced. Exam reveals no gallop and no S3.   No murmur heard.  Pulmonary/Chest: Breath sounds normal. No stridor. No respiratory distress.   Abdominal: Soft. Bowel sounds are normal. There is no abdominal tenderness. There is no rebound.   Musculoskeletal: Normal range of motion.         General: Edema (+ RLE>LLE edema) present.   Neurological: She is alert and oriented to person, place, and time.   Skin: Skin is intact. No rash noted.   Psychiatric: Her behavior is normal.       Lab Results   Component Value Date    CHOL 195 10/19/2020    CHOL 205 (H) 08/21/2020    CHOL 135 07/10/2020     Lab Results   Component Value Date    HDL 57 10/19/2020    HDL 53 08/21/2020    HDL 59 07/10/2020     Lab Results   Component Value Date    LDLCALC 116.4 10/19/2020    LDLCALC 121.4 08/21/2020    LDLCALC 63.4 07/10/2020     Lab Results   Component Value Date    TRIG 108 10/19/2020    TRIG 153 (H) 08/21/2020    TRIG 63 07/10/2020     Lab Results   Component Value Date    CHOLHDL 29.2 10/19/2020    CHOLHDL 25.9 08/21/2020    CHOLHDL 43.7 07/10/2020       Chemistry        Component Value Date/Time     08/21/2020 0952    K 4.2 08/21/2020 0952     08/21/2020 0952    CO2 27 08/21/2020 0952    BUN 19 08/21/2020 0952    CREATININE 1.2 08/21/2020 0952    GLU 90 08/21/2020 0952        Component Value Date/Time    CALCIUM 10.0 08/21/2020 0952    ALKPHOS 90 08/21/2020 0952    AST 18 08/21/2020 0952    ALT 9 (L) 08/21/2020 0952    BILITOT 0.6 08/21/2020 0952    ESTGFRAFRICA 51.8 (A) 08/21/2020 0952    EGFRNONAA 44.9 (A) 08/21/2020 0952          No results found for: LABA1C, HGBA1C  Lab Results   Component Value Date    TSH 3.601 08/21/2020     No results found for: INR, PROTIME  Lab Results   Component Value Date    WBC 5.62 08/21/2020    HGB 11.9 (L) 08/21/2020    HCT 38.2 08/21/2020    MCV 97 08/21/2020     08/21/2020     BMP  Sodium    Date Value Ref Range Status   08/21/2020 142 136 - 145 mmol/L Final     Potassium   Date Value Ref Range Status   08/21/2020 4.2 3.5 - 5.1 mmol/L Final     Chloride   Date Value Ref Range Status   08/21/2020 106 95 - 110 mmol/L Final     CO2   Date Value Ref Range Status   08/21/2020 27 23 - 29 mmol/L Final     BUN   Date Value Ref Range Status   08/21/2020 19 8 - 23 mg/dL Final     Creatinine   Date Value Ref Range Status   08/21/2020 1.2 0.5 - 1.4 mg/dL Final     Calcium   Date Value Ref Range Status   08/21/2020 10.0 8.7 - 10.5 mg/dL Final     Anion Gap   Date Value Ref Range Status   08/21/2020 9 8 - 16 mmol/L Final     eGFR if    Date Value Ref Range Status   08/21/2020 51.8 (A) >60 mL/min/1.73 m^2 Final     eGFR if non    Date Value Ref Range Status   08/21/2020 44.9 (A) >60 mL/min/1.73 m^2 Final     Comment:     Calculation used to obtain the estimated glomerular filtration  rate (eGFR) is the CKD-EPI equation.        BNP  @LABRCNTIP(BNP,BNPTRIAGEBLO)@  @LABRCNTIP(troponini)@  CrCl cannot be calculated (Patient's most recent lab result is older than the maximum 7 days allowed.).  No results found in the last 24 hours.  No results found in the last 24 hours.  No results found in the last 24 hours.    Assessment:      1. Orthostatic hypotension    2. PVC (premature ventricular contraction)    3. Essential hypertension    4. Mixed hyperlipidemia    5. Chronic deep vein thrombosis (DVT) of femoral vein of left lower extremity      Minimal symptom of ornithotic hypotension  Plan:   Advise compression socks  Check SPEP, UPEP and IRENE to r/o myeloidosis  Fall precaution  RTC in 4 weeks

## 2020-11-06 ENCOUNTER — LAB VISIT (OUTPATIENT)
Dept: LAB | Facility: HOSPITAL | Age: 75
End: 2020-11-06
Attending: INTERNAL MEDICINE
Payer: MEDICARE

## 2020-11-06 DIAGNOSIS — I95.1 ORTHOSTATIC HYPOTENSION: ICD-10-CM

## 2020-11-06 PROCEDURE — 84165 PROTEIN E-PHORESIS SERUM: CPT | Mod: 26,,, | Performed by: PATHOLOGY

## 2020-11-06 PROCEDURE — 86334 IMMUNOFIX E-PHORESIS SERUM: CPT

## 2020-11-06 PROCEDURE — 86334 PATHOLOGIST INTERPRETATION IFE: ICD-10-PCS | Mod: 26,,, | Performed by: PATHOLOGY

## 2020-11-06 PROCEDURE — 36415 COLL VENOUS BLD VENIPUNCTURE: CPT | Mod: PO

## 2020-11-06 PROCEDURE — 86334 IMMUNOFIX E-PHORESIS SERUM: CPT | Mod: 26,,, | Performed by: PATHOLOGY

## 2020-11-06 PROCEDURE — 84165 PATHOLOGIST INTERPRETATION SPE: ICD-10-PCS | Mod: 26,,, | Performed by: PATHOLOGY

## 2020-11-06 PROCEDURE — 84165 PROTEIN E-PHORESIS SERUM: CPT

## 2020-11-09 LAB
ALBUMIN SERPL ELPH-MCNC: 3.92 G/DL (ref 3.35–5.55)
ALPHA1 GLOB SERPL ELPH-MCNC: 0.27 G/DL (ref 0.17–0.41)
ALPHA2 GLOB SERPL ELPH-MCNC: 0.88 G/DL (ref 0.43–0.99)
B-GLOBULIN SERPL ELPH-MCNC: 0.7 G/DL (ref 0.5–1.1)
GAMMA GLOB SERPL ELPH-MCNC: 0.73 G/DL (ref 0.67–1.58)
INTERPRETATION SERPL IFE-IMP: NORMAL
PATHOLOGIST INTERPRETATION IFE: NORMAL
PATHOLOGIST INTERPRETATION SPE: NORMAL
PROT SERPL-MCNC: 6.5 G/DL (ref 6–8.4)

## 2020-11-11 ENCOUNTER — TELEPHONE (OUTPATIENT)
Dept: FAMILY MEDICINE | Facility: CLINIC | Age: 75
End: 2020-11-11

## 2020-11-11 NOTE — TELEPHONE ENCOUNTER
----- Message from Smita Mabry sent at 11/11/2020 12:34 PM CST -----  Regarding: records  Contact: patient  Patient needs to speak to nurse about her medical records at [facility], she keeps trying to have them, send reports to Dr Schneider but they don't. Also her US is not showing on her chart,Please call her back at [phone]

## 2020-11-16 ENCOUNTER — OFFICE VISIT (OUTPATIENT)
Dept: FAMILY MEDICINE | Facility: CLINIC | Age: 75
End: 2020-11-16
Payer: MEDICARE

## 2020-11-16 VITALS
DIASTOLIC BLOOD PRESSURE: 78 MMHG | OXYGEN SATURATION: 99 % | WEIGHT: 136.38 LBS | BODY MASS INDEX: 24.16 KG/M2 | TEMPERATURE: 97 F | HEIGHT: 63 IN | SYSTOLIC BLOOD PRESSURE: 136 MMHG | HEART RATE: 60 BPM

## 2020-11-16 DIAGNOSIS — E78.2 MIXED HYPERLIPIDEMIA: ICD-10-CM

## 2020-11-16 DIAGNOSIS — Z00.00 ENCOUNTER FOR PREVENTIVE HEALTH EXAMINATION: Primary | ICD-10-CM

## 2020-11-16 DIAGNOSIS — M85.89 OSTEOPENIA OF MULTIPLE SITES: ICD-10-CM

## 2020-11-16 DIAGNOSIS — G47.00 INSOMNIA, UNSPECIFIED TYPE: ICD-10-CM

## 2020-11-16 DIAGNOSIS — I82.512 CHRONIC DEEP VEIN THROMBOSIS (DVT) OF FEMORAL VEIN OF LEFT LOWER EXTREMITY: ICD-10-CM

## 2020-11-16 DIAGNOSIS — I49.3 PVC (PREMATURE VENTRICULAR CONTRACTION): ICD-10-CM

## 2020-11-16 DIAGNOSIS — I10 ESSENTIAL HYPERTENSION: ICD-10-CM

## 2020-11-16 DIAGNOSIS — K21.9 GASTROESOPHAGEAL REFLUX DISEASE WITHOUT ESOPHAGITIS: ICD-10-CM

## 2020-11-16 DIAGNOSIS — M17.0 PRIMARY OSTEOARTHRITIS OF BOTH KNEES: ICD-10-CM

## 2020-11-16 DIAGNOSIS — N18.31 STAGE 3A CHRONIC KIDNEY DISEASE: ICD-10-CM

## 2020-11-16 PROCEDURE — 99213 OFFICE O/P EST LOW 20 MIN: CPT | Mod: PBBFAC,PO | Performed by: NURSE PRACTITIONER

## 2020-11-16 PROCEDURE — 99999 PR PBB SHADOW E&M-EST. PATIENT-LVL III: CPT | Mod: PBBFAC,,, | Performed by: NURSE PRACTITIONER

## 2020-11-16 PROCEDURE — 99999 PR PBB SHADOW E&M-EST. PATIENT-LVL III: ICD-10-PCS | Mod: PBBFAC,,, | Performed by: NURSE PRACTITIONER

## 2020-11-16 PROCEDURE — G0439 PPPS, SUBSEQ VISIT: HCPCS | Mod: S$GLB,,, | Performed by: NURSE PRACTITIONER

## 2020-11-16 PROCEDURE — G0439 PR MEDICARE ANNUAL WELLNESS SUBSEQUENT VISIT: ICD-10-PCS | Mod: S$GLB,,, | Performed by: NURSE PRACTITIONER

## 2020-11-16 NOTE — PATIENT INSTRUCTIONS
Counseling and Referral of Other Preventative  (Italic type indicates deductible and co-insurance are waived)    Patient Name: Mary Flanagan  Today's Date: 11/16/2020    Health Maintenance       Date Due Completion Date    Pap Smear 08/30/2021 8/30/2018    Override on 7/1/2013: Done    Mammogram 09/14/2021 9/14/2020    Lipid Panel 10/19/2021 10/19/2020    TETANUS VACCINE 06/28/2023 6/28/2013    Colorectal Cancer Screening 01/23/2025 1/23/2015    DEXA SCAN 09/22/2025 9/22/2020    Override on 6/10/2013: Done        No orders of the defined types were placed in this encounter.    The following information is provided to all patients.  This information is to help you find resources for any of the problems found today that may be affecting your health:                Living healthy guide: [email or web address].[facility]      Understanding Diabetes: www.diabetes.org      Eating healthy: www.cdc.gov/healthyweight      [facility] home safety checklist: www.cdc.gov/steadi/patient.html      Agency on Aging: www.goea.[place].[facility]      Alcoholics anonymous (AA): www.aa.org      Physical Activity: www.wander.nih.gov/bu5ucop      Tobacco use: www.quitwithusla.org

## 2020-11-16 NOTE — PROGRESS NOTES
"  Mary Flanagan presented for a  Medicare AWV and comprehensive Health Risk Assessment today. The following components were reviewed and updated:    · Medical history  · Family History  · Social history  · Allergies and Current Medications  · Health Risk Assessment  · Health Maintenance  · Care Team     ** See Completed Assessments for Annual Wellness Visit within the encounter summary.**         The following assessments were completed:  · Living Situation  · CAGE  · Depression Screening  · Timed Get Up and Go  · Whisper Test  · Cognitive Function Screening  · Nutrition Screening  · ADL Screening  · PAQ Screening        Vitals:    11/16/20 1108   BP: 136/78   Pulse: 60   Temp: 97.2 °F (36.2 °C)   SpO2: 99%   Weight: 61.8 kg (136 lb 5.7 oz)   Height: 5' 3" (1.6 m)     Body mass index is 24.15 kg/m².  Physical Exam  Vitals signs and nursing note reviewed.   Constitutional:       Appearance: Normal appearance. She is well-developed.   HENT:      Head: Normocephalic and atraumatic.   Eyes:      Pupils: Pupils are equal, round, and reactive to light.   Neck:      Vascular: No carotid bruit.   Cardiovascular:      Rate and Rhythm: Normal rate and regular rhythm.      Pulses: Normal pulses.      Heart sounds: Normal heart sounds. No murmur. No gallop.    Pulmonary:      Effort: Pulmonary effort is normal.      Breath sounds: Normal breath sounds.   Abdominal:      General: Bowel sounds are normal. There is no distension.      Palpations: Abdomen is soft.      Tenderness: There is no abdominal tenderness.   Musculoskeletal: Normal range of motion.         General: No tenderness.   Skin:     General: Skin is warm and dry.   Neurological:      Mental Status: She is alert.      Motor: No abnormal muscle tone.   Psychiatric:         Speech: Speech normal.         Behavior: Behavior normal.         Thought Content: Thought content normal.         Judgment: Judgment normal.         Current Outpatient Medications:     CALCIUM " CARBONATE (CALCIUM 600 ORAL), Take by mouth once daily., Disp: , Rfl:     garlic (GARLIQUE ORAL), Take by mouth., Disp: , Rfl:     multivitamin (THERAGRAN) per tablet, Take 1 tablet by mouth once daily., Disp: , Rfl:     VIT A/C/E AC/ZNOX/CUPRIC OXIDE (EYE VITAMIN AND MINERALS ORAL), Take by mouth., Disp: , Rfl:           Diagnoses and health risks identified today and associated recommendations/orders:    1. Encounter for preventive health examination  UTD    2. Essential hypertension  Stable and controlled of meds-states she has lows BP but hadnt felt dizzy at this time- states shes mointering and recording- due to follow up with cardiologist in   1 month   . Continue current treatment plan as previously prescribed with your PCP.    3. Mixed hyperlipidemia  Component      Latest Ref Rng & Units 10/19/2020   Cholesterol      120 - 199 mg/dL 195   Triglycerides      30 - 150 mg/dL 108   HDL      40 - 75 mg/dL 57   LDL Cholesterol External      63.0 - 159.0 mg/dL 116.4   HDL/Cholesterol Ratio      20.0 - 50.0 % 29.2   Total Cholesterol/HDL Ratio      2.0 - 5.0 3.4   Non-HDL Cholesterol      mg/dL 138   Chronic and Stable of of meds per pt request . Continue current treatment plan as previously prescribed with your PCP    4. Osteopenia of multiple sites  DEXA SCAN 09/22/2025 9/22/2020   Chronic and Stable on vitamin D. Continue current treatment plan as previously prescribed with your PCP    5. Stage 3a chronic kidney disease  eGFR if African American >60 mL/min/1.73 m^2 51.8Abnormal   51.8Abnormal   51.   CChronic and Stable. Continue current treatment plan as previously prescribed with your PCP    6. Gastroesophageal reflux disease without esophagitis  Chronic and Stable ok to take Pepcid OTC. Continue current treatment plan as previously prescribed with your PCP    7. Insomnia, unspecified type  Chronic and Stable on no medicatipns. Continue current treatment plan as previously prescribed with your PCP    8.  Primary osteoarthritis of both knees  Chronic and Stable on Tylenool as needed  Continue current treatment plan as previously prescribed with your PCP    9. Chronic deep vein thrombosis (DVT) of femoral vein of left lower extremity  Chronic and Stable. States acute pain - awaiting legs study reports for  9/2020. Followed by PCP      10 PVC (premature ventricular contraction  Stable and controlled. Denies CP or palpations Continue current treatment plan as previously prescribed with your  Cardiologis    I offered to discuss end of life issues, including information on how to make advance directives that the patient could use to name someone who would make medical decisions on their behalf if they became too ill to make themselves.d  _X_Patient is interested, I provided paper work and offered to discuss    Provided Mary with a 5-10 year written screening schedule and personal prevention plan. Recommendations were developed using the USPSTF age appropriate recommendations. Education, counseling, and referrals were provided as needed. After Visit Summary printed and given to patient which includes a list of additional screenings\tests needed.    1 Year annual wellness    Gricelda Gonzalez NP

## 2020-11-16 NOTE — Clinical Note
Your patient was seen today for a HRA visit. No acute problems or concerns.   I have included a copy of my visit note, please review the note and feel free to contact me with any questions.   Thank you for allowing me to participate in the care of your patients.   Gricelda Gonzalez NP

## 2020-11-17 PROBLEM — N18.31 STAGE 3A CHRONIC KIDNEY DISEASE: Status: ACTIVE | Noted: 2019-11-14

## 2020-12-04 ENCOUNTER — OFFICE VISIT (OUTPATIENT)
Dept: CARDIOLOGY | Facility: CLINIC | Age: 75
End: 2020-12-04
Payer: MEDICARE

## 2020-12-04 VITALS
BODY MASS INDEX: 24.17 KG/M2 | DIASTOLIC BLOOD PRESSURE: 68 MMHG | SYSTOLIC BLOOD PRESSURE: 134 MMHG | OXYGEN SATURATION: 100 % | WEIGHT: 136.44 LBS | HEART RATE: 66 BPM

## 2020-12-04 DIAGNOSIS — I95.1 ORTHOSTATIC HYPOTENSION: Primary | ICD-10-CM

## 2020-12-04 DIAGNOSIS — I49.3 PVC (PREMATURE VENTRICULAR CONTRACTION): ICD-10-CM

## 2020-12-04 DIAGNOSIS — E78.2 MIXED HYPERLIPIDEMIA: ICD-10-CM

## 2020-12-04 DIAGNOSIS — I10 ESSENTIAL HYPERTENSION: ICD-10-CM

## 2020-12-04 PROCEDURE — 99999 PR PBB SHADOW E&M-EST. PATIENT-LVL IV: CPT | Mod: PBBFAC,,, | Performed by: INTERNAL MEDICINE

## 2020-12-04 PROCEDURE — 99999 PR PBB SHADOW E&M-EST. PATIENT-LVL IV: ICD-10-PCS | Mod: PBBFAC,,, | Performed by: INTERNAL MEDICINE

## 2020-12-04 PROCEDURE — 99214 OFFICE O/P EST MOD 30 MIN: CPT | Mod: PBBFAC | Performed by: INTERNAL MEDICINE

## 2020-12-04 PROCEDURE — 99214 OFFICE O/P EST MOD 30 MIN: CPT | Mod: S$PBB,,, | Performed by: INTERNAL MEDICINE

## 2020-12-04 PROCEDURE — 99214 PR OFFICE/OUTPT VISIT, EST, LEVL IV, 30-39 MIN: ICD-10-PCS | Mod: S$PBB,,, | Performed by: INTERNAL MEDICINE

## 2020-12-04 RX ORDER — HYDRALAZINE HYDROCHLORIDE 25 MG/1
25 TABLET, FILM COATED ORAL 3 TIMES DAILY PRN
Qty: 90 TABLET | Refills: 11 | Status: SHIPPED | OUTPATIENT
Start: 2020-12-04 | End: 2021-08-09

## 2020-12-04 RX ORDER — MIDODRINE HYDROCHLORIDE 2.5 MG/1
2.5 TABLET ORAL 2 TIMES DAILY WITH MEALS
Qty: 60 TABLET | Refills: 11 | Status: SHIPPED | OUTPATIENT
Start: 2020-12-04 | End: 2021-03-30

## 2020-12-04 NOTE — PROGRESS NOTES
Subjective:   Patient ID:  Mary Flanagan is a 75 y.o. female who presents for follow up of No chief complaint on file.      75 yo female, 2 months f/u  PMH autonomic dysfunction, /o LLE SVT, PVCs, HTN HLD, GERD.   Faint for a yr, once a week, usually standing for few minutes, sarted with blurred or black vision, leg side shaking, no cold sweating and nausea, and felt better after sitting   LE venous US GSV and LSV old thrombus  2020 in the office POSITIVE orthostatic sign  HOLter showed PVCs 7%  Echo showed normal EF and no LVH  MPI showed no ischemia  LE venous US left old GSV DVT  Carotid US midl Dz  ENT wnl    No chest pain, dyspnea, palpitation, orthopnea,   Mild leg swelling L > R  Faint improved  Still dizziness after standing for a long time. No dizziness on the bed.  Fatigue and weak. And worse in PM  [place] ok  Sleeps with 2 pillows  Back pain   BP log reviewed    [place] [facility] [place] arthritis and spinal cord problem. MRI neck ordered DR. MIRI SHETTY, [place] [facility] [place]. [phone]    C/o NECK PAIN,   No lightheadedness, faint after held Lipitor and metorpolol  Now can walk 20 mins a day   Reviewed BP log.  Still positive for orthostatic hypotension  Standing  to 130 mmH  Lying  to 159 mmHG          Past Medical History:   Diagnosis Date    Cataract     Chest pain     CKD (chronic kidney disease) stage 3, GFR 30-59 ml/min     Dry eyes     Gastritis     Hyperlipidemia     Hypertension     Insomnia     Osteopenia        Past Surgical History:   Procedure Laterality Date    BREAST BIOPSY Left     , benign     SECTION      x 1    KNEE ARTHROSCOPY Left  approx    KNEE SURGERY      left arm surgery      left knee surgery         Social History     Tobacco Use    Smoking status: Never Smoker    Smokeless tobacco: Never Used   Substance Use Topics    Alcohol use: No     Alcohol/week: 0.0 standard drinks    Drug use: No       Family History    Problem Relation Age of Onset    Hypertension Mother     Hypertension Father     Breast cancer Neg Hx     Colon cancer Neg Hx     Ovarian cancer Neg Hx     Thrombophilia Neg Hx     Strabismus Neg Hx     Macular degeneration Neg Hx     Retinal detachment Neg Hx     Glaucoma Neg Hx     Blindness Neg Hx     Amblyopia Neg Hx          Review of Systems   Constitution: Negative for decreased appetite, diaphoresis, fever, malaise/fatigue and night sweats.   HENT: Negative for nosebleeds.    Eyes: Negative for blurred vision and double vision.   Cardiovascular: Positive for near-syncope. Negative for chest pain, claudication, dyspnea on exertion, irregular heartbeat, leg swelling, orthopnea, palpitations, paroxysmal nocturnal dyspnea and syncope.   Respiratory: Negative for cough, shortness of breath, sleep disturbances due to breathing, snoring, sputum production and wheezing.    Endocrine: Negative for cold intolerance and polyuria.   Hematologic/Lymphatic: Does not bruise/bleed easily.   Skin: Negative for rash.   Musculoskeletal: Negative for back pain, falls, joint pain, joint swelling and neck pain.   Gastrointestinal: Negative for abdominal pain, heartburn, nausea and vomiting.   Genitourinary: Negative for dysuria, frequency and hematuria.   Neurological: Positive for dizziness and light-headedness. Negative for difficulty with concentration, focal weakness, headaches, numbness, seizures and weakness.   Psychiatric/Behavioral: Negative for depression, memory loss and substance abuse. The patient does not have insomnia.    Allergic/Immunologic: Negative for HIV exposure and hives.       Objective:   Physical Exam   Constitutional: She is oriented to person, place, and time. She appears well-nourished.   HENT:   Head: Normocephalic.   Eyes: Pupils are equal, round, and reactive to light.   Neck: Normal carotid pulses and no JVD present. Carotid bruit is not present. No thyromegaly present.    Cardiovascular: Normal rate, regular rhythm, normal heart sounds and normal pulses.  No extrasystoles are present. PMI is not displaced. Exam reveals no gallop and no S3.   No murmur heard.  Pulmonary/Chest: Breath sounds normal. No stridor. No respiratory distress.   Abdominal: Soft. Bowel sounds are normal. There is no abdominal tenderness. There is no rebound.   Musculoskeletal: Normal range of motion.         General: Edema (+ RLE>LLE edema) present.   Neurological: She is alert and oriented to person, place, and time.   Skin: Skin is intact. No rash noted.   Psychiatric: Her behavior is normal.       Lab Results   Component Value Date    CHOL 195 10/19/2020    CHOL 205 (H) 08/21/2020    CHOL 135 07/10/2020     Lab Results   Component Value Date    HDL 57 10/19/2020    HDL 53 08/21/2020    HDL 59 07/10/2020     Lab Results   Component Value Date    LDLCALC 116.4 10/19/2020    LDLCALC 121.4 08/21/2020    LDLCALC 63.4 07/10/2020     Lab Results   Component Value Date    TRIG 108 10/19/2020    TRIG 153 (H) 08/21/2020    TRIG 63 07/10/2020     Lab Results   Component Value Date    CHOLHDL 29.2 10/19/2020    CHOLHDL 25.9 08/21/2020    CHOLHDL 43.7 07/10/2020       Chemistry        Component Value Date/Time     08/21/2020 0952    K 4.2 08/21/2020 0952     08/21/2020 0952    CO2 27 08/21/2020 0952    BUN 19 08/21/2020 0952    CREATININE 1.2 08/21/2020 0952    GLU 90 08/21/2020 0952        Component Value Date/Time    CALCIUM 10.0 08/21/2020 0952    ALKPHOS 90 08/21/2020 0952    AST 18 08/21/2020 0952    ALT 9 (L) 08/21/2020 0952    BILITOT 0.6 08/21/2020 0952    ESTGFRAFRICA 51.8 (A) 08/21/2020 0952    EGFRNONAA 44.9 (A) 08/21/2020 0952          No results found for: LABA1C, HGBA1C  Lab Results   Component Value Date    TSH 3.601 08/21/2020     No results found for: INR, PROTIME  Lab Results   Component Value Date    WBC 5.62 08/21/2020    HGB 11.9 (L) 08/21/2020    HCT 38.2 08/21/2020    MCV 97  08/21/2020     08/21/2020     BMP  Sodium   Date Value Ref Range Status   08/21/2020 142 136 - 145 mmol/L Final     Potassium   Date Value Ref Range Status   08/21/2020 4.2 3.5 - 5.1 mmol/L Final     Chloride   Date Value Ref Range Status   08/21/2020 106 95 - 110 mmol/L Final     CO2   Date Value Ref Range Status   08/21/2020 27 23 - 29 mmol/L Final     BUN   Date Value Ref Range Status   08/21/2020 19 8 - 23 mg/dL Final     Creatinine   Date Value Ref Range Status   08/21/2020 1.2 0.5 - 1.4 mg/dL Final     Calcium   Date Value Ref Range Status   08/21/2020 10.0 8.7 - 10.5 mg/dL Final     Anion Gap   Date Value Ref Range Status   08/21/2020 9 8 - 16 mmol/L Final     eGFR if    Date Value Ref Range Status   08/21/2020 51.8 (A) >60 mL/min/1.73 m^2 Final     eGFR if non    Date Value Ref Range Status   08/21/2020 44.9 (A) >60 mL/min/1.73 m^2 Final     Comment:     Calculation used to obtain the estimated glomerular filtration  rate (eGFR) is the CKD-EPI equation.        BNP  @LABRCNTIP(BNP,BNPTRIAGEBLO)@  @LABRCNTIP(troponini)@  CrCl cannot be calculated (Patient's most recent lab result is older than the maximum 7 days allowed.).  No results found in the last 24 hours.  No results found in the last 24 hours.  No results found in the last 24 hours.    Assessment:      1. Orthostatic hypotension    2. Essential hypertension    3. Mixed hyperlipidemia    4. PVC (premature ventricular contraction)      Orthostatic hypotension with intermittent HTN  ? 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

## 2020-12-07 ENCOUNTER — TELEPHONE (OUTPATIENT)
Dept: NEPHROLOGY | Facility: CLINIC | Age: 75
End: 2020-12-07

## 2020-12-07 NOTE — TELEPHONE ENCOUNTER
----- Message from Yessenia Wolf sent at 12/7/2020  4:01 PM CST -----  Pt is requesting a call back in regards to 's last day. Would like to schedule for annual before doctor leaves. [phone] (home)

## 2020-12-11 ENCOUNTER — PATIENT MESSAGE (OUTPATIENT)
Dept: OTHER | Facility: OTHER | Age: 75
End: 2020-12-11

## 2020-12-14 ENCOUNTER — TELEPHONE (OUTPATIENT)
Dept: CARDIOLOGY | Facility: CLINIC | Age: 75
End: 2020-12-14

## 2020-12-14 NOTE — TELEPHONE ENCOUNTER
This is a 4 hour test that consist of an injection to trace then pictures next there are more pictures. The patient was reminded to fast for the test. The test will check for poor conduction, and provide detail on the function of the heart fibers. The patient stated understanding and stated she may have to cancel due to family coming in town for the holiday. The patient was encouraged to keep her appt.      ----- Message from Dalila Moeller sent at 12/14/2020  1:45 PM CST -----  States she wants to know what her appt on 12/22 is about PYP ATTR related Amyloidosis, nuclear medicine. Please call pt [phone] or [phone]. Thank you

## 2020-12-28 ENCOUNTER — OFFICE VISIT (OUTPATIENT)
Dept: FAMILY MEDICINE | Facility: CLINIC | Age: 75
End: 2020-12-28
Payer: MEDICARE

## 2020-12-28 ENCOUNTER — LAB VISIT (OUTPATIENT)
Dept: LAB | Facility: HOSPITAL | Age: 75
End: 2020-12-28
Payer: MEDICARE

## 2020-12-28 VITALS
DIASTOLIC BLOOD PRESSURE: 86 MMHG | HEART RATE: 89 BPM | BODY MASS INDEX: 23.55 KG/M2 | WEIGHT: 132.94 LBS | SYSTOLIC BLOOD PRESSURE: 142 MMHG | HEIGHT: 63 IN | TEMPERATURE: 97 F | OXYGEN SATURATION: 98 %

## 2020-12-28 DIAGNOSIS — E78.2 MIXED HYPERLIPIDEMIA: ICD-10-CM

## 2020-12-28 DIAGNOSIS — I10 ESSENTIAL HYPERTENSION: ICD-10-CM

## 2020-12-28 DIAGNOSIS — R29.898 WEAKNESS OF LEFT LOWER EXTREMITY: ICD-10-CM

## 2020-12-28 DIAGNOSIS — I95.1 ORTHOSTATIC HYPOTENSION: Primary | ICD-10-CM

## 2020-12-28 LAB
CHOLEST SERPL-MCNC: 200 MG/DL (ref 120–199)
CHOLEST/HDLC SERPL: 3.5 {RATIO} (ref 2–5)
HDLC SERPL-MCNC: 57 MG/DL (ref 40–75)
HDLC SERPL: 28.5 % (ref 20–50)
LDLC SERPL CALC-MCNC: 121.8 MG/DL (ref 63–159)
NONHDLC SERPL-MCNC: 143 MG/DL
TRIGL SERPL-MCNC: 106 MG/DL (ref 30–150)

## 2020-12-28 PROCEDURE — 80061 LIPID PANEL: CPT

## 2020-12-28 PROCEDURE — 99214 PR OFFICE/OUTPT VISIT, EST, LEVL IV, 30-39 MIN: ICD-10-PCS | Mod: S$PBB,,, | Performed by: INTERNAL MEDICINE

## 2020-12-28 PROCEDURE — 99999 PR PBB SHADOW E&M-EST. PATIENT-LVL III: ICD-10-PCS | Mod: PBBFAC,,, | Performed by: INTERNAL MEDICINE

## 2020-12-28 PROCEDURE — 36415 COLL VENOUS BLD VENIPUNCTURE: CPT | Mod: PO

## 2020-12-28 PROCEDURE — 99214 OFFICE O/P EST MOD 30 MIN: CPT | Mod: S$PBB,,, | Performed by: INTERNAL MEDICINE

## 2020-12-28 PROCEDURE — 99213 OFFICE O/P EST LOW 20 MIN: CPT | Mod: PBBFAC,PO | Performed by: INTERNAL MEDICINE

## 2020-12-28 PROCEDURE — 99999 PR PBB SHADOW E&M-EST. PATIENT-LVL III: CPT | Mod: PBBFAC,,, | Performed by: INTERNAL MEDICINE

## 2020-12-28 RX ORDER — METOPROLOL SUCCINATE 25 MG/1
TABLET, EXTENDED RELEASE ORAL
COMMUNITY
Start: 2020-10-27 | End: 2020-12-28

## 2020-12-28 NOTE — PROGRESS NOTES
Subjective:       Patient ID: Mary Flanagan is a 75 y.o. female.    Chief Complaint: Follow-up, Hypertension, and Hyperlipidemia    Follow-up  Associated symptoms include arthralgias, fatigue and myalgias. Pertinent negatives include no abdominal pain, chest pain, chills, congestion, coughing, diaphoresis, fever, headaches, joint swelling, nausea, neck pain, numbness, rash, sore throat, vomiting or weakness.   Hypertension  Pertinent negatives include no chest pain, headaches, neck pain, palpitations or shortness of breath.   Hyperlipidemia  Associated symptoms include myalgias. Pertinent negatives include no chest pain or shortness of breath.     Past Medical History:   Diagnosis Date    Cataract     Chest pain     CKD (chronic kidney disease) stage 3, GFR 30-59 ml/min     Dry eyes     Gastritis     Hyperlipidemia     Hypertension     Insomnia     Osteopenia      Past Surgical History:   Procedure Laterality Date    BREAST BIOPSY Left     , benign     SECTION      x 1    KNEE ARTHROSCOPY Left 2014 approx    KNEE SURGERY      left arm surgery      left knee surgery       Family History   Problem Relation Age of Onset    Hypertension Mother     Hypertension Father     Breast cancer Neg Hx     Colon cancer Neg Hx     Ovarian cancer Neg Hx     Thrombophilia Neg Hx     Strabismus Neg Hx     Macular degeneration Neg Hx     Retinal detachment Neg Hx     Glaucoma Neg Hx     Blindness Neg Hx     Amblyopia Neg Hx      Social History     Socioeconomic History    Marital status:      Spouse name: Not on file    Number of children: Not on file    Years of education: Not on file    Highest education level: Not on file   Occupational History    Not on file   Social Needs    Financial resource strain: Not on file    Food insecurity     Worry: Not on file     Inability: Not on file    Transportation needs     Medical: Not on file     Non-medical: Not on file   Tobacco Use     Smoking status: Never Smoker    Smokeless tobacco: Never Used   Substance and Sexual Activity    Alcohol use: No     Alcohol/week: 0.0 standard drinks    Drug use: No    Sexual activity: Not Currently     Partners: Male     Birth control/protection: None     Comment: mut monog   Lifestyle    Physical activity     Days per week: 7 days     Minutes per session: 30 min    Stress: Not at all   Relationships    Social connections     Talks on phone: Not on file     Gets together: Not on file     Attends Taoism service: Not on file     Active member of club or organization: Not on file     Attends meetings of clubs or organizations: Not on file     Relationship status: Not on file   Other Topics Concern    Not on file   Social History Narrative    Not on file     Review of Systems   Constitutional: Positive for fatigue. Negative for activity change, appetite change, chills, diaphoresis, fever and unexpected weight change.   HENT: Negative for congestion, drooling, ear discharge, ear pain, facial swelling, hearing loss, mouth sores, nosebleeds, postnasal drip, rhinorrhea, sinus pressure, sneezing, sore throat, tinnitus, trouble swallowing and voice change.    Eyes: Negative for photophobia, redness and visual disturbance.   Respiratory: Negative for apnea, cough, choking, chest tightness, shortness of breath and wheezing.    Cardiovascular: Negative for chest pain and palpitations.   Gastrointestinal: Negative for abdominal distention, abdominal pain, blood in stool, constipation, diarrhea, nausea and vomiting.   Endocrine: Negative for cold intolerance, heat intolerance, polydipsia, polyphagia and polyuria.   Genitourinary: Negative for decreased urine volume, difficulty urinating, dysuria, flank pain, frequency, genital sores, hematuria, pelvic pain, urgency and vaginal discharge.   Musculoskeletal: Positive for arthralgias and myalgias. Negative for back pain, gait problem, joint swelling, neck pain and neck  stiffness.   Skin: Negative for color change, pallor, rash and wound.   Allergic/Immunologic: Negative for food allergies and immunocompromised state.   Neurological: Negative for dizziness, tremors, seizures, syncope, speech difficulty, weakness, light-headedness, numbness and headaches.   Hematological: Negative for adenopathy. Does not bruise/bleed easily.   Psychiatric/Behavioral: Negative for agitation, behavioral problems, confusion, decreased concentration, dysphoric mood, hallucinations, self-injury, sleep disturbance and suicidal ideas. The patient is not nervous/anxious and is not hyperactive.    All other systems reviewed and are negative.      Objective:      Physical Exam  Vitals signs and nursing note reviewed.   Constitutional:       General: She is not in acute distress.     Appearance: She is well-developed. She is not diaphoretic.   HENT:      Head: Normocephalic and atraumatic.      Mouth/Throat:      Pharynx: No oropharyngeal exudate.   Eyes:      General: No scleral icterus.  Neck:      Musculoskeletal: Normal range of motion and neck supple.      Thyroid: No thyromegaly.      Vascular: No carotid bruit or JVD.      Trachea: No tracheal deviation.   Cardiovascular:      Rate and Rhythm: Normal rate and regular rhythm.      Heart sounds: Normal heart sounds.   Pulmonary:      Effort: Pulmonary effort is normal. No respiratory distress.      Breath sounds: Normal breath sounds. No wheezing or rales.   Chest:      Chest wall: No tenderness.   Abdominal:      General: Bowel sounds are normal. There is no distension.      Palpations: Abdomen is soft.      Tenderness: There is no abdominal tenderness. There is no guarding or rebound.   Musculoskeletal: Normal range of motion.         General: No tenderness.   Lymphadenopathy:      Cervical: No cervical adenopathy.   Skin:     General: Skin is warm and dry.      Coloration: Skin is not pale.      Findings: No erythema or rash.   Neurological:       Mental Status: She is alert and oriented to person, place, and time.      Coordination: Coordination normal.   Psychiatric:         Behavior: Behavior normal.         Thought Content: Thought content normal.         Judgment: Judgment normal.         CMP  Sodium   Date Value Ref Range Status   08/21/2020 142 136 - 145 mmol/L Final     Potassium   Date Value Ref Range Status   08/21/2020 4.2 3.5 - 5.1 mmol/L Final     Chloride   Date Value Ref Range Status   08/21/2020 106 95 - 110 mmol/L Final     CO2   Date Value Ref Range Status   08/21/2020 27 23 - 29 mmol/L Final     Glucose   Date Value Ref Range Status   08/21/2020 90 70 - 110 mg/dL Final     BUN   Date Value Ref Range Status   08/21/2020 19 8 - 23 mg/dL Final     Creatinine   Date Value Ref Range Status   08/21/2020 1.2 0.5 - 1.4 mg/dL Final     Calcium   Date Value Ref Range Status   08/21/2020 10.0 8.7 - 10.5 mg/dL Final     Total Protein   Date Value Ref Range Status   08/21/2020 7.1 6.0 - 8.4 g/dL Final     Albumin   Date Value Ref Range Status   08/21/2020 4.0 3.5 - 5.2 g/dL Final     Total Bilirubin   Date Value Ref Range Status   08/21/2020 0.6 0.1 - 1.0 mg/dL Final     Comment:     For infants and newborns, interpretation of results should be based  on gestational age, weight and in agreement with clinical  observations.  Premature Infant recommended reference ranges:  Up to 24 hours.............<8.0 mg/dL  Up to 48 hours............<12.0 mg/dL  3-5 days..................<15.0 mg/dL  6-29 days.................<15.0 mg/dL       Alkaline Phosphatase   Date Value Ref Range Status   08/21/2020 90 55 - 135 U/L Final     AST   Date Value Ref Range Status   08/21/2020 18 10 - 40 U/L Final     ALT   Date Value Ref Range Status   08/21/2020 9 (L) 10 - 44 U/L Final     Anion Gap   Date Value Ref Range Status   08/21/2020 9 8 - 16 mmol/L Final     eGFR if    Date Value Ref Range Status   08/21/2020 51.8 (A) >60 mL/min/1.73 m^2 Final     eGFR if  non    Date Value Ref Range Status   08/21/2020 44.9 (A) >60 mL/min/1.73 m^2 Final     Comment:     Calculation used to obtain the estimated glomerular filtration  rate (eGFR) is the CKD-EPI equation.        Lab Results   Component Value Date    WBC 5.62 08/21/2020    HGB 11.9 (L) 08/21/2020    HCT 38.2 08/21/2020    MCV 97 08/21/2020     08/21/2020     Lab Results   Component Value Date    CHOL 195 10/19/2020     Lab Results   Component Value Date    HDL 57 10/19/2020     Lab Results   Component Value Date    LDLCALC 116.4 10/19/2020     Lab Results   Component Value Date    TRIG 108 10/19/2020     Lab Results   Component Value Date    CHOLHDL 29.2 10/19/2020     Lab Results   Component Value Date    TSH 3.601 08/21/2020     No results found for: LABA1C, HGBA1C  Assessment:       1. Orthostatic hypotension    2. Mixed hyperlipidemia    3. Essential hypertension    4. Weakness of left lower extremity        Plan:   Orthostatic hypotension    Mixed hyperlipidemia  -     Lipid Panel; Future; Expected date: 12/28/2020    Essential hypertension    Weakness of left lower extremity    Stable--------continue meds.   F/u 4 months

## 2020-12-29 ENCOUNTER — TELEPHONE (OUTPATIENT)
Dept: FAMILY MEDICINE | Facility: CLINIC | Age: 75
End: 2020-12-29

## 2021-01-19 ENCOUNTER — IMMUNIZATION (OUTPATIENT)
Dept: INTERNAL MEDICINE | Facility: CLINIC | Age: 76
End: 2021-01-19
Payer: MEDICARE

## 2021-01-19 DIAGNOSIS — Z23 NEED FOR VACCINATION: Primary | ICD-10-CM

## 2021-01-19 PROCEDURE — 91300 COVID-19, MRNA, LNP-S, PF, 30 MCG/0.3 ML DOSE VACCINE: CPT | Mod: PBBFAC

## 2021-02-09 ENCOUNTER — IMMUNIZATION (OUTPATIENT)
Dept: INTERNAL MEDICINE | Facility: CLINIC | Age: 76
End: 2021-02-09
Payer: MEDICARE

## 2021-02-09 DIAGNOSIS — Z23 NEED FOR VACCINATION: Primary | ICD-10-CM

## 2021-02-09 PROCEDURE — 91300 COVID-19, MRNA, LNP-S, PF, 30 MCG/0.3 ML DOSE VACCINE: CPT | Mod: PBBFAC

## 2021-02-09 PROCEDURE — 0002A COVID-19, MRNA, LNP-S, PF, 30 MCG/0.3 ML DOSE VACCINE: CPT | Mod: PBBFAC

## 2021-02-18 ENCOUNTER — LAB VISIT (OUTPATIENT)
Dept: LAB | Facility: HOSPITAL | Age: 76
End: 2021-02-18
Attending: INTERNAL MEDICINE
Payer: MEDICARE

## 2021-02-18 DIAGNOSIS — N18.30 CHRONIC KIDNEY DISEASE (CKD), STAGE III (MODERATE): ICD-10-CM

## 2021-02-18 LAB
25(OH)D3+25(OH)D2 SERPL-MCNC: 49 NG/ML (ref 30–96)
ALBUMIN SERPL BCP-MCNC: 3.6 G/DL (ref 3.5–5.2)
ANION GAP SERPL CALC-SCNC: 11 MMOL/L (ref 8–16)
BASOPHILS # BLD AUTO: 0.06 K/UL (ref 0–0.2)
BASOPHILS NFR BLD: 1.1 % (ref 0–1.9)
BUN SERPL-MCNC: 19 MG/DL (ref 8–23)
CALCIUM SERPL-MCNC: 9.8 MG/DL (ref 8.7–10.5)
CHLORIDE SERPL-SCNC: 106 MMOL/L (ref 95–110)
CO2 SERPL-SCNC: 25 MMOL/L (ref 23–29)
CREAT SERPL-MCNC: 1.3 MG/DL (ref 0.5–1.4)
DIFFERENTIAL METHOD: ABNORMAL
EOSINOPHIL # BLD AUTO: 0.1 K/UL (ref 0–0.5)
EOSINOPHIL NFR BLD: 2.5 % (ref 0–8)
ERYTHROCYTE [DISTWIDTH] IN BLOOD BY AUTOMATED COUNT: 13.6 % (ref 11.5–14.5)
EST. GFR  (AFRICAN AMERICAN): 46.7 ML/MIN/1.73 M^2
EST. GFR  (NON AFRICAN AMERICAN): 40.5 ML/MIN/1.73 M^2
GLUCOSE SERPL-MCNC: 91 MG/DL (ref 70–110)
HCT VFR BLD AUTO: 37.9 % (ref 37–48.5)
HGB BLD-MCNC: 11.6 G/DL (ref 12–16)
IMM GRANULOCYTES # BLD AUTO: 0.01 K/UL (ref 0–0.04)
IMM GRANULOCYTES NFR BLD AUTO: 0.2 % (ref 0–0.5)
LYMPHOCYTES # BLD AUTO: 2.3 K/UL (ref 1–4.8)
LYMPHOCYTES NFR BLD: 42.7 % (ref 18–48)
MCH RBC QN AUTO: 30 PG (ref 27–31)
MCHC RBC AUTO-ENTMCNC: 30.6 G/DL (ref 32–36)
MCV RBC AUTO: 98 FL (ref 82–98)
MONOCYTES # BLD AUTO: 0.3 K/UL (ref 0.3–1)
MONOCYTES NFR BLD: 5.5 % (ref 4–15)
NEUTROPHILS # BLD AUTO: 2.5 K/UL (ref 1.8–7.7)
NEUTROPHILS NFR BLD: 48 % (ref 38–73)
NRBC BLD-RTO: 0 /100 WBC
PHOSPHATE SERPL-MCNC: 3.7 MG/DL (ref 2.7–4.5)
PLATELET # BLD AUTO: 225 K/UL (ref 150–350)
PMV BLD AUTO: 12.3 FL (ref 9.2–12.9)
POTASSIUM SERPL-SCNC: 4.6 MMOL/L (ref 3.5–5.1)
PTH-INTACT SERPL-MCNC: 67 PG/ML (ref 9–77)
RBC # BLD AUTO: 3.87 M/UL (ref 4–5.4)
SODIUM SERPL-SCNC: 142 MMOL/L (ref 136–145)
URATE SERPL-MCNC: 6 MG/DL (ref 2.4–5.7)
WBC # BLD AUTO: 5.29 K/UL (ref 3.9–12.7)

## 2021-02-18 PROCEDURE — 82306 VITAMIN D 25 HYDROXY: CPT

## 2021-02-18 PROCEDURE — 36415 COLL VENOUS BLD VENIPUNCTURE: CPT | Mod: PO

## 2021-02-18 PROCEDURE — 85025 COMPLETE CBC W/AUTO DIFF WBC: CPT

## 2021-02-18 PROCEDURE — 84550 ASSAY OF BLOOD/URIC ACID: CPT

## 2021-02-18 PROCEDURE — 80069 RENAL FUNCTION PANEL: CPT

## 2021-02-18 PROCEDURE — 83970 ASSAY OF PARATHORMONE: CPT

## 2021-03-30 ENCOUNTER — OFFICE VISIT (OUTPATIENT)
Dept: NEPHROLOGY | Facility: CLINIC | Age: 76
End: 2021-03-30
Payer: MEDICARE

## 2021-03-30 VITALS
HEIGHT: 63 IN | DIASTOLIC BLOOD PRESSURE: 68 MMHG | WEIGHT: 131.63 LBS | HEART RATE: 70 BPM | SYSTOLIC BLOOD PRESSURE: 128 MMHG | BODY MASS INDEX: 23.32 KG/M2

## 2021-03-30 DIAGNOSIS — N18.32 STAGE 3B CHRONIC KIDNEY DISEASE: Primary | ICD-10-CM

## 2021-03-30 DIAGNOSIS — N18.31 STAGE 3A CHRONIC KIDNEY DISEASE: ICD-10-CM

## 2021-03-30 DIAGNOSIS — I10 HTN (HYPERTENSION), BENIGN: ICD-10-CM

## 2021-03-30 PROCEDURE — 99999 PR PBB SHADOW E&M-EST. PATIENT-LVL III: CPT | Mod: PBBFAC,,, | Performed by: INTERNAL MEDICINE

## 2021-03-30 PROCEDURE — 99213 OFFICE O/P EST LOW 20 MIN: CPT | Mod: PBBFAC | Performed by: INTERNAL MEDICINE

## 2021-03-30 PROCEDURE — 99999 PR PBB SHADOW E&M-EST. PATIENT-LVL III: ICD-10-PCS | Mod: PBBFAC,,, | Performed by: INTERNAL MEDICINE

## 2021-03-30 PROCEDURE — 99214 OFFICE O/P EST MOD 30 MIN: CPT | Mod: S$PBB,,, | Performed by: INTERNAL MEDICINE

## 2021-03-30 PROCEDURE — 99214 PR OFFICE/OUTPT VISIT, EST, LEVL IV, 30-39 MIN: ICD-10-PCS | Mod: S$PBB,,, | Performed by: INTERNAL MEDICINE

## 2021-04-09 ENCOUNTER — HOSPITAL ENCOUNTER (OUTPATIENT)
Dept: CARDIOLOGY | Facility: HOSPITAL | Age: 76
Discharge: HOME | End: 2021-04-09
Attending: INTERNAL MEDICINE
Payer: MEDICARE

## 2021-04-09 ENCOUNTER — OFFICE VISIT (OUTPATIENT)
Dept: CARDIOLOGY | Facility: CLINIC | Age: 76
End: 2021-04-09
Payer: MEDICARE

## 2021-04-09 VITALS
OXYGEN SATURATION: 99 % | WEIGHT: 132.06 LBS | SYSTOLIC BLOOD PRESSURE: 152 MMHG | HEART RATE: 77 BPM | BODY MASS INDEX: 23.77 KG/M2 | DIASTOLIC BLOOD PRESSURE: 84 MMHG

## 2021-04-09 DIAGNOSIS — R26.9 IMPAIRED GAIT: ICD-10-CM

## 2021-04-09 DIAGNOSIS — R53.1 WEAKNESS: ICD-10-CM

## 2021-04-09 DIAGNOSIS — I82.512 CHRONIC DEEP VEIN THROMBOSIS (DVT) OF FEMORAL VEIN OF LEFT LOWER EXTREMITY: ICD-10-CM

## 2021-04-09 DIAGNOSIS — I95.1 ORTHOSTATIC HYPOTENSION: Primary | ICD-10-CM

## 2021-04-09 DIAGNOSIS — E78.2 MIXED HYPERLIPIDEMIA: ICD-10-CM

## 2021-04-09 DIAGNOSIS — I95.1 ORTHOSTATIC HYPOTENSION: ICD-10-CM

## 2021-04-09 DIAGNOSIS — I49.3 PVC (PREMATURE VENTRICULAR CONTRACTION): ICD-10-CM

## 2021-04-09 DIAGNOSIS — R42 DIZZINESS: ICD-10-CM

## 2021-04-09 DIAGNOSIS — R42 LIGHTHEADEDNESS: ICD-10-CM

## 2021-04-09 PROCEDURE — 93005 ELECTROCARDIOGRAM TRACING: CPT

## 2021-04-09 PROCEDURE — 99999 PR PBB SHADOW E&M-EST. PATIENT-LVL III: CPT | Mod: PBBFAC,,, | Performed by: INTERNAL MEDICINE

## 2021-04-09 PROCEDURE — 99215 OFFICE O/P EST HI 40 MIN: CPT | Mod: S$PBB,,, | Performed by: INTERNAL MEDICINE

## 2021-04-09 PROCEDURE — 99213 OFFICE O/P EST LOW 20 MIN: CPT | Mod: PBBFAC | Performed by: INTERNAL MEDICINE

## 2021-04-09 PROCEDURE — 93010 EKG 12-LEAD: ICD-10-PCS | Mod: ,,, | Performed by: INTERNAL MEDICINE

## 2021-04-09 PROCEDURE — 99999 PR PBB SHADOW E&M-EST. PATIENT-LVL III: ICD-10-PCS | Mod: PBBFAC,,, | Performed by: INTERNAL MEDICINE

## 2021-04-09 PROCEDURE — 93010 ELECTROCARDIOGRAM REPORT: CPT | Mod: ,,, | Performed by: INTERNAL MEDICINE

## 2021-04-09 PROCEDURE — 99215 PR OFFICE/OUTPT VISIT, EST, LEVL V, 40-54 MIN: ICD-10-PCS | Mod: S$PBB,,, | Performed by: INTERNAL MEDICINE

## 2021-04-14 ENCOUNTER — PATIENT OUTREACH (OUTPATIENT)
Dept: ADMINISTRATIVE | Facility: HOSPITAL | Age: 76
End: 2021-04-14

## 2021-04-14 ENCOUNTER — TELEPHONE (OUTPATIENT)
Dept: FAMILY MEDICINE | Facility: CLINIC | Age: 76
End: 2021-04-14

## 2021-04-21 ENCOUNTER — LAB VISIT (OUTPATIENT)
Dept: LAB | Facility: HOSPITAL | Age: 76
End: 2021-04-21
Attending: INTERNAL MEDICINE
Payer: MEDICARE

## 2021-04-21 DIAGNOSIS — E78.5 DYSLIPIDEMIA: ICD-10-CM

## 2021-04-21 PROCEDURE — 80061 LIPID PANEL: CPT | Performed by: INTERNAL MEDICINE

## 2021-04-21 PROCEDURE — 36415 COLL VENOUS BLD VENIPUNCTURE: CPT | Mod: PO | Performed by: INTERNAL MEDICINE

## 2021-04-22 LAB
CHOLEST SERPL-MCNC: 199 MG/DL (ref 120–199)
CHOLEST/HDLC SERPL: 3.2 {RATIO} (ref 2–5)
HDLC SERPL-MCNC: 63 MG/DL (ref 40–75)
HDLC SERPL: 31.7 % (ref 20–50)
LDLC SERPL CALC-MCNC: 121.4 MG/DL (ref 63–159)
NONHDLC SERPL-MCNC: 136 MG/DL
TRIGL SERPL-MCNC: 73 MG/DL (ref 30–150)

## 2021-04-27 ENCOUNTER — HOSPITAL ENCOUNTER (OUTPATIENT)
Dept: RADIOLOGY | Facility: HOSPITAL | Age: 76
Discharge: HOME | End: 2021-04-27
Attending: INTERNAL MEDICINE
Payer: MEDICARE

## 2021-04-27 DIAGNOSIS — I95.1 ORTHOSTATIC HYPOTENSION: ICD-10-CM

## 2021-04-27 DIAGNOSIS — R53.1 WEAKNESS: ICD-10-CM

## 2021-04-27 PROCEDURE — 78803 RP LOCLZJ TUM SPECT 1 AREA: CPT | Mod: 26,,, | Performed by: INTERNAL MEDICINE

## 2021-04-27 PROCEDURE — 78803 CV PYP ATTR W SPECT (CUPID ONLY): ICD-10-PCS | Mod: 26,,, | Performed by: INTERNAL MEDICINE

## 2021-04-27 PROCEDURE — A9538 TC99M PYROPHOSPHATE: HCPCS

## 2021-04-27 PROCEDURE — 78803 RP LOCLZJ TUM SPECT 1 AREA: CPT

## 2021-04-28 ENCOUNTER — TELEPHONE (OUTPATIENT)
Dept: CARDIOLOGY | Facility: CLINIC | Age: 76
End: 2021-04-28

## 2021-04-28 ENCOUNTER — OFFICE VISIT (OUTPATIENT)
Dept: FAMILY MEDICINE | Facility: CLINIC | Age: 76
End: 2021-04-28
Payer: MEDICARE

## 2021-04-28 VITALS
RESPIRATION RATE: 18 BRPM | HEIGHT: 63 IN | DIASTOLIC BLOOD PRESSURE: 64 MMHG | BODY MASS INDEX: 23.28 KG/M2 | WEIGHT: 131.38 LBS | SYSTOLIC BLOOD PRESSURE: 138 MMHG | HEART RATE: 83 BPM | OXYGEN SATURATION: 100 % | TEMPERATURE: 98 F

## 2021-04-28 DIAGNOSIS — E78.2 MIXED HYPERLIPIDEMIA: ICD-10-CM

## 2021-04-28 DIAGNOSIS — R53.1 WEAKNESS: ICD-10-CM

## 2021-04-28 DIAGNOSIS — I10 ESSENTIAL HYPERTENSION: Primary | ICD-10-CM

## 2021-04-28 PROCEDURE — 99213 OFFICE O/P EST LOW 20 MIN: CPT | Mod: PBBFAC,PO | Performed by: INTERNAL MEDICINE

## 2021-04-28 PROCEDURE — 99214 PR OFFICE/OUTPT VISIT, EST, LEVL IV, 30-39 MIN: ICD-10-PCS | Mod: S$PBB,,, | Performed by: INTERNAL MEDICINE

## 2021-04-28 PROCEDURE — 99214 OFFICE O/P EST MOD 30 MIN: CPT | Mod: S$PBB,,, | Performed by: INTERNAL MEDICINE

## 2021-04-28 PROCEDURE — 99999 PR PBB SHADOW E&M-EST. PATIENT-LVL III: CPT | Mod: PBBFAC,,, | Performed by: INTERNAL MEDICINE

## 2021-04-28 PROCEDURE — 99999 PR PBB SHADOW E&M-EST. PATIENT-LVL III: ICD-10-PCS | Mod: PBBFAC,,, | Performed by: INTERNAL MEDICINE

## 2021-04-28 RX ORDER — HYDROCHLOROTHIAZIDE 12.5 MG/1
12.5 TABLET ORAL
Qty: 30 TABLET | Refills: 0 | Status: SHIPPED | OUTPATIENT
Start: 2021-04-28 | End: 2021-06-25

## 2021-04-30 ENCOUNTER — TELEPHONE (OUTPATIENT)
Dept: CARDIOLOGY | Facility: CLINIC | Age: 76
End: 2021-04-30

## 2021-06-16 ENCOUNTER — PES CALL (OUTPATIENT)
Dept: ADMINISTRATIVE | Facility: CLINIC | Age: 76
End: 2021-06-16

## 2021-07-14 ENCOUNTER — TELEPHONE (OUTPATIENT)
Dept: OBSTETRICS AND GYNECOLOGY | Facility: CLINIC | Age: 76
End: 2021-07-14

## 2021-07-14 DIAGNOSIS — Z12.31 ENCOUNTER FOR SCREENING MAMMOGRAM FOR BREAST CANCER: Primary | ICD-10-CM

## 2021-07-28 ENCOUNTER — TELEPHONE (OUTPATIENT)
Dept: FAMILY MEDICINE | Facility: CLINIC | Age: 76
End: 2021-07-28

## 2021-07-28 ENCOUNTER — PATIENT MESSAGE (OUTPATIENT)
Dept: FAMILY MEDICINE | Facility: CLINIC | Age: 76
End: 2021-07-28

## 2021-07-28 DIAGNOSIS — I10 HYPERTENSION, UNSPECIFIED TYPE: Primary | ICD-10-CM

## 2021-08-02 ENCOUNTER — TELEPHONE (OUTPATIENT)
Dept: FAMILY MEDICINE | Facility: CLINIC | Age: 76
End: 2021-08-02

## 2021-08-02 DIAGNOSIS — E78.5 DYSLIPIDEMIA: Primary | ICD-10-CM

## 2021-08-05 ENCOUNTER — LAB VISIT (OUTPATIENT)
Dept: LAB | Facility: HOSPITAL | Age: 76
End: 2021-08-05
Attending: INTERNAL MEDICINE
Payer: MEDICARE

## 2021-08-05 DIAGNOSIS — E78.5 DYSLIPIDEMIA: ICD-10-CM

## 2021-08-05 LAB
CHOLEST SERPL-MCNC: 205 MG/DL (ref 120–199)
CHOLEST/HDLC SERPL: 3.4 {RATIO} (ref 2–5)
HDLC SERPL-MCNC: 60 MG/DL (ref 40–75)
HDLC SERPL: 29.3 % (ref 20–50)
LDLC SERPL CALC-MCNC: 124.2 MG/DL (ref 63–159)
NONHDLC SERPL-MCNC: 145 MG/DL
TRIGL SERPL-MCNC: 104 MG/DL (ref 30–150)

## 2021-08-05 PROCEDURE — 80061 LIPID PANEL: CPT | Performed by: INTERNAL MEDICINE

## 2021-08-05 PROCEDURE — 36415 COLL VENOUS BLD VENIPUNCTURE: CPT | Mod: PO | Performed by: INTERNAL MEDICINE

## 2021-08-06 ENCOUNTER — LAB VISIT (OUTPATIENT)
Dept: LAB | Facility: HOSPITAL | Age: 76
End: 2021-08-06
Attending: INTERNAL MEDICINE
Payer: MEDICARE

## 2021-08-06 DIAGNOSIS — I10 HYPERTENSION, UNSPECIFIED TYPE: ICD-10-CM

## 2021-08-06 LAB
ALBUMIN SERPL BCP-MCNC: 3.9 G/DL (ref 3.5–5.2)
ALP SERPL-CCNC: 85 U/L (ref 55–135)
ALT SERPL W/O P-5'-P-CCNC: 7 U/L (ref 10–44)
ANION GAP SERPL CALC-SCNC: 7 MMOL/L (ref 8–16)
AST SERPL-CCNC: 16 U/L (ref 10–40)
BASOPHILS # BLD AUTO: 0.06 K/UL (ref 0–0.2)
BASOPHILS NFR BLD: 1 % (ref 0–1.9)
BILIRUB SERPL-MCNC: 0.7 MG/DL (ref 0.1–1)
BUN SERPL-MCNC: 14 MG/DL (ref 8–23)
CALCIUM SERPL-MCNC: 10.4 MG/DL (ref 8.7–10.5)
CHLORIDE SERPL-SCNC: 103 MMOL/L (ref 95–110)
CO2 SERPL-SCNC: 30 MMOL/L (ref 23–29)
CREAT SERPL-MCNC: 1.1 MG/DL (ref 0.5–1.4)
DIFFERENTIAL METHOD: ABNORMAL
EOSINOPHIL # BLD AUTO: 0.1 K/UL (ref 0–0.5)
EOSINOPHIL NFR BLD: 1.7 % (ref 0–8)
ERYTHROCYTE [DISTWIDTH] IN BLOOD BY AUTOMATED COUNT: 13.5 % (ref 11.5–14.5)
EST. GFR  (AFRICAN AMERICAN): 57.2 ML/MIN/1.73 M^2
EST. GFR  (NON AFRICAN AMERICAN): 49.6 ML/MIN/1.73 M^2
GLUCOSE SERPL-MCNC: 85 MG/DL (ref 70–110)
HCT VFR BLD AUTO: 37.6 % (ref 37–48.5)
HGB BLD-MCNC: 11.9 G/DL (ref 12–16)
IMM GRANULOCYTES # BLD AUTO: 0.01 K/UL (ref 0–0.04)
IMM GRANULOCYTES NFR BLD AUTO: 0.2 % (ref 0–0.5)
LYMPHOCYTES # BLD AUTO: 2.3 K/UL (ref 1–4.8)
LYMPHOCYTES NFR BLD: 38.2 % (ref 18–48)
MCH RBC QN AUTO: 30.4 PG (ref 27–31)
MCHC RBC AUTO-ENTMCNC: 31.6 G/DL (ref 32–36)
MCV RBC AUTO: 96 FL (ref 82–98)
MONOCYTES # BLD AUTO: 0.4 K/UL (ref 0.3–1)
MONOCYTES NFR BLD: 7 % (ref 4–15)
NEUTROPHILS # BLD AUTO: 3.1 K/UL (ref 1.8–7.7)
NEUTROPHILS NFR BLD: 51.9 % (ref 38–73)
NRBC BLD-RTO: 0 /100 WBC
PLATELET # BLD AUTO: 225 K/UL (ref 150–450)
PMV BLD AUTO: 11.7 FL (ref 9.2–12.9)
POTASSIUM SERPL-SCNC: 4.5 MMOL/L (ref 3.5–5.1)
PROT SERPL-MCNC: 7 G/DL (ref 6–8.4)
RBC # BLD AUTO: 3.92 M/UL (ref 4–5.4)
SODIUM SERPL-SCNC: 140 MMOL/L (ref 136–145)
WBC # BLD AUTO: 5.89 K/UL (ref 3.9–12.7)

## 2021-08-06 PROCEDURE — 36415 COLL VENOUS BLD VENIPUNCTURE: CPT | Mod: PO | Performed by: INTERNAL MEDICINE

## 2021-08-06 PROCEDURE — 80053 COMPREHEN METABOLIC PANEL: CPT | Performed by: INTERNAL MEDICINE

## 2021-08-06 PROCEDURE — 85025 COMPLETE CBC W/AUTO DIFF WBC: CPT | Performed by: INTERNAL MEDICINE

## 2021-08-09 ENCOUNTER — OFFICE VISIT (OUTPATIENT)
Dept: FAMILY MEDICINE | Facility: CLINIC | Age: 76
End: 2021-08-09
Payer: MEDICARE

## 2021-08-09 DIAGNOSIS — I95.1 ORTHOSTATIC HYPOTENSION: ICD-10-CM

## 2021-08-09 DIAGNOSIS — R22.42 LOCALIZED SWELLING OF LEFT LOWER LEG: ICD-10-CM

## 2021-08-09 DIAGNOSIS — I10 ESSENTIAL HYPERTENSION: ICD-10-CM

## 2021-08-09 DIAGNOSIS — R26.9 IMPAIRED GAIT: ICD-10-CM

## 2021-08-09 DIAGNOSIS — I82.512 CHRONIC DEEP VEIN THROMBOSIS (DVT) OF FEMORAL VEIN OF LEFT LOWER EXTREMITY: Primary | ICD-10-CM

## 2021-08-09 DIAGNOSIS — R53.1 WEAKNESS: ICD-10-CM

## 2021-08-09 DIAGNOSIS — E78.2 MIXED HYPERLIPIDEMIA: ICD-10-CM

## 2021-08-09 PROCEDURE — 99442 PR PHYSICIAN TELEPHONE EVALUATION 11-20 MIN: ICD-10-PCS | Mod: 95,,, | Performed by: INTERNAL MEDICINE

## 2021-08-09 PROCEDURE — 99442 PR PHYSICIAN TELEPHONE EVALUATION 11-20 MIN: CPT | Mod: 95,,, | Performed by: INTERNAL MEDICINE

## 2021-08-12 ENCOUNTER — TELEPHONE (OUTPATIENT)
Dept: FAMILY MEDICINE | Facility: CLINIC | Age: 76
End: 2021-08-12

## 2021-09-15 ENCOUNTER — HOSPITAL ENCOUNTER (OUTPATIENT)
Dept: RADIOLOGY | Facility: HOSPITAL | Age: 76
Discharge: HOME | End: 2021-09-15
Attending: OBSTETRICS & GYNECOLOGY
Payer: MEDICARE

## 2021-09-15 DIAGNOSIS — Z12.31 ENCOUNTER FOR SCREENING MAMMOGRAM FOR BREAST CANCER: ICD-10-CM

## 2021-09-15 PROCEDURE — 77063 BREAST TOMOSYNTHESIS BI: CPT | Mod: 26,,, | Performed by: RADIOLOGY

## 2021-09-15 PROCEDURE — 77063 MAMMO DIGITAL SCREENING BILAT WITH TOMO: ICD-10-PCS | Mod: 26,,, | Performed by: RADIOLOGY

## 2021-09-15 PROCEDURE — 77067 SCR MAMMO BI INCL CAD: CPT | Mod: 26,,, | Performed by: RADIOLOGY

## 2021-09-15 PROCEDURE — 77067 SCR MAMMO BI INCL CAD: CPT | Mod: TC

## 2021-09-15 PROCEDURE — 77067 MAMMO DIGITAL SCREENING BILAT WITH TOMO: ICD-10-PCS | Mod: 26,,, | Performed by: RADIOLOGY

## 2021-09-21 ENCOUNTER — TELEPHONE (OUTPATIENT)
Dept: FAMILY MEDICINE | Facility: CLINIC | Age: 76
End: 2021-09-21

## 2021-09-21 ENCOUNTER — PES CALL (OUTPATIENT)
Dept: ADMINISTRATIVE | Facility: CLINIC | Age: 76
End: 2021-09-21

## 2021-09-21 DIAGNOSIS — I10 ESSENTIAL HYPERTENSION: Primary | ICD-10-CM

## 2021-09-24 ENCOUNTER — IMMUNIZATION (OUTPATIENT)
Dept: PHARMACY | Facility: CLINIC | Age: 76
End: 2021-09-24
Payer: MEDICARE

## 2021-09-24 DIAGNOSIS — Z23 NEED FOR VACCINATION: Primary | ICD-10-CM

## 2021-09-28 ENCOUNTER — OFFICE VISIT (OUTPATIENT)
Dept: OBSTETRICS AND GYNECOLOGY | Facility: CLINIC | Age: 76
End: 2021-09-28
Payer: MEDICARE

## 2021-09-28 VITALS
DIASTOLIC BLOOD PRESSURE: 60 MMHG | SYSTOLIC BLOOD PRESSURE: 120 MMHG | HEIGHT: 63 IN | BODY MASS INDEX: 23.25 KG/M2 | WEIGHT: 131.19 LBS

## 2021-09-28 DIAGNOSIS — Z01.419 ROUTINE GYNECOLOGICAL EXAMINATION: Primary | ICD-10-CM

## 2021-09-28 PROCEDURE — 99213 OFFICE O/P EST LOW 20 MIN: CPT | Mod: PBBFAC,25 | Performed by: NURSE PRACTITIONER

## 2021-09-28 PROCEDURE — 99999 PR PBB SHADOW E&M-EST. PATIENT-LVL III: CPT | Mod: PBBFAC,,, | Performed by: NURSE PRACTITIONER

## 2021-09-28 PROCEDURE — G0101 PR CA SCREEN;PELVIC/BREAST EXAM: ICD-10-PCS | Mod: S$PBB,,, | Performed by: NURSE PRACTITIONER

## 2021-09-28 PROCEDURE — G0101 CA SCREEN;PELVIC/BREAST EXAM: HCPCS | Mod: PBBFAC | Performed by: NURSE PRACTITIONER

## 2021-09-28 PROCEDURE — G0101 CA SCREEN;PELVIC/BREAST EXAM: HCPCS | Mod: S$PBB,,, | Performed by: NURSE PRACTITIONER

## 2021-09-28 PROCEDURE — 99999 PR PBB SHADOW E&M-EST. PATIENT-LVL III: ICD-10-PCS | Mod: PBBFAC,,, | Performed by: NURSE PRACTITIONER

## 2021-10-30 NOTE — ADDENDUM NOTE
Addended by: NICOLE LUJAN on: 7/9/2020 04:29 PM     Modules accepted: Orders     23 y/o female with no significant PMH presents to the ED for evaluation of 3-4 episodes of nonbloody diarrhea daily, which usually occur after eating. associated with nausea. pt reports she had an unwitnessed syncopal episode onto her bed this evening, and woke up around 3 minutes later. pt LMP was one week ago. pt reports vaping. pt denies fever, dairy allergy, gluten allergy, recent travel, use of abx, use of alcohol, hx of seizures, urinary or bowel retention or incontinence, chills, head injury, cough, chest pain, sob, abdominal pain, vomiting, urinary symptoms, vaginal discharge, hx of abdominal surgeries, or back pain.

## 2021-11-03 ENCOUNTER — LAB VISIT (OUTPATIENT)
Dept: LAB | Facility: HOSPITAL | Age: 76
End: 2021-11-03
Attending: INTERNAL MEDICINE
Payer: MEDICARE

## 2021-11-03 DIAGNOSIS — I10 ESSENTIAL HYPERTENSION: ICD-10-CM

## 2021-11-03 LAB
ALBUMIN SERPL BCP-MCNC: 3.7 G/DL (ref 3.5–5.2)
ALP SERPL-CCNC: 84 U/L (ref 55–135)
ALT SERPL W/O P-5'-P-CCNC: 10 U/L (ref 10–44)
ANION GAP SERPL CALC-SCNC: 9 MMOL/L (ref 8–16)
AST SERPL-CCNC: 15 U/L (ref 10–40)
BASOPHILS # BLD AUTO: 0.04 K/UL (ref 0–0.2)
BASOPHILS NFR BLD: 0.8 % (ref 0–1.9)
BILIRUB SERPL-MCNC: 0.7 MG/DL (ref 0.1–1)
BUN SERPL-MCNC: 17 MG/DL (ref 8–23)
CALCIUM SERPL-MCNC: 10.2 MG/DL (ref 8.7–10.5)
CHLORIDE SERPL-SCNC: 101 MMOL/L (ref 95–110)
CHOLEST SERPL-MCNC: 193 MG/DL (ref 120–199)
CHOLEST/HDLC SERPL: 3.2 {RATIO} (ref 2–5)
CO2 SERPL-SCNC: 27 MMOL/L (ref 23–29)
CREAT SERPL-MCNC: 1.2 MG/DL (ref 0.5–1.4)
DIFFERENTIAL METHOD: ABNORMAL
EOSINOPHIL # BLD AUTO: 0.2 K/UL (ref 0–0.5)
EOSINOPHIL NFR BLD: 3.1 % (ref 0–8)
ERYTHROCYTE [DISTWIDTH] IN BLOOD BY AUTOMATED COUNT: 13.6 % (ref 11.5–14.5)
EST. GFR  (AFRICAN AMERICAN): 51.1 ML/MIN/1.73 M^2
EST. GFR  (NON AFRICAN AMERICAN): 44.3 ML/MIN/1.73 M^2
GLUCOSE SERPL-MCNC: 94 MG/DL (ref 70–110)
HCT VFR BLD AUTO: 35.7 % (ref 37–48.5)
HDLC SERPL-MCNC: 61 MG/DL (ref 40–75)
HDLC SERPL: 31.6 % (ref 20–50)
HGB BLD-MCNC: 11.3 G/DL (ref 12–16)
IMM GRANULOCYTES # BLD AUTO: 0.01 K/UL (ref 0–0.04)
IMM GRANULOCYTES NFR BLD AUTO: 0.2 % (ref 0–0.5)
LDLC SERPL CALC-MCNC: 117.2 MG/DL (ref 63–159)
LYMPHOCYTES # BLD AUTO: 1.8 K/UL (ref 1–4.8)
LYMPHOCYTES NFR BLD: 37.7 % (ref 18–48)
MCH RBC QN AUTO: 30.5 PG (ref 27–31)
MCHC RBC AUTO-ENTMCNC: 31.7 G/DL (ref 32–36)
MCV RBC AUTO: 97 FL (ref 82–98)
MONOCYTES # BLD AUTO: 0.3 K/UL (ref 0.3–1)
MONOCYTES NFR BLD: 7 % (ref 4–15)
NEUTROPHILS # BLD AUTO: 2.5 K/UL (ref 1.8–7.7)
NEUTROPHILS NFR BLD: 51.2 % (ref 38–73)
NONHDLC SERPL-MCNC: 132 MG/DL
NRBC BLD-RTO: 0 /100 WBC
PLATELET # BLD AUTO: 233 K/UL (ref 150–450)
PMV BLD AUTO: 11.9 FL (ref 9.2–12.9)
POTASSIUM SERPL-SCNC: 4.4 MMOL/L (ref 3.5–5.1)
PROT SERPL-MCNC: 6.6 G/DL (ref 6–8.4)
RBC # BLD AUTO: 3.7 M/UL (ref 4–5.4)
SODIUM SERPL-SCNC: 137 MMOL/L (ref 136–145)
TRIGL SERPL-MCNC: 74 MG/DL (ref 30–150)
TSH SERPL DL<=0.005 MIU/L-ACNC: 2.63 UIU/ML (ref 0.4–4)
WBC # BLD AUTO: 4.85 K/UL (ref 3.9–12.7)

## 2021-11-03 PROCEDURE — 80061 LIPID PANEL: CPT | Performed by: INTERNAL MEDICINE

## 2021-11-03 PROCEDURE — 36415 COLL VENOUS BLD VENIPUNCTURE: CPT | Mod: PO | Performed by: INTERNAL MEDICINE

## 2021-11-03 PROCEDURE — 85025 COMPLETE CBC W/AUTO DIFF WBC: CPT | Performed by: INTERNAL MEDICINE

## 2021-11-03 PROCEDURE — 84443 ASSAY THYROID STIM HORMONE: CPT | Performed by: INTERNAL MEDICINE

## 2021-11-03 PROCEDURE — 80053 COMPREHEN METABOLIC PANEL: CPT | Performed by: INTERNAL MEDICINE

## 2021-11-04 ENCOUNTER — TELEPHONE (OUTPATIENT)
Dept: FAMILY MEDICINE | Facility: CLINIC | Age: 76
End: 2021-11-04
Payer: MEDICARE

## 2021-11-04 DIAGNOSIS — D64.9 ANEMIA, UNSPECIFIED TYPE: Primary | ICD-10-CM

## 2021-11-10 ENCOUNTER — OFFICE VISIT (OUTPATIENT)
Dept: FAMILY MEDICINE | Facility: CLINIC | Age: 76
End: 2021-11-10
Payer: MEDICARE

## 2021-11-10 VITALS
HEART RATE: 87 BPM | HEIGHT: 63 IN | BODY MASS INDEX: 22.66 KG/M2 | OXYGEN SATURATION: 99 % | DIASTOLIC BLOOD PRESSURE: 70 MMHG | SYSTOLIC BLOOD PRESSURE: 138 MMHG | TEMPERATURE: 98 F | RESPIRATION RATE: 18 BRPM | WEIGHT: 127.88 LBS

## 2021-11-10 DIAGNOSIS — M48.062 SPINAL STENOSIS OF LUMBAR REGION WITH NEUROGENIC CLAUDICATION: ICD-10-CM

## 2021-11-10 DIAGNOSIS — D64.9 ANEMIA, UNSPECIFIED TYPE: ICD-10-CM

## 2021-11-10 DIAGNOSIS — I82.512 CHRONIC DEEP VEIN THROMBOSIS (DVT) OF FEMORAL VEIN OF LEFT LOWER EXTREMITY: ICD-10-CM

## 2021-11-10 DIAGNOSIS — M81.0 OSTEOPOROSIS, UNSPECIFIED OSTEOPOROSIS TYPE, UNSPECIFIED PATHOLOGICAL FRACTURE PRESENCE: ICD-10-CM

## 2021-11-10 DIAGNOSIS — E78.2 MIXED HYPERLIPIDEMIA: ICD-10-CM

## 2021-11-10 DIAGNOSIS — R53.1 WEAKNESS: ICD-10-CM

## 2021-11-10 DIAGNOSIS — R29.898 WEAKNESS OF LEFT LOWER EXTREMITY: Primary | ICD-10-CM

## 2021-11-10 DIAGNOSIS — R26.9 IMPAIRED GAIT: ICD-10-CM

## 2021-11-10 PROCEDURE — 99214 PR OFFICE/OUTPT VISIT, EST, LEVL IV, 30-39 MIN: ICD-10-PCS | Mod: S$PBB,,, | Performed by: INTERNAL MEDICINE

## 2021-11-10 PROCEDURE — 99999 PR PBB SHADOW E&M-EST. PATIENT-LVL IV: CPT | Mod: PBBFAC,,, | Performed by: INTERNAL MEDICINE

## 2021-11-10 PROCEDURE — 99214 OFFICE O/P EST MOD 30 MIN: CPT | Mod: S$PBB,,, | Performed by: INTERNAL MEDICINE

## 2021-11-10 PROCEDURE — 99999 PR PBB SHADOW E&M-EST. PATIENT-LVL IV: ICD-10-PCS | Mod: PBBFAC,,, | Performed by: INTERNAL MEDICINE

## 2021-11-10 PROCEDURE — 99214 OFFICE O/P EST MOD 30 MIN: CPT | Mod: PBBFAC,PO | Performed by: INTERNAL MEDICINE

## 2021-11-11 ENCOUNTER — TELEPHONE (OUTPATIENT)
Dept: INTERNAL MEDICINE | Facility: CLINIC | Age: 76
End: 2021-11-11
Payer: MEDICARE

## 2021-11-30 ENCOUNTER — OFFICE VISIT (OUTPATIENT)
Dept: INTERNAL MEDICINE | Facility: CLINIC | Age: 76
End: 2021-11-30
Payer: MEDICARE

## 2021-11-30 VITALS
DIASTOLIC BLOOD PRESSURE: 62 MMHG | OXYGEN SATURATION: 98 % | SYSTOLIC BLOOD PRESSURE: 126 MMHG | BODY MASS INDEX: 22.61 KG/M2 | HEART RATE: 75 BPM | WEIGHT: 127.63 LBS | HEIGHT: 63 IN

## 2021-11-30 DIAGNOSIS — R20.2 TINGLING: ICD-10-CM

## 2021-11-30 DIAGNOSIS — N18.31 STAGE 3A CHRONIC KIDNEY DISEASE: ICD-10-CM

## 2021-11-30 DIAGNOSIS — I65.29 STENOSIS OF CAROTID ARTERY, UNSPECIFIED LATERALITY: ICD-10-CM

## 2021-11-30 DIAGNOSIS — D64.9 ANEMIA, UNSPECIFIED TYPE: ICD-10-CM

## 2021-11-30 DIAGNOSIS — M85.80 OSTEOPENIA, UNSPECIFIED LOCATION: ICD-10-CM

## 2021-11-30 DIAGNOSIS — Z00.00 ENCOUNTER FOR PREVENTIVE HEALTH EXAMINATION: Primary | ICD-10-CM

## 2021-11-30 DIAGNOSIS — E78.5 HYPERLIPIDEMIA, UNSPECIFIED HYPERLIPIDEMIA TYPE: ICD-10-CM

## 2021-11-30 DIAGNOSIS — I82.512 CHRONIC DEEP VEIN THROMBOSIS (DVT) OF FEMORAL VEIN OF LEFT LOWER EXTREMITY: ICD-10-CM

## 2021-11-30 DIAGNOSIS — I95.1 ORTHOSTATIC HYPOTENSION: ICD-10-CM

## 2021-11-30 PROCEDURE — 99999 PR PBB SHADOW E&M-EST. PATIENT-LVL IV: ICD-10-PCS | Mod: PBBFAC,,, | Performed by: NURSE PRACTITIONER

## 2021-11-30 PROCEDURE — G0439 PPPS, SUBSEQ VISIT: HCPCS | Mod: ,,, | Performed by: NURSE PRACTITIONER

## 2021-11-30 PROCEDURE — 99214 OFFICE O/P EST MOD 30 MIN: CPT | Mod: PBBFAC | Performed by: NURSE PRACTITIONER

## 2021-11-30 PROCEDURE — 99999 PR PBB SHADOW E&M-EST. PATIENT-LVL IV: CPT | Mod: PBBFAC,,, | Performed by: NURSE PRACTITIONER

## 2021-11-30 PROCEDURE — G0439 PR MEDICARE ANNUAL WELLNESS SUBSEQUENT VISIT: ICD-10-PCS | Mod: ,,, | Performed by: NURSE PRACTITIONER

## 2021-11-30 RX ORDER — UBIDECARENONE 75 MG
500 CAPSULE ORAL DAILY
COMMUNITY
End: 2022-02-03

## 2021-11-30 RX ORDER — FERROUS SULFATE 325(65) MG
325 TABLET ORAL
COMMUNITY
End: 2023-02-03

## 2021-12-02 ENCOUNTER — LAB VISIT (OUTPATIENT)
Dept: LAB | Facility: HOSPITAL | Age: 76
End: 2021-12-02
Attending: INTERNAL MEDICINE
Payer: MEDICARE

## 2021-12-02 ENCOUNTER — OFFICE VISIT (OUTPATIENT)
Dept: HEMATOLOGY/ONCOLOGY | Facility: CLINIC | Age: 76
End: 2021-12-02
Payer: MEDICARE

## 2021-12-02 VITALS
TEMPERATURE: 97 F | BODY MASS INDEX: 23.45 KG/M2 | DIASTOLIC BLOOD PRESSURE: 74 MMHG | HEIGHT: 62 IN | SYSTOLIC BLOOD PRESSURE: 152 MMHG | HEART RATE: 73 BPM | WEIGHT: 127.44 LBS | OXYGEN SATURATION: 99 %

## 2021-12-02 DIAGNOSIS — D53.9 NUTRITIONAL ANEMIA: ICD-10-CM

## 2021-12-02 DIAGNOSIS — N18.31 STAGE 3A CHRONIC KIDNEY DISEASE: ICD-10-CM

## 2021-12-02 DIAGNOSIS — D53.9 NUTRITIONAL ANEMIA: Primary | ICD-10-CM

## 2021-12-02 DIAGNOSIS — D64.9 ANEMIA, UNSPECIFIED TYPE: ICD-10-CM

## 2021-12-02 LAB
ALBUMIN SERPL BCP-MCNC: 4.2 G/DL (ref 3.5–5.2)
ALP SERPL-CCNC: 89 U/L (ref 55–135)
ALT SERPL W/O P-5'-P-CCNC: 11 U/L (ref 10–44)
ANION GAP SERPL CALC-SCNC: 10 MMOL/L (ref 8–16)
AST SERPL-CCNC: 17 U/L (ref 10–40)
BASOPHILS # BLD AUTO: 0.06 K/UL (ref 0–0.2)
BASOPHILS NFR BLD: 1.1 % (ref 0–1.9)
BILIRUB SERPL-MCNC: 0.5 MG/DL (ref 0.1–1)
BUN SERPL-MCNC: 12 MG/DL (ref 8–23)
CALCIUM SERPL-MCNC: 10 MG/DL (ref 8.7–10.5)
CHLORIDE SERPL-SCNC: 106 MMOL/L (ref 95–110)
CO2 SERPL-SCNC: 25 MMOL/L (ref 23–29)
CREAT SERPL-MCNC: 1.1 MG/DL (ref 0.5–1.4)
DIFFERENTIAL METHOD: ABNORMAL
EOSINOPHIL # BLD AUTO: 0 K/UL (ref 0–0.5)
EOSINOPHIL NFR BLD: 0.7 % (ref 0–8)
ERYTHROCYTE [DISTWIDTH] IN BLOOD BY AUTOMATED COUNT: 13.2 % (ref 11.5–14.5)
EST. GFR  (AFRICAN AMERICAN): 57 ML/MIN/1.73 M^2
EST. GFR  (NON AFRICAN AMERICAN): 49 ML/MIN/1.73 M^2
FERRITIN SERPL-MCNC: 125 NG/ML (ref 20–300)
FOLATE SERPL-MCNC: 19.4 NG/ML (ref 4–24)
GLUCOSE SERPL-MCNC: 94 MG/DL (ref 70–110)
HCT VFR BLD AUTO: 37.6 % (ref 37–48.5)
HGB BLD-MCNC: 11.9 G/DL (ref 12–16)
IMM GRANULOCYTES # BLD AUTO: 0.01 K/UL (ref 0–0.04)
IMM GRANULOCYTES NFR BLD AUTO: 0.2 % (ref 0–0.5)
IRON SERPL-MCNC: 54 UG/DL (ref 30–160)
LYMPHOCYTES # BLD AUTO: 2.1 K/UL (ref 1–4.8)
LYMPHOCYTES NFR BLD: 37 % (ref 18–48)
MCH RBC QN AUTO: 30.2 PG (ref 27–31)
MCHC RBC AUTO-ENTMCNC: 31.6 G/DL (ref 32–36)
MCV RBC AUTO: 95 FL (ref 82–98)
MONOCYTES # BLD AUTO: 0.3 K/UL (ref 0.3–1)
MONOCYTES NFR BLD: 5.6 % (ref 4–15)
NEUTROPHILS # BLD AUTO: 3.2 K/UL (ref 1.8–7.7)
NEUTROPHILS NFR BLD: 55.4 % (ref 38–73)
NRBC BLD-RTO: 0 /100 WBC
PLATELET # BLD AUTO: 241 K/UL (ref 150–450)
PMV BLD AUTO: 10.6 FL (ref 9.2–12.9)
POTASSIUM SERPL-SCNC: 4.3 MMOL/L (ref 3.5–5.1)
PROT SERPL-MCNC: 7 G/DL (ref 6–8.4)
RBC # BLD AUTO: 3.94 M/UL (ref 4–5.4)
SATURATED IRON: 18 % (ref 20–50)
SODIUM SERPL-SCNC: 141 MMOL/L (ref 136–145)
TOTAL IRON BINDING CAPACITY: 306 UG/DL (ref 250–450)
TRANSFERRIN SERPL-MCNC: 207 MG/DL (ref 200–375)
VIT B12 SERPL-MCNC: >2000 PG/ML (ref 210–950)
WBC # BLD AUTO: 5.7 K/UL (ref 3.9–12.7)

## 2021-12-02 PROCEDURE — 99214 OFFICE O/P EST MOD 30 MIN: CPT | Mod: PBBFAC | Performed by: INTERNAL MEDICINE

## 2021-12-02 PROCEDURE — 86334 IMMUNOFIX E-PHORESIS SERUM: CPT | Performed by: INTERNAL MEDICINE

## 2021-12-02 PROCEDURE — 86334 PATHOLOGIST INTERPRETATION IFE: ICD-10-PCS | Mod: 26,,, | Performed by: PATHOLOGY

## 2021-12-02 PROCEDURE — 83520 IMMUNOASSAY QUANT NOS NONAB: CPT | Mod: 59 | Performed by: INTERNAL MEDICINE

## 2021-12-02 PROCEDURE — 83921 ORGANIC ACID SINGLE QUANT: CPT | Performed by: INTERNAL MEDICINE

## 2021-12-02 PROCEDURE — 99999 PR PBB SHADOW E&M-EST. PATIENT-LVL IV: ICD-10-PCS | Mod: PBBFAC,,, | Performed by: INTERNAL MEDICINE

## 2021-12-02 PROCEDURE — 82607 VITAMIN B-12: CPT | Performed by: INTERNAL MEDICINE

## 2021-12-02 PROCEDURE — 99205 OFFICE O/P NEW HI 60 MIN: CPT | Mod: S$PBB,,, | Performed by: INTERNAL MEDICINE

## 2021-12-02 PROCEDURE — 85025 COMPLETE CBC W/AUTO DIFF WBC: CPT | Performed by: INTERNAL MEDICINE

## 2021-12-02 PROCEDURE — 36415 COLL VENOUS BLD VENIPUNCTURE: CPT | Performed by: INTERNAL MEDICINE

## 2021-12-02 PROCEDURE — 84165 PATHOLOGIST INTERPRETATION SPE: ICD-10-PCS | Mod: 26,,, | Performed by: PATHOLOGY

## 2021-12-02 PROCEDURE — 82746 ASSAY OF FOLIC ACID SERUM: CPT | Performed by: INTERNAL MEDICINE

## 2021-12-02 PROCEDURE — 84165 PROTEIN E-PHORESIS SERUM: CPT | Performed by: INTERNAL MEDICINE

## 2021-12-02 PROCEDURE — 82728 ASSAY OF FERRITIN: CPT | Performed by: INTERNAL MEDICINE

## 2021-12-02 PROCEDURE — 86334 IMMUNOFIX E-PHORESIS SERUM: CPT | Mod: 26,,, | Performed by: PATHOLOGY

## 2021-12-02 PROCEDURE — 84466 ASSAY OF TRANSFERRIN: CPT | Performed by: INTERNAL MEDICINE

## 2021-12-02 PROCEDURE — 80053 COMPREHEN METABOLIC PANEL: CPT | Performed by: INTERNAL MEDICINE

## 2021-12-02 PROCEDURE — 84165 PROTEIN E-PHORESIS SERUM: CPT | Mod: 26,,, | Performed by: PATHOLOGY

## 2021-12-02 PROCEDURE — 99205 PR OFFICE/OUTPT VISIT, NEW, LEVL V, 60-74 MIN: ICD-10-PCS | Mod: S$PBB,,, | Performed by: INTERNAL MEDICINE

## 2021-12-02 PROCEDURE — 99999 PR PBB SHADOW E&M-EST. PATIENT-LVL IV: CPT | Mod: PBBFAC,,, | Performed by: INTERNAL MEDICINE

## 2021-12-03 LAB
ALBUMIN SERPL ELPH-MCNC: 4.15 G/DL (ref 3.35–5.55)
ALPHA1 GLOB SERPL ELPH-MCNC: 0.29 G/DL (ref 0.17–0.41)
ALPHA2 GLOB SERPL ELPH-MCNC: 0.96 G/DL (ref 0.43–0.99)
B-GLOBULIN SERPL ELPH-MCNC: 0.73 G/DL (ref 0.5–1.1)
GAMMA GLOB SERPL ELPH-MCNC: 0.77 G/DL (ref 0.67–1.58)
INTERPRETATION SERPL IFE-IMP: NORMAL
KAPPA LC SER QL IA: 1.52 MG/DL (ref 0.33–1.94)
KAPPA LC/LAMBDA SER IA: 1.37 (ref 0.26–1.65)
LAMBDA LC SER QL IA: 1.11 MG/DL (ref 0.57–2.63)
PATHOLOGIST INTERPRETATION IFE: NORMAL
PATHOLOGIST INTERPRETATION SPE: NORMAL
PROT SERPL-MCNC: 6.9 G/DL (ref 6–8.4)

## 2021-12-07 LAB — METHYLMALONATE SERPL-SCNC: 0.22 UMOL/L

## 2021-12-16 ENCOUNTER — OFFICE VISIT (OUTPATIENT)
Dept: RHEUMATOLOGY | Facility: CLINIC | Age: 76
End: 2021-12-16
Payer: MEDICARE

## 2021-12-16 VITALS
HEART RATE: 68 BPM | SYSTOLIC BLOOD PRESSURE: 172 MMHG | WEIGHT: 127 LBS | BODY MASS INDEX: 23.37 KG/M2 | DIASTOLIC BLOOD PRESSURE: 77 MMHG | HEIGHT: 62 IN

## 2021-12-16 DIAGNOSIS — M81.0 OSTEOPOROSIS, UNSPECIFIED OSTEOPOROSIS TYPE, UNSPECIFIED PATHOLOGICAL FRACTURE PRESENCE: Primary | ICD-10-CM

## 2021-12-16 DIAGNOSIS — M15.9 PRIMARY OSTEOARTHRITIS INVOLVING MULTIPLE JOINTS: ICD-10-CM

## 2021-12-16 DIAGNOSIS — R53.1 WEAKNESS: ICD-10-CM

## 2021-12-16 DIAGNOSIS — Z71.89 COUNSELING ON HEALTH PROMOTION AND DISEASE PREVENTION: ICD-10-CM

## 2021-12-16 PROCEDURE — 99213 OFFICE O/P EST LOW 20 MIN: CPT | Mod: PBBFAC | Performed by: INTERNAL MEDICINE

## 2021-12-16 PROCEDURE — 99214 OFFICE O/P EST MOD 30 MIN: CPT | Mod: S$PBB,ICN,, | Performed by: INTERNAL MEDICINE

## 2021-12-16 PROCEDURE — 99999 PR PBB SHADOW E&M-EST. PATIENT-LVL III: CPT | Mod: PBBFAC,,, | Performed by: INTERNAL MEDICINE

## 2021-12-16 PROCEDURE — 99999 PR PBB SHADOW E&M-EST. PATIENT-LVL III: ICD-10-PCS | Mod: PBBFAC,,, | Performed by: INTERNAL MEDICINE

## 2021-12-16 PROCEDURE — 99214 PR OFFICE/OUTPT VISIT, EST, LEVL IV, 30-39 MIN: ICD-10-PCS | Mod: S$PBB,ICN,, | Performed by: INTERNAL MEDICINE

## 2021-12-16 RX ORDER — DICLOFENAC SODIUM 10 MG/G
4 GEL TOPICAL 2 TIMES DAILY
COMMUNITY
Start: 2021-11-26

## 2021-12-16 RX ORDER — METHOCARBAMOL 500 MG/1
500 TABLET, FILM COATED ORAL NIGHTLY PRN
Qty: 40 TABLET | Refills: 0 | Status: SHIPPED | OUTPATIENT
Start: 2021-12-16 | End: 2021-12-26

## 2021-12-20 ENCOUNTER — PATIENT OUTREACH (OUTPATIENT)
Dept: ADMINISTRATIVE | Facility: OTHER | Age: 76
End: 2021-12-20
Payer: MEDICARE

## 2021-12-22 ENCOUNTER — OFFICE VISIT (OUTPATIENT)
Dept: OPHTHALMOLOGY | Facility: CLINIC | Age: 76
End: 2021-12-22
Payer: MEDICARE

## 2021-12-22 DIAGNOSIS — H52.7 REFRACTION DISORDER: ICD-10-CM

## 2021-12-22 DIAGNOSIS — H25.13 NUCLEAR SENILE CATARACT OF BOTH EYES: Primary | ICD-10-CM

## 2021-12-22 DIAGNOSIS — H04.123 DRY EYE SYNDROME, BILATERAL: ICD-10-CM

## 2021-12-22 PROCEDURE — 99999 PR PBB SHADOW E&M-EST. PATIENT-LVL II: CPT | Mod: PBBFAC,,, | Performed by: OPHTHALMOLOGY

## 2021-12-22 PROCEDURE — 99212 OFFICE O/P EST SF 10 MIN: CPT | Mod: PBBFAC | Performed by: OPHTHALMOLOGY

## 2021-12-22 PROCEDURE — 99999 PR PBB SHADOW E&M-EST. PATIENT-LVL II: ICD-10-PCS | Mod: PBBFAC,,, | Performed by: OPHTHALMOLOGY

## 2021-12-22 PROCEDURE — 99214 OFFICE O/P EST MOD 30 MIN: CPT | Mod: S$PBB,,, | Performed by: OPHTHALMOLOGY

## 2021-12-22 PROCEDURE — 99214 PR OFFICE/OUTPT VISIT, EST, LEVL IV, 30-39 MIN: ICD-10-PCS | Mod: S$PBB,,, | Performed by: OPHTHALMOLOGY

## 2021-12-23 ENCOUNTER — OFFICE VISIT (OUTPATIENT)
Dept: HEMATOLOGY/ONCOLOGY | Facility: CLINIC | Age: 76
End: 2021-12-23
Payer: MEDICARE

## 2021-12-23 ENCOUNTER — LAB VISIT (OUTPATIENT)
Dept: LAB | Facility: HOSPITAL | Age: 76
End: 2021-12-23
Attending: INTERNAL MEDICINE
Payer: MEDICARE

## 2021-12-23 VITALS
BODY MASS INDEX: 23.32 KG/M2 | TEMPERATURE: 98 F | SYSTOLIC BLOOD PRESSURE: 137 MMHG | OXYGEN SATURATION: 100 % | RESPIRATION RATE: 16 BRPM | HEART RATE: 67 BPM | HEIGHT: 62 IN | WEIGHT: 126.75 LBS | DIASTOLIC BLOOD PRESSURE: 73 MMHG

## 2021-12-23 DIAGNOSIS — R63.4 ABNORMAL WEIGHT LOSS: Primary | ICD-10-CM

## 2021-12-23 DIAGNOSIS — D64.9 ANEMIA, UNSPECIFIED TYPE: ICD-10-CM

## 2021-12-23 DIAGNOSIS — R53.1 WEAKNESS: ICD-10-CM

## 2021-12-23 DIAGNOSIS — D53.9 NUTRITIONAL ANEMIA: ICD-10-CM

## 2021-12-23 DIAGNOSIS — N18.31 STAGE 3A CHRONIC KIDNEY DISEASE: ICD-10-CM

## 2021-12-23 DIAGNOSIS — R63.4 UNINTENTIONAL WEIGHT LOSS OF 10% BODY WEIGHT WITHIN 6 MONTHS: ICD-10-CM

## 2021-12-23 LAB
ALBUMIN SERPL BCP-MCNC: 4 G/DL (ref 3.5–5.2)
ALP SERPL-CCNC: 100 U/L (ref 55–135)
ALT SERPL W/O P-5'-P-CCNC: 15 U/L (ref 10–44)
ANION GAP SERPL CALC-SCNC: 6 MMOL/L (ref 8–16)
AST SERPL-CCNC: 17 U/L (ref 10–40)
BASOPHILS # BLD AUTO: 0.05 K/UL (ref 0–0.2)
BASOPHILS NFR BLD: 0.6 % (ref 0–1.9)
BILIRUB SERPL-MCNC: 0.6 MG/DL (ref 0.1–1)
BUN SERPL-MCNC: 21 MG/DL (ref 8–23)
CALCIUM SERPL-MCNC: 9.7 MG/DL (ref 8.7–10.5)
CHLORIDE SERPL-SCNC: 106 MMOL/L (ref 95–110)
CK SERPL-CCNC: 69 U/L (ref 20–180)
CO2 SERPL-SCNC: 28 MMOL/L (ref 23–29)
CREAT SERPL-MCNC: 1.2 MG/DL (ref 0.5–1.4)
DIFFERENTIAL METHOD: ABNORMAL
EOSINOPHIL # BLD AUTO: 0.1 K/UL (ref 0–0.5)
EOSINOPHIL NFR BLD: 0.8 % (ref 0–8)
ERYTHROCYTE [DISTWIDTH] IN BLOOD BY AUTOMATED COUNT: 13.5 % (ref 11.5–14.5)
EST. GFR  (AFRICAN AMERICAN): 51 ML/MIN/1.73 M^2
EST. GFR  (NON AFRICAN AMERICAN): 44 ML/MIN/1.73 M^2
GLUCOSE SERPL-MCNC: 94 MG/DL (ref 70–110)
HCT VFR BLD AUTO: 35.5 % (ref 37–48.5)
HGB BLD-MCNC: 11.5 G/DL (ref 12–16)
IMM GRANULOCYTES # BLD AUTO: 0.01 K/UL (ref 0–0.04)
IMM GRANULOCYTES NFR BLD AUTO: 0.1 % (ref 0–0.5)
LYMPHOCYTES # BLD AUTO: 2.1 K/UL (ref 1–4.8)
LYMPHOCYTES NFR BLD: 26.2 % (ref 18–48)
MCH RBC QN AUTO: 30.9 PG (ref 27–31)
MCHC RBC AUTO-ENTMCNC: 32.4 G/DL (ref 32–36)
MCV RBC AUTO: 95 FL (ref 82–98)
MONOCYTES # BLD AUTO: 0.4 K/UL (ref 0.3–1)
MONOCYTES NFR BLD: 5.2 % (ref 4–15)
NEUTROPHILS # BLD AUTO: 5.3 K/UL (ref 1.8–7.7)
NEUTROPHILS NFR BLD: 67.1 % (ref 38–73)
NRBC BLD-RTO: 0 /100 WBC
PLATELET # BLD AUTO: 255 K/UL (ref 150–450)
PMV BLD AUTO: 10.3 FL (ref 9.2–12.9)
POTASSIUM SERPL-SCNC: 4.1 MMOL/L (ref 3.5–5.1)
PROT SERPL-MCNC: 7 G/DL (ref 6–8.4)
RBC # BLD AUTO: 3.72 M/UL (ref 4–5.4)
SODIUM SERPL-SCNC: 140 MMOL/L (ref 136–145)
WBC # BLD AUTO: 7.9 K/UL (ref 3.9–12.7)

## 2021-12-23 PROCEDURE — 99999 PR PBB SHADOW E&M-EST. PATIENT-LVL V: CPT | Mod: PBBFAC,,, | Performed by: INTERNAL MEDICINE

## 2021-12-23 PROCEDURE — 99214 PR OFFICE/OUTPT VISIT, EST, LEVL IV, 30-39 MIN: ICD-10-PCS | Mod: S$PBB,,, | Performed by: INTERNAL MEDICINE

## 2021-12-23 PROCEDURE — 82550 ASSAY OF CK (CPK): CPT | Performed by: INTERNAL MEDICINE

## 2021-12-23 PROCEDURE — 85025 COMPLETE CBC W/AUTO DIFF WBC: CPT | Performed by: INTERNAL MEDICINE

## 2021-12-23 PROCEDURE — 82085 ASSAY OF ALDOLASE: CPT | Performed by: INTERNAL MEDICINE

## 2021-12-23 PROCEDURE — 99215 OFFICE O/P EST HI 40 MIN: CPT | Mod: PBBFAC | Performed by: INTERNAL MEDICINE

## 2021-12-23 PROCEDURE — 80053 COMPREHEN METABOLIC PANEL: CPT | Performed by: INTERNAL MEDICINE

## 2021-12-23 PROCEDURE — 99214 OFFICE O/P EST MOD 30 MIN: CPT | Mod: S$PBB,,, | Performed by: INTERNAL MEDICINE

## 2021-12-23 PROCEDURE — 36415 COLL VENOUS BLD VENIPUNCTURE: CPT | Performed by: INTERNAL MEDICINE

## 2021-12-23 PROCEDURE — 99999 PR PBB SHADOW E&M-EST. PATIENT-LVL V: ICD-10-PCS | Mod: PBBFAC,,, | Performed by: INTERNAL MEDICINE

## 2021-12-24 LAB — ALDOLASE SERPL-CCNC: 3.6 U/L (ref 1.2–7.6)

## 2021-12-29 ENCOUNTER — IMMUNIZATION (OUTPATIENT)
Dept: FAMILY MEDICINE | Facility: CLINIC | Age: 76
End: 2021-12-29
Payer: MEDICARE

## 2021-12-29 PROCEDURE — 90694 VACC AIIV4 NO PRSRV 0.5ML IM: CPT | Mod: PBBFAC,PO

## 2021-12-29 PROCEDURE — G0008 ADMIN INFLUENZA VIRUS VAC: HCPCS | Mod: PBBFAC,PO

## 2022-01-31 ENCOUNTER — HOSPITAL ENCOUNTER (OUTPATIENT)
Dept: RADIOLOGY | Facility: HOSPITAL | Age: 77
Discharge: HOME | End: 2022-01-31
Attending: INTERNAL MEDICINE
Payer: MEDICARE

## 2022-01-31 DIAGNOSIS — R63.4 ABNORMAL WEIGHT LOSS: ICD-10-CM

## 2022-01-31 PROCEDURE — 74177 CT ABD & PELVIS W/CONTRAST: CPT | Mod: TC

## 2022-01-31 PROCEDURE — 25500020 PHARM REV CODE 255: Performed by: INTERNAL MEDICINE

## 2022-01-31 PROCEDURE — A9698 NON-RAD CONTRAST MATERIALNOC: HCPCS | Performed by: INTERNAL MEDICINE

## 2022-01-31 PROCEDURE — 71260 CT THORAX DX C+: CPT | Mod: TC

## 2022-01-31 RX ADMIN — IOHEXOL 100 ML: 350 INJECTION, SOLUTION INTRAVENOUS at 05:01

## 2022-01-31 RX ADMIN — IOHEXOL 1000 ML: 9 SOLUTION ORAL at 04:01

## 2022-02-01 ENCOUNTER — TELEPHONE (OUTPATIENT)
Dept: HEMATOLOGY/ONCOLOGY | Facility: CLINIC | Age: 77
End: 2022-02-01
Payer: MEDICARE

## 2022-02-01 NOTE — TELEPHONE ENCOUNTER
Spoke to pt she said that she had her scan yesterday and she not sure why she have to wait until march to see the doctor informed her that she can come tomorrow 2/2. Pt verbalized understanding.

## 2022-02-01 NOTE — TELEPHONE ENCOUNTER
----- Message from Mahogany Srivastava sent at 2/1/2022 12:29 PM CST -----  Contact: self [phone]  Would like to consult with nurse regarding her appts for 03/23, please call her at [phone]. Thanks ah

## 2022-02-02 ENCOUNTER — LAB VISIT (OUTPATIENT)
Dept: LAB | Facility: HOSPITAL | Age: 77
End: 2022-02-02
Attending: INTERNAL MEDICINE
Payer: MEDICARE

## 2022-02-02 ENCOUNTER — OFFICE VISIT (OUTPATIENT)
Dept: HEMATOLOGY/ONCOLOGY | Facility: CLINIC | Age: 77
End: 2022-02-02
Payer: MEDICARE

## 2022-02-02 VITALS
TEMPERATURE: 97 F | HEART RATE: 85 BPM | HEIGHT: 63 IN | WEIGHT: 128.31 LBS | DIASTOLIC BLOOD PRESSURE: 76 MMHG | SYSTOLIC BLOOD PRESSURE: 158 MMHG | OXYGEN SATURATION: 100 % | RESPIRATION RATE: 18 BRPM | BODY MASS INDEX: 22.73 KG/M2

## 2022-02-02 DIAGNOSIS — N18.31 STAGE 3A CHRONIC KIDNEY DISEASE: ICD-10-CM

## 2022-02-02 DIAGNOSIS — R63.4 UNINTENTIONAL WEIGHT LOSS OF 10% BODY WEIGHT WITHIN 6 MONTHS: ICD-10-CM

## 2022-02-02 DIAGNOSIS — D64.9 ANEMIA, UNSPECIFIED TYPE: ICD-10-CM

## 2022-02-02 DIAGNOSIS — D21.9 FIBROID: Primary | ICD-10-CM

## 2022-02-02 LAB
ALBUMIN SERPL BCP-MCNC: 4 G/DL (ref 3.5–5.2)
ALP SERPL-CCNC: 91 U/L (ref 55–135)
ALT SERPL W/O P-5'-P-CCNC: 11 U/L (ref 10–44)
ANION GAP SERPL CALC-SCNC: 8 MMOL/L (ref 8–16)
AST SERPL-CCNC: 16 U/L (ref 10–40)
BASOPHILS # BLD AUTO: 0.04 K/UL (ref 0–0.2)
BASOPHILS NFR BLD: 0.6 % (ref 0–1.9)
BILIRUB SERPL-MCNC: 0.4 MG/DL (ref 0.1–1)
BUN SERPL-MCNC: 12 MG/DL (ref 8–23)
CALCIUM SERPL-MCNC: 10.4 MG/DL (ref 8.7–10.5)
CHLORIDE SERPL-SCNC: 97 MMOL/L (ref 95–110)
CO2 SERPL-SCNC: 29 MMOL/L (ref 23–29)
CREAT SERPL-MCNC: 1.2 MG/DL (ref 0.5–1.4)
DIFFERENTIAL METHOD: ABNORMAL
EOSINOPHIL # BLD AUTO: 0.1 K/UL (ref 0–0.5)
EOSINOPHIL NFR BLD: 0.7 % (ref 0–8)
ERYTHROCYTE [DISTWIDTH] IN BLOOD BY AUTOMATED COUNT: 13.5 % (ref 11.5–14.5)
EST. GFR  (AFRICAN AMERICAN): 51 ML/MIN/1.73 M^2
EST. GFR  (NON AFRICAN AMERICAN): 44 ML/MIN/1.73 M^2
GLUCOSE SERPL-MCNC: 105 MG/DL (ref 70–110)
HCT VFR BLD AUTO: 37.2 % (ref 37–48.5)
HGB BLD-MCNC: 12 G/DL (ref 12–16)
IMM GRANULOCYTES # BLD AUTO: 0.02 K/UL (ref 0–0.04)
IMM GRANULOCYTES NFR BLD AUTO: 0.3 % (ref 0–0.5)
LYMPHOCYTES # BLD AUTO: 1.9 K/UL (ref 1–4.8)
LYMPHOCYTES NFR BLD: 27.2 % (ref 18–48)
MCH RBC QN AUTO: 31.3 PG (ref 27–31)
MCHC RBC AUTO-ENTMCNC: 32.3 G/DL (ref 32–36)
MCV RBC AUTO: 97 FL (ref 82–98)
MONOCYTES # BLD AUTO: 0.5 K/UL (ref 0.3–1)
MONOCYTES NFR BLD: 6.6 % (ref 4–15)
NEUTROPHILS # BLD AUTO: 4.4 K/UL (ref 1.8–7.7)
NEUTROPHILS NFR BLD: 64.6 % (ref 38–73)
NRBC BLD-RTO: 0 /100 WBC
PLATELET # BLD AUTO: 246 K/UL (ref 150–450)
PMV BLD AUTO: 10.2 FL (ref 9.2–12.9)
POTASSIUM SERPL-SCNC: 4.6 MMOL/L (ref 3.5–5.1)
PROT SERPL-MCNC: 7 G/DL (ref 6–8.4)
RBC # BLD AUTO: 3.83 M/UL (ref 4–5.4)
SODIUM SERPL-SCNC: 134 MMOL/L (ref 136–145)
WBC # BLD AUTO: 6.87 K/UL (ref 3.9–12.7)

## 2022-02-02 PROCEDURE — 99999 PR PBB SHADOW E&M-EST. PATIENT-LVL IV: ICD-10-PCS | Mod: PBBFAC,,, | Performed by: INTERNAL MEDICINE

## 2022-02-02 PROCEDURE — 99214 OFFICE O/P EST MOD 30 MIN: CPT | Mod: PBBFAC | Performed by: INTERNAL MEDICINE

## 2022-02-02 PROCEDURE — 99999 PR PBB SHADOW E&M-EST. PATIENT-LVL IV: CPT | Mod: PBBFAC,,, | Performed by: INTERNAL MEDICINE

## 2022-02-02 PROCEDURE — 85025 COMPLETE CBC W/AUTO DIFF WBC: CPT | Performed by: INTERNAL MEDICINE

## 2022-02-02 PROCEDURE — 99214 PR OFFICE/OUTPT VISIT, EST, LEVL IV, 30-39 MIN: ICD-10-PCS | Mod: S$PBB,,, | Performed by: INTERNAL MEDICINE

## 2022-02-02 PROCEDURE — 99214 OFFICE O/P EST MOD 30 MIN: CPT | Mod: S$PBB,,, | Performed by: INTERNAL MEDICINE

## 2022-02-02 PROCEDURE — 36415 COLL VENOUS BLD VENIPUNCTURE: CPT | Performed by: INTERNAL MEDICINE

## 2022-02-02 PROCEDURE — 80053 COMPREHEN METABOLIC PANEL: CPT | Performed by: INTERNAL MEDICINE

## 2022-02-02 NOTE — ASSESSMENT & PLAN NOTE
Normocytic anemia is multifactorial including chronic inflammation due to hypertension and chronic kidney disease.  CBC from 12/02/2021 showed hemoglobin of 11.9 grams/deciliters, hematocrit 37.6% with normal MCV.  Noted normal B12, folate and thyroid levels.  Workup for plasma cell dyscrasia was unremarkable with no evidence of paraprotein.  Iron studies showed adequate iron saturation and ferritin above 100 nanograms/cc.     Advised that anemia is likely related to chronic kidney disease.  Given hemoglobin above 10 grams/deciliter, there is no indication for erythropoietin stimulating agent but could be considered in future to maintain hemoglobin between 10 and 11 grams/deciliters.     Up-to-date with screening colonoscopy with most recent in 2015. Return in 3 months with repeat labs or sooner if needed.

## 2022-02-02 NOTE — PROGRESS NOTES
Subjective:   Date of Visit: 2/2/22   ?   ?    REFERRING PROVIDER: No referring provider defined for this encounter.   ?   CHIEF COMPLAINT:  Anemia???????   ?  HPI:  76-year-old female with history of hypertension, chronic back pain and chronic kidney disease was referred to us by Dr. Hill for evaluation and management of anemia.  Most recent CBC from 11/3/21 showed hemoglobin of 11.3 grams/deciliter, hematocrit 35.7% with MCV of 97.    She endorses chronic fatigue and generalized weakness but denies chest pain or shortness of breath.  She denies abnormal bleeding including epistaxis, hemoptysis, hematemesis, hematochezia, melena or hematuria.    She is up-to-date with screening colonoscopy with most recent in 2015. No pertinent family history.      INTERVAL HISTORY:  76-year-old female who presents to us for routine visit and to discuss recent CT scan result. No new complaints.    Review of Systems   Constitutional: Positive for fatigue. Negative for activity change, appetite change, chills, fever and unexpected weight change.   HENT: Negative for hearing loss, mouth sores, nosebleeds, sore throat, tinnitus, trouble swallowing and voice change.    Eyes: Negative for visual disturbance.   Respiratory: Negative for cough, chest tightness and shortness of breath.    Cardiovascular: Negative for chest pain, palpitations and leg swelling.   Gastrointestinal: Negative for abdominal pain, anal bleeding, blood in stool, constipation, diarrhea, nausea and vomiting.   Genitourinary: Negative for dysuria, frequency, hematuria, pelvic pain, vaginal bleeding and vaginal pain.   Musculoskeletal: Positive for back pain and gait problem. Negative for arthralgias, joint swelling and neck pain.   Skin: Negative for color change, pallor, rash and wound.   Allergic/Immunologic: Negative for immunocompromised state.   Neurological: Positive for weakness. Negative for dizziness, tremors, syncope, speech difficulty,  light-headedness and headaches.   Hematological: Negative for adenopathy. Does not bruise/bleed easily.   Psychiatric/Behavioral: Negative for agitation, confusion, decreased concentration, hallucinations and sleep disturbance. The patient is not nervous/anxious.        ?   PAST MEDICAL HISTORY:   Past Medical History:   Diagnosis Date    Cataract     Chest pain     CKD (chronic kidney disease) stage 3, GFR 30-59 ml/min     Dry eyes     Gastritis     Hyperlipidemia     Hypertension     Insomnia     Osteopenia     ?     PAST SURGICAL HISTORY:   Past Surgical History:   Procedure Laterality Date    BREAST BIOPSY Left     , benign     SECTION      x 1    KNEE ARTHROSCOPY Left  approx    KNEE SURGERY      left arm surgery      left knee surgery        ?   ALLERGIES:   Allergies as of 2022 - Reviewed 2022   Allergen Reaction Noted    No known drug allergies  2013      ?   MEDICATIONS:?   Outpatient Medications Marked as Taking for the 22 encounter (Office Visit) with Ramiro Burden MD   Medication Sig Dispense Refill    CALCIUM CARBONATE (CALCIUM 600 ORAL) Take by mouth once daily.      cyanocobalamin 500 MCG tablet Take 500 mcg by mouth once daily.      diclofenac sodium (VOLTAREN) 1 % Gel Apply topically.      ferrous sulfate (FEOSOL) 325 mg (65 mg iron) Tab tablet Take 325 mg by mouth daily with breakfast.      garlic (GARLIQUE ORAL) Take by mouth.      hydroCHLOROthiazide (HYDRODIURIL) 12.5 MG Tab TAKE 1 TABLET(12.5 MG) BY MOUTH EVERY 48 HOURS AS NEEDED 90 tablet 3    multivitamin (THERAGRAN) per tablet Take 1 tablet by mouth once daily.      VIT A/C/E AC/ZNOX/CUPRIC OXIDE (EYE VITAMIN AND MINERALS ORAL) Take by mouth.        ?   SOCIAL HISTORY:?   Social History     Tobacco Use    Smoking status: Never Smoker    Smokeless tobacco: Never Used   Substance Use Topics    Alcohol use: No     Alcohol/week: 0.0 standard drinks        ?   FAMILY HISTORY:    family history includes Hypertension in her father and mother.   ?     Objective:      Physical Exam  Constitutional:       General: She is not in acute distress.     Appearance: She is well-developed. She is not ill-appearing or toxic-appearing.   HENT:      Head: Normocephalic and atraumatic.      Mouth/Throat:      Pharynx: No oropharyngeal exudate.   Eyes:      General: No scleral icterus.        Right eye: No discharge.         Left eye: No discharge.      Conjunctiva/sclera: Conjunctivae normal.      Pupils: Pupils are equal, round, and reactive to light.   Neck:      Thyroid: No thyromegaly.   Cardiovascular:      Rate and Rhythm: Normal rate and regular rhythm.      Heart sounds: No murmur heard.      Pulmonary:      Effort: Pulmonary effort is normal. No respiratory distress.      Breath sounds: Normal breath sounds.   Chest:      Chest wall: No tenderness.   Breasts:      Right: No supraclavicular adenopathy.      Left: No supraclavicular adenopathy.       Abdominal:      General: Bowel sounds are normal. There is no distension.      Palpations: Abdomen is soft. There is no mass.      Tenderness: There is no abdominal tenderness. There is no guarding or rebound.   Musculoskeletal:         General: No tenderness. Normal range of motion.      Cervical back: Normal range of motion and neck supple.   Lymphadenopathy:      Cervical: No cervical adenopathy.      Right cervical: No superficial cervical adenopathy.     Left cervical: No superficial cervical adenopathy.      Upper Body:      Right upper body: No supraclavicular or pectoral adenopathy.      Left upper body: No supraclavicular or pectoral adenopathy.      Lower Body: No right inguinal adenopathy. No left inguinal adenopathy.   Skin:     General: Skin is warm and dry.      Capillary Refill: Capillary refill takes 2 to 3 seconds.      Coloration: Skin is not pale.      Findings: No erythema or rash.   Neurological:      Mental Status: She is alert  and oriented to person, place, and time.      Cranial Nerves: No cranial nerve deficit.      Sensory: No sensory deficit.   Psychiatric:         Behavior: Behavior normal. Behavior is cooperative.         Judgment: Judgment normal.       ?   Vitals:    02/02/22 1407   BP: (!) 158/76   Pulse: 85   Resp: 18   Temp: 96.6 °F (35.9 °C)      ?     ECOG SCORE    2 - Capable of all selfcare but unable to carry out any work activities, active > 50% of hours       Laboratory:  ?   No visits with results within 1 Day(s) from this visit.   Latest known visit with results is:   Lab Visit on 01/31/2022   Component Date Value Ref Range Status    Creatinine 01/31/2022 1.1  0.5 - 1.4 mg/dL Final    eGFR if  01/31/2022 57* >60 mL/min/1.73 m^2 Final    eGFR if non African American 01/31/2022 49* >60 mL/min/1.73 m^2 Final      ?   Tumor markers   ?   ?   Imaging: CT Chest Abdomen Pelvis With Contrast  Narrative: EXAMINATION:  CT CHEST ABDOMEN PELVIS WITH CONTRAST (XPD)    CLINICAL HISTORY:  unintentional weight loss;Abnormal weight loss    TECHNIQUE:  Low dose axial images, sagittal and coronal reformations were obtained from the thoracic inlet to the pubic synthesis following intravenous administration of 100 cc Omnipaque 350 and oral administration of 1000 cc Omnipaque 9. All CT scans at this facility are performed using dose optimization techniques including the following: automated exposure control; adjustment of the mA and/or kV; use of iterative reconstruction technique.    COMPARISON:  Abdominal radiograph 01/08/2020, complete abdominal ultrasound 08/08/2013    FINDINGS:  Thoracic soft tissues: No significant abnormality.    Aorta: Normal in course and caliber, without significant atherosclerotic plaque. There are three branching vessels at the arch.    Heart: Normal in size. No pericardial effusion.    Shantal/Mediastinum: No significant lymphadenopathy    Lungs: Well aerated, without consolidation or pleural  fluid. There is mild linear density at the lung bases, compatible with subsegmental atelectasis or fibrosis.    Liver: Normal in size and attenuation.  A 6 mm hypodensity within the posterior aspect of hepatic segment 7 is too small to characterize.    Gallbladder: No calcified gallstones.    Bile Ducts: No evidence of dilated ducts.    Pancreas: No mass or peripancreatic fat stranding.    Spleen: Unremarkable.    Adrenals: Unremarkable.    Kidneys/ Ureters: Normal in size and location. Normal concentration and excretion of contrast. No hydronephrosis or nephrolithiasis. No ureteral dilatation.    Bladder: No evidence of wall thickening.    Reproductive organs: A 1.4 cm heterogeneously hyperdense lesion lies along the anterior aspect of the uterine fundus, and may correlate with a fibroid.  Further evaluation with dedicated pelvic ultrasound would allow for additional characterization.    GI Tract/Mesentery: No evidence of bowel obstruction or inflammation. The appendix is normal.  The stomach is distended with ingested contrast.    Peritoneal Space: No ascites. No free air.    Retroperitoneum: No significant adenopathy.    Abdominal wall: There is a tiny fat containing umbilical hernia.    Vasculature: There is mild aortoiliac atherosclerosis.  No aneurysm.    Bones: There is mild compression deformity at the superior endplate of T9 with approximately 25% height loss.  There is mild degenerative change of the spine with grade 1 retrolisthesis of L2 on L3. No concerning sclerotic or lytic osseous lesions.  Impression: No significant CT abnormality to definitively suggest neoplastic disease within the chest, abdomen, or pelvis.    6 mm right hepatic hypodensity, which is too small to characterize.    Nonspecific 1.4 cm hyperdense uterine lesion, which may correlate with a fibroid.  Further evaluation with dedicated pelvic ultrasound would allow for additional characterization.    Degenerative change of the spine with  mild compression deformity at the superior endplate of T9 with approximately 25% height loss.    Electronically signed by: Jennifer Tejeda  Date:    02/01/2022  Time:    09:02     ?    Pathology:  Pathology Results  (Last 10 years)    None         ?   Assessment/Plan:       1. Fibroid    2. Anemia, unspecified type    3. Stage 3a chronic kidney disease    4. Unintentional weight loss of 10% body weight within 6 months          Anemia  Normocytic anemia is multifactorial including chronic inflammation due to hypertension and chronic kidney disease.  CBC from 12/02/2021 showed hemoglobin of 11.9 grams/deciliters, hematocrit 37.6% with normal MCV.  Noted normal B12, folate and thyroid levels.  Workup for plasma cell dyscrasia was unremarkable with no evidence of paraprotein.  Iron studies showed adequate iron saturation and ferritin above 100 nanograms/cc.     Advised that anemia is likely related to chronic kidney disease.  Given hemoglobin above 10 grams/deciliter, there is no indication for erythropoietin stimulating agent but could be considered in future to maintain hemoglobin between 10 and 11 grams/deciliters.     Up-to-date with screening colonoscopy with most recent in 2015. Return in 3 months with repeat labs or sooner if needed.         Stage 3a chronic kidney disease  Followed by Nephrology    Unintentional weight loss of 10% body weight within 6 months  CT scan of chest abdomen pelvis was obtained to occult in the showed no abnormality to suggest neoplastic chest.  There was 6 mm right hepatic was too small.  Also noted 1 4 cm hypodense uterine lesion which was thought to be fibroid.  Will obtain ultrasound for further characterization and referred to gyn. Also noted compression deformity at the superior endplate of T9.  There is no evidence occult malignancy.  Will place referral to Nutrition for evaluation regarding weight loss and loss of appetite.    Generalized fatigue: May need neurology  evaluation. Defer to PCP.     ?Fibroid  -     Ambulatory referral/consult to Gynecology; Future; Expected date: 02/09/2022  -     US Pelvis Complete Non OB; Future; Expected date: 02/02/2022    Anemia, unspecified type  -     CBC Auto Differential; Future; Expected date: 02/02/2022  -     Comprehensive Metabolic Panel; Future; Expected date: 02/02/2022  -     Ambulatory referral/consult to Gynecology; Future; Expected date: 02/09/2022  -     US Pelvis Complete Non OB; Future; Expected date: 02/02/2022  -     Ambulatory referral/consult to Hematology/Oncology/Nutrition; Future; Expected date: 02/09/2022  -     CBC Auto Differential; Future; Expected date: 02/02/2022  -     Comprehensive Metabolic Panel; Future; Expected date: 02/02/2022    Stage 3a chronic kidney disease  -     CBC Auto Differential; Future; Expected date: 02/02/2022  -     Comprehensive Metabolic Panel; Future; Expected date: 02/02/2022  -     Ambulatory referral/consult to Gynecology; Future; Expected date: 02/09/2022  -     US Pelvis Complete Non OB; Future; Expected date: 02/02/2022  -     Ambulatory referral/consult to Hematology/Oncology/Nutrition; Future; Expected date: 02/09/2022  -     CBC Auto Differential; Future; Expected date: 02/02/2022  -     Comprehensive Metabolic Panel; Future; Expected date: 02/02/2022    Unintentional weight loss of 10% body weight within 6 months  -     Ambulatory referral/consult to Hematology/Oncology/Nutrition; Future; Expected date: 02/09/2022  -     CBC Auto Differential; Future; Expected date: 02/02/2022  -     Comprehensive Metabolic Panel; Future; Expected date: 02/02/2022    ?   Follow-Up: Follow up in about 3 months (around 5/2/2022).    GENE KHAN Md., Ph.D  Hematology & Oncology Department  Phone #: [phone]

## 2022-02-02 NOTE — ASSESSMENT & PLAN NOTE
CT scan of chest abdomen pelvis was obtained to occult in the showed no abnormality to suggest neoplastic chest.  There was 6 mm right hepatic was too small.  Also noted 1 4 cm hypodense uterine lesion which was thought to be fibroid.  Will obtain ultrasound for further characterization and referred to gyn. Also noted compression deformity at the superior endplate of T9.  There is no evidence occult malignancy.  Will place referral to Nutrition for evaluation regarding weight loss and loss of appetite.

## 2022-02-03 ENCOUNTER — OFFICE VISIT (OUTPATIENT)
Dept: NEPHROLOGY | Facility: CLINIC | Age: 77
End: 2022-02-03
Payer: MEDICARE

## 2022-02-03 VITALS
DIASTOLIC BLOOD PRESSURE: 80 MMHG | SYSTOLIC BLOOD PRESSURE: 160 MMHG | HEART RATE: 73 BPM | WEIGHT: 127.88 LBS | BODY MASS INDEX: 22.66 KG/M2 | HEIGHT: 63 IN

## 2022-02-03 DIAGNOSIS — I12.9 PARENCHYMAL RENAL HYPERTENSION, STAGE 1 THROUGH STAGE 4 OR UNSPECIFIED CHRONIC KIDNEY DISEASE: ICD-10-CM

## 2022-02-03 DIAGNOSIS — N18.31 STAGE 3A CHRONIC KIDNEY DISEASE: Primary | ICD-10-CM

## 2022-02-03 PROCEDURE — 99214 PR OFFICE/OUTPT VISIT, EST, LEVL IV, 30-39 MIN: ICD-10-PCS | Mod: S$PBB,,, | Performed by: INTERNAL MEDICINE

## 2022-02-03 PROCEDURE — 99999 PR PBB SHADOW E&M-EST. PATIENT-LVL III: CPT | Mod: PBBFAC,,, | Performed by: INTERNAL MEDICINE

## 2022-02-03 PROCEDURE — 99999 PR PBB SHADOW E&M-EST. PATIENT-LVL III: ICD-10-PCS | Mod: PBBFAC,,, | Performed by: INTERNAL MEDICINE

## 2022-02-03 PROCEDURE — 99214 OFFICE O/P EST MOD 30 MIN: CPT | Mod: S$PBB,,, | Performed by: INTERNAL MEDICINE

## 2022-02-03 PROCEDURE — 99213 OFFICE O/P EST LOW 20 MIN: CPT | Mod: PBBFAC | Performed by: INTERNAL MEDICINE

## 2022-02-03 NOTE — PROGRESS NOTES
"Subjective:       Patient ID: Mary Flanagan is a 76 y.o. female.    Chief Complaint: Chronic Kidney Disease    HPI    She presents to clinic today for routine follow-up.  Since her last office visit she has been doing well.  Her primary complaint today is of generalized weakness which seems to be worsening over the past several months.  She want to make sure that her kidney function had remained stable in that her kidneys were not the cause of her weakness.  Her laboratory studies and medications were reviewed.  All Nephrology related questions were answered to her satisfaction.      Review of Systems   Constitutional: Positive for fatigue.   HENT: Negative.    Eyes: Negative.    Respiratory: Negative.    Cardiovascular: Negative.    Gastrointestinal: Negative.    Endocrine: Negative.    Genitourinary: Negative.    Musculoskeletal: Negative.    Skin: Negative.    Neurological: Positive for weakness.         BP (!) 160/80   Pulse 73   Ht 5' 3" (1.6 m)   Wt 58 kg (127 lb 13.9 oz)   BMI 22.65 kg/m²     Lab Results   Component Value Date    WBC 6.87 02/02/2022    HGB 12.0 02/02/2022    HCT 37.2 02/02/2022    MCV 97 02/02/2022     02/02/2022      BMP  Lab Results   Component Value Date     (L) 02/02/2022    K 4.6 02/02/2022    CL 97 02/02/2022    CO2 29 02/02/2022    BUN 12 02/02/2022    CREATININE 1.2 02/02/2022    CALCIUM 10.4 02/02/2022    ANIONGAP 8 02/02/2022    ESTGFRAFRICA 51 (A) 02/02/2022    EGFRNONAA 44 (A) 02/02/2022     CMP  Sodium   Date Value Ref Range Status   02/02/2022 134 (L) 136 - 145 mmol/L Final     Potassium   Date Value Ref Range Status   02/02/2022 4.6 3.5 - 5.1 mmol/L Final     Chloride   Date Value Ref Range Status   02/02/2022 97 95 - 110 mmol/L Final     CO2   Date Value Ref Range Status   02/02/2022 29 23 - 29 mmol/L Final     Glucose   Date Value Ref Range Status   02/02/2022 105 70 - 110 mg/dL Final     BUN   Date Value Ref Range Status   02/02/2022 12 8 - 23 mg/dL Final "     Creatinine   Date Value Ref Range Status   02/02/2022 1.2 0.5 - 1.4 mg/dL Final     Calcium   Date Value Ref Range Status   02/02/2022 10.4 8.7 - 10.5 mg/dL Final     Total Protein   Date Value Ref Range Status   02/02/2022 7.0 6.0 - 8.4 g/dL Final     Albumin   Date Value Ref Range Status   02/02/2022 4.0 3.5 - 5.2 g/dL Final     Total Bilirubin   Date Value Ref Range Status   02/02/2022 0.4 0.1 - 1.0 mg/dL Final     Comment:     For infants and newborns, interpretation of results should be based  on gestational age, weight and in agreement with clinical  observations.    Premature Infant recommended reference ranges:  Up to 24 hours.............<8.0 mg/dL  Up to 48 hours............<12.0 mg/dL  3-5 days..................<15.0 mg/dL  6-29 days.................<15.0 mg/dL       Alkaline Phosphatase   Date Value Ref Range Status   02/02/2022 91 55 - 135 U/L Final     AST   Date Value Ref Range Status   02/02/2022 16 10 - 40 U/L Final     ALT   Date Value Ref Range Status   02/02/2022 11 10 - 44 U/L Final     Anion Gap   Date Value Ref Range Status   02/02/2022 8 8 - 16 mmol/L Final     eGFR if    Date Value Ref Range Status   02/02/2022 51 (A) >60 mL/min/1.73 m^2 Final     eGFR if non    Date Value Ref Range Status   02/02/2022 44 (A) >60 mL/min/1.73 m^2 Final     Comment:     Calculation used to obtain the estimated glomerular filtration  rate (eGFR) is the CKD-EPI equation.             Current Outpatient Medications on File Prior to Visit   Medication Sig Dispense Refill    diclofenac sodium (VOLTAREN) 1 % Gel Apply topically.      ferrous sulfate (FEOSOL) 325 mg (65 mg iron) Tab tablet Take 325 mg by mouth daily with breakfast.      garlic (GARLIQUE ORAL) Take by mouth.      hydroCHLOROthiazide (HYDRODIURIL) 12.5 MG Tab TAKE 1 TABLET(12.5 MG) BY MOUTH EVERY 48 HOURS AS NEEDED 90 tablet 3    multivitamin (THERAGRAN) per tablet Take 1 tablet by mouth once daily.      VIT  A/C/E AC/ZNOX/CUPRIC OXIDE (EYE VITAMIN AND MINERALS ORAL) Take by mouth.      [DISCONTINUED] CALCIUM CARBONATE (CALCIUM 600 ORAL) Take by mouth once daily.      [DISCONTINUED] cyanocobalamin 500 MCG tablet Take 500 mcg by mouth once daily.       No current facility-administered medications on file prior to visit.         Objective:            Physical Exam  Constitutional:       Appearance: Normal appearance.   HENT:      Head: Normocephalic and atraumatic.   Eyes:      General: No scleral icterus.     Extraocular Movements: Extraocular movements intact.      Pupils: Pupils are equal, round, and reactive to light.   Pulmonary:      Effort: Pulmonary effort is normal.      Breath sounds: No stridor.   Musculoskeletal:      Right lower leg: No edema.      Left lower leg: No edema.   Skin:     General: Skin is warm and dry.   Neurological:      General: No focal deficit present.      Mental Status: She is alert and oriented to person, place, and time.   Psychiatric:         Mood and Affect: Mood normal.         Behavior: Behavior normal.         Assessment:       1. Stage 3a chronic kidney disease    2. Parenchymal renal hypertension, stage 1 through stage 4 or unspecified chronic kidney disease        Plan:       1. Creatinine has remained stable ranging between 1.1 and 1.3 for the past several years.  Most recent chemistry shows creatinine of 1.2.  EGFR is normal for her age.  Potassium is stable at 4.6.  Serum bicarbonate is stable at 29.  There is no evidence of volume overload.    2. Blood pressure was mildly elevated in the clinic today.  She relates that she checks her blood pressures at home and her systolic blood pressures often times run in the 115-120 range.  She states that her blood pressure is usually higher when she goes to the doctor.  Will continue to monitor.        Moose Hoskins MD

## 2022-02-04 ENCOUNTER — TELEPHONE (OUTPATIENT)
Dept: RADIOLOGY | Facility: HOSPITAL | Age: 77
End: 2022-02-04
Payer: MEDICARE

## 2022-02-07 ENCOUNTER — HOSPITAL ENCOUNTER (OUTPATIENT)
Dept: RADIOLOGY | Facility: HOSPITAL | Age: 77
Discharge: HOME | End: 2022-02-07
Attending: INTERNAL MEDICINE
Payer: MEDICARE

## 2022-02-07 DIAGNOSIS — N18.31 STAGE 3A CHRONIC KIDNEY DISEASE: ICD-10-CM

## 2022-02-07 DIAGNOSIS — D64.9 ANEMIA, UNSPECIFIED TYPE: ICD-10-CM

## 2022-02-07 DIAGNOSIS — D21.9 FIBROID: ICD-10-CM

## 2022-02-07 PROCEDURE — 76830 TRANSVAGINAL US NON-OB: CPT | Mod: 26,,, | Performed by: RADIOLOGY

## 2022-02-07 PROCEDURE — 76856 US EXAM PELVIC COMPLETE: CPT | Mod: 26,,, | Performed by: RADIOLOGY

## 2022-02-07 PROCEDURE — 76830 US PELVIS COMP WITH TRANSVAG NON-OB (XPD): ICD-10-PCS | Mod: 26,,, | Performed by: RADIOLOGY

## 2022-02-07 PROCEDURE — 76856 US PELVIS COMP WITH TRANSVAG NON-OB (XPD): ICD-10-PCS | Mod: 26,,, | Performed by: RADIOLOGY

## 2022-02-07 PROCEDURE — 76830 TRANSVAGINAL US NON-OB: CPT | Mod: TC

## 2022-02-14 ENCOUNTER — TELEPHONE (OUTPATIENT)
Dept: NEPHROLOGY | Facility: CLINIC | Age: 77
End: 2022-02-14
Payer: MEDICARE

## 2022-02-14 NOTE — TELEPHONE ENCOUNTER
----- Message from Sulema Reynolds sent at 2/14/2022  1:10 PM CST -----  Regarding: report  Contact: pt  Pt requesting a written copy of her report. [phone]

## 2022-02-16 ENCOUNTER — TELEPHONE (OUTPATIENT)
Dept: NEUROLOGY | Facility: HOSPITAL | Age: 77
End: 2022-02-16
Payer: MEDICARE

## 2022-02-16 ENCOUNTER — OFFICE VISIT (OUTPATIENT)
Dept: OBSTETRICS AND GYNECOLOGY | Facility: CLINIC | Age: 77
End: 2022-02-16
Payer: MEDICARE

## 2022-02-16 VITALS
WEIGHT: 129.44 LBS | DIASTOLIC BLOOD PRESSURE: 78 MMHG | SYSTOLIC BLOOD PRESSURE: 142 MMHG | BODY MASS INDEX: 22.93 KG/M2 | HEIGHT: 63 IN

## 2022-02-16 DIAGNOSIS — D21.9 FIBROIDS: ICD-10-CM

## 2022-02-16 PROCEDURE — 99213 PR OFFICE/OUTPT VISIT, EST, LEVL III, 20-29 MIN: ICD-10-PCS | Mod: S$PBB,,, | Performed by: OBSTETRICS & GYNECOLOGY

## 2022-02-16 PROCEDURE — 99999 PR PBB SHADOW E&M-EST. PATIENT-LVL III: ICD-10-PCS | Mod: PBBFAC,,, | Performed by: OBSTETRICS & GYNECOLOGY

## 2022-02-16 PROCEDURE — 99999 PR PBB SHADOW E&M-EST. PATIENT-LVL III: CPT | Mod: PBBFAC,,, | Performed by: OBSTETRICS & GYNECOLOGY

## 2022-02-16 PROCEDURE — 99213 OFFICE O/P EST LOW 20 MIN: CPT | Mod: PBBFAC | Performed by: OBSTETRICS & GYNECOLOGY

## 2022-02-16 PROCEDURE — 99213 OFFICE O/P EST LOW 20 MIN: CPT | Mod: S$PBB,,, | Performed by: OBSTETRICS & GYNECOLOGY

## 2022-02-16 NOTE — PROGRESS NOTES
Subjective:       Patient ID: Mary Flanagan is a 76 y.o. female.    Chief Complaint:  Fibroids      History of Present Illness  HPI  Presents to review pelvic ultrasound findings.  Pt has been seeing various providers for general weakness.  Hematology ordered a CT C/A/P, and a possible uterine fibroid was noted.  No other pelvic masses.  Pelvic ultrasound was done, and shows a 1.5cm and 0.9cm fibroids.  Endometrial stripe 0.7mm.  Patient has no vaginal bleeding and no pelvic pain.  No hx of cervical dysplasia.    GYN & OB History  No LMP recorded. Patient is postmenopausal.   Date of Last Pap: No result found    OB History    Para Term  AB Living   3 3 3     3   SAB IAB Ectopic Multiple Live Births                  # Outcome Date GA Lbr Parish/2nd Weight Sex Delivery Anes PTL Lv   3 Term      Vag-Spont      2 Term      Vag-Spont      1 Term      Vag-Spont          Review of Systems  Review of Systems   Constitutional: Negative for fatigue, fever and unexpected weight change.   Gastrointestinal: Negative for abdominal pain, constipation, diarrhea, nausea and vomiting.   Genitourinary: Negative for dysuria, frequency, pelvic pain, urgency, vaginal bleeding, vaginal discharge, vaginal pain and vaginal odor.           Objective:    Physical Exam:   Constitutional: She is oriented to person, place, and time. She appears well-developed and well-nourished. No distress.                           Neurological: She is alert and oriented to person, place, and time.    Skin: No rash noted. No erythema. No pallor.    Psychiatric: She has a normal mood and affect. Her behavior is normal. Judgment and thought content normal.          Assessment:        1. Fibroids                Plan:      Mary was seen today for fibroids.    Diagnoses and all orders for this visit:    Fibroids    Reassured the patient that her fibroids are very small, and they have likely been present for many years.  This is a benign, incidental  finding that is causing no symptoms for her.  No further tests are needed regarding the fibroids, but advised her to RTC if she develops any vaginal bleeding.  Continue to work with PCP, heme/onc, and neuro for further evaluation of weakness.

## 2022-02-18 ENCOUNTER — CLINICAL SUPPORT (OUTPATIENT)
Dept: NEUROLOGY | Facility: HOSPITAL | Age: 77
End: 2022-02-18
Payer: MEDICARE

## 2022-02-18 VITALS — BODY MASS INDEX: 22.93 KG/M2 | HEIGHT: 63 IN | WEIGHT: 129.44 LBS

## 2022-02-18 DIAGNOSIS — N18.31 STAGE 3A CHRONIC KIDNEY DISEASE: ICD-10-CM

## 2022-02-18 DIAGNOSIS — D64.9 ANEMIA, UNSPECIFIED TYPE: ICD-10-CM

## 2022-02-18 DIAGNOSIS — R53.1 WEAKNESS: ICD-10-CM

## 2022-02-18 DIAGNOSIS — D64.9 NORMOCYTIC ANEMIA: ICD-10-CM

## 2022-02-18 DIAGNOSIS — R63.4 UNINTENTIONAL WEIGHT LOSS OF 10% BODY WEIGHT WITHIN 6 MONTHS: ICD-10-CM

## 2022-02-18 DIAGNOSIS — Z71.3 NUTRITIONAL COUNSELING: ICD-10-CM

## 2022-02-18 PROCEDURE — 97802 PR MED NUTR THER, 1ST, INDIV, EA 15 MIN: ICD-10-PCS | Mod: 95,,, | Performed by: DIETITIAN, REGISTERED

## 2022-02-18 PROCEDURE — 97802 MEDICAL NUTRITION INDIV IN: CPT | Mod: 95,,, | Performed by: DIETITIAN, REGISTERED

## 2022-02-18 NOTE — PROGRESS NOTES
MRN: [id number]    Referring Physician:  Ramiro Burden MD    Reason for Telemedicine Call: Nutrition Consult for Iron Deficiency Anemia    The patient location is: home  The chief complaint leading to consultation is: Fe Deficiency Anemia and Fatigue  Visit type: Virtual visit with synchronous audio and video. No   Total time spent with patient: 15 minutes    Each patient to whom he or she provides medical services by telemedicine is:    (1) informed of the relationship between the physician and patient and the respective role of any other health care provider with respect to management of the patient; and   (2) notified that he or she may decline to receive medical services by telemedicine and may withdraw from such care at any time.      Previous Medical History:  Past Medical History:   Diagnosis Date    Cataract     Chest pain     CKD (chronic kidney disease) stage 3, GFR 30-59 ml/min     Dry eyes     Gastritis     Hyperlipidemia     Hypertension     Insomnia     Osteopenia        Previous Surgical History:  Past Surgical History:   Procedure Laterality Date    BREAST BIOPSY Left     , benign     SECTION      x 1    KNEE ARTHROSCOPY Left  approx    KNEE SURGERY      left arm surgery      left knee surgery            Nutrition Assessment    Mary Flanagan is a 76 y.o. female referred for a telemedicine Nutrition Consultation related to Iron Deficiency Anemia. Patient seen by Hemo/ONC for this. Per her last clinic visit with Dr Robbins, she was diagnosis with Normocytic anemia is multifactorial including chronic inflammation due to hypertension and chronic kidney disease.  CBC from 2021 showed hemoglobin of 11.9 grams/deciliters, hematocrit 37.6% with normal MCV.  Noted normal B12, folate and thyroid levels.  Workup for plasma cell dyscrasia was unremarkable with no evidence of paraprotein.  Iron studies showed adequate iron saturation and ferritin above 100 nanograms/cc.  "She endorses fatigue and inability to participate in activities. She recently met with a neurologist and participated in PT in the recent past. Her weight appears stable on review of 2022 weight history.      Anthropometrics  Weight: 58.7 kg (129 lb 6.6 oz)  Height: 5' 3" (1.6 m)  IBW: Ideal body weight: 52.4 kg (115 lb 8.3 oz)  Adjusted ideal body weight: 54.9 kg (121 lb 1.2 oz)    Estimated body mass index is 22.92 kg/m²     BMI Weight Status   <16 Severe Thinness   16-16.9 Moderate Thinness   17.0-18.4 Mild Thinness   Below 18.5 Underweight   18.6-24.9 Normal/Healthy   25.0-29.9 Overweight   30.0-34.9 Obesity Class I   35.0-39.9 Obesity Class II   >40 Extreme Obesity   Class III       Weight History:  Wt Readings from Last 12 Encounters:   02/16/22 58.7 kg (129 lb 6.6 oz)   02/16/22 58.7 kg (129 lb 6.6 oz)   02/03/22 58 kg (127 lb 13.9 oz)   02/02/22 58.2 kg (128 lb 4.9 oz)   12/23/21 57.5 kg (126 lb 12.2 oz)   12/16/21 57.6 kg (126 lb 15.8 oz)   12/02/21 57.8 kg (127 lb 6.8 oz)   11/30/21 57.9 kg (127 lb 10.3 oz)   11/10/21 58 kg (127 lb 13.9 oz)   09/28/21 59.5 kg (131 lb 2.8 oz)   04/28/21 59.6 kg (131 lb 6.3 oz)   04/09/21 59.9 kg (132 lb 0.9 oz)       Weight Change when compared to Current Weight:   Wt Readings from Last 1 Encounters:   02/16/22 58.7 kg (129 lb 6.6 oz)        Weight Pounds %Change Significant Severe   1 Week - - - 1-2% >2%   1 Month - - - 5% >5%   3 Months - - - 7.5% >7.5%   6 Months - - - 10% >10%   1 Year - - - 20% >20%     Based on weight history, patient's weight is currently stable.    Malnutrition Assessment: n/a Telephone consult      Nutrition  Prescription:   Energy Needs: 1760 (30 kcal/kg)  Protein Needs: 60 (1.0 gm Protein/kg)  Fluid Needs:  1760 (1 mL/calorie)      Gastrointestinal Habits:   GI Symptoms: early satiety - feeling of fullness after eating a very small amount   Frequency: 1 to 2  bowel movement(s) per day when not talking Iron supplements  Consistency: brown formed " and hard      [place] Stool Scale: Type 2: Mild Constipation. Sausage-shaped, but lumpy or Type 3: Normal. Like a sausage but with cracks on its surface  Flushing: N/A      Nutrition History  Appetite: good     Meal Patterns: 2 meals daily and 1-2 snacks daily  Beverage Intake: drinks some regular beverages, some water    Food Intolerance: lactose and milk    Meal preparation/shopping: self   Physical Abilities: Cognitive ability to complete tasks for meal preparation, Physical ability to complete tasks for meal preparation, Physical ability to self-feed and Remembers to eat, recalls eating  Dining out: Infrequent, 1-2 times per week    Dentition: normal dentition for age                  Difficulty chewing or swallowing? No    Exercise Level: not active  Activities: activities of daily living only     ECOG Performance Status: (1) Restricted in physically strenuous activity, ambulatory and able to do work of light nature    Medication:  Medication reviewed for interactions. Yes - none      diclofenac sodium (VOLTAREN) 1 % Gel, Apply topically., Disp: , Rfl:     hydroCHLOROthiazide (HYDRODIURIL) 12.5 MG Tab, TAKE 1 TABLET(12.5 MG) BY MOUTH EVERY 48 HOURS AS NEEDED, Disp: 90 tablet, Rfl: 3    VIT A/C/E AC/ZNOX/CUPRIC OXIDE (EYE VITAMIN AND MINERALS ORAL), Take by mouth., Disp: , Rfl:     Vitamin/Supplements/Herbs:     ferrous sulfate (FEOSOL) 325 mg (65 mg iron) Tab tablet, Take 325 mg by mouth daily with breakfast., Disp: , Rfl:     garlic (GARLIQUE ORAL), Take by mouth., Disp: , Rfl:     multivitamin (THERAGRAN) per tablet, Take 1 tablet by mouth once daily., Disp: , Rfl:       Allergies:  Review of patient's allergies indicates:   Allergen Reactions    No known drug allergies        Labs:   Results for orders placed or performed in visit on 02/02/22   CBC Auto Differential   Result Value Ref Range    WBC 6.87 3.90 - 12.70 K/uL    RBC 3.83 (L) 4.00 - 5.40 M/uL    Hemoglobin 12.0 12.0 - 16.0 g/dL    Hematocrit  37.2 37.0 - 48.5 %    MCV 97 82 - 98 fL    MCH 31.3 (H) 27.0 - 31.0 pg    MCHC 32.3 32.0 - 36.0 g/dL    RDW 13.5 11.5 - 14.5 %    Platelets 246 150 - 450 K/uL    MPV 10.2 9.2 - 12.9 fL    Immature Granulocytes 0.3 0.0 - 0.5 %    Gran # (ANC) 4.4 1.8 - 7.7 K/uL    Immature Grans (Abs) 0.02 0.00 - 0.04 K/uL    Lymph # 1.9 1.0 - 4.8 K/uL    Mono # 0.5 0.3 - 1.0 K/uL    Eos # 0.1 0.0 - 0.5 K/uL    Baso # 0.04 0.00 - 0.20 K/uL    nRBC 0 0 /100 WBC    Gran % 64.6 38.0 - 73.0 %    Lymph % 27.2 18.0 - 48.0 %    Mono % 6.6 4.0 - 15.0 %    Eosinophil % 0.7 0.0 - 8.0 %    Basophil % 0.6 0.0 - 1.9 %    Differential Method Automated    Comprehensive Metabolic Panel   Result Value Ref Range    Sodium 134 (L) 136 - 145 mmol/L    Potassium 4.6 3.5 - 5.1 mmol/L    Chloride 97 95 - 110 mmol/L    CO2 29 23 - 29 mmol/L    Glucose 105 70 - 110 mg/dL    BUN 12 8 - 23 mg/dL    Creatinine 1.2 0.5 - 1.4 mg/dL    Calcium 10.4 8.7 - 10.5 mg/dL    Total Protein 7.0 6.0 - 8.4 g/dL    Albumin 4.0 3.5 - 5.2 g/dL    Total Bilirubin 0.4 0.1 - 1.0 mg/dL    Alkaline Phosphatase 91 55 - 135 U/L    AST 16 10 - 40 U/L    ALT 11 10 - 44 U/L    Anion Gap 8 8 - 16 mmol/L    eGFR if African American 51 (A) >60 mL/min/1.73 m^2    eGFR if non African American 44 (A) >60 mL/min/1.73 m^2        Nutrition Diagnosis    Nutrition Problem  Food- and nutrition-related knowledge deficit      Related to (etiology):   No prior nutrition related education regarding iron rich foods         Signs and Symptoms (as evidenced by):   Patient request for nutrition therapy for Fe deficiency Anemia           Nutrition Intervention    General/healthful diet - Consume a variety of fruits and vegetables up to 6 servings a day. Cook to improve digestibility.   Energy-modified diet - Consume 1760 calories every day.   Protein-modified diet - Consume 60 gm of Protein every day.   Fluid-modified diet - Aim to consume 64 ounces of fluids a day. Water being the preferred choice.    Nutrition Education  Result interpretation - Patient has a diagnosis of Normocytic Anemia likely related to CRI.       Nutrition Diagnosis Status:   Initial      Recommendations:  1. Continue with current diet as tolerated.     Written Information Provided and Explained:  1.  Iron Deficiency Anemia Nutrition Therapy       Nutrition Monitoring and Evaluation    Monitor: diet education needs       Goals:   1. Behavior: Sefl-management as agreed upon      Nutrition Education:     Discussed with: patient    Anticipated Barriers: emotional/psychosocial    Knowledge/Beliefs/Attitudes: Emotions;     Actions Taken: instruct/provide written information    Patient and/or family comprehend instructions: yes    Outcome: Verbalizes understanding    Comprehension: good     Motivation to change: high      Nutrition Communication, Consultation Time, and Follow Up:      Communication:   1. Note available in Epic for review      Consultation Time: 15 minutes.      Follow Up: RD contact information provided for questions. Patient encouraged to call for follow up appointment if further nutrition intervention warranted. , RD added to Care Team.

## 2022-02-18 NOTE — PATIENT INSTRUCTIONS
Nutrition Intervention    General/healthful diet - Consume a variety of fruits and vegetables up to 6 servings a day. Cook to improve digestibility.   Energy-modified diet - Consume 1760 calories every day.   Protein-modified diet - Consume 60 gm of Protein every day.   Fluid-modified diet - Aim to consume 64 ounces of fluids a day. Water being the preferred choice.   Nutrition Education  Result interpretation - Patient has a diagnosis of Normocytic Anemia likely related to CRI.       Recommendations:  1. Continue with current diet as tolerated.     Written Information Provided and Explained:  1.  Iron Deficiency Anemia Nutrition Therapy

## 2022-03-10 ENCOUNTER — OFFICE VISIT (OUTPATIENT)
Dept: FAMILY MEDICINE | Facility: CLINIC | Age: 77
End: 2022-03-10
Payer: MEDICARE

## 2022-03-10 ENCOUNTER — HOSPITAL ENCOUNTER (OUTPATIENT)
Dept: RADIOLOGY | Facility: HOSPITAL | Age: 77
Discharge: HOME | End: 2022-03-10
Attending: INTERNAL MEDICINE
Payer: MEDICARE

## 2022-03-10 VITALS
WEIGHT: 123.88 LBS | SYSTOLIC BLOOD PRESSURE: 110 MMHG | OXYGEN SATURATION: 99 % | BODY MASS INDEX: 21.95 KG/M2 | HEART RATE: 75 BPM | TEMPERATURE: 98 F | RESPIRATION RATE: 16 BRPM | DIASTOLIC BLOOD PRESSURE: 60 MMHG

## 2022-03-10 DIAGNOSIS — E55.9 VITAMIN D DEFICIENCY: ICD-10-CM

## 2022-03-10 DIAGNOSIS — R63.4 UNINTENTIONAL WEIGHT LOSS OF 10% BODY WEIGHT WITHIN 6 MONTHS: Primary | ICD-10-CM

## 2022-03-10 DIAGNOSIS — R63.4 UNINTENTIONAL WEIGHT LOSS OF 10% BODY WEIGHT WITHIN 6 MONTHS: ICD-10-CM

## 2022-03-10 DIAGNOSIS — R53.1 WEAKNESS: ICD-10-CM

## 2022-03-10 PROCEDURE — 71046 X-RAY EXAM CHEST 2 VIEWS: CPT | Mod: 26,,, | Performed by: RADIOLOGY

## 2022-03-10 PROCEDURE — 76700 US EXAM ABDOM COMPLETE: CPT | Mod: TC

## 2022-03-10 PROCEDURE — 99214 PR OFFICE/OUTPT VISIT, EST, LEVL IV, 30-39 MIN: ICD-10-PCS | Mod: S$PBB,,, | Performed by: INTERNAL MEDICINE

## 2022-03-10 PROCEDURE — 71046 X-RAY EXAM CHEST 2 VIEWS: CPT | Mod: TC,FY,PO

## 2022-03-10 PROCEDURE — 99214 OFFICE O/P EST MOD 30 MIN: CPT | Mod: S$PBB,,, | Performed by: INTERNAL MEDICINE

## 2022-03-10 PROCEDURE — 99999 PR PBB SHADOW E&M-EST. PATIENT-LVL V: ICD-10-PCS | Mod: PBBFAC,,, | Performed by: INTERNAL MEDICINE

## 2022-03-10 PROCEDURE — 71046 XR CHEST PA AND LATERAL: ICD-10-PCS | Mod: 26,,, | Performed by: RADIOLOGY

## 2022-03-10 PROCEDURE — 76700 US ABDOMEN COMPLETE: ICD-10-PCS | Mod: 26,,, | Performed by: RADIOLOGY

## 2022-03-10 PROCEDURE — 99215 OFFICE O/P EST HI 40 MIN: CPT | Mod: PBBFAC,PO,25 | Performed by: INTERNAL MEDICINE

## 2022-03-10 PROCEDURE — 99999 PR PBB SHADOW E&M-EST. PATIENT-LVL V: CPT | Mod: PBBFAC,,, | Performed by: INTERNAL MEDICINE

## 2022-03-10 PROCEDURE — 76700 US EXAM ABDOM COMPLETE: CPT | Mod: 26,,, | Performed by: RADIOLOGY

## 2022-03-10 NOTE — PROGRESS NOTES
Subjective:       Patient ID: Mary Flanagna is a 76 y.o. female.    Chief Complaint: Follow-up (4m), Weight Loss, and Weakness    Follow-up  Associated symptoms include arthralgias, myalgias and weakness. Pertinent negatives include no abdominal pain, chest pain, chills, congestion, coughing, diaphoresis, fatigue, fever, headaches, joint swelling, nausea, neck pain, numbness, rash, sore throat or vomiting.     Past Medical History:   Diagnosis Date    Cataract     Chest pain     CKD (chronic kidney disease) stage 3, GFR 30-59 ml/min     Dry eyes     Gastritis     Hyperlipidemia     Hypertension     Insomnia     Osteopenia      Past Surgical History:   Procedure Laterality Date    BREAST BIOPSY Left     , benign     SECTION      x 1    KNEE ARTHROSCOPY Left 2014 approx    KNEE SURGERY      left arm surgery      left knee surgery       Family History   Problem Relation Age of Onset    Hypertension Mother     Hypertension Father     Breast cancer Neg Hx     Colon cancer Neg Hx     Ovarian cancer Neg Hx     Thrombophilia Neg Hx     Strabismus Neg Hx     Macular degeneration Neg Hx     Retinal detachment Neg Hx     Glaucoma Neg Hx     Blindness Neg Hx     Amblyopia Neg Hx      Social History     Socioeconomic History    Marital status:     Number of children: 3   Occupational History    Occupation: retired   Tobacco Use    Smoking status: Never Smoker    Smokeless tobacco: Never Used   Substance and Sexual Activity    Alcohol use: No     Alcohol/week: 0.0 standard drinks    Drug use: No    Sexual activity: Not Currently     Partners: Male     Birth control/protection: None     Comment: mut monog     Social Determinants of Health     Financial Resource Strain: Low Risk     Difficulty of Paying Living Expenses: Not hard at all   Food Insecurity: No Food Insecurity    Worried About Running Out of Food in the Last Year: Never true    Ran Out of Food in the Last Year:  Never true   Transportation Needs: No Transportation Needs    Lack of Transportation (Medical): No    Lack of Transportation (Non-Medical): No   Physical Activity: Inactive    Days of Exercise per Week: 0 days    Minutes of Exercise per Session: 0 min   Stress: Stress Concern Present    Feeling of Stress : Very much   Social Connections: Socially Integrated    Frequency of Communication with Friends and Family: More than three times a week    Frequency of Social Gatherings with Friends and Family: Once a week    Attends [employer] Services: More than 4 times per year    Active Member of Clubs or Organizations: Yes    Attends Club or Organization Meetings: More than 4 times per year    Marital Status:    Housing Stability: Low Risk     Unable to Pay for Housing in the Last Year: No    Number of Places Lived in the Last Year: 1    Unstable Housing in the Last Year: No     Review of Systems   Constitutional: Positive for unexpected weight change. Negative for activity change, appetite change, chills, diaphoresis, fatigue and fever.   HENT: Negative for congestion, drooling, ear discharge, ear pain, facial swelling, hearing loss, mouth sores, nosebleeds, postnasal drip, rhinorrhea, sinus pressure, sneezing, sore throat, tinnitus, trouble swallowing and voice change.    Eyes: Negative for photophobia, redness and visual disturbance.   Respiratory: Negative for apnea, cough, choking, chest tightness, shortness of breath and wheezing.    Cardiovascular: Negative for chest pain and palpitations.   Gastrointestinal: Negative for abdominal distention, abdominal pain, blood in stool, constipation, diarrhea, nausea and vomiting.   Endocrine: Negative for cold intolerance, heat intolerance, polydipsia, polyphagia and polyuria.   Genitourinary: Negative for decreased urine volume, difficulty urinating, dysuria, flank pain, frequency, genital sores, hematuria, pelvic pain and urgency.   Musculoskeletal:  Positive for arthralgias and myalgias. Negative for back pain, gait problem, joint swelling, neck pain and neck stiffness.   Skin: Negative for color change, pallor, rash and wound.   Allergic/Immunologic: Negative for food allergies and immunocompromised state.   Neurological: Positive for weakness. Negative for dizziness, tremors, seizures, syncope, speech difficulty, light-headedness, numbness and headaches.   Hematological: Negative for adenopathy. Does not bruise/bleed easily.   Psychiatric/Behavioral: Negative for agitation, behavioral problems, confusion, decreased concentration, dysphoric mood, hallucinations, self-injury, sleep disturbance and suicidal ideas. The patient is not nervous/anxious and is not hyperactive.    All other systems reviewed and are negative.      Objective:      Physical Exam  Vitals and nursing note reviewed.   Constitutional:       General: She is not in acute distress.     Appearance: Normal appearance. She is well-developed. She is not diaphoretic.   HENT:      Head: Normocephalic and atraumatic.      Mouth/Throat:      Pharynx: No oropharyngeal exudate.   Eyes:      General: No scleral icterus.  Neck:      Thyroid: No thyromegaly.      Vascular: No carotid bruit or JVD.      Trachea: No tracheal deviation.   Cardiovascular:      Rate and Rhythm: Normal rate and regular rhythm.      Heart sounds: Normal heart sounds.   Pulmonary:      Effort: Pulmonary effort is normal. No respiratory distress.      Breath sounds: Normal breath sounds. No wheezing or rales.   Chest:      Chest wall: No tenderness.   Abdominal:      General: Bowel sounds are normal. There is no distension.      Palpations: Abdomen is soft.      Tenderness: There is no abdominal tenderness. There is no guarding or rebound.   Musculoskeletal:         General: No tenderness. Normal range of motion.      Cervical back: Normal range of motion and neck supple.   Lymphadenopathy:      Cervical: No cervical adenopathy.    Skin:     General: Skin is warm and dry.      Coloration: Skin is not pale.      Findings: No erythema or rash.   Neurological:      Mental Status: She is alert and oriented to person, place, and time.   Psychiatric:         Behavior: Behavior normal.         Thought Content: Thought content normal.         Judgment: Judgment normal.         CMP  Sodium   Date Value Ref Range Status   02/02/2022 134 (L) 136 - 145 mmol/L Final     Potassium   Date Value Ref Range Status   02/02/2022 4.6 3.5 - 5.1 mmol/L Final     Chloride   Date Value Ref Range Status   02/02/2022 97 95 - 110 mmol/L Final     CO2   Date Value Ref Range Status   02/02/2022 29 23 - 29 mmol/L Final     Glucose   Date Value Ref Range Status   02/02/2022 105 70 - 110 mg/dL Final     BUN   Date Value Ref Range Status   02/02/2022 12 8 - 23 mg/dL Final     Creatinine   Date Value Ref Range Status   02/02/2022 1.2 0.5 - 1.4 mg/dL Final     Calcium   Date Value Ref Range Status   02/02/2022 10.4 8.7 - 10.5 mg/dL Final     Total Protein   Date Value Ref Range Status   02/02/2022 7.0 6.0 - 8.4 g/dL Final     Albumin   Date Value Ref Range Status   02/02/2022 4.0 3.5 - 5.2 g/dL Final     Total Bilirubin   Date Value Ref Range Status   02/02/2022 0.4 0.1 - 1.0 mg/dL Final     Comment:     For infants and newborns, interpretation of results should be based  on gestational age, weight and in agreement with clinical  observations.    Premature Infant recommended reference ranges:  Up to 24 hours.............<8.0 mg/dL  Up to 48 hours............<12.0 mg/dL  3-5 days..................<15.0 mg/dL  6-29 days.................<15.0 mg/dL       Alkaline Phosphatase   Date Value Ref Range Status   02/02/2022 91 55 - 135 U/L Final     AST   Date Value Ref Range Status   02/02/2022 16 10 - 40 U/L Final     ALT   Date Value Ref Range Status   02/02/2022 11 10 - 44 U/L Final     Anion Gap   Date Value Ref Range Status   02/02/2022 8 8 - 16 mmol/L Final     eGFR if African  American   Date Value Ref Range Status   02/02/2022 51 (A) >60 mL/min/1.73 m^2 Final     eGFR if non    Date Value Ref Range Status   02/02/2022 44 (A) >60 mL/min/1.73 m^2 Final     Comment:     Calculation used to obtain the estimated glomerular filtration  rate (eGFR) is the CKD-EPI equation.        Lab Results   Component Value Date    WBC 6.87 02/02/2022    HGB 12.0 02/02/2022    HCT 37.2 02/02/2022    MCV 97 02/02/2022     02/02/2022     Lab Results   Component Value Date    CHOL 193 11/03/2021     Lab Results   Component Value Date    HDL 61 11/03/2021     Lab Results   Component Value Date    LDLCALC 117.2 11/03/2021     Lab Results   Component Value Date    TRIG 74 11/03/2021     Lab Results   Component Value Date    CHOLHDL 31.6 11/03/2021     Lab Results   Component Value Date    TSH 2.627 11/03/2021     No results found for: LABA1C, HGBA1C  Assessment:       1. Unintentional weight loss of 10% body weight within 6 months    2. Weakness    3. Vitamin D deficiency        Plan:   Unintentional weight loss of 10% body weight within 6 months  -     US Abdomen Complete; Future; Expected date: 03/10/2022  -     Ambulatory referral/consult to Gastroenterology; Future; Expected date: 03/17/2022  -     X-Ray Chest PA And Lateral; Future; Expected date: 03/10/2022    Weakness  -     TSH; Future; Expected date: 03/10/2022  -     Vitamin D; Future; Expected date: 03/10/2022  -     Sedimentation rate; Future; Expected date: 03/10/2022  -     Rheumatoid Factor; Future; Expected date: 03/10/2022  -     ANANT Screen w/Reflex; Future; Expected date: 03/10/2022    Vitamin D deficiency  -     Vitamin D; Future; Expected date: 03/10/2022    As above---------sees neurology dr herndon,  Sees heme-onc.      F/u 1 month--------

## 2022-03-11 ENCOUNTER — TELEPHONE (OUTPATIENT)
Dept: FAMILY MEDICINE | Facility: CLINIC | Age: 77
End: 2022-03-11
Payer: MEDICARE

## 2022-03-11 ENCOUNTER — PATIENT MESSAGE (OUTPATIENT)
Dept: FAMILY MEDICINE | Facility: CLINIC | Age: 77
End: 2022-03-11
Payer: MEDICARE

## 2022-03-14 ENCOUNTER — TELEPHONE (OUTPATIENT)
Dept: FAMILY MEDICINE | Facility: CLINIC | Age: 77
End: 2022-03-14
Payer: MEDICARE

## 2022-03-14 NOTE — TELEPHONE ENCOUNTER
----- Message from Jonathan Keyes sent at 3/14/2022  3:39 PM CDT -----  Contact: Patient [phone]  Calling to get test results.  Name of test (lab, x-ray): Labs and xray ultrasound   Date of test: 03/10/22  Where was the test performed: [facility]  Would you like a call back, or a response through your [facility] portal?: Call back   Comments:

## 2022-03-16 ENCOUNTER — OFFICE VISIT (OUTPATIENT)
Dept: OTOLARYNGOLOGY | Facility: CLINIC | Age: 77
End: 2022-03-16
Payer: MEDICARE

## 2022-03-16 VITALS
HEART RATE: 77 BPM | SYSTOLIC BLOOD PRESSURE: 153 MMHG | TEMPERATURE: 98 F | DIASTOLIC BLOOD PRESSURE: 85 MMHG | BODY MASS INDEX: 22.53 KG/M2 | WEIGHT: 127.19 LBS

## 2022-03-16 DIAGNOSIS — H61.23 BILATERAL IMPACTED CERUMEN: Primary | ICD-10-CM

## 2022-03-16 DIAGNOSIS — M26.622 ARTHRALGIA OF LEFT TEMPOROMANDIBULAR JOINT: ICD-10-CM

## 2022-03-16 PROCEDURE — 69210 REMOVE IMPACTED EAR WAX UNI: CPT | Mod: 50,PBBFAC | Performed by: OTOLARYNGOLOGY

## 2022-03-16 PROCEDURE — 99999 PR PBB SHADOW E&M-EST. PATIENT-LVL III: ICD-10-PCS | Mod: PBBFAC,,, | Performed by: OTOLARYNGOLOGY

## 2022-03-16 PROCEDURE — 99213 PR OFFICE/OUTPT VISIT, EST, LEVL III, 20-29 MIN: ICD-10-PCS | Mod: 25,S$PBB,, | Performed by: OTOLARYNGOLOGY

## 2022-03-16 PROCEDURE — 99213 OFFICE O/P EST LOW 20 MIN: CPT | Mod: PBBFAC | Performed by: OTOLARYNGOLOGY

## 2022-03-16 PROCEDURE — 99999 PR PBB SHADOW E&M-EST. PATIENT-LVL III: CPT | Mod: PBBFAC,,, | Performed by: OTOLARYNGOLOGY

## 2022-03-16 PROCEDURE — 99213 OFFICE O/P EST LOW 20 MIN: CPT | Mod: 25,S$PBB,, | Performed by: OTOLARYNGOLOGY

## 2022-03-16 PROCEDURE — 69210 REMOVE IMPACTED EAR WAX UNI: CPT | Mod: S$PBB,,, | Performed by: OTOLARYNGOLOGY

## 2022-03-16 PROCEDURE — 69210 PR REMOVAL IMPACTED CERUMEN REQUIRING INSTRUMENTATION, UNILATERAL: ICD-10-PCS | Mod: S$PBB,,, | Performed by: OTOLARYNGOLOGY

## 2022-03-16 NOTE — PROGRESS NOTES
Referring Provider:    Aaareferral Self  No address on file  Subjective:   Patient: Mary Flanagan [id number], :1945   Visit date:3/16/2022 3:39 PM    Chief Complaint:  Cerumen Impaction    HPI:    Prior notes reviewed by myself.  Clinical documentation obtained by nursing staff reviewed.     76-year-old female presents with complaints of left-sided otalgia and muffled hearing.  She noticed an increase in left-sided ear pain over the last week.  She states that she changed her diet recently and is eating more nuts and other things that require her to chew.  She has a long history of recurrent cerumen impaction for which she has seen us for in the past.      Objective:     Physical Exam:  Vitals:  BP (!) 153/85   Pulse 77   Temp 97.7 °F (36.5 °C) (Temporal)   Wt 57.7 kg (127 lb 3.3 oz)   BMI 22.53 kg/m²   General appearance:  Well developed, well nourished    Ears:  Otoscopy of external auditory canals and tympanic membranes was significant for bilateral cerumen impaction and normal TM's, clinical speech reception thresholds grossly intact, no mass/lesion of auricle.    Nose:  No masses/lesions of external nose, nasal mucosa, septum, and turbinates were within normal limits.    Mouth:  No mass/lesion of lips, teeth, gums, hard/soft palate, tongue, tonsils, or oropharynx.    Neck & Lymphatics:  No cervical lymphadenopathy, no neck mass/crepitus/ asymmetry, trachea is midline, no thyroid enlargement/tenderness/mass. Tender to palpation over left TMJ        [x]  Data Reviewed:    Lab Results   Component Value Date    WBC 6.87 2022    HGB 12.0 2022    HCT 37.2 2022    MCV 97 2022    EOSINOPHIL 0.7 2022       Procedure Note    CHIEF COMPLAINT:  Cerumen Impaction    Description:  The patient was seated in an exam chair.  An ear speculum was placed in the right EAC and was examined under the microscope.  Suction and/or loop curettes were used to remove a large cerumen impaction.  The  tympanic membrane was visualized and was normal in appearance.  The procedure was repeated on the left side in a similar fashion.  The TM was intact and normal on this side as well.  The patient tolerated the procedure well.          Assessment & Plan:   Bilateral impacted cerumen    Arthralgia of left temporomandibular joint        She had a dense cerumen impaction bilaterally which we removed without incident I believe her otalgia is coming from her left temporomandibular joint.  She recently changed her diet significantly and that is when she 1st started noticing her issue.  We reviewed conservative measures to treat this including using NSAIDS like ibuprofen or naproxen, soft diet and warm compresses.      We had a long discussion regarding the underlying pathology of temporomandibular joint dysfunction (TMD) as the cause of ear pain.  We further discussed conservative measures to treat TMD including avoiding gum and other foods that require lots of chewing, warm compresses, and scheduled antinflammatories. We also discussed bruxism (grinding of the teeth) and the role that often plays in TMJ related pain.  I explained that for patients with evidence of wear consistent with bruxism, a mouth guard to be worn while sleeping is often necessary.  If the pain persists with conservative treatment, the patient will then schedule an appointment with a dentist for further evaluation.

## 2022-03-18 ENCOUNTER — TELEPHONE (OUTPATIENT)
Dept: HEMATOLOGY/ONCOLOGY | Facility: CLINIC | Age: 77
End: 2022-03-18
Payer: MEDICARE

## 2022-03-18 NOTE — TELEPHONE ENCOUNTER
Spoke to patient informed appointment r/s d/t Dr. Burden out of the office, verbalized understanding.

## 2022-03-21 ENCOUNTER — OFFICE VISIT (OUTPATIENT)
Dept: GASTROENTEROLOGY | Facility: CLINIC | Age: 77
End: 2022-03-21
Payer: MEDICARE

## 2022-03-21 ENCOUNTER — LAB VISIT (OUTPATIENT)
Dept: PRIMARY CARE CLINIC | Facility: CLINIC | Age: 77
End: 2022-03-21
Payer: MEDICARE

## 2022-03-21 ENCOUNTER — OFFICE VISIT (OUTPATIENT)
Dept: OTOLARYNGOLOGY | Facility: CLINIC | Age: 77
End: 2022-03-21
Payer: MEDICARE

## 2022-03-21 VITALS
HEART RATE: 70 BPM | SYSTOLIC BLOOD PRESSURE: 140 MMHG | HEIGHT: 63 IN | BODY MASS INDEX: 22.35 KG/M2 | WEIGHT: 126.13 LBS | DIASTOLIC BLOOD PRESSURE: 68 MMHG

## 2022-03-21 VITALS
SYSTOLIC BLOOD PRESSURE: 177 MMHG | WEIGHT: 125.88 LBS | HEART RATE: 75 BPM | TEMPERATURE: 97 F | BODY MASS INDEX: 22.3 KG/M2 | DIASTOLIC BLOOD PRESSURE: 84 MMHG

## 2022-03-21 DIAGNOSIS — R53.83 FATIGUE, UNSPECIFIED TYPE: ICD-10-CM

## 2022-03-21 DIAGNOSIS — E61.1 IRON DEFICIENCY: Primary | ICD-10-CM

## 2022-03-21 DIAGNOSIS — R63.4 UNINTENTIONAL WEIGHT LOSS OF 10% BODY WEIGHT WITHIN 6 MONTHS: ICD-10-CM

## 2022-03-21 DIAGNOSIS — R53.83 FATIGUE: ICD-10-CM

## 2022-03-21 DIAGNOSIS — M26.622 ARTHRALGIA OF LEFT TEMPOROMANDIBULAR JOINT: Primary | ICD-10-CM

## 2022-03-21 PROCEDURE — U0003 INFECTIOUS AGENT DETECTION BY NUCLEIC ACID (DNA OR RNA); SEVERE ACUTE RESPIRATORY SYNDROME CORONAVIRUS 2 (SARS-COV-2) (CORONAVIRUS DISEASE [COVID-19]), AMPLIFIED PROBE TECHNIQUE, MAKING USE OF HIGH THROUGHPUT TECHNOLOGIES AS DESCRIBED BY CMS-2020-01-R: HCPCS | Performed by: NURSE PRACTITIONER

## 2022-03-21 PROCEDURE — 99999 PR PBB SHADOW E&M-EST. PATIENT-LVL IV: CPT | Mod: PBBFAC,,, | Performed by: NURSE PRACTITIONER

## 2022-03-21 PROCEDURE — 99213 OFFICE O/P EST LOW 20 MIN: CPT | Mod: PBBFAC | Performed by: OTOLARYNGOLOGY

## 2022-03-21 PROCEDURE — 99214 OFFICE O/P EST MOD 30 MIN: CPT | Mod: S$PBB,,, | Performed by: OTOLARYNGOLOGY

## 2022-03-21 PROCEDURE — 99999 PR PBB SHADOW E&M-EST. PATIENT-LVL III: CPT | Mod: PBBFAC,,, | Performed by: OTOLARYNGOLOGY

## 2022-03-21 PROCEDURE — 99204 PR OFFICE/OUTPT VISIT, NEW, LEVL IV, 45-59 MIN: ICD-10-PCS | Mod: S$PBB,,, | Performed by: NURSE PRACTITIONER

## 2022-03-21 PROCEDURE — 99214 PR OFFICE/OUTPT VISIT, EST, LEVL IV, 30-39 MIN: ICD-10-PCS | Mod: S$PBB,,, | Performed by: OTOLARYNGOLOGY

## 2022-03-21 PROCEDURE — 99999 PR PBB SHADOW E&M-EST. PATIENT-LVL III: ICD-10-PCS | Mod: PBBFAC,,, | Performed by: OTOLARYNGOLOGY

## 2022-03-21 PROCEDURE — 99999 PR PBB SHADOW E&M-EST. PATIENT-LVL IV: ICD-10-PCS | Mod: PBBFAC,,, | Performed by: NURSE PRACTITIONER

## 2022-03-21 PROCEDURE — 99204 OFFICE O/P NEW MOD 45 MIN: CPT | Mod: S$PBB,,, | Performed by: NURSE PRACTITIONER

## 2022-03-21 PROCEDURE — U0005 INFEC AGEN DETEC AMPLI PROBE: HCPCS | Performed by: NURSE PRACTITIONER

## 2022-03-21 PROCEDURE — 99214 OFFICE O/P EST MOD 30 MIN: CPT | Mod: PBBFAC,27 | Performed by: NURSE PRACTITIONER

## 2022-03-21 RX ORDER — METHYLPREDNISOLONE 4 MG/1
TABLET ORAL
Qty: 1 EACH | Refills: 0 | Status: SHIPPED | OUTPATIENT
Start: 2022-03-21 | End: 2022-07-07

## 2022-03-21 RX ORDER — SODIUM, POTASSIUM,MAG SULFATES 17.5-3.13G
SOLUTION, RECONSTITUTED, ORAL ORAL
Qty: 354 ML | Refills: 0 | Status: SHIPPED | OUTPATIENT
Start: 2022-03-21 | End: 2022-07-13

## 2022-03-21 NOTE — PROGRESS NOTES
Referring Provider:    No referring provider defined for this encounter.  Subjective:   Patient: Mary Flanagan [id number], :1945   Visit date:3/21/2022 3:39 PM    Chief Complaint:  Otalgia (Left ear pain unresolved with use of advil)    HPI:    Prior notes reviewed by myself.  Clinical documentation obtained by nursing staff reviewed.     76-year-old female presents with complaints of left-sided otalgia and muffled hearing.  She noticed an increase in left-sided ear pain over the last week.  She states that she changed her diet recently and is eating more nuts and other things that require her to chew.  She has a long history of recurrent cerumen impaction for which she has seen us for in the past.    3/21/22 update:  Still c/o left greater than right otalgia    Objective:     Physical Exam:  Vitals:  BP (!) 177/84   Pulse 75   Temp 97 °F (36.1 °C) (Temporal)   Wt 57.1 kg (125 lb 14.1 oz)   BMI 22.30 kg/m²   General appearance:  Well developed, well nourished    Ears:  Otoscopy of external auditory canals and tympanic membranes was significant for normal TM's, clinical speech reception thresholds grossly intact, no mass/lesion of auricle.    Nose:  No masses/lesions of external nose, nasal mucosa, septum, and turbinates were within normal limits.    Mouth:  No mass/lesion of lips, teeth, gums, hard/soft palate, tongue, tonsils, or oropharynx.    Neck & Lymphatics:  No cervical lymphadenopathy, no neck mass/crepitus/ asymmetry, trachea is midline, no thyroid enlargement/tenderness/mass. Tender to palpation over left TMJ        [x]  Data Reviewed:    Lab Results   Component Value Date    WBC 6.87 2022    HGB 12.0 2022    HCT 37.2 2022    MCV 97 2022    EOSINOPHIL 0.7 2022             Assessment & Plan:   Arthralgia of left temporomandibular joint  -     methylPREDNISolone (MEDROL DOSEPACK) 4 mg tablet; use as directed  Dispense: 1 each; Refill: 0        She had a dense cerumen  impaction bilaterally which we removed without incident I believe her otalgia is coming from her left temporomandibular joint.  She recently changed her diet significantly and that is when she 1st started noticing her issue.  We reviewed conservative measures to treat this including using NSAIDS like ibuprofen or naproxen, soft diet and warm compresses.      We had a long discussion regarding the underlying pathology of temporomandibular joint dysfunction (TMD) as the cause of ear pain.  We further discussed conservative measures to treat TMD including avoiding gum and other foods that require lots of chewing, warm compresses, and scheduled antinflammatories. We also discussed bruxism (grinding of the teeth) and the role that often plays in TMJ related pain.  I explained that for patients with evidence of wear consistent with bruxism, a mouth guard to be worn while sleeping is often necessary.  If the pain persists with conservative treatment, the patient will then schedule an appointment with a dentist for further evaluation.    3/21/22 update:   she continues to have left greater than right ear pain and point tenderness over her TMJ bilaterally.  She has been using ibuprofen.  I recommended we try a Medrol Dosepak and continue using her ibuprofen.  I recommended that she take all the medication with food.  She will follow up p.r.n. and she will make plans to discuss this with her dental professional.

## 2022-03-21 NOTE — PROGRESS NOTES
Location Screening:    If answers yes to either of the following, schedule at [facility] ONLY. If No, OK for either location.    1. Is procedure for esophageal banding (Varices)? {YES/NO:20267) Yes, select [facility]  2. Is this an Advanced Endoscopic procedure (Dr Peck)?  {YES/NO:20267) Yes, select [facility] (except for bariatric procedures - schedule those at the [facility])  3. Is the BMI > 50? {YES/NO:20267) Yes, select [facility]  4. Does the patient have chronic hypoxic respiratory failure, as evidenced by symptoms below? {YES/NO:20267) Yes, select [facility]  a. O2 Saturation <92%  b. Is the patient on supplemental O2  c. History of moderate to severe PFT's  d. Unable to complete a 6 min walk test    COVID Screening    1. Are you COVID vaccinated? yes    2. Are you experiencing shortness of breath, cough, muscle aches, loss of taste or loss of smell?  yes    If answered yes then the patient must seek medical attention with their PCP, urgent care or ED.  Do not schedule the procedure.       ENDO screening    1. Have you been admitted for cardiac, kidney or pulmonary causes to the hospital in the past 3 months? no   If yes, schedule an appointment with PCP before scheduling endoscopic procedure  Unless patient has been seen by Nephrology, Cardiologist or in the GI department within 3 months.      2. Have you had a stent placed in the last 12 months? no: If yes, have one of our providers review the chart and determine if they need to be seen in clinic.      3. Have you had a stroke or heart attack in the past 6 months? no If patient has not been seen by PCP, Neurology or Cardiology within 3 months, have one of our providers review the chart and determine if they need to be seen in clinic.        4. Have you had any chest pain in the past 3 months? no   If yes, have you been evaluated by your PCP and/or cardiologist and was it determined to not be heart related? not applicable   If No, Pt needs to be seen by PCP or Cardiologist.  Pt  "can be scheduled once cardiac clearance obtained by either of those providers.     5. Do you take prescription weight loss medications?  no   If yes, must stop weight loss medication for 2 weeks prior to procedure.     6. Have you been diagnosed with diverticulitis within the past 3 months? no   If yes, must have been seen by GI within the last 3 months, if not schedule with GI [facility].    If Pt has been seen by GI, schedule procedure 8-12 weeks post antibiotic treatment.     7. Are you on Dialysis? no  If yes, schedule procedure for the day AFTER dialysis.  Appt time should be 9am or later, patient arrival time is 2 hours prior to procedure, for labs.  Nulytely or miralax prep must be used for all patients with kidney disease.     8. Are you diabetic?  no   If yes, schedule morning appt. Advise Pt to hold all diabetic meds day of procedure.     9. All patients 80 years of age and older must be seen in the GI clinic - please schedule an [facility] appointment - Do not schedule a scope procedure appointment.     10. Is patient on a "high risk" medication (blood thinner/antiplatelet agent)?  no   If yes, has cardiac clearance been obtained within the last 60 days? N/A   If no, a new cardiac clearance must be obtained.     Final Questions:    1.I have reviewed the last colonoscopy for recommendations regarding next procedure bowel prep.  no  2. I have reviewed medications and allergies.  no  3. I have verified the pharmacy information and appropriate prep sent if needed. no  4. Prep instructions have been mailed or sent to portal per patient request. no1        All schedulers will have ability to reach out to the ordering GI provider to clarify any issues.           "

## 2022-03-21 NOTE — PROGRESS NOTES
Clinic Consult:  [facility] Consultation Note    Reason for Consult:  The primary encounter diagnosis was Iron deficiency. Diagnoses of Unintentional weight loss of 10% body weight within 6 months and Fatigue were also pertinent to this visit.    PCP: Raul Hill   [address] / [place] [id number]    HPI:  This is a 76 y.o. female here for evaluation of weight loss.   She reports progressive weakness over the last year. She has been undergoing workup with PCP without clear etiology.   She has associated weight loss of 8 lbs over the last 6 months.   Upon review of her labs, she has slight iron deficiency with iron saturation of 18. Hgb is normal.   Her last colonoscopy was in .     Review of Systems   Constitutional: Positive for malaise/fatigue and weight loss. Negative for fever.   HENT: Negative for sore throat.    Respiratory: Negative for cough and wheezing.    Cardiovascular: Negative for chest pain and palpitations.   Gastrointestinal: Negative for abdominal pain, blood in stool, constipation, diarrhea, heartburn, melena, nausea and vomiting.   Genitourinary: Negative for dysuria and frequency.   Skin: Negative for itching and rash.   Neurological: Positive for weakness. Negative for dizziness, speech change, seizures, loss of consciousness and headaches.   Psychiatric/Behavioral: Negative for depression and substance abuse. The patient is not nervous/anxious.        Medical History:  has a past medical history of Cataract, Chest pain, CKD (chronic kidney disease) stage 3, GFR 30-59 ml/min, Dry eyes, Gastritis, Hyperlipidemia, Hypertension, Insomnia, and Osteopenia.    Surgical History:  has a past surgical history that includes left knee surgery; left arm surgery;  section; Knee surgery; Knee arthroscopy (Left,  approx); and Breast biopsy (Left).    Family History: family history includes Hypertension in her father and mother..     Social History:  reports  "that she has never smoked. She has never used smokeless tobacco. She reports that she does not drink alcohol and does not use drugs.    Allergies: Reviewed    Home Medications:   Current Outpatient Medications on File Prior to Visit   Medication Sig Dispense Refill    diclofenac sodium (VOLTAREN) 1 % Gel Apply topically.      ferrous sulfate (FEOSOL) 325 mg (65 mg iron) Tab tablet Take 325 mg by mouth daily with breakfast.      garlic (GARLIQUE ORAL) Take by mouth.      hydroCHLOROthiazide (HYDRODIURIL) 12.5 MG Tab TAKE 1 TABLET(12.5 MG) BY MOUTH EVERY 48 HOURS AS NEEDED 90 tablet 3    multivitamin (THERAGRAN) per tablet Take 1 tablet by mouth once daily.      VIT A/C/E AC/ZNOX/CUPRIC OXIDE (EYE VITAMIN AND MINERALS ORAL) Take by mouth.       No current facility-administered medications on file prior to visit.       Physical Exam:  BP (!) 140/68 (BP Location: Left arm, Patient Position: Sitting, BP Method: Medium (Manual))   Pulse 70   Ht 5' 3" (1.6 m)   Wt 57.2 kg (126 lb 1.7 oz)   BMI 22.34 kg/m²   Body mass index is 22.34 kg/m².  Physical Exam  Constitutional:       General: She is not in acute distress.  HENT:      Head: Normocephalic and atraumatic.   Eyes:      General: No scleral icterus.     Conjunctiva/sclera: Conjunctivae normal.   Cardiovascular:      Rate and Rhythm: Normal rate and regular rhythm.      Heart sounds: No murmur heard.  Pulmonary:      Effort: Pulmonary effort is normal. No respiratory distress.      Breath sounds: Normal breath sounds. No wheezing.   Abdominal:      General: Abdomen is flat. Bowel sounds are normal.      Palpations: Abdomen is soft.      Tenderness: There is no abdominal tenderness.   Skin:     General: Skin is warm and dry.   Neurological:      General: No focal deficit present.      Mental Status: She is alert and oriented to person, place, and time.      Cranial Nerves: No cranial nerve deficit.   Psychiatric:         Mood and Affect: Mood normal.         " Judgment: Judgment normal.         Labs: Pertinent labs reviewed.  CRC Screening: up to date     Assessment:  1. Iron deficiency    2. Unintentional weight loss of 10% body weight within 6 months    3. Fatigue      Patient with fatige and weakness with weight loss. She has slight iron deficiency without decreased blood count. This is not likely the cause of her symptoms but should be evaluated further.     Recommendations:   - EGD and colonoscopy\  - COVID     Iron deficiency  -     Case Request Endoscopy: EGD (ESOPHAGOGASTRODUODENOSCOPY), COLONOSCOPY  -     SUPREP BOWEL PREP KIT 17.5-3.13-1.6 gram SolR; Use as directed  Dispense: 354 mL; Refill: 0    Unintentional weight loss of 10% body weight within 6 months  -     Ambulatory referral/consult to Gastroenterology  -     Case Request Endoscopy: EGD (ESOPHAGOGASTRODUODENOSCOPY), COLONOSCOPY  -     SUPREP BOWEL PREP KIT 17.5-3.13-1.6 gram SolR; Use as directed  Dispense: 354 mL; Refill: 0    Fatigue  -     COVID-19 Routine Screening; Future; Expected date: 03/21/2022      Follow up to be determined after results/ procedure(s).    Thank you so much for allowing me to participate in the care of REMI Kline

## 2022-03-22 LAB
SARS-COV-2 RNA RESP QL NAA+PROBE: NOT DETECTED
SARS-COV-2- CYCLE NUMBER: NORMAL

## 2022-03-29 ENCOUNTER — TELEPHONE (OUTPATIENT)
Dept: GASTROENTEROLOGY | Facility: CLINIC | Age: 77
End: 2022-03-29
Payer: MEDICARE

## 2022-03-29 ENCOUNTER — TELEPHONE (OUTPATIENT)
Dept: CARDIOLOGY | Facility: HOSPITAL | Age: 77
End: 2022-03-29
Payer: MEDICARE

## 2022-03-29 NOTE — TELEPHONE ENCOUNTER
----- Message from Guero Curtis MA sent at 3/29/2022 10:57 AM CDT -----  Contact: DARIN AUGUSTINE [[id number]]  Please advise?  ----- Message -----  From: Diamante Jones  Sent: 3/29/2022  10:48 AM CDT  To: Rae Thompson Staff    .Type:  Needs Medical Advice    Who Called: DARIN AUGUSTINE [[id number]]  Would the patient rather a call back or a response via [facility]? Call   Best Call Back Number: .[phone] (home)    Additional Information: Pt is req a call back in regards to why did the provider req to have her colonoscopy scheduled sooner. She states that she isn't required to have this done until 2025.. Thanks AW

## 2022-03-29 NOTE — TELEPHONE ENCOUNTER
----- Message from Jeana Carrion MA sent at 3/29/2022 10:55 AM CDT -----  Contact: DARIN AUGUSTINE [[id number]]  Pt contacted and would like if she is able to do the colonoscopy on the 7th. Please advise.  ----- Message -----  From: Diamante Jones  Sent: 3/29/2022  10:42 AM CDT  To: Patrice Jurado Staff    .Type:  Needs Medical Advice    Who Called: DARIN AUGUSTINE [[id number]]  Would the patient rather a call back or a response via [facility]? Call   Best Call Back Number:.[phone] (home)    Additional Information: Pt states that she is scheduled to have a procedure 04/07/2022 and that she would like to see the provider to ensure her heart will be able to with stand this procedure. Pt took her pressure it was  116/66 pulse 95 at 10:40 am she states that she also needs to see the provider also just for an overall check up and would like for someone to contact her to discuss this with her... Thanks AW

## 2022-03-29 NOTE — TELEPHONE ENCOUNTER
Called patient back to answer questions about colonoscopy. Patient verbalized understanding. Patient is waiting for cardiology to get back with her to see if they will be able to see her before procedure. If cardiology can not fit her into there schedule before procedure , patient will call endoscopy  and get colonoscopy pushed back.

## 2022-04-01 ENCOUNTER — TELEPHONE (OUTPATIENT)
Dept: ENDOSCOPY | Facility: HOSPITAL | Age: 77
End: 2022-04-01
Payer: MEDICARE

## 2022-04-01 NOTE — TELEPHONE ENCOUNTER
----- Message from Osvaldo Snow sent at 4/1/2022  3:40 PM CDT -----  Contact: PT  PT is calling to cancel her colonoscopy. Call back at [phone].

## 2022-04-04 ENCOUNTER — PATIENT MESSAGE (OUTPATIENT)
Dept: ENDOSCOPY | Facility: HOSPITAL | Age: 77
End: 2022-04-04
Payer: MEDICARE

## 2022-04-04 DIAGNOSIS — I49.3 PVC (PREMATURE VENTRICULAR CONTRACTION): Primary | ICD-10-CM

## 2022-04-11 ENCOUNTER — IMMUNIZATION (OUTPATIENT)
Dept: PRIMARY CARE CLINIC | Facility: CLINIC | Age: 77
End: 2022-04-11
Payer: MEDICARE

## 2022-04-11 ENCOUNTER — HOSPITAL ENCOUNTER (OUTPATIENT)
Dept: CARDIOLOGY | Facility: HOSPITAL | Age: 77
Discharge: HOME | End: 2022-04-11
Attending: INTERNAL MEDICINE
Payer: MEDICARE

## 2022-04-11 ENCOUNTER — OFFICE VISIT (OUTPATIENT)
Dept: CARDIOLOGY | Facility: CLINIC | Age: 77
End: 2022-04-11
Payer: MEDICARE

## 2022-04-11 VITALS
DIASTOLIC BLOOD PRESSURE: 70 MMHG | HEIGHT: 63 IN | HEART RATE: 80 BPM | BODY MASS INDEX: 21.84 KG/M2 | RESPIRATION RATE: 16 BRPM | SYSTOLIC BLOOD PRESSURE: 138 MMHG | OXYGEN SATURATION: 100 % | WEIGHT: 123.25 LBS

## 2022-04-11 DIAGNOSIS — I65.23 BILATERAL CAROTID ARTERY STENOSIS: ICD-10-CM

## 2022-04-11 DIAGNOSIS — Z23 NEED FOR VACCINATION: Primary | ICD-10-CM

## 2022-04-11 DIAGNOSIS — I95.1 ORTHOSTATIC HYPOTENSION: Primary | ICD-10-CM

## 2022-04-11 DIAGNOSIS — I49.3 PVC (PREMATURE VENTRICULAR CONTRACTION): ICD-10-CM

## 2022-04-11 DIAGNOSIS — E78.2 MIXED HYPERLIPIDEMIA: ICD-10-CM

## 2022-04-11 PROCEDURE — 99214 OFFICE O/P EST MOD 30 MIN: CPT | Mod: S$PBB,,, | Performed by: INTERNAL MEDICINE

## 2022-04-11 PROCEDURE — 99214 PR OFFICE/OUTPT VISIT, EST, LEVL IV, 30-39 MIN: ICD-10-PCS | Mod: S$PBB,,, | Performed by: INTERNAL MEDICINE

## 2022-04-11 PROCEDURE — 99999 PR PBB SHADOW E&M-EST. PATIENT-LVL III: ICD-10-PCS | Mod: PBBFAC,,, | Performed by: INTERNAL MEDICINE

## 2022-04-11 PROCEDURE — 93005 ELECTROCARDIOGRAM TRACING: CPT

## 2022-04-11 PROCEDURE — 99213 OFFICE O/P EST LOW 20 MIN: CPT | Mod: PBBFAC | Performed by: INTERNAL MEDICINE

## 2022-04-11 PROCEDURE — 93010 EKG 12-LEAD: ICD-10-PCS | Mod: ,,, | Performed by: STUDENT IN AN ORGANIZED HEALTH CARE EDUCATION/TRAINING PROGRAM

## 2022-04-11 PROCEDURE — 99999 PR PBB SHADOW E&M-EST. PATIENT-LVL III: CPT | Mod: PBBFAC,,, | Performed by: INTERNAL MEDICINE

## 2022-04-11 PROCEDURE — 93010 ELECTROCARDIOGRAM REPORT: CPT | Mod: ,,, | Performed by: STUDENT IN AN ORGANIZED HEALTH CARE EDUCATION/TRAINING PROGRAM

## 2022-04-11 PROCEDURE — 91305 COVID-19, MRNA, LNP-S, PF, 30 MCG/0.3 ML DOSE VACCINE (PFIZER): CPT | Mod: PBBFAC | Performed by: FAMILY MEDICINE

## 2022-04-11 NOTE — PROGRESS NOTES
Subjective:   Patient ID:  Mary Flanagan is a 76 y.o. female who presents for follow up of No chief complaint on file.      76 yo female, 4 months f/u  PMH autonomic dysfunction, h/o LLE SVT, PVCs, HTN HLD, GERD.   Faint for a yr, once a week, usually standing for few minutes, sarted with blurred or black vision, leg side shaking, no cold sweating and nausea, and felt better after sitting   LE venous US GSV and LSV old thrombus  2020 in the office POSITIVE orthostatic sign  HOLter showed PVCs 7%  Echo showed normal EF and no LVH  MPI showed no ischemia  LE venous US left old GSV DVT  Carotid US midl Dz  ENT wnl    No chest pain, dyspnea, palpitation, orthopnea,   Mild leg swelling L > R  Faint improved  Still dizziness after standing for a long time. No dizziness on the bed.  Fatigue and weak. And worse in PM  appettete ok  Sleeps with 2 pillows  Back pain   BP log reviewed    F/u at [facility] and suspect neck arthritis and spinal cord problem. MRI neck ordered DR. MIRI SHETTY, AT [facility]. [phone]    C/o NECK PAIN,   No lightheadedness, faint after held Lipitor and metorpolol  Now can walk 20 mins a day   Reviewed BP log.  Still positive for orthostatic hypotension  Standing  to 130 mmH  Lying  to 159 mmHG     visit  At home not taking Midodrine and Hydralazine, SBP at 100 to 150 mmHG.  feels fatigue, weak. No faint  Some imbalance and PT ordered by neurology  Weight stable.  EKG NSR and PVCs    Interval history  Lost weight 9 pounds in 2 yrs. Occasional dizziness  The BP logs reviewed.   to 138 mmHG  ekg NSR PVCs      Past Medical History:   Diagnosis Date    Cataract     Chest pain     CKD (chronic kidney disease) stage 3, GFR 30-59 ml/min     Dry eyes     Gastritis     Hyperlipidemia     Hypertension     Insomnia     Osteopenia        Past Surgical History:   Procedure Laterality Date    BREAST BIOPSY Left     , benign     SECTION      x 1     KNEE ARTHROSCOPY Left 2014 approx    KNEE SURGERY      left arm surgery      left knee surgery         Social History     Tobacco Use    Smoking status: Never Smoker    Smokeless tobacco: Never Used   Substance Use Topics    Alcohol use: No     Alcohol/week: 0.0 standard drinks    Drug use: No       Family History   Problem Relation Age of Onset    Hypertension Mother     Hypertension Father     Breast cancer Neg Hx     Colon cancer Neg Hx     Ovarian cancer Neg Hx     Thrombophilia Neg Hx     Strabismus Neg Hx     Macular degeneration Neg Hx     Retinal detachment Neg Hx     Glaucoma Neg Hx     Blindness Neg Hx     Amblyopia Neg Hx          Review of Systems   Constitutional: Negative for decreased appetite, diaphoresis, fever, malaise/fatigue and night sweats.   HENT: Negative for nosebleeds.    Eyes: Negative for blurred vision and double vision.   Cardiovascular: Positive for near-syncope. Negative for chest pain, claudication, dyspnea on exertion, irregular heartbeat, leg swelling, orthopnea, palpitations, paroxysmal nocturnal dyspnea and syncope.   Respiratory: Negative for cough, shortness of breath, sleep disturbances due to breathing, snoring, sputum production and wheezing.    Endocrine: Negative for cold intolerance and polyuria.   Hematologic/Lymphatic: Does not bruise/bleed easily.   Skin: Negative for rash.   Musculoskeletal: Negative for back pain, falls, joint pain, joint swelling and neck pain.   Gastrointestinal: Negative for abdominal pain, heartburn, nausea and vomiting.   Genitourinary: Negative for dysuria, frequency and hematuria.   Neurological: Positive for dizziness and light-headedness. Negative for difficulty with concentration, focal weakness, headaches, numbness, seizures and weakness.   Psychiatric/Behavioral: Negative for depression, memory loss and substance abuse. The patient does not have insomnia.    Allergic/Immunologic: Negative for HIV exposure and hives.        Objective:   Physical Exam  HENT:      Head: Normocephalic.   Eyes:      Pupils: Pupils are equal, round, and reactive to light.   Neck:      Thyroid: No thyromegaly.      Vascular: Normal carotid pulses. No carotid bruit or JVD.   Cardiovascular:      Rate and Rhythm: Normal rate and regular rhythm.  No extrasystoles are present.     Chest Wall: PMI is not displaced.      Pulses: Normal pulses.      Heart sounds: Normal heart sounds. No murmur heard.    No gallop. No S3 sounds.   Pulmonary:      Effort: No respiratory distress.      Breath sounds: Normal breath sounds. No stridor.   Abdominal:      General: Bowel sounds are normal.      Palpations: Abdomen is soft.      Tenderness: There is no abdominal tenderness. There is no rebound.   Musculoskeletal:         General: Normal range of motion.   Skin:     Findings: No rash.   Neurological:      Mental Status: She is alert and oriented to person, place, and time.      Motor: Weakness present.   Psychiatric:         Behavior: Behavior normal.         Lab Results   Component Value Date    CHOL 193 11/03/2021    CHOL 205 (H) 08/05/2021    CHOL 199 04/21/2021     Lab Results   Component Value Date    HDL 61 11/03/2021    HDL 60 08/05/2021    HDL 63 04/21/2021     Lab Results   Component Value Date    LDLCALC 117.2 11/03/2021    LDLCALC 124.2 08/05/2021    LDLCALC 121.4 04/21/2021     Lab Results   Component Value Date    TRIG 74 11/03/2021    TRIG 104 08/05/2021    TRIG 73 04/21/2021     Lab Results   Component Value Date    CHOLHDL 31.6 11/03/2021    CHOLHDL 29.3 08/05/2021    CHOLHDL 31.7 04/21/2021       Chemistry        Component Value Date/Time     (L) 02/02/2022 1513    K 4.6 02/02/2022 1513    CL 97 02/02/2022 1513    CO2 29 02/02/2022 1513    BUN 12 02/02/2022 1513    CREATININE 1.2 02/02/2022 1513     02/02/2022 1513        Component Value Date/Time    CALCIUM 10.4 02/02/2022 1513    ALKPHOS 91 02/02/2022 1513    AST 16 02/02/2022 1513     ALT 11 02/02/2022 1513    BILITOT 0.4 02/02/2022 1513    ESTGFRAFRICA 51 (A) 02/02/2022 1513    EGFRNONAA 44 (A) 02/02/2022 1513          No results found for: LABA1C, HGBA1C  Lab Results   Component Value Date    TSH 2.701 03/10/2022     No results found for: INR, PROTIME  Lab Results   Component Value Date    WBC 6.87 02/02/2022    HGB 12.0 02/02/2022    HCT 37.2 02/02/2022    MCV 97 02/02/2022     02/02/2022     BMP  Sodium   Date Value Ref Range Status   02/02/2022 134 (L) 136 - 145 mmol/L Final     Potassium   Date Value Ref Range Status   02/02/2022 4.6 3.5 - 5.1 mmol/L Final     Chloride   Date Value Ref Range Status   02/02/2022 97 95 - 110 mmol/L Final     CO2   Date Value Ref Range Status   02/02/2022 29 23 - 29 mmol/L Final     BUN   Date Value Ref Range Status   02/02/2022 12 8 - 23 mg/dL Final     Creatinine   Date Value Ref Range Status   02/02/2022 1.2 0.5 - 1.4 mg/dL Final     Calcium   Date Value Ref Range Status   02/02/2022 10.4 8.7 - 10.5 mg/dL Final     Anion Gap   Date Value Ref Range Status   02/02/2022 8 8 - 16 mmol/L Final     eGFR if    Date Value Ref Range Status   02/02/2022 51 (A) >60 mL/min/1.73 m^2 Final     eGFR if non    Date Value Ref Range Status   02/02/2022 44 (A) >60 mL/min/1.73 m^2 Final     Comment:     Calculation used to obtain the estimated glomerular filtration  rate (eGFR) is the CKD-EPI equation.        BNP  @LABRCNTIP(BNP,BNPTRIAGEBLO)@  @LABRCNTIP(troponini)@  CrCl cannot be calculated (Patient's most recent lab result is older than the maximum 7 days allowed.).  No results found in the last 24 hours.  No results found in the last 24 hours.  No results found in the last 24 hours.    Assessment:      1. Orthostatic hypotension    2. PVC (premature ventricular contraction)    3. Mixed hyperlipidemia    4. Bilateral carotid artery stenosis        Plan:   Ok to take HCTZ now for PVD  Concerning depression for the weight loss and  weakness. Sent the message to PCP    Counseled DASH  Check Lipid profile in 6 months  Recommend heart-healthy diet, weight control and regular exercise.  Lucy. Risk modification.   I have reviewed all pertinent labs and cardiac studies independently. Plans and recommendations have been formulated under my direct supervision. All questions answered and patient voiced understanding.   If symptoms persist go to the ED  RTC in 6 months

## 2022-04-14 ENCOUNTER — OFFICE VISIT (OUTPATIENT)
Dept: FAMILY MEDICINE | Facility: CLINIC | Age: 77
End: 2022-04-14
Payer: MEDICARE

## 2022-04-14 ENCOUNTER — LAB VISIT (OUTPATIENT)
Dept: LAB | Facility: HOSPITAL | Age: 77
End: 2022-04-14
Attending: INTERNAL MEDICINE
Payer: MEDICARE

## 2022-04-14 VITALS
TEMPERATURE: 98 F | RESPIRATION RATE: 16 BRPM | SYSTOLIC BLOOD PRESSURE: 128 MMHG | DIASTOLIC BLOOD PRESSURE: 70 MMHG | BODY MASS INDEX: 21.91 KG/M2 | WEIGHT: 123.69 LBS | HEART RATE: 70 BPM

## 2022-04-14 DIAGNOSIS — R63.4 WEIGHT LOSS: ICD-10-CM

## 2022-04-14 DIAGNOSIS — R53.1 WEAKNESS: ICD-10-CM

## 2022-04-14 DIAGNOSIS — F41.8 ANXIOUS DEPRESSION: ICD-10-CM

## 2022-04-14 DIAGNOSIS — R53.1 WEAKNESS: Primary | ICD-10-CM

## 2022-04-14 DIAGNOSIS — M48.062 SPINAL STENOSIS OF LUMBAR REGION WITH NEUROGENIC CLAUDICATION: ICD-10-CM

## 2022-04-14 LAB
ANION GAP SERPL CALC-SCNC: 7 MMOL/L (ref 8–16)
BASOPHILS # BLD AUTO: 0.05 K/UL (ref 0–0.2)
BASOPHILS NFR BLD: 0.8 % (ref 0–1.9)
BUN SERPL-MCNC: 11 MG/DL (ref 8–23)
CALCIUM SERPL-MCNC: 10.4 MG/DL (ref 8.7–10.5)
CHLORIDE SERPL-SCNC: 98 MMOL/L (ref 95–110)
CO2 SERPL-SCNC: 31 MMOL/L (ref 23–29)
CREAT SERPL-MCNC: 0.9 MG/DL (ref 0.5–1.4)
DIFFERENTIAL METHOD: ABNORMAL
EOSINOPHIL # BLD AUTO: 0.1 K/UL (ref 0–0.5)
EOSINOPHIL NFR BLD: 1 % (ref 0–8)
ERYTHROCYTE [DISTWIDTH] IN BLOOD BY AUTOMATED COUNT: 13.3 % (ref 11.5–14.5)
EST. GFR  (AFRICAN AMERICAN): >60 ML/MIN/1.73 M^2
EST. GFR  (NON AFRICAN AMERICAN): >60 ML/MIN/1.73 M^2
GLUCOSE SERPL-MCNC: 87 MG/DL (ref 70–110)
HCT VFR BLD AUTO: 36.9 % (ref 37–48.5)
HGB BLD-MCNC: 11.5 G/DL (ref 12–16)
IMM GRANULOCYTES # BLD AUTO: 0.03 K/UL (ref 0–0.04)
IMM GRANULOCYTES NFR BLD AUTO: 0.5 % (ref 0–0.5)
LYMPHOCYTES # BLD AUTO: 1.9 K/UL (ref 1–4.8)
LYMPHOCYTES NFR BLD: 31.4 % (ref 18–48)
MCH RBC QN AUTO: 29.9 PG (ref 27–31)
MCHC RBC AUTO-ENTMCNC: 31.2 G/DL (ref 32–36)
MCV RBC AUTO: 96 FL (ref 82–98)
MONOCYTES # BLD AUTO: 0.5 K/UL (ref 0.3–1)
MONOCYTES NFR BLD: 7.4 % (ref 4–15)
NEUTROPHILS # BLD AUTO: 3.6 K/UL (ref 1.8–7.7)
NEUTROPHILS NFR BLD: 58.9 % (ref 38–73)
NRBC BLD-RTO: 0 /100 WBC
PLATELET # BLD AUTO: 245 K/UL (ref 150–450)
PMV BLD AUTO: 11.5 FL (ref 9.2–12.9)
POTASSIUM SERPL-SCNC: 4.2 MMOL/L (ref 3.5–5.1)
RBC # BLD AUTO: 3.84 M/UL (ref 4–5.4)
SODIUM SERPL-SCNC: 136 MMOL/L (ref 136–145)
WBC # BLD AUTO: 6.09 K/UL (ref 3.9–12.7)

## 2022-04-14 PROCEDURE — 99214 OFFICE O/P EST MOD 30 MIN: CPT | Mod: S$PBB,,, | Performed by: INTERNAL MEDICINE

## 2022-04-14 PROCEDURE — 99213 OFFICE O/P EST LOW 20 MIN: CPT | Mod: PBBFAC,PO | Performed by: INTERNAL MEDICINE

## 2022-04-14 PROCEDURE — 36415 COLL VENOUS BLD VENIPUNCTURE: CPT | Mod: PO | Performed by: INTERNAL MEDICINE

## 2022-04-14 PROCEDURE — 99999 PR PBB SHADOW E&M-EST. PATIENT-LVL III: CPT | Mod: PBBFAC,,, | Performed by: INTERNAL MEDICINE

## 2022-04-14 PROCEDURE — 99999 PR PBB SHADOW E&M-EST. PATIENT-LVL III: ICD-10-PCS | Mod: PBBFAC,,, | Performed by: INTERNAL MEDICINE

## 2022-04-14 PROCEDURE — 85025 COMPLETE CBC W/AUTO DIFF WBC: CPT | Performed by: INTERNAL MEDICINE

## 2022-04-14 PROCEDURE — 99214 PR OFFICE/OUTPT VISIT, EST, LEVL IV, 30-39 MIN: ICD-10-PCS | Mod: S$PBB,,, | Performed by: INTERNAL MEDICINE

## 2022-04-14 PROCEDURE — 80048 BASIC METABOLIC PNL TOTAL CA: CPT | Performed by: INTERNAL MEDICINE

## 2022-04-14 RX ORDER — MIRTAZAPINE 7.5 MG/1
7.5 TABLET, FILM COATED ORAL NIGHTLY
Qty: 30 TABLET | Refills: 11 | Status: SHIPPED | OUTPATIENT
Start: 2022-04-14 | End: 2022-06-03 | Stop reason: SDUPTHER

## 2022-04-14 NOTE — PROGRESS NOTES
Subjective:       Patient ID: Mary Flanagan is a 76 y.o. female.    Chief Complaint: Follow-up (1m ) and Fatigue    F/u fatigue--------still with feeling of fatigue-------weight stable today--------no new symptoms --------does not feel depressed---but states feels like she is getting weaker.      No SI, HI--------    Follow-up  Associated symptoms include arthralgias, fatigue and weakness. Pertinent negatives include no abdominal pain, chest pain, chills, congestion, coughing, diaphoresis, fever, headaches, joint swelling, myalgias, nausea, neck pain, numbness, rash, sore throat or vomiting.   Fatigue  Associated symptoms include arthralgias, fatigue and weakness. Pertinent negatives include no abdominal pain, chest pain, chills, congestion, coughing, diaphoresis, fever, headaches, joint swelling, myalgias, nausea, neck pain, numbness, rash, sore throat or vomiting.     Past Medical History:   Diagnosis Date    Cataract     Chest pain     CKD (chronic kidney disease) stage 3, GFR 30-59 ml/min     Dry eyes     Gastritis     Hyperlipidemia     Hypertension     Insomnia     Osteopenia      Past Surgical History:   Procedure Laterality Date    BREAST BIOPSY Left     , benign     SECTION      x 1    KNEE ARTHROSCOPY Left  approx    KNEE SURGERY      left arm surgery      left knee surgery       Family History   Problem Relation Age of Onset    Hypertension Mother     Hypertension Father     Breast cancer Neg Hx     Colon cancer Neg Hx     Ovarian cancer Neg Hx     Thrombophilia Neg Hx     Strabismus Neg Hx     Macular degeneration Neg Hx     Retinal detachment Neg Hx     Glaucoma Neg Hx     Blindness Neg Hx     Amblyopia Neg Hx      Social History     Socioeconomic History    Marital status:     Number of children: 3   Occupational History    Occupation: retired   Tobacco Use    Smoking status: Never Smoker    Smokeless tobacco: Never Used   Substance and Sexual  Activity    Alcohol use: No     Alcohol/week: 0.0 standard drinks    Drug use: No    Sexual activity: Not Currently     Partners: Male     Birth control/protection: None     Comment: mut monog     Social Determinants of Health     Financial Resource Strain: Low Risk     Difficulty of Paying Living Expenses: Not hard at all   Food Insecurity: No Food Insecurity    Worried About Running Out of Food in the Last Year: Never true    Ran Out of Food in the Last Year: Never true   Transportation Needs: No Transportation Needs    Lack of Transportation (Medical): No    Lack of Transportation (Non-Medical): No   Physical Activity: Inactive    Days of Exercise per Week: 0 days    Minutes of Exercise per Session: 0 min   Stress: Stress Concern Present    Feeling of Stress : Very much   Social Connections: Socially Integrated    Frequency of Communication with Friends and Family: More than three times a week    Frequency of Social Gatherings with Friends and Family: Once a week    Attends Advent Services: More than 4 times per year    Active Member of Clubs or Organizations: Yes    Attends Club or Organization Meetings: More than 4 times per year    Marital Status:    Housing Stability: Low Risk     Unable to Pay for Housing in the Last Year: No    Number of Places Lived in the Last Year: 1    Unstable Housing in the Last Year: No     Review of Systems   Constitutional: Positive for fatigue. Negative for activity change, appetite change, chills, diaphoresis, fever and unexpected weight change.   HENT: Negative for congestion, drooling, ear discharge, ear pain, facial swelling, hearing loss, mouth sores, nosebleeds, postnasal drip, rhinorrhea, sinus pressure, sneezing, sore throat, tinnitus, trouble swallowing and voice change.    Eyes: Negative for photophobia, redness and visual disturbance.   Respiratory: Negative for apnea, cough, choking, chest tightness, shortness of breath and wheezing.     Cardiovascular: Negative for chest pain and palpitations.   Gastrointestinal: Negative for abdominal distention, abdominal pain, blood in stool, constipation, diarrhea, nausea and vomiting.   Endocrine: Negative for cold intolerance, heat intolerance, polydipsia, polyphagia and polyuria.   Genitourinary: Negative for decreased urine volume, difficulty urinating, dysuria, flank pain, frequency, genital sores, hematuria, pelvic pain and urgency.   Musculoskeletal: Positive for arthralgias and back pain. Negative for gait problem, joint swelling, myalgias, neck pain and neck stiffness.   Skin: Negative for color change, pallor, rash and wound.   Allergic/Immunologic: Negative for food allergies and immunocompromised state.   Neurological: Positive for weakness. Negative for dizziness, tremors, seizures, syncope, speech difficulty, light-headedness, numbness and headaches.   Hematological: Negative for adenopathy. Does not bruise/bleed easily.   Psychiatric/Behavioral: Negative for agitation, behavioral problems, confusion, decreased concentration, dysphoric mood, hallucinations, self-injury, sleep disturbance and suicidal ideas. The patient is not nervous/anxious and is not hyperactive.    All other systems reviewed and are negative.      Objective:      Physical Exam  Vitals and nursing note reviewed.   Constitutional:       General: She is not in acute distress.     Appearance: Normal appearance. She is well-developed. She is not diaphoretic.   HENT:      Head: Normocephalic and atraumatic.      Mouth/Throat:      Pharynx: No oropharyngeal exudate.   Eyes:      General: No scleral icterus.  Neck:      Thyroid: No thyromegaly.      Vascular: No carotid bruit or JVD.      Trachea: No tracheal deviation.   Cardiovascular:      Rate and Rhythm: Normal rate and regular rhythm.      Heart sounds: Normal heart sounds.   Pulmonary:      Effort: Pulmonary effort is normal. No respiratory distress.      Breath sounds: Normal  breath sounds. No wheezing or rales.   Chest:      Chest wall: No tenderness.   Abdominal:      General: Bowel sounds are normal. There is no distension.      Palpations: Abdomen is soft.      Tenderness: There is no abdominal tenderness. There is no guarding or rebound.   Musculoskeletal:         General: No tenderness. Normal range of motion.      Cervical back: Normal range of motion and neck supple.   Lymphadenopathy:      Cervical: No cervical adenopathy.   Skin:     General: Skin is warm and dry.      Coloration: Skin is not pale.      Findings: No erythema or rash.   Neurological:      Mental Status: She is alert and oriented to person, place, and time.   Psychiatric:         Behavior: Behavior normal.         Thought Content: Thought content normal.         Judgment: Judgment normal.         CMP  Sodium   Date Value Ref Range Status   02/02/2022 134 (L) 136 - 145 mmol/L Final     Potassium   Date Value Ref Range Status   02/02/2022 4.6 3.5 - 5.1 mmol/L Final     Chloride   Date Value Ref Range Status   02/02/2022 97 95 - 110 mmol/L Final     CO2   Date Value Ref Range Status   02/02/2022 29 23 - 29 mmol/L Final     Glucose   Date Value Ref Range Status   02/02/2022 105 70 - 110 mg/dL Final     BUN   Date Value Ref Range Status   02/02/2022 12 8 - 23 mg/dL Final     Creatinine   Date Value Ref Range Status   02/02/2022 1.2 0.5 - 1.4 mg/dL Final     Calcium   Date Value Ref Range Status   02/02/2022 10.4 8.7 - 10.5 mg/dL Final     Total Protein   Date Value Ref Range Status   02/02/2022 7.0 6.0 - 8.4 g/dL Final     Albumin   Date Value Ref Range Status   02/02/2022 4.0 3.5 - 5.2 g/dL Final     Total Bilirubin   Date Value Ref Range Status   02/02/2022 0.4 0.1 - 1.0 mg/dL Final     Comment:     For infants and newborns, interpretation of results should be based  on gestational age, weight and in agreement with clinical  observations.    Premature Infant recommended reference ranges:  Up to 24  hours.............<8.0 mg/dL  Up to 48 hours............<12.0 mg/dL  3-5 days..................<15.0 mg/dL  6-29 days.................<15.0 mg/dL       Alkaline Phosphatase   Date Value Ref Range Status   02/02/2022 91 55 - 135 U/L Final     AST   Date Value Ref Range Status   02/02/2022 16 10 - 40 U/L Final     ALT   Date Value Ref Range Status   02/02/2022 11 10 - 44 U/L Final     Anion Gap   Date Value Ref Range Status   02/02/2022 8 8 - 16 mmol/L Final     eGFR if    Date Value Ref Range Status   02/02/2022 51 (A) >60 mL/min/1.73 m^2 Final     eGFR if non    Date Value Ref Range Status   02/02/2022 44 (A) >60 mL/min/1.73 m^2 Final     Comment:     Calculation used to obtain the estimated glomerular filtration  rate (eGFR) is the CKD-EPI equation.        Lab Results   Component Value Date    WBC 6.87 02/02/2022    HGB 12.0 02/02/2022    HCT 37.2 02/02/2022    MCV 97 02/02/2022     02/02/2022     Lab Results   Component Value Date    CHOL 193 11/03/2021     Lab Results   Component Value Date    HDL 61 11/03/2021     Lab Results   Component Value Date    LDLCALC 117.2 11/03/2021     Lab Results   Component Value Date    TRIG 74 11/03/2021     Lab Results   Component Value Date    CHOLHDL 31.6 11/03/2021     Lab Results   Component Value Date    TSH 2.701 03/10/2022     No results found for: LABA1C, HGBA1C  Assessment:       1. Weakness    2. Spinal stenosis of lumbar region with neurogenic claudication    3. Weight loss    4. Anxious depression        Plan:   Weakness  -     Basic Metabolic Panel; Future; Expected date: 04/14/2022  -     CBC Auto Differential; Future; Expected date: 04/14/2022    Spinal stenosis of lumbar region with neurogenic claudication-----------------sees neurology----------------------    Weight loss--------------------stable------------------    Anxious depression--------------------trial remeron----------------    Other orders  -     mirtazapine  (REMERON) 7.5 MG Tab; Take 1 tablet (7.5 mg total) by mouth every evening.  Dispense: 30 tablet; Refill: 11    Continue meds,            As above---------------EGD,colon pending-------------f/u 6 weeks---------

## 2022-04-18 ENCOUNTER — TELEPHONE (OUTPATIENT)
Dept: GASTROENTEROLOGY | Facility: CLINIC | Age: 77
End: 2022-04-18
Payer: MEDICARE

## 2022-04-18 RX ORDER — POLYETHYLENE GLYCOL 3350, SODIUM SULFATE ANHYDROUS, SODIUM BICARBONATE, SODIUM CHLORIDE, POTASSIUM CHLORIDE 236; 22.74; 6.74; 5.86; 2.97 G/4L; G/4L; G/4L; G/4L; G/4L
4 POWDER, FOR SOLUTION ORAL ONCE
Qty: 4000 ML | Refills: 0 | Status: SHIPPED | OUTPATIENT
Start: 2022-04-18 | End: 2022-04-18

## 2022-05-02 ENCOUNTER — LAB VISIT (OUTPATIENT)
Dept: LAB | Facility: HOSPITAL | Age: 77
End: 2022-05-02
Attending: INTERNAL MEDICINE
Payer: MEDICARE

## 2022-05-02 ENCOUNTER — OFFICE VISIT (OUTPATIENT)
Dept: HEMATOLOGY/ONCOLOGY | Facility: CLINIC | Age: 77
End: 2022-05-02
Payer: MEDICARE

## 2022-05-02 VITALS
HEIGHT: 62 IN | WEIGHT: 125 LBS | HEART RATE: 78 BPM | OXYGEN SATURATION: 100 % | BODY MASS INDEX: 23 KG/M2 | TEMPERATURE: 97 F | SYSTOLIC BLOOD PRESSURE: 149 MMHG | DIASTOLIC BLOOD PRESSURE: 79 MMHG | RESPIRATION RATE: 16 BRPM

## 2022-05-02 DIAGNOSIS — N18.31 STAGE 3A CHRONIC KIDNEY DISEASE: ICD-10-CM

## 2022-05-02 DIAGNOSIS — R63.4 UNINTENTIONAL WEIGHT LOSS OF 10% BODY WEIGHT WITHIN 6 MONTHS: ICD-10-CM

## 2022-05-02 DIAGNOSIS — D64.9 ANEMIA, UNSPECIFIED TYPE: ICD-10-CM

## 2022-05-02 LAB
ALBUMIN SERPL BCP-MCNC: 3.8 G/DL (ref 3.5–5.2)
ALP SERPL-CCNC: 81 U/L (ref 55–135)
ALT SERPL W/O P-5'-P-CCNC: 10 U/L (ref 10–44)
ANION GAP SERPL CALC-SCNC: 9 MMOL/L (ref 8–16)
AST SERPL-CCNC: 14 U/L (ref 10–40)
BASOPHILS # BLD AUTO: 0.04 K/UL (ref 0–0.2)
BASOPHILS NFR BLD: 0.6 % (ref 0–1.9)
BILIRUB SERPL-MCNC: 0.5 MG/DL (ref 0.1–1)
BUN SERPL-MCNC: 14 MG/DL (ref 8–23)
CALCIUM SERPL-MCNC: 10 MG/DL (ref 8.7–10.5)
CHLORIDE SERPL-SCNC: 100 MMOL/L (ref 95–110)
CO2 SERPL-SCNC: 27 MMOL/L (ref 23–29)
CREAT SERPL-MCNC: 1.1 MG/DL (ref 0.5–1.4)
DIFFERENTIAL METHOD: ABNORMAL
EOSINOPHIL # BLD AUTO: 0 K/UL (ref 0–0.5)
EOSINOPHIL NFR BLD: 0.6 % (ref 0–8)
ERYTHROCYTE [DISTWIDTH] IN BLOOD BY AUTOMATED COUNT: 13.9 % (ref 11.5–14.5)
EST. GFR  (AFRICAN AMERICAN): 57 ML/MIN/1.73 M^2
EST. GFR  (NON AFRICAN AMERICAN): 49 ML/MIN/1.73 M^2
GLUCOSE SERPL-MCNC: 96 MG/DL (ref 70–110)
HCT VFR BLD AUTO: 36.1 % (ref 37–48.5)
HGB BLD-MCNC: 11.6 G/DL (ref 12–16)
IMM GRANULOCYTES # BLD AUTO: 0.03 K/UL (ref 0–0.04)
IMM GRANULOCYTES NFR BLD AUTO: 0.5 % (ref 0–0.5)
LYMPHOCYTES # BLD AUTO: 2 K/UL (ref 1–4.8)
LYMPHOCYTES NFR BLD: 31.7 % (ref 18–48)
MCH RBC QN AUTO: 31.1 PG (ref 27–31)
MCHC RBC AUTO-ENTMCNC: 32.1 G/DL (ref 32–36)
MCV RBC AUTO: 97 FL (ref 82–98)
MONOCYTES # BLD AUTO: 0.4 K/UL (ref 0.3–1)
MONOCYTES NFR BLD: 6.6 % (ref 4–15)
NEUTROPHILS # BLD AUTO: 3.7 K/UL (ref 1.8–7.7)
NEUTROPHILS NFR BLD: 60 % (ref 38–73)
NRBC BLD-RTO: 0 /100 WBC
PLATELET # BLD AUTO: 246 K/UL (ref 150–450)
PMV BLD AUTO: 11.4 FL (ref 9.2–12.9)
POTASSIUM SERPL-SCNC: 4.5 MMOL/L (ref 3.5–5.1)
PROT SERPL-MCNC: 6.8 G/DL (ref 6–8.4)
RBC # BLD AUTO: 3.73 M/UL (ref 4–5.4)
SODIUM SERPL-SCNC: 136 MMOL/L (ref 136–145)
WBC # BLD AUTO: 6.19 K/UL (ref 3.9–12.7)

## 2022-05-02 PROCEDURE — 99214 PR OFFICE/OUTPT VISIT, EST, LEVL IV, 30-39 MIN: ICD-10-PCS | Mod: S$PBB,,, | Performed by: INTERNAL MEDICINE

## 2022-05-02 PROCEDURE — 99999 PR PBB SHADOW E&M-EST. PATIENT-LVL III: ICD-10-PCS | Mod: PBBFAC,,, | Performed by: INTERNAL MEDICINE

## 2022-05-02 PROCEDURE — 99999 PR PBB SHADOW E&M-EST. PATIENT-LVL III: CPT | Mod: PBBFAC,,, | Performed by: INTERNAL MEDICINE

## 2022-05-02 PROCEDURE — 99213 OFFICE O/P EST LOW 20 MIN: CPT | Mod: PBBFAC | Performed by: INTERNAL MEDICINE

## 2022-05-02 PROCEDURE — 80053 COMPREHEN METABOLIC PANEL: CPT | Performed by: INTERNAL MEDICINE

## 2022-05-02 PROCEDURE — 36415 COLL VENOUS BLD VENIPUNCTURE: CPT | Performed by: INTERNAL MEDICINE

## 2022-05-02 PROCEDURE — 99214 OFFICE O/P EST MOD 30 MIN: CPT | Mod: S$PBB,,, | Performed by: INTERNAL MEDICINE

## 2022-05-02 PROCEDURE — 85025 COMPLETE CBC W/AUTO DIFF WBC: CPT | Performed by: INTERNAL MEDICINE

## 2022-05-02 NOTE — ASSESSMENT & PLAN NOTE
Normocytic anemia is multifactorial including chronic inflammation due to hypertension and chronic kidney disease.  CBC from 12/02/2021 showed hemoglobin of 11.9 grams/deciliters, hematocrit 37.6% with normal MCV.  Noted normal B12, folate and thyroid levels.  Workup for plasma cell dyscrasia was unremarkable with no evidence of paraprotein.  Iron studies showed adequate iron saturation and ferritin above 100 nanograms/cc.     Advised that anemia is likely related to chronic kidney disease.  Given hemoglobin above 11 grams/deciliter, there is no indication for erythropoietin stimulating agent but could be considered in future to maintain hemoglobin between 10 and 11 grams/deciliters.     Up-to-date with screening colonoscopy with most recent in 2015. Return in 6 months with repeat labs or sooner if needed.

## 2022-05-02 NOTE — ASSESSMENT & PLAN NOTE
Improving per patient.  Prior extensive workup was unremarkable with no evidence of malignancy.  Referral was placed to nutrition for evaluation regarding weight loss.  There was a 14 cm hypodense uterine lesion which was thought to be fibroid for which referral to gyn was placed.

## 2022-05-02 NOTE — PROGRESS NOTES
Subjective:   Date of Visit: 5/2/22   ?   ?    REFERRING PROVIDER: No referring provider defined for this encounter.   ?   CHIEF COMPLAINT:  Anemia???????   ?  HPI:  76-year-old female with history of hypertension, chronic back pain and chronic kidney disease was referred to us by Dr. Hill for evaluation and management of anemia.  Most recent CBC from 11/3/21 showed hemoglobin of 11.3 grams/deciliter, hematocrit 35.7% with MCV of 97.    She endorses chronic fatigue and generalized weakness but denies chest pain or shortness of breath.  She denies abnormal bleeding including epistaxis, hemoptysis, hematemesis, hematochezia, melena or hematuria.    She is up-to-date with screening colonoscopy with most recent in 2015. No pertinent family history.      INTERVAL HISTORY:  76-year-old female who presents to us for routine visit. No new complaints.    Review of Systems   Constitutional: Positive for fatigue. Negative for activity change, appetite change, chills, fever and unexpected weight change.   HENT: Negative for hearing loss, mouth sores, nosebleeds, sore throat, tinnitus, trouble swallowing and voice change.    Eyes: Negative for visual disturbance.   Respiratory: Negative for cough, chest tightness and shortness of breath.    Cardiovascular: Negative for chest pain, palpitations and leg swelling.   Gastrointestinal: Negative for abdominal pain, anal bleeding, blood in stool, constipation, diarrhea, nausea and vomiting.   Genitourinary: Negative for dysuria, frequency, hematuria, pelvic pain, vaginal bleeding and vaginal pain.   Musculoskeletal: Positive for back pain and gait problem. Negative for arthralgias, joint swelling and neck pain.   Skin: Negative for color change, pallor, rash and wound.   Allergic/Immunologic: Negative for immunocompromised state.   Neurological: Positive for weakness. Negative for dizziness, tremors, syncope, speech difficulty, light-headedness and headaches.   Hematological:  Negative for adenopathy. Does not bruise/bleed easily.   Psychiatric/Behavioral: Negative for agitation, confusion, decreased concentration, hallucinations and sleep disturbance. The patient is not nervous/anxious.        ?   PAST MEDICAL HISTORY:   Past Medical History:   Diagnosis Date    Cataract     Chest pain     CKD (chronic kidney disease) stage 3, GFR 30-59 ml/min     Dry eyes     Gastritis     Hyperlipidemia     Hypertension     Insomnia     Osteopenia     ?     PAST SURGICAL HISTORY:   Past Surgical History:   Procedure Laterality Date    BREAST BIOPSY Left     , benign     SECTION      x 1    KNEE ARTHROSCOPY Left  approx    KNEE SURGERY      left arm surgery      left knee surgery        ?   ALLERGIES:   Allergies as of 2022 - Reviewed 2022   Allergen Reaction Noted    No known drug allergies  2013      ?   MEDICATIONS:?   Outpatient Medications Marked as Taking for the 22 encounter (Office Visit) with Ramiro Burden MD   Medication Sig Dispense Refill    diclofenac sodium (VOLTAREN) 1 % Gel Apply topically.      ferrous sulfate (FEOSOL) 325 mg (65 mg iron) Tab tablet Take 325 mg by mouth daily with breakfast. Every other day      garlic (GARLIQUE ORAL) Take by mouth.      hydroCHLOROthiazide (HYDRODIURIL) 12.5 MG Tab TAKE 1 TABLET(12.5 MG) BY MOUTH EVERY 48 HOURS AS NEEDED 90 tablet 3    methylPREDNISolone (MEDROL DOSEPACK) 4 mg tablet use as directed 1 each 0    mirtazapine (REMERON) 7.5 MG Tab Take 1 tablet (7.5 mg total) by mouth every evening. 30 tablet 11    multivitamin (THERAGRAN) per tablet Take 1 tablet by mouth once daily.      SUPREP BOWEL PREP KIT 17.5-3.13-1.6 gram SolR Use as directed 354 mL 0    VIT A/C/E AC/ZNOX/CUPRIC OXIDE (EYE VITAMIN AND MINERALS ORAL) Take by mouth.        ?   SOCIAL HISTORY:?   Social History     Tobacco Use    Smoking status: Never Smoker    Smokeless tobacco: Never Used   Substance Use Topics     Alcohol use: No     Alcohol/week: 0.0 standard drinks        ?   FAMILY HISTORY:   family history includes Hypertension in her father and mother.   ?     Objective:      Physical Exam  Constitutional:       General: She is not in acute distress.     Appearance: She is well-developed. She is not ill-appearing or toxic-appearing.   HENT:      Head: Normocephalic and atraumatic.      Mouth/Throat:      Pharynx: No oropharyngeal exudate.   Eyes:      General: No scleral icterus.        Right eye: No discharge.         Left eye: No discharge.      Conjunctiva/sclera: Conjunctivae normal.      Pupils: Pupils are equal, round, and reactive to light.   Neck:      Thyroid: No thyromegaly.   Cardiovascular:      Rate and Rhythm: Normal rate and regular rhythm.      Heart sounds: No murmur heard.  Pulmonary:      Effort: Pulmonary effort is normal. No respiratory distress.      Breath sounds: Normal breath sounds.   Chest:      Chest wall: No tenderness.   Breasts:      Right: No supraclavicular adenopathy.      Left: No supraclavicular adenopathy.       Abdominal:      General: Bowel sounds are normal. There is no distension.      Palpations: Abdomen is soft. There is no mass.      Tenderness: There is no abdominal tenderness. There is no guarding or rebound.   Musculoskeletal:         General: No tenderness. Normal range of motion.      Cervical back: Normal range of motion and neck supple.   Lymphadenopathy:      Cervical: No cervical adenopathy.      Right cervical: No superficial cervical adenopathy.     Left cervical: No superficial cervical adenopathy.      Upper Body:      Right upper body: No supraclavicular or pectoral adenopathy.      Left upper body: No supraclavicular or pectoral adenopathy.      Lower Body: No right inguinal adenopathy. No left inguinal adenopathy.   Skin:     General: Skin is warm and dry.      Capillary Refill: Capillary refill takes 2 to 3 seconds.      Coloration: Skin is not pale.       Findings: No erythema or rash.   Neurological:      Mental Status: She is alert and oriented to person, place, and time.      Cranial Nerves: No cranial nerve deficit.      Sensory: No sensory deficit.   Psychiatric:         Behavior: Behavior normal. Behavior is cooperative.         Judgment: Judgment normal.       ?   Vitals:    05/02/22 1309   BP: (!) 149/79   Pulse: 78   Resp: 16   Temp: 97 °F (36.1 °C)      ?     ECOG SCORE    2 - Capable of all selfcare but unable to carry out any work activities, active > 50% of hours       Laboratory:  ?   No visits with results within 1 Day(s) from this visit.   Latest known visit with results is:   Lab Visit on 04/14/2022   Component Date Value Ref Range Status    Sodium 04/14/2022 136  136 - 145 mmol/L Final    Potassium 04/14/2022 4.2  3.5 - 5.1 mmol/L Final    Chloride 04/14/2022 98  95 - 110 mmol/L Final    CO2 04/14/2022 31 (A) 23 - 29 mmol/L Final    Glucose 04/14/2022 87  70 - 110 mg/dL Final    BUN 04/14/2022 11  8 - 23 mg/dL Final    Creatinine 04/14/2022 0.9  0.5 - 1.4 mg/dL Final    Calcium 04/14/2022 10.4  8.7 - 10.5 mg/dL Final    Anion Gap 04/14/2022 7 (A) 8 - 16 mmol/L Final    eGFR if African American 04/14/2022 >60.0  >60 mL/min/1.73 m^2 Final    eGFR if non African American 04/14/2022 >60.0  >60 mL/min/1.73 m^2 Final    WBC 04/14/2022 6.09  3.90 - 12.70 K/uL Final    RBC 04/14/2022 3.84 (A) 4.00 - 5.40 M/uL Final    Hemoglobin 04/14/2022 11.5 (A) 12.0 - 16.0 g/dL Final    Hematocrit 04/14/2022 36.9 (A) 37.0 - 48.5 % Final    MCV 04/14/2022 96  82 - 98 fL Final    MCH 04/14/2022 29.9  27.0 - 31.0 pg Final    MCHC 04/14/2022 31.2 (A) 32.0 - 36.0 g/dL Final    RDW 04/14/2022 13.3  11.5 - 14.5 % Final    Platelets 04/14/2022 245  150 - 450 K/uL Final    MPV 04/14/2022 11.5  9.2 - 12.9 fL Final    Immature Granulocytes 04/14/2022 0.5  0.0 - 0.5 % Final    Gran # (ANC) 04/14/2022 3.6  1.8 - 7.7 K/uL Final    Immature Grans (Abs)  04/14/2022 0.03  0.00 - 0.04 K/uL Final    Lymph # 04/14/2022 1.9  1.0 - 4.8 K/uL Final    Mono # 04/14/2022 0.5  0.3 - 1.0 K/uL Final    Eos # 04/14/2022 0.1  0.0 - 0.5 K/uL Final    Baso # 04/14/2022 0.05  0.00 - 0.20 K/uL Final    nRBC 04/14/2022 0  0 /100 WBC Final    Gran % 04/14/2022 58.9  38.0 - 73.0 % Final    Lymph % 04/14/2022 31.4  18.0 - 48.0 % Final    Mono % 04/14/2022 7.4  4.0 - 15.0 % Final    Eosinophil % 04/14/2022 1.0  0.0 - 8.0 % Final    Basophil % 04/14/2022 0.8  0.0 - 1.9 % Final    Differential Method 04/14/2022 Automated   Final      ?   Tumor markers   ?   ?   Imaging: US Abdomen Complete  Narrative: EXAMINATION:  US ABDOMEN COMPLETE    CLINICAL HISTORY:  Abnormal weight loss    TECHNIQUE:  Complete abdominal ultrasound (including pancreas, aorta, liver, gallbladder, common bile duct, IVC, kidneys, and spleen) was performed.    COMPARISON:  None    FINDINGS:  Pancreas: The visualized portions of pancreas appear normal.    Aorta: No aneurysm.    Gallbladder: No calculi, wall thickening, or pericholecystic fluid.    Biliary system: 2.6 mm common bile duct.  No intrahepatic ductal dilatation.    Liver: 12 cm, homogeneous parenchymal echotexture. No focal lesions.    Inferior vena cava: Normal in appearance.    Right kidney: 7.5 cm. No hydronephrosis.    Left kidney: 7.7 cm. No hydronephrosis.    Spleen: 5 cm.  No intrinsic abnormality.    Miscellaneous: No ascites.  Impression: No acute abnormality.    Electronically signed by: Chivo Vasquez MD  Date:    03/10/2022  Time:    14:05  X-Ray Chest PA And Lateral  Narrative: EXAMINATION:  XR CHEST PA AND LATERAL    CLINICAL HISTORY:  Abnormal weight loss    TECHNIQUE:  PA and lateral views of the chest were performed.    COMPARISON:  01/08/2020    FINDINGS:  The cardiac and mediastinal silhouettes appear within normal limits.   The lungs are clear bilaterally.  No acute osseous findings demonstrated.  Impression: No acute  findings.    Electronically signed by: Andrew Hogue MD  Date:    03/10/2022  Time:    12:13     ?    Pathology:  Pathology Results  (Last 10 years)    None         ?   Assessment/Plan:       1. Anemia, unspecified type    2. Stage 3a chronic kidney disease    3. Unintentional weight loss of 10% body weight within 6 months          Anemia  Normocytic anemia is multifactorial including chronic inflammation due to hypertension and chronic kidney disease.  CBC from 12/02/2021 showed hemoglobin of 11.9 grams/deciliters, hematocrit 37.6% with normal MCV.  Noted normal B12, folate and thyroid levels.  Workup for plasma cell dyscrasia was unremarkable with no evidence of paraprotein.  Iron studies showed adequate iron saturation and ferritin above 100 nanograms/cc.     Advised that anemia is likely related to chronic kidney disease.  Given hemoglobin above 11 grams/deciliter, there is no indication for erythropoietin stimulating agent but could be considered in future to maintain hemoglobin between 10 and 11 grams/deciliters.     Up-to-date with screening colonoscopy with most recent in 2015. Return in 6 months with repeat labs or sooner if needed.         Stage 3a chronic kidney disease  Followed by Nephrology       Unintentional weight loss of 10% body weight within 6 months  Improving per patient.  Prior extensive workup was unremarkable with no evidence of malignancy.  Referral was placed to nutrition for evaluation regarding weight loss.  There was a 14 cm hypodense uterine lesion which was thought to be fibroid for which referral to gyn was placed.      ?Anemia, unspecified type  -     CBC Auto Differential; Future; Expected date: 05/02/2022  -     Comprehensive Metabolic Panel; Future; Expected date: 05/02/2022    Stage 3a chronic kidney disease  -     CBC Auto Differential; Future; Expected date: 05/02/2022  -     Comprehensive Metabolic Panel; Future; Expected date: 05/02/2022    Unintentional weight loss of  10% body weight within 6 months  -     CBC Auto Differential; Future; Expected date: 05/02/2022  -     Comprehensive Metabolic Panel; Future; Expected date: 05/02/2022    ?   Follow-Up: Follow up in about 6 months (around 11/2/2022).    GENE KHAN Md., Ph.D  Hematology & Oncology Department  Phone #: [phone]

## 2022-05-26 ENCOUNTER — ANESTHESIA (OUTPATIENT)
Dept: ENDOSCOPY | Facility: HOSPITAL | Age: 77
End: 2022-05-26
Payer: MEDICARE

## 2022-05-26 ENCOUNTER — HOSPITAL ENCOUNTER (OUTPATIENT)
Facility: HOSPITAL | Age: 77
Discharge: HOME | End: 2022-05-26
Attending: INTERNAL MEDICINE | Admitting: INTERNAL MEDICINE
Payer: MEDICARE

## 2022-05-26 ENCOUNTER — ANESTHESIA EVENT (OUTPATIENT)
Dept: ENDOSCOPY | Facility: HOSPITAL | Age: 77
End: 2022-05-26
Payer: MEDICARE

## 2022-05-26 VITALS
RESPIRATION RATE: 14 BRPM | OXYGEN SATURATION: 98 % | DIASTOLIC BLOOD PRESSURE: 80 MMHG | HEIGHT: 62 IN | BODY MASS INDEX: 23 KG/M2 | WEIGHT: 125 LBS | SYSTOLIC BLOOD PRESSURE: 199 MMHG | HEART RATE: 78 BPM | TEMPERATURE: 98 F

## 2022-05-26 DIAGNOSIS — E61.1 IRON DEFICIENCY: Primary | ICD-10-CM

## 2022-05-26 DIAGNOSIS — R63.4 WEIGHT LOSS: Primary | ICD-10-CM

## 2022-05-26 PROCEDURE — 88342 IMHCHEM/IMCYTCHM 1ST ANTB: CPT | Performed by: PATHOLOGY

## 2022-05-26 PROCEDURE — 88305 TISSUE EXAM BY PATHOLOGIST: ICD-10-PCS | Mod: 26,,, | Performed by: PATHOLOGY

## 2022-05-26 PROCEDURE — 88305 TISSUE EXAM BY PATHOLOGIST: CPT | Mod: 26,,, | Performed by: PATHOLOGY

## 2022-05-26 PROCEDURE — 88341 IMHCHEM/IMCYTCHM EA ADD ANTB: CPT | Mod: 26,,, | Performed by: PATHOLOGY

## 2022-05-26 PROCEDURE — 45378 DIAGNOSTIC COLONOSCOPY: CPT | Mod: ,,, | Performed by: INTERNAL MEDICINE

## 2022-05-26 PROCEDURE — 88341 IMHCHEM/IMCYTCHM EA ADD ANTB: CPT | Performed by: PATHOLOGY

## 2022-05-26 PROCEDURE — 88342 CHG IMMUNOCYTOCHEMISTRY: ICD-10-PCS | Mod: 26,,, | Performed by: PATHOLOGY

## 2022-05-26 PROCEDURE — 45378 PR COLONOSCOPY,DIAGNOSTIC: ICD-10-PCS | Mod: ,,, | Performed by: INTERNAL MEDICINE

## 2022-05-26 PROCEDURE — 25000003 PHARM REV CODE 250: Performed by: INTERNAL MEDICINE

## 2022-05-26 PROCEDURE — 45378 DIAGNOSTIC COLONOSCOPY: CPT | Performed by: INTERNAL MEDICINE

## 2022-05-26 PROCEDURE — 37000008 HC ANESTHESIA 1ST 15 MINUTES: Performed by: INTERNAL MEDICINE

## 2022-05-26 PROCEDURE — 88341 PR IHC OR ICC EACH ADD'L SINGLE ANTIBODY  STAINPR: ICD-10-PCS | Mod: 26,,, | Performed by: PATHOLOGY

## 2022-05-26 PROCEDURE — 43239 PR EGD, FLEX, W/BIOPSY, SGL/MULTI: ICD-10-PCS | Mod: 51,,, | Performed by: INTERNAL MEDICINE

## 2022-05-26 PROCEDURE — 25000003 PHARM REV CODE 250: Performed by: NURSE ANESTHETIST, CERTIFIED REGISTERED

## 2022-05-26 PROCEDURE — 43239 EGD BIOPSY SINGLE/MULTIPLE: CPT | Performed by: INTERNAL MEDICINE

## 2022-05-26 PROCEDURE — 63600175 PHARM REV CODE 636 W HCPCS: Performed by: NURSE ANESTHETIST, CERTIFIED REGISTERED

## 2022-05-26 PROCEDURE — 43239 EGD BIOPSY SINGLE/MULTIPLE: CPT | Mod: 51,,, | Performed by: INTERNAL MEDICINE

## 2022-05-26 PROCEDURE — 88342 IMHCHEM/IMCYTCHM 1ST ANTB: CPT | Mod: 26,,, | Performed by: PATHOLOGY

## 2022-05-26 PROCEDURE — 37000009 HC ANESTHESIA EA ADD 15 MINS: Performed by: INTERNAL MEDICINE

## 2022-05-26 PROCEDURE — 27201012 HC FORCEPS, HOT/COLD, DISP: Performed by: INTERNAL MEDICINE

## 2022-05-26 PROCEDURE — 88305 TISSUE EXAM BY PATHOLOGIST: CPT | Performed by: PATHOLOGY

## 2022-05-26 RX ORDER — PROPOFOL 10 MG/ML
VIAL (ML) INTRAVENOUS
Status: DISCONTINUED | OUTPATIENT
Start: 2022-05-26 | End: 2022-05-26

## 2022-05-26 RX ORDER — DEXTROMETHORPHAN/PSEUDOEPHED 2.5-7.5/.8
DROPS ORAL
Status: COMPLETED | OUTPATIENT
Start: 2022-05-26 | End: 2022-05-26

## 2022-05-26 RX ORDER — SODIUM CHLORIDE, SODIUM LACTATE, POTASSIUM CHLORIDE, CALCIUM CHLORIDE 600; 310; 30; 20 MG/100ML; MG/100ML; MG/100ML; MG/100ML
INJECTION, SOLUTION INTRAVENOUS CONTINUOUS
Status: DISCONTINUED | OUTPATIENT
Start: 2022-05-26 | End: 2022-05-26 | Stop reason: HOSPADM

## 2022-05-26 RX ORDER — LIDOCAINE HYDROCHLORIDE 10 MG/ML
INJECTION, SOLUTION EPIDURAL; INFILTRATION; INTRACAUDAL; PERINEURAL
Status: DISCONTINUED | OUTPATIENT
Start: 2022-05-26 | End: 2022-05-26

## 2022-05-26 RX ADMIN — SIMETHICONE 66.6 MG: 20 SUSPENSION/ DROPS ORAL at 10:05

## 2022-05-26 RX ADMIN — PROPOFOL 100 MG: 10 INJECTION, EMULSION INTRAVENOUS at 10:05

## 2022-05-26 RX ADMIN — PROPOFOL 20 MG: 10 INJECTION, EMULSION INTRAVENOUS at 10:05

## 2022-05-26 RX ADMIN — SODIUM CHLORIDE, SODIUM LACTATE, POTASSIUM CHLORIDE, AND CALCIUM CHLORIDE: .6; .31; .03; .02 INJECTION, SOLUTION INTRAVENOUS at 09:05

## 2022-05-26 RX ADMIN — LIDOCAINE HYDROCHLORIDE 50 MG: 10 INJECTION, SOLUTION EPIDURAL; INFILTRATION; INTRACAUDAL; PERINEURAL at 10:05

## 2022-05-26 RX ADMIN — PROPOFOL 30 MG: 10 INJECTION, EMULSION INTRAVENOUS at 10:05

## 2022-05-26 NOTE — ANESTHESIA POSTPROCEDURE EVALUATION
Anesthesia Post Evaluation    Patient: Mary Flanagan    Procedure(s) Performed: Procedure(s) (LRB):  EGD (ESOPHAGOGASTRODUODENOSCOPY) (N/A)  COLONOSCOPY (N/A)    Final Anesthesia Type: MAC      Patient location during evaluation: PACU  Patient participation: Yes- Able to Participate  Level of consciousness: awake and alert  Post-procedure vital signs: reviewed and stable  Pain management: adequate  Airway patency: patent    PONV status at discharge: No PONV  Anesthetic complications: no      Cardiovascular status: blood pressure returned to baseline  Respiratory status: unassisted  Hydration status: euvolemic  Follow-up not needed.          Vitals Value Taken Time   /66 05/26/22 1040   Temp 98 05/26/22 1040   Pulse 66 05/26/22 1040   Resp 12 05/26/22 1040   SpO2 98 05/26/22 1040         No case tracking events are documented in the log.      Pain/[place]ville Score: No data recorded

## 2022-05-26 NOTE — H&P
PRE PROCEDURE H&P    Patient Name: Mary Flanagan  MRN: [id number]  : 1945  Date of Procedure:  2022  Referring Physician: Donna Mcbride NP  Primary Physician: Raul Hill MD  Procedure Physician: Karina Beck MD       Planned Procedure: Colonoscopy and EGD  Diagnosis: iron deficiency anemia;unintentional weight loss   Chief Complaint: Same as above    HPI: Patient is an 76 y.o. female is here for the above.     Last colonoscopy:   Family history: negative   Anticoagulation: none     Past Medical History:   Past Medical History:   Diagnosis Date    CKD (chronic kidney disease) stage 3, GFR 30-59 ml/min     Dry eyes     Gastritis     Hyperlipidemia     Hypertension     Insomnia     Osteopenia         Past Surgical History:  Past Surgical History:   Procedure Laterality Date    BREAST BIOPSY Left     , benign     SECTION      x 1    KNEE ARTHROSCOPY Left  approx    KNEE SURGERY      left arm surgery      left knee surgery          Home Medications:  Prior to Admission medications    Medication Sig Start Date End Date Taking? Authorizing Provider   diclofenac sodium (VOLTAREN) 1 % Gel Apply topically. 21  Yes Historical Provider   ferrous sulfate (FEOSOL) 325 mg (65 mg iron) Tab tablet Take 325 mg by mouth daily with breakfast. Every other day   Yes Historical Provider   garlic (GARLIQUE ORAL) Take by mouth.   Yes Historical Provider   hydroCHLOROthiazide (HYDRODIURIL) 12.5 MG Tab TAKE 1 TABLET(12.5 MG) BY MOUTH EVERY 48 HOURS AS NEEDED 21  Yes Raul Hill MD   methylPREDNISolone (MEDROL DOSEPACK) 4 mg tablet use as directed 3/21/22   Xavi Ross MD   mirtazapine (REMERON) 7.5 MG Tab Take 1 tablet (7.5 mg total) by mouth every evening. 22  Raul Hill MD   multivitamin (THERAGRAN) per tablet Take 1 tablet by mouth once daily.    Historical Provider   SUPREP BOWEL PREP KIT 17.5-3.13-1.6 gram SolR Use as  directed 3/21/22   Donna Mcbride NP   VIT A/C/E AC/ZNOX/CUPRIC OXIDE (EYE VITAMIN AND MINERALS ORAL) Take by mouth.    Historical Provider        Allergies:  Review of patient's allergies indicates:   Allergen Reactions    No known drug allergies         Social History:   Social History     Socioeconomic History    Marital status:     Number of children: 3   Occupational History    Occupation: retired   Tobacco Use    Smoking status: Never Smoker    Smokeless tobacco: Never Used   Substance and Sexual Activity    Alcohol use: No     Alcohol/week: 0.0 standard drinks    Drug use: No    Sexual activity: Not Currently     Partners: Male     Birth control/protection: None     Comment: mut monog     Social Determinants of Health     Financial Resource Strain: Low Risk     Difficulty of Paying Living Expenses: Not hard at all   Food Insecurity: No Food Insecurity    Worried About Running Out of Food in the Last Year: Never true    Ran Out of Food in the Last Year: Never true   Transportation Needs: No Transportation Needs    Lack of Transportation (Medical): No    Lack of Transportation (Non-Medical): No   Physical Activity: Inactive    Days of Exercise per Week: 0 days    Minutes of Exercise per Session: 0 min   Stress: Stress Concern Present    Feeling of Stress : Very much   Social Connections: Socially Integrated    Frequency of Communication with Friends and Family: More than three times a week    Frequency of Social Gatherings with Friends and Family: Once a week    Attends Yarsanism Services: More than 4 times per year    Active Member of Clubs or Organizations: Yes    Attends Club or Organization Meetings: More than 4 times per year    Marital Status:    Housing Stability: Low Risk     Unable to Pay for Housing in the Last Year: No    Number of Places Lived in the Last Year: 1    Unstable Housing in the Last Year: No       Family History:  Family History   Problem  "Relation Age of Onset    Hypertension Mother     Hypertension Father     Breast cancer Neg Hx     Colon cancer Neg Hx     Ovarian cancer Neg Hx     Thrombophilia Neg Hx     Strabismus Neg Hx     Macular degeneration Neg Hx     Retinal detachment Neg Hx     Glaucoma Neg Hx     Blindness Neg Hx     Amblyopia Neg Hx        ROS: No acute cardiac events, no acute respiratory complaints.     Physical Exam (all patients):    BP (!) 161/77   Pulse 84   Temp 98.1 °F (36.7 °C)   Resp 20   Ht 5' 2" (1.575 m)   Wt 56.7 kg (125 lb)   SpO2 99%   Breastfeeding No   BMI 22.86 kg/m²   Lungs: Clear to auscultation bilaterally, respirations unlabored  Heart: Regular rate and rhythm, S1 and S2 normal, no obvious murmurs  Abdomen:         Soft, non-tender, bowel sounds normal, no masses, no organomegaly    Lab Results   Component Value Date    WBC 6.19 05/02/2022    MCV 97 05/02/2022    RDW 13.9 05/02/2022     05/02/2022    GLU 96 05/02/2022    BUN 14 05/02/2022     05/02/2022    K 4.5 05/02/2022     05/02/2022        SEDATION PLAN: per anesthesia      History reviewed, vital signs satisfactory, cardiopulmonary status satisfactory, sedation options, risks and plans have been discussed with the patient  All their questions were answered and the patient agrees to the sedation procedures as planned and the patient is deemed an appropriate candidate for the sedation as planned.    Procedure explained to patient, informed consent obtained and placed in chart.    Karina Beck  5/26/2022  10:10 AM   "

## 2022-05-26 NOTE — PROVATION PATIENT INSTRUCTIONS
Discharge Summary/Instructions after an Endoscopic Procedure  Patient Name: Mary Cummings  Patient MRN: [id number]  Patient YOB: 1945  Thursday, May 26, 2022 Karina Beck MD  Dear patient,  As a result of recent federal legislation (The Federal Cures Act), you may   receive lab or pathology results from your procedure in your [facility]   account before your physician is able to contact you. Your physician or   their representative will relay the results to you with their   recommendations at their soonest availability.  Thank you,  RESTRICTIONS:  During your procedure today, you received medications for sedation.  These   medications may affect your judgment, balance and coordination.  Therefore,   for 24 hours, you have the following restrictions:   - DO NOT drive a car, operate machinery, make legal/financial decisions,   sign important papers or drink alcohol.    ACTIVITY:  Today: no heavy lifting, straining or running due to procedural   sedation/anesthesia.  The following day: return to full activity including work.  DIET:  Eat and drink normally unless instructed otherwise.     TREATMENT FOR COMMON SIDE EFFECTS:  - Mild abdominal pain, nausea, belching, bloating or excessive gas:  rest,   eat lightly and use a heating pad.  - Sore Throat: treat with throat lozenges and/or gargle with warm salt   water.  - Because air was used during the procedure, expelling large amounts of air   from your rectum or belching is normal.  - If a bowel prep was taken, you may not have a bowel movement for 1-3 days.    This is normal.  SYMPTOMS TO WATCH FOR AND REPORT TO YOUR PHYSICIAN:  1. Abdominal pain or bloating, other than gas cramps.  2. Chest pain.  3. Back pain.  4. Signs of infection such as: chills or fever occurring within 24 hours   after the procedure.  5. Rectal bleeding, which would show as bright red, maroon, or black stools.   (A tablespoon of blood from the rectum is not serious, especially if    hemorrhoids are present.)  6. Vomiting.  7. Weakness or dizziness.  GO DIRECTLY TO THE NEAREST EMERGENCY ROOM IF YOU HAVE ANY OF THE FOLLOWING:      Difficulty breathing              Chills and/or fever over 101 F   Persistent vomiting and/or vomiting blood   Severe abdominal pain   Severe chest pain   Black, tarry stools   Bleeding- more than one tablespoon   Any other symptom or condition that you feel may need urgent attention  Your doctor recommends these additional instructions:  If any biopsies were taken, your doctors clinic will contact you in 1 to 2   weeks with any results.  - Patient has a contact number available for emergencies.  The signs and   symptoms of potential delayed complications were discussed with the   patient.  Return to normal activities tomorrow.  Written discharge   instructions were provided to the patient.   - Discharge patient to home (via wheelchair).   - Resume previous diet today.   - Continue present medications.   - Repeat colonoscopy is not recommended due to current age (66 years or   older) for screening purposes.   - Video capsule endoscopy  For questions, problems or results please call your physician Karina Beck MD at Work:  [phone]  If you have any questions about the above instructions, call the GI   department at [phone] or call the endoscopy unit at [phone]   from 7am until 3 pm.  [facility] [place], EMERGENCY ROOM PHONE NUMBER:   [phone]  IF A COMPLICATION OR EMERGENCY SITUATION ARISES AND YOU ARE UNABLE TO REACH   YOUR PHYSICIAN - GO DIRECTLY TO THE EMERGENCY ROOM.  I have read or have had read to me these discharge instructions for my   procedure and have received a written copy.  I understand these   instructions and will follow-up with my physician if I have any questions.     __________________________________       _____________________________________  Nurse Signature                                           Patient/Designated   Responsible Party Signature  MD Karina Griffiths MD  5/26/2022 10:45:23 AM  This report has been verified and signed electronically.  Dear patient,  As a result of recent federal legislation (The Federal Cures Act), you may   receive lab or pathology results from your procedure in your [facility]   account before your physician is able to contact you. Your physician or   their representative will relay the results to you with their   recommendations at their soonest availability.  Thank you,  PROVATION

## 2022-05-26 NOTE — ANESTHESIA RELEASE NOTE
"Anesthesia Release from PACU Note    Patient: Mary Flanagan    Procedure(s) Performed: Procedure(s) (LRB):  EGD (ESOPHAGOGASTRODUODENOSCOPY) (N/A)  COLONOSCOPY (N/A)    Anesthesia type: MAC    Post pain: Adequate analgesia    Post assessment: no apparent anesthetic complications    Last Vitals:   Visit Vitals  BP (!) 161/77   Pulse 84   Temp 36.7 °C (98.1 °F)   Resp 20   Ht 5' 2" (1.575 m)   Wt 56.7 kg (125 lb)   SpO2 99%   Breastfeeding No   BMI 22.86 kg/m²       Post vital signs: stable    Level of consciousness: awake    Nausea/Vomiting: no nausea/no vomiting    Complications: none    Airway Patency: patent    Respiratory: unassisted    Cardiovascular: stable and blood pressure at baseline    Hydration: euvolemic  "

## 2022-05-26 NOTE — ANESTHESIA PREPROCEDURE EVALUATION
05/26/2022  Mary Flanagan is a 76 y.o., female.      Pre-op Assessment    I have reviewed the Patient Summary Reports.     I have reviewed the Nursing Notes. I have reviewed the NPO Status.   I have reviewed the Medications.     Review of Systems  Anesthesia Hx:  No problems with previous Anesthesia    Social:  Non-Smoker    Hematology/Oncology:     Oncology Normal    -- Anemia:   EENT/Dental:EENT/Dental Normal   Cardiovascular:   Hypertension, well controlled hyperlipidemia  Deep Venous Thrombosis (DVT), Hx of DVT  Carotoid Artery Disease, bilateral    Pulmonary:  Pulmonary Normal    Renal/:   Chronic Renal Disease, CRI    Hepatic/GI:   GERD, poorly controlled    Musculoskeletal:   Arthritis   Bone Disorders: Osteopenia, Osteoporosis    Neurological:  Neurology Normal    Endocrine:  Endocrine Normal    Psych:   Psychiatric History anxiety depression Sleep Disorder and Insomnia. Sleep Disorder and Insomnia.        Physical Exam  General: Well nourished    Airway:  Mallampati: II   Mouth Opening: Normal  TM Distance: Normal  Tongue: Normal  Neck ROM: Normal ROM    Dental:  Intact    Chest/Lungs:  Clear to auscultation    Heart:  Rate: Normal        Anesthesia Plan  Type of Anesthesia, risks & benefits discussed:    Anesthesia Type: MAC  Intra-op Monitoring Plan: Standard ASA Monitors  Induction:  IV  Informed Consent: Informed consent signed with the Patient and all parties understand the risks and agree with anesthesia plan.  All questions answered. Patient consented to blood products? Yes  ASA Score: 3    Ready For Surgery From Anesthesia Perspective.     .

## 2022-05-26 NOTE — PLAN OF CARE
Pt's bp remains eleveated, 199/80 last, pt asymptomatic, Dr Beck notified, stated ok to discharge pt if she can get an MD appointment today or tomorrow. Appointment made tomorrow with Dr Whatley.Pt to be discharged.

## 2022-05-26 NOTE — PROVATION PATIENT INSTRUCTIONS
Discharge Summary/Instructions after an Endoscopic Procedure  Patient Name: Mary Cummings  Patient MRN: [id number]  Patient YOB: 1945  Thursday, May 26, 2022 Karina Beck MD  Dear patient,  As a result of recent federal legislation (The Federal Cures Act), you may   receive lab or pathology results from your procedure in your [facility]   account before your physician is able to contact you. Your physician or   their representative will relay the results to you with their   recommendations at their soonest availability.  Thank you,  RESTRICTIONS:  During your procedure today, you received medications for sedation.  These   medications may affect your judgment, balance and coordination.  Therefore,   for 24 hours, you have the following restrictions:   - DO NOT drive a car, operate machinery, make legal/financial decisions,   sign important papers or drink alcohol.    ACTIVITY:  Today: no heavy lifting, straining or running due to procedural   sedation/anesthesia.  The following day: return to full activity including work.  DIET:  Eat and drink normally unless instructed otherwise.     TREATMENT FOR COMMON SIDE EFFECTS:  - Mild abdominal pain, nausea, belching, bloating or excessive gas:  rest,   eat lightly and use a heating pad.  - Sore Throat: treat with throat lozenges and/or gargle with warm salt   water.  - Because air was used during the procedure, expelling large amounts of air   from your rectum or belching is normal.  - If a bowel prep was taken, you may not have a bowel movement for 1-3 days.    This is normal.  SYMPTOMS TO WATCH FOR AND REPORT TO YOUR PHYSICIAN:  1. Abdominal pain or bloating, other than gas cramps.  2. Chest pain.  3. Back pain.  4. Signs of infection such as: chills or fever occurring within 24 hours   after the procedure.  5. Rectal bleeding, which would show as bright red, maroon, or black stools.   (A tablespoon of blood from the rectum is not serious, especially if    hemorrhoids are present.)  6. Vomiting.  7. Weakness or dizziness.  GO DIRECTLY TO THE NEAREST EMERGENCY ROOM IF YOU HAVE ANY OF THE FOLLOWING:      Difficulty breathing              Chills and/or fever over 101 F   Persistent vomiting and/or vomiting blood   Severe abdominal pain   Severe chest pain   Black, tarry stools   Bleeding- more than one tablespoon   Any other symptom or condition that you feel may need urgent attention  Your doctor recommends these additional instructions:  If any biopsies were taken, your doctors clinic will contact you in 1 to 2   weeks with any results.  - Patient has a contact number available for emergencies.  The signs and   symptoms of potential delayed complications were discussed with the   patient.  Return to normal activities tomorrow.  Written discharge   instructions were provided to the patient.   - Discharge patient to home (via wheelchair).   - Resume previous diet today.   - Continue present medications.   - Await pathology results.  For questions, problems or results please call your physician Karina Beck MD at Work:  [phone]  If you have any questions about the above instructions, call the GI   department at [phone] or call the endoscopy unit at [phone]   from 7am until 3 pm.  [facility] [place], EMERGENCY ROOM PHONE NUMBER:   [phone]  IF A COMPLICATION OR EMERGENCY SITUATION ARISES AND YOU ARE UNABLE TO REACH   YOUR PHYSICIAN - GO DIRECTLY TO THE EMERGENCY ROOM.  I have read or have had read to me these discharge instructions for my   procedure and have received a written copy.  I understand these   instructions and will follow-up with my physician if I have any questions.     __________________________________       _____________________________________  Nurse Signature                                          Patient/Designated   Responsible Party Signature  MD Karina Griffiths MD  5/26/2022 10:47:43  AM  This report has been verified and signed electronically.  Dear patient,  As a result of recent federal legislation (The Federal Cures Act), you may   receive lab or pathology results from your procedure in your [facility]   account before your physician is able to contact you. Your physician or   their representative will relay the results to you with their   recommendations at their soonest availability.  Thank you,  PROVATION

## 2022-05-26 NOTE — TRANSFER OF CARE
"Anesthesia Transfer of Care Note    Patient: Mary Flanagan    Procedure(s) Performed: Procedure(s) (LRB):  EGD (ESOPHAGOGASTRODUODENOSCOPY) (N/A)  COLONOSCOPY (N/A)    Patient location: PACU    Anesthesia Type: MAC    Transport from OR: Transported from OR on room air with adequate spontaneous ventilation    Post pain: adequate analgesia    Post assessment: no apparent anesthetic complications    Post vital signs: stable    Level of consciousness: awake    Nausea/Vomiting: no nausea/vomiting    Complications: none    Transfer of care protocol was followed      Last vitals:   Visit Vitals  BP (!) 161/77   Pulse 84   Temp 36.7 °C (98.1 °F)   Resp 20   Ht 5' 2" (1.575 m)   Wt 56.7 kg (125 lb)   SpO2 99%   Breastfeeding No   BMI 22.86 kg/m²     "

## 2022-05-30 ENCOUNTER — LAB VISIT (OUTPATIENT)
Dept: LAB | Facility: HOSPITAL | Age: 77
End: 2022-05-30
Attending: INTERNAL MEDICINE
Payer: MEDICARE

## 2022-05-30 ENCOUNTER — OFFICE VISIT (OUTPATIENT)
Dept: FAMILY MEDICINE | Facility: CLINIC | Age: 77
End: 2022-05-30
Payer: MEDICARE

## 2022-05-30 ENCOUNTER — PATIENT MESSAGE (OUTPATIENT)
Dept: GASTROENTEROLOGY | Facility: CLINIC | Age: 77
End: 2022-05-30
Payer: MEDICARE

## 2022-05-30 VITALS
SYSTOLIC BLOOD PRESSURE: 122 MMHG | DIASTOLIC BLOOD PRESSURE: 78 MMHG | TEMPERATURE: 98 F | RESPIRATION RATE: 16 BRPM | HEART RATE: 73 BPM | BODY MASS INDEX: 22.18 KG/M2 | WEIGHT: 121.25 LBS | OXYGEN SATURATION: 100 %

## 2022-05-30 DIAGNOSIS — R25.1 TREMOR: ICD-10-CM

## 2022-05-30 DIAGNOSIS — D64.9 ANEMIA, UNSPECIFIED TYPE: ICD-10-CM

## 2022-05-30 DIAGNOSIS — E78.2 MIXED HYPERLIPIDEMIA: ICD-10-CM

## 2022-05-30 DIAGNOSIS — R53.1 WEAKNESS: Primary | ICD-10-CM

## 2022-05-30 DIAGNOSIS — G89.29 CHRONIC BILATERAL LOW BACK PAIN WITHOUT SCIATICA: ICD-10-CM

## 2022-05-30 DIAGNOSIS — R63.4 UNINTENTIONAL WEIGHT LOSS OF 10% BODY WEIGHT WITHIN 6 MONTHS: ICD-10-CM

## 2022-05-30 DIAGNOSIS — M54.50 CHRONIC BILATERAL LOW BACK PAIN WITHOUT SCIATICA: ICD-10-CM

## 2022-05-30 PROBLEM — I82.512 CHRONIC DEEP VEIN THROMBOSIS (DVT) OF FEMORAL VEIN OF LEFT LOWER EXTREMITY: Status: RESOLVED | Noted: 2020-06-04 | Resolved: 2022-05-30

## 2022-05-30 LAB
CHOLEST SERPL-MCNC: 208 MG/DL (ref 120–199)
CHOLEST/HDLC SERPL: 3.1 {RATIO} (ref 2–5)
HDLC SERPL-MCNC: 68 MG/DL (ref 40–75)
HDLC SERPL: 32.7 % (ref 20–50)
LDLC SERPL CALC-MCNC: 122 MG/DL (ref 63–159)
NONHDLC SERPL-MCNC: 140 MG/DL
TRIGL SERPL-MCNC: 90 MG/DL (ref 30–150)

## 2022-05-30 PROCEDURE — 36415 COLL VENOUS BLD VENIPUNCTURE: CPT | Mod: PO | Performed by: INTERNAL MEDICINE

## 2022-05-30 PROCEDURE — 99999 PR PBB SHADOW E&M-EST. PATIENT-LVL IV: ICD-10-PCS | Mod: PBBFAC,,, | Performed by: INTERNAL MEDICINE

## 2022-05-30 PROCEDURE — 80061 LIPID PANEL: CPT | Performed by: INTERNAL MEDICINE

## 2022-05-30 PROCEDURE — 99214 OFFICE O/P EST MOD 30 MIN: CPT | Mod: S$PBB,,, | Performed by: INTERNAL MEDICINE

## 2022-05-30 PROCEDURE — 99214 OFFICE O/P EST MOD 30 MIN: CPT | Mod: PBBFAC,PO | Performed by: INTERNAL MEDICINE

## 2022-05-30 PROCEDURE — 99999 PR PBB SHADOW E&M-EST. PATIENT-LVL IV: CPT | Mod: PBBFAC,,, | Performed by: INTERNAL MEDICINE

## 2022-05-30 PROCEDURE — 99214 PR OFFICE/OUTPT VISIT, EST, LEVL IV, 30-39 MIN: ICD-10-PCS | Mod: S$PBB,,, | Performed by: INTERNAL MEDICINE

## 2022-05-30 NOTE — PROGRESS NOTES
Subjective:       Patient ID: Mary Flanagan is a 76 y.o. female.    Chief Complaint: Follow-up (6weeks), Anemia, and Hyperlipidemia    F/u--still c/o weakness-----------weight down 2 # since last visit-------    Follow-up  Associated symptoms include arthralgias, fatigue, myalgias and weakness. Pertinent negatives include no abdominal pain, chest pain, chills, congestion, coughing, diaphoresis, fever, headaches, joint swelling, nausea, neck pain, numbness, rash, sore throat or vomiting.   Anemia  There has been no abdominal pain, bruising/bleeding easily, confusion, fever, light-headedness, pallor or palpitations.   Hyperlipidemia  Associated symptoms include myalgias. Pertinent negatives include no chest pain or shortness of breath.     Past Medical History:   Diagnosis Date    CKD (chronic kidney disease) stage 3, GFR 30-59 ml/min     Dry eyes     Gastritis     Hyperlipidemia     Hypertension     Insomnia     Osteopenia      Past Surgical History:   Procedure Laterality Date    BREAST BIOPSY Left     , benign     SECTION      x 1    COLONOSCOPY N/A 2022    Procedure: COLONOSCOPY;  Surgeon: Karina Beck MD;  Location: [facility];  Service: Endoscopy;  Laterality: N/A;    ESOPHAGOGASTRODUODENOSCOPY N/A 2022    Procedure: EGD (ESOPHAGOGASTRODUODENOSCOPY);  Surgeon: Karina Beck MD;  Location: [facility];  Service: Endoscopy;  Laterality: N/A;    KNEE ARTHROSCOPY Left  approx    KNEE SURGERY      left arm surgery      left knee surgery       Family History   Problem Relation Age of Onset    Hypertension Mother     Hypertension Father     Breast cancer Neg Hx     Colon cancer Neg Hx     Ovarian cancer Neg Hx     Thrombophilia Neg Hx     Strabismus Neg Hx     Macular degeneration Neg Hx     Retinal detachment Neg Hx     Glaucoma Neg Hx     Blindness Neg Hx     Amblyopia Neg Hx      Social History     Socioeconomic History    Marital status:     Number  of children: 3   Occupational History    Occupation: retired   Tobacco Use    Smoking status: Never Smoker    Smokeless tobacco: Never Used   Substance and Sexual Activity    Alcohol use: No     Alcohol/week: 0.0 standard drinks    Drug use: No    Sexual activity: Not Currently     Partners: Male     Birth control/protection: None     Comment: mut monog     Social Determinants of Health     Financial Resource Strain: Low Risk     Difficulty of Paying Living Expenses: Not hard at all   Food Insecurity: No Food Insecurity    Worried About Running Out of Food in the Last Year: Never true    Ran Out of Food in the Last Year: Never true   Transportation Needs: No Transportation Needs    Lack of Transportation (Medical): No    Lack of Transportation (Non-Medical): No   Physical Activity: Inactive    Days of Exercise per Week: 0 days    Minutes of Exercise per Session: 0 min   Stress: Stress Concern Present    Feeling of Stress : Very much   Social Connections: Socially Integrated    Frequency of Communication with Friends and Family: More than three times a week    Frequency of Social Gatherings with Friends and Family: Once a week    Attends Orthodoxy Services: More than 4 times per year    Active Member of Clubs or Organizations: Yes    Attends Club or Organization Meetings: More than 4 times per year    Marital Status:    Housing Stability: Low Risk     Unable to Pay for Housing in the Last Year: No    Number of Places Lived in the Last Year: 1    Unstable Housing in the Last Year: No     Review of Systems   Constitutional: Positive for activity change and fatigue. Negative for appetite change, chills, diaphoresis and fever.   HENT: Negative for congestion, drooling, ear discharge, ear pain, facial swelling, hearing loss, mouth sores, nosebleeds, postnasal drip, rhinorrhea, sinus pressure, sneezing, sore throat, tinnitus, trouble swallowing and voice change.    Eyes: Negative for  photophobia, redness and visual disturbance.   Respiratory: Negative for apnea, cough, choking, chest tightness, shortness of breath and wheezing.    Cardiovascular: Negative for chest pain and palpitations.   Gastrointestinal: Negative for abdominal distention, abdominal pain, blood in stool, constipation, diarrhea, nausea and vomiting.   Endocrine: Negative for cold intolerance, heat intolerance, polydipsia, polyphagia and polyuria.   Genitourinary: Negative for decreased urine volume, difficulty urinating, dysuria, flank pain, frequency, genital sores, hematuria, pelvic pain and urgency.   Musculoskeletal: Positive for arthralgias, back pain and myalgias. Negative for gait problem, joint swelling, neck pain and neck stiffness.   Skin: Negative for color change, pallor, rash and wound.   Allergic/Immunologic: Negative for food allergies and immunocompromised state.   Neurological: Positive for weakness. Negative for dizziness, tremors, seizures, syncope, speech difficulty, light-headedness, numbness and headaches.   Hematological: Negative for adenopathy. Does not bruise/bleed easily.   Psychiatric/Behavioral: Negative for agitation, behavioral problems, confusion, decreased concentration, dysphoric mood, hallucinations, self-injury, sleep disturbance and suicidal ideas. The patient is nervous/anxious. The patient is not hyperactive.    All other systems reviewed and are negative.      Objective:      Physical Exam  Vitals and nursing note reviewed.   Constitutional:       General: She is not in acute distress.     Appearance: Normal appearance. She is well-developed. She is not diaphoretic.   HENT:      Head: Normocephalic and atraumatic.      Mouth/Throat:      Pharynx: No oropharyngeal exudate.   Eyes:      General: No scleral icterus.  Neck:      Thyroid: No thyromegaly.      Vascular: No carotid bruit or JVD.      Trachea: No tracheal deviation.   Cardiovascular:      Rate and Rhythm: Normal rate and regular  rhythm.      Heart sounds: Normal heart sounds.   Pulmonary:      Effort: Pulmonary effort is normal. No respiratory distress.      Breath sounds: Normal breath sounds. No wheezing or rales.   Chest:      Chest wall: No tenderness.   Abdominal:      General: Bowel sounds are normal. There is no distension.      Palpations: Abdomen is soft.      Tenderness: There is no abdominal tenderness. There is no guarding or rebound.   Musculoskeletal:         General: No tenderness. Normal range of motion.      Cervical back: Normal range of motion and neck supple.   Lymphadenopathy:      Cervical: No cervical adenopathy.   Skin:     General: Skin is warm and dry.      Coloration: Skin is not pale.      Findings: No erythema or rash.   Neurological:      Mental Status: She is alert and oriented to person, place, and time.   Psychiatric:         Behavior: Behavior normal.         Thought Content: Thought content normal.         Judgment: Judgment normal.         CMP  Sodium   Date Value Ref Range Status   05/02/2022 136 136 - 145 mmol/L Final     Potassium   Date Value Ref Range Status   05/02/2022 4.5 3.5 - 5.1 mmol/L Final     Chloride   Date Value Ref Range Status   05/02/2022 100 95 - 110 mmol/L Final     CO2   Date Value Ref Range Status   05/02/2022 27 23 - 29 mmol/L Final     Glucose   Date Value Ref Range Status   05/02/2022 96 70 - 110 mg/dL Final     BUN   Date Value Ref Range Status   05/02/2022 14 8 - 23 mg/dL Final     Creatinine   Date Value Ref Range Status   05/02/2022 1.1 0.5 - 1.4 mg/dL Final     Calcium   Date Value Ref Range Status   05/02/2022 10.0 8.7 - 10.5 mg/dL Final     Total Protein   Date Value Ref Range Status   05/02/2022 6.8 6.0 - 8.4 g/dL Final     Albumin   Date Value Ref Range Status   05/02/2022 3.8 3.5 - 5.2 g/dL Final     Total Bilirubin   Date Value Ref Range Status   05/02/2022 0.5 0.1 - 1.0 mg/dL Final     Comment:     For infants and newborns, interpretation of results should be  based  on gestational age, weight and in agreement with clinical  observations.    Premature Infant recommended reference ranges:  Up to 24 hours.............<8.0 mg/dL  Up to 48 hours............<12.0 mg/dL  3-5 days..................<15.0 mg/dL  6-29 days.................<15.0 mg/dL       Alkaline Phosphatase   Date Value Ref Range Status   05/02/2022 81 55 - 135 U/L Final     AST   Date Value Ref Range Status   05/02/2022 14 10 - 40 U/L Final     ALT   Date Value Ref Range Status   05/02/2022 10 10 - 44 U/L Final     Anion Gap   Date Value Ref Range Status   05/02/2022 9 8 - 16 mmol/L Final     eGFR if    Date Value Ref Range Status   05/02/2022 57 (A) >60 mL/min/1.73 m^2 Final     eGFR if non    Date Value Ref Range Status   05/02/2022 49 (A) >60 mL/min/1.73 m^2 Final     Comment:     Calculation used to obtain the estimated glomerular filtration  rate (eGFR) is the CKD-EPI equation.        Lab Results   Component Value Date    WBC 6.19 05/02/2022    HGB 11.6 (L) 05/02/2022    HCT 36.1 (L) 05/02/2022    MCV 97 05/02/2022     05/02/2022     Lab Results   Component Value Date    CHOL 193 11/03/2021     Lab Results   Component Value Date    HDL 61 11/03/2021     Lab Results   Component Value Date    LDLCALC 117.2 11/03/2021     Lab Results   Component Value Date    TRIG 74 11/03/2021     Lab Results   Component Value Date    CHOLHDL 31.6 11/03/2021     Lab Results   Component Value Date    TSH 2.701 03/10/2022     No results found for: LABA1C, HGBA1C  Assessment:       1. Weakness    2. Unintentional weight loss of 10% body weight within 6 months    3. Anemia, unspecified type    4. Mixed hyperlipidemia    5. Chronic bilateral low back pain without sciatica        Plan:   Weakness    Unintentional weight loss of 10% body weight within 6 months--------------w/u negative----------declines remeron------states she is doing better in this regard-    Anemia, unspecified  type-----seen by gi, hematology-onc----------------------    Mixed hyperlipidemia  -     Lipid Panel; Future; Expected date: 05/30/2022    Chronic bilateral low back pain without sciatica-------------sees back pain doc---------------    Continue meds----------sees neurology dr herndon for tremor.--------f/u 3 months-

## 2022-05-31 ENCOUNTER — TELEPHONE (OUTPATIENT)
Dept: GASTROENTEROLOGY | Facility: CLINIC | Age: 77
End: 2022-05-31
Payer: MEDICARE

## 2022-05-31 NOTE — TELEPHONE ENCOUNTER
Spoke to patient. She states she is suppose to have a test done on 6/7 and Murali states the instructions would be sent through her portal. She does not have those instructions and is inquiring about them. I informed her I do not see his message but will forward the call to him. She verbalized understanding.

## 2022-06-01 NOTE — TELEPHONE ENCOUNTER
Spoke to patient. She states she received her instructions via e-mail. She is wanting to reschedule the VCE due to not receiving her pathology results yet. I informed her the pathology results can take up to two weeks to results. She verbalized understanding and states she will call Murali and push the VCE back two weeks or so.

## 2022-06-03 ENCOUNTER — TELEPHONE (OUTPATIENT)
Dept: FAMILY MEDICINE | Facility: CLINIC | Age: 77
End: 2022-06-03
Payer: MEDICARE

## 2022-06-03 DIAGNOSIS — E78.5 DYSLIPIDEMIA: Primary | ICD-10-CM

## 2022-06-03 LAB
FINAL PATHOLOGIC DIAGNOSIS: NORMAL
GROSS: NORMAL
Lab: NORMAL
MICROSCOPIC EXAM: NORMAL

## 2022-06-03 RX ORDER — PRAVASTATIN SODIUM 10 MG/1
10 TABLET ORAL DAILY
Qty: 90 TABLET | Refills: 3 | Status: ON HOLD | OUTPATIENT
Start: 2022-06-03 | End: 2022-09-05 | Stop reason: HOSPADM

## 2022-06-03 RX ORDER — MIRTAZAPINE 7.5 MG/1
7.5 TABLET, FILM COATED ORAL NIGHTLY
Qty: 30 TABLET | Refills: 11 | Status: SHIPPED | OUTPATIENT
Start: 2022-06-03 | End: 2023-06-03

## 2022-06-03 NOTE — TELEPHONE ENCOUNTER
----- Message from Giana Garner sent at 6/3/2022  9:25 AM CDT -----  Regarding: Advice  Contact: Patient  Patient would like a call back concerning her cholesterol results at Ph .[phone], and what she should do about it .

## 2022-06-06 ENCOUNTER — PATIENT MESSAGE (OUTPATIENT)
Dept: FAMILY MEDICINE | Facility: CLINIC | Age: 77
End: 2022-06-06
Payer: MEDICARE

## 2022-06-08 ENCOUNTER — OFFICE VISIT (OUTPATIENT)
Dept: HEMATOLOGY/ONCOLOGY | Facility: CLINIC | Age: 77
End: 2022-06-08
Payer: MEDICARE

## 2022-06-08 VITALS
SYSTOLIC BLOOD PRESSURE: 135 MMHG | DIASTOLIC BLOOD PRESSURE: 78 MMHG | BODY MASS INDEX: 22.29 KG/M2 | OXYGEN SATURATION: 99 % | HEART RATE: 78 BPM | HEIGHT: 62 IN | TEMPERATURE: 98 F | WEIGHT: 121.13 LBS

## 2022-06-08 DIAGNOSIS — N18.31 STAGE 3A CHRONIC KIDNEY DISEASE: ICD-10-CM

## 2022-06-08 DIAGNOSIS — R63.4 UNINTENTIONAL WEIGHT LOSS OF 10% BODY WEIGHT WITHIN 6 MONTHS: ICD-10-CM

## 2022-06-08 DIAGNOSIS — D21.9 FIBROIDS: ICD-10-CM

## 2022-06-08 DIAGNOSIS — D64.9 ANEMIA, UNSPECIFIED TYPE: ICD-10-CM

## 2022-06-08 PROCEDURE — 99214 OFFICE O/P EST MOD 30 MIN: CPT | Mod: S$PBB,,, | Performed by: INTERNAL MEDICINE

## 2022-06-08 PROCEDURE — 99214 OFFICE O/P EST MOD 30 MIN: CPT | Mod: PBBFAC | Performed by: INTERNAL MEDICINE

## 2022-06-08 PROCEDURE — 99999 PR PBB SHADOW E&M-EST. PATIENT-LVL IV: CPT | Mod: PBBFAC,,, | Performed by: INTERNAL MEDICINE

## 2022-06-08 PROCEDURE — 99214 PR OFFICE/OUTPT VISIT, EST, LEVL IV, 30-39 MIN: ICD-10-PCS | Mod: S$PBB,,, | Performed by: INTERNAL MEDICINE

## 2022-06-08 PROCEDURE — 99999 PR PBB SHADOW E&M-EST. PATIENT-LVL IV: ICD-10-PCS | Mod: PBBFAC,,, | Performed by: INTERNAL MEDICINE

## 2022-06-08 NOTE — ASSESSMENT & PLAN NOTE
Normocytic anemia is multifactorial including chronic inflammation due to hypertension and chronic kidney disease.  CBC from 12/02/2021 showed hemoglobin of 11.9 grams/deciliters, hematocrit 37.6% with normal MCV.  Noted normal B12, folate and thyroid levels.  Workup for plasma cell dyscrasia was unremarkable with no evidence of paraprotein.  Iron studies showed adequate iron saturation and ferritin above 100 nanograms/cc.     Advised that anemia is likely related to chronic kidney disease.  Given hemoglobin above 11 grams/deciliter, there is no indication for erythropoietin stimulating agent but could be considered in future to maintain hemoglobin between 10 and 11 grams/deciliters.     Up-to-date with screening colonoscopy with most recent in May of 2022 that was unremarkable apart from internal hemorrhoids noted.  Repeat colonoscopy not recommended given age.  Upper GI endoscopy showed chemical gastritis otherwise unremarkable, no Helicobacter pylori noted.  Return in 6 months with repeat labs or sooner if needed.

## 2022-06-08 NOTE — ASSESSMENT & PLAN NOTE
Improving per patient.  Prior extensive workup was unremarkable with no evidence of malignancy.  Referral was placed to nutrition for evaluation regarding weight loss.

## 2022-06-08 NOTE — ASSESSMENT & PLAN NOTE
There was a 14 cm hypodense uterine lesion which was thought to be fibroid for which referral to gyn was placed.

## 2022-06-08 NOTE — PROGRESS NOTES
Subjective:   Date of Visit: 6/8/22   ?   ?    REFERRING PROVIDER: No referring provider defined for this encounter.   ?   CHIEF COMPLAINT:  Anemia???????   ?  HPI:  76-year-old female with history of hypertension, chronic back pain and chronic kidney disease was referred to us by Dr. Hill for evaluation and management of anemia.  Most recent CBC from 11/3/21 showed hemoglobin of 11.3 grams/deciliter, hematocrit 35.7% with MCV of 97.    She endorses chronic fatigue and generalized weakness but denies chest pain or shortness of breath.  She denies abnormal bleeding including epistaxis, hemoptysis, hematemesis, hematochezia, melena or hematuria.    She is up-to-date with screening colonoscopy with most recent in 2015. No pertinent family history.      INTERVAL HISTORY:  76-year-old female who presents to us for routine visit. No new complaints.    Review of Systems   Constitutional: Positive for fatigue. Negative for activity change, appetite change, chills, fever and unexpected weight change.   HENT: Negative for hearing loss, mouth sores, nosebleeds, sore throat, tinnitus, trouble swallowing and voice change.    Eyes: Negative for visual disturbance.   Respiratory: Negative for cough, chest tightness and shortness of breath.    Cardiovascular: Negative for chest pain, palpitations and leg swelling.   Gastrointestinal: Negative for abdominal pain, anal bleeding, blood in stool, constipation, diarrhea, nausea and vomiting.   Genitourinary: Negative for dysuria, frequency, hematuria, pelvic pain, vaginal bleeding and vaginal pain.   Musculoskeletal: Positive for back pain and gait problem. Negative for arthralgias, joint swelling and neck pain.   Skin: Negative for color change, pallor, rash and wound.   Allergic/Immunologic: Negative for immunocompromised state.   Neurological: Positive for weakness. Negative for dizziness, tremors, syncope, speech difficulty, light-headedness and headaches.   Hematological:  Negative for adenopathy. Does not bruise/bleed easily.   Psychiatric/Behavioral: Negative for agitation, confusion, decreased concentration, hallucinations and sleep disturbance. The patient is not nervous/anxious.        ?   PAST MEDICAL HISTORY:   Past Medical History:   Diagnosis Date    CKD (chronic kidney disease) stage 3, GFR 30-59 ml/min     Dry eyes     Gastritis     Hyperlipidemia     Hypertension     Insomnia     Osteopenia     ?     PAST SURGICAL HISTORY:   Past Surgical History:   Procedure Laterality Date    BREAST BIOPSY Left     , benign     SECTION      x 1    COLONOSCOPY N/A 2022    Procedure: COLONOSCOPY;  Surgeon: Karina Beck MD;  Location: [facility];  Service: Endoscopy;  Laterality: N/A;    ESOPHAGOGASTRODUODENOSCOPY N/A 2022    Procedure: EGD (ESOPHAGOGASTRODUODENOSCOPY);  Surgeon: Karina Beck MD;  Location: [facility];  Service: Endoscopy;  Laterality: N/A;    KNEE ARTHROSCOPY Left  approx    KNEE SURGERY      left arm surgery      left knee surgery        ?   ALLERGIES:   Allergies as of 2022 - Reviewed 2022   Allergen Reaction Noted    No known drug allergies  2013      ?   MEDICATIONS:?   Outpatient Medications Marked as Taking for the 22 encounter (Office Visit) with Ramiro Burden MD   Medication Sig Dispense Refill    diclofenac sodium (VOLTAREN) 1 % Gel Apply topically.      ferrous sulfate (FEOSOL) 325 mg (65 mg iron) Tab tablet Take 325 mg by mouth daily with breakfast. Every other day      garlic (GARLIQUE ORAL) Take by mouth.      hydroCHLOROthiazide (HYDRODIURIL) 12.5 MG Tab TAKE 1 TABLET(12.5 MG) BY MOUTH EVERY 48 HOURS AS NEEDED 90 tablet 3    methylPREDNISolone (MEDROL DOSEPACK) 4 mg tablet use as directed 1 each 0    mirtazapine (REMERON) 7.5 MG Tab Take 1 tablet (7.5 mg total) by mouth every evening. 30 tablet 11    multivitamin (THERAGRAN) per tablet Take 1 tablet by mouth once daily.       pravastatin (PRAVACHOL) 10 MG tablet Take 1 tablet (10 mg total) by mouth once daily. 90 tablet 3    SUPREP BOWEL PREP KIT 17.5-3.13-1.6 gram SolR Use as directed 354 mL 0    VIT A/C/E AC/ZNOX/CUPRIC OXIDE (EYE VITAMIN AND MINERALS ORAL) Take by mouth.        ?   SOCIAL HISTORY:?   Social History     Tobacco Use    Smoking status: Never Smoker    Smokeless tobacco: Never Used   Substance Use Topics    Alcohol use: No     Alcohol/week: 0.0 standard drinks        ?   FAMILY HISTORY:   family history includes Hypertension in her father and mother.   ?     Objective:      Physical Exam  Constitutional:       General: She is not in acute distress.     Appearance: She is well-developed. She is not ill-appearing or toxic-appearing.   HENT:      Head: Normocephalic and atraumatic.      Mouth/Throat:      Pharynx: No oropharyngeal exudate.   Eyes:      General: No scleral icterus.        Right eye: No discharge.         Left eye: No discharge.      Conjunctiva/sclera: Conjunctivae normal.      Pupils: Pupils are equal, round, and reactive to light.   Neck:      Thyroid: No thyromegaly.   Cardiovascular:      Rate and Rhythm: Normal rate and regular rhythm.      Heart sounds: No murmur heard.  Pulmonary:      Effort: Pulmonary effort is normal. No respiratory distress.      Breath sounds: Normal breath sounds.   Chest:      Chest wall: No tenderness.   Breasts:      Right: No supraclavicular adenopathy.      Left: No supraclavicular adenopathy.       Abdominal:      General: Bowel sounds are normal. There is no distension.      Palpations: Abdomen is soft. There is no mass.      Tenderness: There is no abdominal tenderness. There is no guarding or rebound.   Musculoskeletal:         General: No tenderness. Normal range of motion.      Cervical back: Normal range of motion and neck supple.   Lymphadenopathy:      Cervical: No cervical adenopathy.      Right cervical: No superficial cervical adenopathy.     Left cervical:  No superficial cervical adenopathy.      Upper Body:      Right upper body: No supraclavicular or pectoral adenopathy.      Left upper body: No supraclavicular or pectoral adenopathy.      Lower Body: No right inguinal adenopathy. No left inguinal adenopathy.   Skin:     General: Skin is warm and dry.      Capillary Refill: Capillary refill takes 2 to 3 seconds.      Coloration: Skin is not pale.      Findings: No erythema or rash.   Neurological:      Mental Status: She is alert and oriented to person, place, and time.      Cranial Nerves: No cranial nerve deficit.      Sensory: No sensory deficit.   Psychiatric:         Behavior: Behavior normal. Behavior is cooperative.         Judgment: Judgment normal.       ?   Vitals:    06/08/22 0937   BP: 135/78   Pulse: 78   Temp: 97.5 °F (36.4 °C)      ?     ECOG SCORE    2 - Capable of all selfcare but unable to carry out any work activities, active > 50% of hours       Laboratory:  ?   No visits with results within 1 Day(s) from this visit.   Latest known visit with results is:   Lab Visit on 05/30/2022   Component Date Value Ref Range Status    Cholesterol 05/30/2022 208 (A) 120 - 199 mg/dL Final    Triglycerides 05/30/2022 90  30 - 150 mg/dL Final    HDL 05/30/2022 68  40 - 75 mg/dL Final    LDL Cholesterol 05/30/2022 122.0  63.0 - 159.0 mg/dL Final    HDL/Cholesterol Ratio 05/30/2022 32.7  20.0 - 50.0 % Final    Total Cholesterol/HDL Ratio 05/30/2022 3.1  2.0 - 5.0 Final    Non-HDL Cholesterol 05/30/2022 140  mg/dL Final      ?   Tumor markers   ?   ?   Imaging: US Abdomen Complete  Narrative: EXAMINATION:  US ABDOMEN COMPLETE    CLINICAL HISTORY:  Abnormal weight loss    TECHNIQUE:  Complete abdominal ultrasound (including pancreas, aorta, liver, gallbladder, common bile duct, IVC, kidneys, and spleen) was performed.    COMPARISON:  None    FINDINGS:  Pancreas: The visualized portions of pancreas appear normal.    Aorta: No aneurysm.    Gallbladder: No  calculi, wall thickening, or pericholecystic fluid.    Biliary system: 2.6 mm common bile duct.  No intrahepatic ductal dilatation.    Liver: 12 cm, homogeneous parenchymal echotexture. No focal lesions.    Inferior vena cava: Normal in appearance.    Right kidney: 7.5 cm. No hydronephrosis.    Left kidney: 7.7 cm. No hydronephrosis.    Spleen: 5 cm.  No intrinsic abnormality.    Miscellaneous: No ascites.  Impression: No acute abnormality.    Electronically signed by: Chivo Vasquez MD  Date:    03/10/2022  Time:    14:05  X-Ray Chest PA And Lateral  Narrative: EXAMINATION:  XR CHEST PA AND LATERAL    CLINICAL HISTORY:  Abnormal weight loss    TECHNIQUE:  PA and lateral views of the chest were performed.    COMPARISON:  01/08/2020    FINDINGS:  The cardiac and mediastinal silhouettes appear within normal limits.   The lungs are clear bilaterally.  No acute osseous findings demonstrated.  Impression: No acute findings.    Electronically signed by: Andrew Hogue MD  Date:    03/10/2022  Time:    12:13     ?    Pathology:  Pathology Results  (Last 10 years)               05/26/22 1036  Specimen to Pathology, Surgery Gastrointestinal tract Final result    Narrative:  Pre-op Diagnosis: Unintentional weight loss of 10% body   weight within 6 months [R63.4]   Iron deficiency [E61.1]   Procedure(s):   EGD (ESOPHAGOGASTRODUODENOSCOPY)   COLONOSCOPY   1. Gastric Bx r/o HPylori   Which provider would you like to cc?->FELI CAMPUZANO   Release to patient->Immediate   Specimen total (fresh, frozen, permanent):->1            ?   Assessment/Plan:       1. Anemia, unspecified type    2. Stage 3a chronic kidney disease    3. Fibroids    4. Unintentional weight loss of 10% body weight within 6 months          Anemia  Normocytic anemia is multifactorial including chronic inflammation due to hypertension and chronic kidney disease.  CBC from 12/02/2021 showed hemoglobin of 11.9 grams/deciliters, hematocrit 37.6% with normal  MCV.  Noted normal B12, folate and thyroid levels.  Workup for plasma cell dyscrasia was unremarkable with no evidence of paraprotein.  Iron studies showed adequate iron saturation and ferritin above 100 nanograms/cc.     Advised that anemia is likely related to chronic kidney disease.  Given hemoglobin above 11 grams/deciliter, there is no indication for erythropoietin stimulating agent but could be considered in future to maintain hemoglobin between 10 and 11 grams/deciliters.     Up-to-date with screening colonoscopy with most recent in May of 2022 that was unremarkable apart from internal hemorrhoids noted.  Repeat colonoscopy not recommended given age.  Upper GI endoscopy showed chemical gastritis otherwise unremarkable, no Helicobacter pylori noted.  Return in 6 months with repeat labs or sooner if needed.      Stage 3a chronic kidney disease  Followed by Nephrology    Fibroids  There was a 14 cm hypodense uterine lesion which was thought to be fibroid for which referral to gyn was placed.    Unintentional weight loss of 10% body weight within 6 months  Improving per patient.  Prior extensive workup was unremarkable with no evidence of malignancy.  Referral was placed to nutrition for evaluation regarding weight loss.        ?Anemia, unspecified type  -     CBC Auto Differential; Future; Expected date: 06/08/2022  -     Comprehensive Metabolic Panel; Future; Expected date: 06/08/2022    Stage 3a chronic kidney disease  -     CBC Auto Differential; Future; Expected date: 06/08/2022  -     Comprehensive Metabolic Panel; Future; Expected date: 06/08/2022    Fibroids  -     CBC Auto Differential; Future; Expected date: 06/08/2022  -     Comprehensive Metabolic Panel; Future; Expected date: 06/08/2022    Unintentional weight loss of 10% body weight within 6 months  -     CBC Auto Differential; Future; Expected date: 06/08/2022  -     Comprehensive Metabolic Panel; Future; Expected date: 06/08/2022    ?   Follow-Up:  Follow up in about 6 months (around 12/8/2022).    GENE KHAN Md., Ph.D  Hematology & Oncology Department  Phone #: [phone]

## 2022-06-09 ENCOUNTER — TELEPHONE (OUTPATIENT)
Dept: OBSTETRICS AND GYNECOLOGY | Facility: CLINIC | Age: 77
End: 2022-06-09
Payer: MEDICARE

## 2022-06-09 NOTE — TELEPHONE ENCOUNTER
Called patient and she state hematologist suggested she see gyn for the issues she is having.  Patient denied bleeding.  Patient wanting to know if blood work could be ordered to check her hormones?  Any testing for fibroids?  Patient stated she has been feeling bad for a while and has been having lots of tests and they cannot determine what is wrong with her.  Advised patient message would be sent to ED.

## 2022-06-09 NOTE — TELEPHONE ENCOUNTER
----- Message from Jonathan Keyes sent at 6/9/2022 10:06 AM CDT -----  Contact: Patient [phone]  Patient called in regards to getting some labs orders to see why she has been feeling weak please advise

## 2022-06-09 NOTE — TELEPHONE ENCOUNTER
Venice Hobbs MD  You 46 minutes ago (10:45 AM)       I saw her for this in February.  Her fibroids are very small, and not causing any bleeding.  No need to check hormones, as all they will show is that she is postmenopausal.  Hormone replacement is contraindicated for her due to her age.  There's nothing else I can really offer her.  If she'd like to see me again, then next available is fine.      Called patient and after verifying with 2 identifiers the above discussed.  Patient will keep appointment scheduled.

## 2022-07-07 ENCOUNTER — OFFICE VISIT (OUTPATIENT)
Dept: FAMILY MEDICINE | Facility: CLINIC | Age: 77
End: 2022-07-07
Payer: MEDICARE

## 2022-07-07 VITALS
OXYGEN SATURATION: 100 % | WEIGHT: 122.38 LBS | RESPIRATION RATE: 16 BRPM | SYSTOLIC BLOOD PRESSURE: 110 MMHG | BODY MASS INDEX: 22.38 KG/M2 | TEMPERATURE: 98 F | HEART RATE: 68 BPM | DIASTOLIC BLOOD PRESSURE: 60 MMHG

## 2022-07-07 DIAGNOSIS — R63.4 WEIGHT LOSS: Primary | ICD-10-CM

## 2022-07-07 DIAGNOSIS — R53.1 WEAKNESS: ICD-10-CM

## 2022-07-07 PROCEDURE — 99213 PR OFFICE/OUTPT VISIT, EST, LEVL III, 20-29 MIN: ICD-10-PCS | Mod: S$PBB,,, | Performed by: INTERNAL MEDICINE

## 2022-07-07 PROCEDURE — 99213 OFFICE O/P EST LOW 20 MIN: CPT | Mod: S$PBB,,, | Performed by: INTERNAL MEDICINE

## 2022-07-07 PROCEDURE — 99214 OFFICE O/P EST MOD 30 MIN: CPT | Mod: PBBFAC,PO | Performed by: INTERNAL MEDICINE

## 2022-07-07 PROCEDURE — 99999 PR PBB SHADOW E&M-EST. PATIENT-LVL IV: CPT | Mod: PBBFAC,,, | Performed by: INTERNAL MEDICINE

## 2022-07-07 PROCEDURE — 99999 PR PBB SHADOW E&M-EST. PATIENT-LVL IV: ICD-10-PCS | Mod: PBBFAC,,, | Performed by: INTERNAL MEDICINE

## 2022-07-07 NOTE — PROGRESS NOTES
Subjective:       Patient ID: Mary Flanagan is a 76 y.o. female.    Chief Complaint: Follow-up (Still feeling weak)    F/u weight--------------stable--------------takes the remeron every other night-----    Follow-up  Associated symptoms include fatigue. Pertinent negatives include no abdominal pain, chest pain, chills, coughing, fever, nausea or vomiting.     Past Medical History:   Diagnosis Date    CKD (chronic kidney disease) stage 3, GFR 30-59 ml/min     Dry eyes     Gastritis     Hyperlipidemia     Hypertension     Insomnia     Osteopenia      Past Surgical History:   Procedure Laterality Date    BREAST BIOPSY Left     , benign     SECTION      x 1    COLONOSCOPY N/A 2022    Procedure: COLONOSCOPY;  Surgeon: Karina Beck MD;  [facility];  Service: Endoscopy;  Laterality: N/A;    ESOPHAGOGASTRODUODENOSCOPY N/A 2022    Procedure: EGD (ESOPHAGOGASTRODUODENOSCOPY);  Surgeon: Karina Beck MD;  Location: [facility];  Service: Endoscopy;  Laterality: N/A;    KNEE ARTHROSCOPY Left  approx    KNEE SURGERY      left arm surgery      left knee surgery       Family History   Problem Relation Age of Onset    Hypertension Mother     Hypertension Father     Breast cancer Neg Hx     Colon cancer Neg Hx     Ovarian cancer Neg Hx     Thrombophilia Neg Hx     Strabismus Neg Hx     Macular degeneration Neg Hx     Retinal detachment Neg Hx     Glaucoma Neg Hx     Blindness Neg Hx     Amblyopia Neg Hx      Social History     Socioeconomic History    Marital status:     Number of children: 3   Occupational History    Occupation: retired   Tobacco Use    Smoking status: Never Smoker    Smokeless tobacco: Never Used   Substance and Sexual Activity    Alcohol use: No     Alcohol/week: 0.0 standard drinks    Drug use: No    Sexual activity: Not Currently     Partners: Male     Birth control/protection: None     Comment: mut monog     Social  Determinants of Health     Financial Resource Strain: Low Risk     Difficulty of Paying Living Expenses: Not hard at all   Food Insecurity: No Food Insecurity    Worried About Running Out of Food in the Last Year: Never true    Ran Out of Food in the Last Year: Never true   Transportation Needs: No Transportation Needs    Lack of Transportation (Medical): No    Lack of Transportation (Non-Medical): No   Physical Activity: Inactive    Days of Exercise per Week: 0 days    Minutes of Exercise per Session: 0 min   Stress: Stress Concern Present    Feeling of Stress : Very much   Social Connections: Socially Integrated    Frequency of Communication with Friends and Family: More than three times a week    Frequency of Social Gatherings with Friends and Family: Once a week    Attends [employer] Services: More than 4 times per year    Active Member of Clubs or Organizations: Yes    Attends Club or Organization Meetings: More than 4 times per year    Marital Status:    Housing Stability: Low Risk     Unable to Pay for Housing in the Last Year: No    Number of Places Lived in the Last Year: 1    Unstable Housing in the Last Year: No     Review of Systems   Constitutional: Positive for fatigue. Negative for chills and fever.   Respiratory: Negative for apnea, cough, choking, chest tightness, shortness of breath, wheezing and stridor.    Cardiovascular: Negative for chest pain, palpitations and leg swelling.   Gastrointestinal: Negative for abdominal pain, nausea and vomiting.   Genitourinary: Negative for dysuria.   Musculoskeletal: Negative for gait problem.   Neurological: Negative for dizziness.   Psychiatric/Behavioral: Negative for agitation, behavioral problems and confusion.       Objective:      Physical Exam  Vitals and nursing note reviewed.   Constitutional:       General: She is not in acute distress.     Appearance: Normal appearance. She is well-developed. She is not diaphoretic.   HENT:       Head: Normocephalic and atraumatic.      Mouth/Throat:      Pharynx: No oropharyngeal exudate.   Eyes:      General: No scleral icterus.  Neck:      Thyroid: No thyromegaly.      Vascular: No carotid bruit or JVD.      Trachea: No tracheal deviation.   Cardiovascular:      Rate and Rhythm: Normal rate and regular rhythm.      Heart sounds: Normal heart sounds.   Pulmonary:      Effort: Pulmonary effort is normal. No respiratory distress.      Breath sounds: Normal breath sounds. No wheezing or rales.   Chest:      Chest wall: No tenderness.   Abdominal:      General: Bowel sounds are normal. There is no distension.      Palpations: Abdomen is soft.      Tenderness: There is no abdominal tenderness. There is no guarding or rebound.   Musculoskeletal:         General: No tenderness. Normal range of motion.      Cervical back: Normal range of motion and neck supple.   Lymphadenopathy:      Cervical: No cervical adenopathy.   Skin:     General: Skin is warm and dry.      Coloration: Skin is not pale.      Findings: No erythema or rash.   Neurological:      Mental Status: She is alert and oriented to person, place, and time.   Psychiatric:         Behavior: Behavior normal.         Thought Content: Thought content normal.         Judgment: Judgment normal.         CMP  Sodium   Date Value Ref Range Status   05/02/2022 136 136 - 145 mmol/L Final     Potassium   Date Value Ref Range Status   05/02/2022 4.5 3.5 - 5.1 mmol/L Final     Chloride   Date Value Ref Range Status   05/02/2022 100 95 - 110 mmol/L Final     CO2   Date Value Ref Range Status   05/02/2022 27 23 - 29 mmol/L Final     Glucose   Date Value Ref Range Status   05/02/2022 96 70 - 110 mg/dL Final     BUN   Date Value Ref Range Status   05/02/2022 14 8 - 23 mg/dL Final     Creatinine   Date Value Ref Range Status   05/02/2022 1.1 0.5 - 1.4 mg/dL Final     Calcium   Date Value Ref Range Status   05/02/2022 10.0 8.7 - 10.5 mg/dL Final     Total Protein   Date  Value Ref Range Status   05/02/2022 6.8 6.0 - 8.4 g/dL Final     Albumin   Date Value Ref Range Status   05/02/2022 3.8 3.5 - 5.2 g/dL Final     Total Bilirubin   Date Value Ref Range Status   05/02/2022 0.5 0.1 - 1.0 mg/dL Final     Comment:     For infants and newborns, interpretation of results should be based  on gestational age, weight and in agreement with clinical  observations.    Premature Infant recommended reference ranges:  Up to 24 hours.............<8.0 mg/dL  Up to 48 hours............<12.0 mg/dL  3-5 days..................<15.0 mg/dL  6-29 days.................<15.0 mg/dL       Alkaline Phosphatase   Date Value Ref Range Status   05/02/2022 81 55 - 135 U/L Final     AST   Date Value Ref Range Status   05/02/2022 14 10 - 40 U/L Final     ALT   Date Value Ref Range Status   05/02/2022 10 10 - 44 U/L Final     Anion Gap   Date Value Ref Range Status   05/02/2022 9 8 - 16 mmol/L Final     eGFR if    Date Value Ref Range Status   05/02/2022 57 (A) >60 mL/min/1.73 m^2 Final     eGFR if non    Date Value Ref Range Status   05/02/2022 49 (A) >60 mL/min/1.73 m^2 Final     Comment:     Calculation used to obtain the estimated glomerular filtration  rate (eGFR) is the CKD-EPI equation.        Lab Results   Component Value Date    WBC 6.19 05/02/2022    HGB 11.6 (L) 05/02/2022    HCT 36.1 (L) 05/02/2022    MCV 97 05/02/2022     05/02/2022     Lab Results   Component Value Date    CHOL 208 (H) 05/30/2022     Lab Results   Component Value Date    HDL 68 05/30/2022     Lab Results   Component Value Date    LDLCALC 122.0 05/30/2022     Lab Results   Component Value Date    TRIG 90 05/30/2022     Lab Results   Component Value Date    CHOLHDL 32.7 05/30/2022     Lab Results   Component Value Date    TSH 2.701 03/10/2022     No results found for: LABA1C, HGBA1C  Assessment:       1. Weight loss    2. Weakness        Plan:   Weight  loss    Weakness    Stable-----------------continue current meds--------keep f/u appts----------------

## 2022-07-13 ENCOUNTER — OFFICE VISIT (OUTPATIENT)
Dept: OBSTETRICS AND GYNECOLOGY | Facility: CLINIC | Age: 77
End: 2022-07-13
Payer: MEDICARE

## 2022-07-13 VITALS
BODY MASS INDEX: 23.4 KG/M2 | HEIGHT: 62 IN | SYSTOLIC BLOOD PRESSURE: 140 MMHG | WEIGHT: 127.19 LBS | DIASTOLIC BLOOD PRESSURE: 80 MMHG

## 2022-07-13 DIAGNOSIS — D50.8 OTHER IRON DEFICIENCY ANEMIA: Primary | ICD-10-CM

## 2022-07-13 PROBLEM — R63.4 WEIGHT LOSS: Status: RESOLVED | Noted: 2022-04-14 | Resolved: 2022-07-13

## 2022-07-13 PROBLEM — R42 DIZZINESS: Status: RESOLVED | Noted: 2020-04-15 | Resolved: 2022-07-13

## 2022-07-13 PROBLEM — G89.29 CHRONIC BILATERAL LOW BACK PAIN: Status: RESOLVED | Noted: 2022-05-30 | Resolved: 2022-07-13

## 2022-07-13 PROBLEM — R26.9 IMPAIRED GAIT: Status: RESOLVED | Noted: 2021-04-09 | Resolved: 2022-07-13

## 2022-07-13 PROBLEM — R53.1 WEAKNESS: Status: RESOLVED | Noted: 2020-09-18 | Resolved: 2022-07-13

## 2022-07-13 PROBLEM — M76.30 ILIOTIBIAL BAND SYNDROME: Status: RESOLVED | Noted: 2017-11-01 | Resolved: 2022-07-13

## 2022-07-13 PROBLEM — R42 LIGHTHEADEDNESS: Status: RESOLVED | Noted: 2020-09-18 | Resolved: 2022-07-13

## 2022-07-13 PROBLEM — I95.1 ORTHOSTATIC HYPOTENSION: Status: RESOLVED | Noted: 2020-05-20 | Resolved: 2022-07-13

## 2022-07-13 PROBLEM — M54.50 CHRONIC BILATERAL LOW BACK PAIN: Status: RESOLVED | Noted: 2022-05-30 | Resolved: 2022-07-13

## 2022-07-13 PROBLEM — R29.898 WEAKNESS OF LEFT LOWER EXTREMITY: Status: RESOLVED | Noted: 2020-09-18 | Resolved: 2022-07-13

## 2022-07-13 PROBLEM — R22.42 LOCALIZED SWELLING OF LEFT LOWER LEG: Status: RESOLVED | Noted: 2020-05-20 | Resolved: 2022-07-13

## 2022-07-13 PROBLEM — I49.3 PVC (PREMATURE VENTRICULAR CONTRACTION): Status: RESOLVED | Noted: 2020-05-20 | Resolved: 2022-07-13

## 2022-07-13 PROCEDURE — 99214 PR OFFICE/OUTPT VISIT, EST, LEVL IV, 30-39 MIN: ICD-10-PCS | Mod: S$PBB,,, | Performed by: OBSTETRICS & GYNECOLOGY

## 2022-07-13 PROCEDURE — 99999 PR PBB SHADOW E&M-EST. PATIENT-LVL III: ICD-10-PCS | Mod: PBBFAC,,, | Performed by: OBSTETRICS & GYNECOLOGY

## 2022-07-13 PROCEDURE — 99999 PR PBB SHADOW E&M-EST. PATIENT-LVL III: CPT | Mod: PBBFAC,,, | Performed by: OBSTETRICS & GYNECOLOGY

## 2022-07-13 PROCEDURE — 99214 OFFICE O/P EST MOD 30 MIN: CPT | Mod: S$PBB,,, | Performed by: OBSTETRICS & GYNECOLOGY

## 2022-07-13 PROCEDURE — 99213 OFFICE O/P EST LOW 20 MIN: CPT | Mod: PBBFAC | Performed by: OBSTETRICS & GYNECOLOGY

## 2022-07-13 NOTE — PROGRESS NOTES
"  Subjective:       Patient ID: Mary Flanagan is a 76 y.o. female.    Chief Complaint:  Fibroids      History of Present Illness  HPI  Referral for second Gyn opinion from oncology and Primary care   Primary symptom of fatigue and some weight loss   Imaging with CT of chest, abdomen and pelvis all normal   Upper and lower endoscopy normal   Medication review with no obvious etiology of symptoms   Had Hormone assay done in [place].  Thyroid is normal and Vit D is in the 45.  B 12 is above normal     Female hormones are all normal for menopause   Ultrasound shows small, 2 cm myoma but there is no vaginal bleeding at all and no pelvic pressure symptoms     Discussed findings with patient  30 minutes time spent review records, documentation and discussion     Health Maintenance   Topic Date Due    Aspirin/Antiplatelet Therapy  Never done    High Dose Statin  Never done    Pap Smear  2021    Mammogram  09/15/2022    Lipid Panel  2023    TETANUS VACCINE  2023    DEXA Scan  2025    Hepatitis C Screening  Completed     GYN & OB History  No LMP recorded. Patient is postmenopausal.   Date of Last Pap: No result found    OB History    Para Term  AB Living   3 3 3     3   SAB IAB Ectopic Multiple Live Births                  # Outcome Date GA Lbr Parish/2nd Weight Sex Delivery Anes PTL Lv   3 Term      Vag-Spont      2 Term      Vag-Spont      1 Term      Vag-Spont          Review of Systems  Review of Systems        Objective:   BP (!) 140/80   Ht 5' 2" (1.575 m)   Wt 57.7 kg (127 lb 3.3 oz)   BMI 23.27 kg/m²    Physical Exam     Assessment:        1. Other iron deficiency anemia        Normal hormonal assay and no Gyn etiology for mild anemia         Plan:            Mary was seen today for fibroids.    Diagnoses and all orders for this visit:    Other iron deficiency anemia        "

## 2022-07-18 ENCOUNTER — TELEPHONE (OUTPATIENT)
Dept: OBSTETRICS AND GYNECOLOGY | Facility: CLINIC | Age: 77
End: 2022-07-18
Payer: MEDICARE

## 2022-07-18 DIAGNOSIS — Z12.31 ENCOUNTER FOR SCREENING MAMMOGRAM FOR MALIGNANT NEOPLASM OF BREAST: Primary | ICD-10-CM

## 2022-07-22 ENCOUNTER — TELEPHONE (OUTPATIENT)
Dept: OTOLARYNGOLOGY | Facility: CLINIC | Age: 77
End: 2022-07-22
Payer: MEDICARE

## 2022-07-22 NOTE — TELEPHONE ENCOUNTER
----- Message from Paty Soto sent at 7/22/2022 10:25 AM CDT -----  Contact: Mary  .Type:  Same Day Appointment Request    Caller is requesting a same day appointment.  Caller declined first available appointment listed below.    Name of Caller:Mary    When is the first available appointment? N/a    Symptoms:right ear blocked    Best Call Back Number:[phone] (home)      Additional Information:  pt need to be seen today and says when she push or pull the ear she can hear the wax

## 2022-07-22 NOTE — TELEPHONE ENCOUNTER
Called patient back after talking to Dr. Talbot. Told her she could get debrox otc and use it twice daily everyday until her appt.

## 2022-07-22 NOTE — TELEPHONE ENCOUNTER
Spoke to pt, scheduled appt at [facility] on 7/25. She asked if there were any ear drops she could use to loosen up the wax in the mean time. Told her I would talk to Dr. Talbot and call her back.

## 2022-07-25 ENCOUNTER — OFFICE VISIT (OUTPATIENT)
Dept: OTOLARYNGOLOGY | Facility: CLINIC | Age: 77
End: 2022-07-25
Payer: MEDICARE

## 2022-07-25 VITALS — WEIGHT: 123.88 LBS | BODY MASS INDEX: 22.66 KG/M2 | TEMPERATURE: 97 F

## 2022-07-25 DIAGNOSIS — H61.23 BILATERAL IMPACTED CERUMEN: Primary | ICD-10-CM

## 2022-07-25 PROCEDURE — 99213 OFFICE O/P EST LOW 20 MIN: CPT | Mod: S$PBB,,, | Performed by: STUDENT IN AN ORGANIZED HEALTH CARE EDUCATION/TRAINING PROGRAM

## 2022-07-25 PROCEDURE — 99999 PR PBB SHADOW E&M-EST. PATIENT-LVL III: ICD-10-PCS | Mod: PBBFAC,,, | Performed by: STUDENT IN AN ORGANIZED HEALTH CARE EDUCATION/TRAINING PROGRAM

## 2022-07-25 PROCEDURE — 99999 PR PBB SHADOW E&M-EST. PATIENT-LVL III: CPT | Mod: PBBFAC,,, | Performed by: STUDENT IN AN ORGANIZED HEALTH CARE EDUCATION/TRAINING PROGRAM

## 2022-07-25 PROCEDURE — 99213 OFFICE O/P EST LOW 20 MIN: CPT | Mod: PBBFAC | Performed by: STUDENT IN AN ORGANIZED HEALTH CARE EDUCATION/TRAINING PROGRAM

## 2022-07-25 PROCEDURE — 99213 PR OFFICE/OUTPT VISIT, EST, LEVL III, 20-29 MIN: ICD-10-PCS | Mod: S$PBB,,, | Performed by: STUDENT IN AN ORGANIZED HEALTH CARE EDUCATION/TRAINING PROGRAM

## 2022-07-25 NOTE — PROGRESS NOTES
REFERRING PROVIDER  No referring provider defined for this encounter.  Subjective:   Patient: Mary Flanagan [id number], :1945   Visit date:2022 12:58 PM    Chief Complaint:  ear cleaning (C/o right ear feeling full on Thursday night. Used Debrox and feels better)        HPI:     Mary Flanagan is a 76 y.o. female whom I am asked to see for evaluation of progressively worsening hearing loss in the right ear for the past few days.   Mary rates the severity as moderate.  No exacerbating or relieving factors.  There is no drainage from the ears.      Her meds, allergies, medical, surgical, social & family histories were reviewed & updated:  -     She has a current medication list which includes the following prescription(s): diclofenac sodium, garlic, hydrochlorothiazide, mirtazapine, multivitamin, pravastatin, vit a/c/e ac/znox/cupric oxide, and ferrous sulfate.  -     She  has a past medical history of CKD (chronic kidney disease) stage 3, GFR 30-59 ml/min, Dry eyes, Gastritis, Hyperlipidemia, Hypertension, Insomnia, and Osteopenia.   -     She does not have any pertinent problems on file.   -     She  has a past surgical history that includes left knee surgery; left arm surgery;  section; Knee surgery; Knee arthroscopy (Left,  approx); Breast biopsy (Left); Esophagogastroduodenoscopy (N/A, 2022); and Colonoscopy (N/A, 2022).  -     She  reports that she has never smoked. She has never used smokeless tobacco. She reports that she does not drink alcohol and does not use drugs.  -     Her family history includes Hypertension in her father and mother.  -     She is allergic to no known drug allergies.      Review of Systems:  -     Allergic/Immunologic: is allergic to no known drug allergies..  -     Constitutional: Current temp: 97.3 °F (36.3 °C) (Temporal)        Objective:     Physical Exam:  Vitals:  Temp 97.3 °F (36.3 °C) (Temporal)   Wt 56.2 kg (123 lb 14.4 oz)   BMI 22.66  kg/m²   Communication:  Able to communicate, no hoarseness.  Head & Face:  Normocephalic, atraumatic, no sinus tenderness.  Eyes:  Extraocular motions intact.  Ears:  Otoscopy of external auditory canals reveals impaction of left ear canals.  With the patient in the supine position, we used the operating microscope to examine both ears with the appropriate sized ear speculum.  A variety of sterile, micro-instruments were utilized to remove the cerumen atraumatically from the impacted ear(s).   After removal, the ears were reexamined-  Right Ear:  No mass/lesion of auricle. The external auditory canals is without erythema or discharge. Pneumatic otoscopy of the tympanic membrane revealed no perforation, with no fluid in middle ear. Clinical speech reception thresholds grossly normal.  Left Ear:  No mass/lesion of auricle. The external auditory canals is without erythema or discharge. Pneumatic otoscopy of the tympanic membrane revealed no perforation, with no fluid in middle ear. Clinical speech reception thresholds grossly normal  Nose:  No masses/lesions of external nose, nasal mucosa, septum, and turbinates were within normal limits.  Mouth:  No mass/lesion of lips, teeth, gums, hard/soft palate, tongue, tonsils, or oropharynx.  Neck & Lymphatics:  No cervical lymphadenopathy, no neck mass/crepitus/ asymmetry, trachea is midline, no thyroid enlargement/tenderness/mass.  Neuro/Psych: Alert with normal mood and affect.   Respiration/Chest:  Symmetric expansion during respiration, normal respiratory effort.  Skin:  Warm and intact.    Assessment & Plan:       -     Cerumen Impaction - Mary has cerumen impaction.  We discussed preventative measures and treatment options.  Q-tips must be avoided, instead the ears can be cleaned with OTC ear rinses (or a mixture of alcohol & vinegar in equal parts).   For hard wax, Mary may place mineral oil/baby oil in the ear with a cotton ball at night and remove in the shower.   This will assist in softening the wax and allow it to drain out on its own. If the cerumen impacts the ear canal and causes hearing loss or infection she needs to follow-up in the clinic for treatment and cleaning.

## 2022-08-24 ENCOUNTER — TELEPHONE (OUTPATIENT)
Dept: FAMILY MEDICINE | Facility: CLINIC | Age: 77
End: 2022-08-24
Payer: MEDICARE

## 2022-08-24 ENCOUNTER — PATIENT MESSAGE (OUTPATIENT)
Dept: FAMILY MEDICINE | Facility: CLINIC | Age: 77
End: 2022-08-24
Payer: MEDICARE

## 2022-08-24 NOTE — TELEPHONE ENCOUNTER
----- Message from Rosa Palm sent at 8/24/2022  3:18 PM CDT -----  Pt is calling in regards to wanting the doctor to request her records from [facility] from Dr. Vinicio Huertas and Moose Crodero. Pt states that she want her primary doctor to know what is going on with her and the medication that they put her on and would like the doctor to have the records by her appt on 8/30.  Pt can be reached at [phone]    Pt states that her account number for [facility] is [id number]

## 2022-08-30 ENCOUNTER — OFFICE VISIT (OUTPATIENT)
Dept: FAMILY MEDICINE | Facility: CLINIC | Age: 77
End: 2022-08-30
Payer: MEDICARE

## 2022-08-30 VITALS
RESPIRATION RATE: 16 BRPM | DIASTOLIC BLOOD PRESSURE: 70 MMHG | TEMPERATURE: 98 F | BODY MASS INDEX: 22.7 KG/M2 | OXYGEN SATURATION: 100 % | WEIGHT: 124.13 LBS | HEART RATE: 70 BPM | SYSTOLIC BLOOD PRESSURE: 138 MMHG

## 2022-08-30 DIAGNOSIS — R63.4 UNINTENTIONAL WEIGHT LOSS OF 10% BODY WEIGHT WITHIN 6 MONTHS: ICD-10-CM

## 2022-08-30 DIAGNOSIS — M48.062 SPINAL STENOSIS OF LUMBAR REGION WITH NEUROGENIC CLAUDICATION: ICD-10-CM

## 2022-08-30 DIAGNOSIS — M81.0 OSTEOPOROSIS, UNSPECIFIED OSTEOPOROSIS TYPE, UNSPECIFIED PATHOLOGICAL FRACTURE PRESENCE: ICD-10-CM

## 2022-08-30 DIAGNOSIS — E78.2 MIXED HYPERLIPIDEMIA: Primary | ICD-10-CM

## 2022-08-30 DIAGNOSIS — D50.8 OTHER IRON DEFICIENCY ANEMIA: ICD-10-CM

## 2022-08-30 PROCEDURE — 99214 OFFICE O/P EST MOD 30 MIN: CPT | Mod: S$PBB,,, | Performed by: INTERNAL MEDICINE

## 2022-08-30 PROCEDURE — 99214 PR OFFICE/OUTPT VISIT, EST, LEVL IV, 30-39 MIN: ICD-10-PCS | Mod: S$PBB,,, | Performed by: INTERNAL MEDICINE

## 2022-08-30 PROCEDURE — 99999 PR PBB SHADOW E&M-EST. PATIENT-LVL IV: CPT | Mod: PBBFAC,,, | Performed by: INTERNAL MEDICINE

## 2022-08-30 PROCEDURE — 99999 PR PBB SHADOW E&M-EST. PATIENT-LVL IV: ICD-10-PCS | Mod: PBBFAC,,, | Performed by: INTERNAL MEDICINE

## 2022-08-30 PROCEDURE — 99214 OFFICE O/P EST MOD 30 MIN: CPT | Mod: PBBFAC,PO | Performed by: INTERNAL MEDICINE

## 2022-08-30 RX ORDER — HYDROCODONE BITARTRATE AND ACETAMINOPHEN 7.5; 325 MG/1; MG/1
1 TABLET ORAL 3 TIMES DAILY PRN
COMMUNITY
Start: 2022-08-12 | End: 2022-09-03 | Stop reason: SDUPTHER

## 2022-08-30 RX ORDER — PREGABALIN 50 MG/1
50 CAPSULE ORAL 2 TIMES DAILY
Status: ON HOLD | COMMUNITY
Start: 2022-08-11 | End: 2022-09-05 | Stop reason: HOSPADM

## 2022-08-30 NOTE — PROGRESS NOTES
Subjective:       Patient ID: Mary Flanagan is a 77 y.o. female.    Chief Complaint: Follow-up (3m), Hyperlipidemia, and Weight Loss    Follow-up  Associated symptoms include arthralgias and fatigue. Pertinent negatives include no abdominal pain, chest pain, chills, congestion, coughing, diaphoresis, fever, headaches, joint swelling, myalgias, nausea, neck pain, numbness, rash, sore throat or vomiting.   Hyperlipidemia  Pertinent negatives include no chest pain, myalgias or shortness of breath.   Past Medical History:   Diagnosis Date    CKD (chronic kidney disease) stage 3, GFR 30-59 ml/min     Dry eyes     Gastritis     Hyperlipidemia     Hypertension     Insomnia     Osteopenia      Past Surgical History:   Procedure Laterality Date    BREAST BIOPSY Left     , benign     SECTION      x 1    COLONOSCOPY N/A 2022    Procedure: COLONOSCOPY;  Surgeon: Karina Beck MD;  Location: [facility];  Service: Endoscopy;  Laterality: N/A;    ESOPHAGOGASTRODUODENOSCOPY N/A 2022    Procedure: EGD (ESOPHAGOGASTRODUODENOSCOPY);  Surgeon: aKrina Beck MD;  Location: [facility];  Service: Endoscopy;  Laterality: N/A;    KNEE ARTHROSCOPY Left  approx    KNEE SURGERY      left arm surgery      left knee surgery       Family History   Problem Relation Age of Onset    Hypertension Mother     Hypertension Father     Breast cancer Neg Hx     Colon cancer Neg Hx     Ovarian cancer Neg Hx     Thrombophilia Neg Hx     Strabismus Neg Hx     Macular degeneration Neg Hx     Retinal detachment Neg Hx     Glaucoma Neg Hx     Blindness Neg Hx     Amblyopia Neg Hx      Social History     Socioeconomic History    Marital status:     Number of children: 3   Occupational History    Occupation: retired   Tobacco Use    Smoking status: Never    Smokeless tobacco: Never   Substance and Sexual Activity    Alcohol use: No     Alcohol/week: 0.0 standard drinks    Drug use: No    Sexual activity: Not Currently      Partners: Male     Birth control/protection: None     Comment: mut monog     Social Determinants of Health     Financial Resource Strain: Low Risk     Difficulty of Paying Living Expenses: Not hard at all   Food Insecurity: No Food Insecurity    Worried About Running Out of Food in the Last Year: Never true    Ran Out of Food in the Last Year: Never true   Transportation Needs: No Transportation Needs    Lack of Transportation (Medical): No    Lack of Transportation (Non-Medical): No   Physical Activity: Inactive    Days of Exercise per Week: 0 days    Minutes of Exercise per Session: 0 min   Stress: Stress Concern Present    Feeling of Stress : Very much   Social Connections: Socially Integrated    Frequency of Communication with Friends and Family: More than three times a week    Frequency of Social Gatherings with Friends and Family: Once a week    Attends Alevism Services: More than 4 times per year    Active Member of Clubs or Organizations: Yes    Attends Club or Organization Meetings: More than 4 times per year    Marital Status:    Housing Stability: Low Risk     Unable to Pay for Housing in the Last Year: No    Number of Places Lived in the Last Year: 1    Unstable Housing in the Last Year: No     Review of Systems   Constitutional:  Positive for fatigue. Negative for activity change, appetite change, chills, diaphoresis, fever and unexpected weight change.   HENT:  Negative for congestion, drooling, ear discharge, ear pain, facial swelling, hearing loss, mouth sores, nosebleeds, postnasal drip, rhinorrhea, sinus pressure, sneezing, sore throat, tinnitus, trouble swallowing and voice change.    Eyes:  Negative for photophobia, redness and visual disturbance.   Respiratory:  Negative for apnea, cough, choking, chest tightness, shortness of breath and wheezing.    Cardiovascular:  Negative for chest pain, palpitations and leg swelling.   Gastrointestinal:  Negative for abdominal distention, abdominal  pain, blood in stool, constipation, diarrhea, nausea and vomiting.   Endocrine: Negative for cold intolerance, heat intolerance, polydipsia, polyphagia and polyuria.   Genitourinary:  Negative for decreased urine volume, difficulty urinating, dysuria, flank pain, frequency, genital sores, hematuria, pelvic pain and urgency.   Musculoskeletal:  Positive for arthralgias. Negative for back pain, gait problem, joint swelling, myalgias, neck pain and neck stiffness.   Skin:  Negative for color change, pallor, rash and wound.   Allergic/Immunologic: Negative for food allergies and immunocompromised state.   Neurological:  Negative for dizziness, tremors, seizures, syncope, speech difficulty, light-headedness, numbness and headaches.   Hematological:  Negative for adenopathy. Does not bruise/bleed easily.   Psychiatric/Behavioral:  Negative for agitation, behavioral problems, confusion, decreased concentration, dysphoric mood, hallucinations, self-injury, sleep disturbance and suicidal ideas. The patient is not nervous/anxious and is not hyperactive.    All other systems reviewed and are negative.    Objective:      Physical Exam  Vitals and nursing note reviewed.   Constitutional:       General: She is not in acute distress.     Appearance: Normal appearance. She is well-developed. She is not diaphoretic.   HENT:      Head: Normocephalic and atraumatic.      Mouth/Throat:      Pharynx: No oropharyngeal exudate.   Eyes:      General: No scleral icterus.  Neck:      Thyroid: No thyromegaly.      Vascular: No carotid bruit or JVD.      Trachea: No tracheal deviation.   Cardiovascular:      Rate and Rhythm: Normal rate and regular rhythm.      Heart sounds: Normal heart sounds.   Pulmonary:      Effort: Pulmonary effort is normal. No respiratory distress.      Breath sounds: Normal breath sounds. No wheezing or rales.   Chest:      Chest wall: No tenderness.   Abdominal:      General: Bowel sounds are normal. There is no  distension.      Palpations: Abdomen is soft.      Tenderness: There is no abdominal tenderness. There is no guarding or rebound.   Musculoskeletal:         General: No tenderness. Normal range of motion.      Cervical back: Normal range of motion and neck supple.   Lymphadenopathy:      Cervical: No cervical adenopathy.   Skin:     General: Skin is warm and dry.      Coloration: Skin is not pale.      Findings: No erythema or rash.   Neurological:      Mental Status: She is alert and oriented to person, place, and time.      Cranial Nerves: No cranial nerve deficit.      Coordination: Coordination normal.   Psychiatric:         Behavior: Behavior normal.         Thought Content: Thought content normal.         Judgment: Judgment normal.       CMP  Sodium   Date Value Ref Range Status   05/02/2022 136 136 - 145 mmol/L Final     Potassium   Date Value Ref Range Status   05/02/2022 4.5 3.5 - 5.1 mmol/L Final     Chloride   Date Value Ref Range Status   05/02/2022 100 95 - 110 mmol/L Final     CO2   Date Value Ref Range Status   05/02/2022 27 23 - 29 mmol/L Final     Glucose   Date Value Ref Range Status   05/02/2022 96 70 - 110 mg/dL Final     BUN   Date Value Ref Range Status   05/02/2022 14 8 - 23 mg/dL Final     Creatinine   Date Value Ref Range Status   05/02/2022 1.1 0.5 - 1.4 mg/dL Final     Calcium   Date Value Ref Range Status   05/02/2022 10.0 8.7 - 10.5 mg/dL Final     Total Protein   Date Value Ref Range Status   05/02/2022 6.8 6.0 - 8.4 g/dL Final     Albumin   Date Value Ref Range Status   05/02/2022 3.8 3.5 - 5.2 g/dL Final     Total Bilirubin   Date Value Ref Range Status   05/02/2022 0.5 0.1 - 1.0 mg/dL Final     Comment:     For infants and newborns, interpretation of results should be based  on gestational age, weight and in agreement with clinical  observations.    Premature Infant recommended reference ranges:  Up to 24 hours.............<8.0 mg/dL  Up to 48 hours............<12.0 mg/dL  3-5  days..................<15.0 mg/dL  6-29 days.................<15.0 mg/dL       Alkaline Phosphatase   Date Value Ref Range Status   05/02/2022 81 55 - 135 U/L Final     AST   Date Value Ref Range Status   05/02/2022 14 10 - 40 U/L Final     ALT   Date Value Ref Range Status   05/02/2022 10 10 - 44 U/L Final     Anion Gap   Date Value Ref Range Status   05/02/2022 9 8 - 16 mmol/L Final     eGFR if    Date Value Ref Range Status   05/02/2022 57 (A) >60 mL/min/1.73 m^2 Final     eGFR if non    Date Value Ref Range Status   05/02/2022 49 (A) >60 mL/min/1.73 m^2 Final     Comment:     Calculation used to obtain the estimated glomerular filtration  rate (eGFR) is the CKD-EPI equation.        Lab Results   Component Value Date    WBC 6.19 05/02/2022    HGB 11.6 (L) 05/02/2022    HCT 36.1 (L) 05/02/2022    MCV 97 05/02/2022     05/02/2022     Lab Results   Component Value Date    CHOL 208 (H) 05/30/2022     Lab Results   Component Value Date    HDL 68 05/30/2022     Lab Results   Component Value Date    LDLCALC 122.0 05/30/2022     Lab Results   Component Value Date    TRIG 90 05/30/2022     Lab Results   Component Value Date    CHOLHDL 32.7 05/30/2022     Lab Results   Component Value Date    TSH 2.701 03/10/2022     No results found for: LABA1C, HGBA1C  Assessment:       1. Mixed hyperlipidemia    2. Other iron deficiency anemia    3. Osteoporosis, unspecified osteoporosis type, unspecified pathological fracture presence    4. Spinal stenosis of lumbar region with neurogenic claudication    5. Unintentional weight loss of 10% body weight within 6 months          Plan:   Mixed hyperlipidemia    Other iron deficiency anemia    Osteoporosis, unspecified osteoporosis type, unspecified pathological fracture presence    Spinal stenosis of lumbar region with neurogenic claudication    Unintentional weight loss of 10% body weight within 6 months-weight stable-----    Stable-------continue  meds-----sees pain doc---sees neurology dr herndon ----keep f/u appts--------f/u 4 months-----

## 2022-09-03 ENCOUNTER — HOSPITAL ENCOUNTER (INPATIENT)
Facility: HOSPITAL | Age: 77
LOS: 3 days | Discharge: NURSING, REHAB OR LONG-TERM CARE | DRG: 065 | End: 2022-09-06
Attending: EMERGENCY MEDICINE | Admitting: INTERNAL MEDICINE
Payer: MEDICARE

## 2022-09-03 DIAGNOSIS — I63.9 CEREBROVASCULAR ACCIDENT (CVA), UNSPECIFIED MECHANISM: Primary | ICD-10-CM

## 2022-09-03 DIAGNOSIS — M54.42 CHRONIC BILATERAL LOW BACK PAIN WITH LEFT-SIDED SCIATICA: ICD-10-CM

## 2022-09-03 DIAGNOSIS — R47.01 EXPRESSIVE APHASIA: ICD-10-CM

## 2022-09-03 DIAGNOSIS — I63.9: ICD-10-CM

## 2022-09-03 DIAGNOSIS — M79.606 LEG PAIN: ICD-10-CM

## 2022-09-03 DIAGNOSIS — G45.9 TIA (TRANSIENT ISCHEMIC ATTACK): ICD-10-CM

## 2022-09-03 DIAGNOSIS — G89.29 CHRONIC BILATERAL LOW BACK PAIN WITH LEFT-SIDED SCIATICA: ICD-10-CM

## 2022-09-03 DIAGNOSIS — R53.1 WEAKNESS: ICD-10-CM

## 2022-09-03 DIAGNOSIS — R29.898 LEFT LEG WEAKNESS: ICD-10-CM

## 2022-09-03 LAB
ALBUMIN SERPL BCP-MCNC: 4.3 G/DL (ref 3.5–5.2)
ALP SERPL-CCNC: 89 U/L (ref 55–135)
ALT SERPL W/O P-5'-P-CCNC: 12 U/L (ref 10–44)
ANION GAP SERPL CALC-SCNC: 12 MMOL/L (ref 8–16)
AST SERPL-CCNC: 22 U/L (ref 10–40)
BASOPHILS # BLD AUTO: 0.05 K/UL (ref 0–0.2)
BASOPHILS NFR BLD: 0.5 % (ref 0–1.9)
BILIRUB SERPL-MCNC: 0.9 MG/DL (ref 0.1–1)
BILIRUB UR QL STRIP: NEGATIVE
BNP SERPL-MCNC: 44 PG/ML (ref 0–99)
BUN SERPL-MCNC: 17 MG/DL (ref 8–23)
CALCIUM SERPL-MCNC: 10.5 MG/DL (ref 8.7–10.5)
CHLORIDE SERPL-SCNC: 102 MMOL/L (ref 95–110)
CK SERPL-CCNC: 364 U/L (ref 20–180)
CLARITY UR: CLEAR
CO2 SERPL-SCNC: 25 MMOL/L (ref 23–29)
COLOR UR: YELLOW
CREAT SERPL-MCNC: 1.1 MG/DL (ref 0.5–1.4)
CTP QC/QA: YES
DIFFERENTIAL METHOD: ABNORMAL
EOSINOPHIL # BLD AUTO: 0 K/UL (ref 0–0.5)
EOSINOPHIL NFR BLD: 0.4 % (ref 0–8)
ERYTHROCYTE [DISTWIDTH] IN BLOOD BY AUTOMATED COUNT: 13.5 % (ref 11.5–14.5)
EST. GFR  (NO RACE VARIABLE): 52 ML/MIN/1.73 M^2
GLUCOSE SERPL-MCNC: 102 MG/DL (ref 70–110)
GLUCOSE UR QL STRIP: NEGATIVE
HCT VFR BLD AUTO: 35.9 % (ref 37–48.5)
HGB BLD-MCNC: 11.9 G/DL (ref 12–16)
HGB UR QL STRIP: NEGATIVE
IMM GRANULOCYTES # BLD AUTO: 0.03 K/UL (ref 0–0.04)
IMM GRANULOCYTES NFR BLD AUTO: 0.3 % (ref 0–0.5)
KETONES UR QL STRIP: ABNORMAL
LEUKOCYTE ESTERASE UR QL STRIP: ABNORMAL
LYMPHOCYTES # BLD AUTO: 2.8 K/UL (ref 1–4.8)
LYMPHOCYTES NFR BLD: 27.4 % (ref 18–48)
MCH RBC QN AUTO: 30.7 PG (ref 27–31)
MCHC RBC AUTO-ENTMCNC: 33.1 G/DL (ref 32–36)
MCV RBC AUTO: 93 FL (ref 82–98)
MICROSCOPIC COMMENT: ABNORMAL
MONOCYTES # BLD AUTO: 0.8 K/UL (ref 0.3–1)
MONOCYTES NFR BLD: 7.7 % (ref 4–15)
NEUTROPHILS # BLD AUTO: 6.5 K/UL (ref 1.8–7.7)
NEUTROPHILS NFR BLD: 63.7 % (ref 38–73)
NITRITE UR QL STRIP: NEGATIVE
NRBC BLD-RTO: 0 /100 WBC
PH UR STRIP: 6 [PH] (ref 5–8)
PLATELET # BLD AUTO: 233 K/UL (ref 150–450)
PMV BLD AUTO: 11.6 FL (ref 9.2–12.9)
POCT GLUCOSE: 115 MG/DL (ref 70–110)
POCT GLUCOSE: 167 MG/DL (ref 70–110)
POTASSIUM SERPL-SCNC: 4 MMOL/L (ref 3.5–5.1)
PROT SERPL-MCNC: 7.7 G/DL (ref 6–8.4)
PROT UR QL STRIP: NEGATIVE
RBC # BLD AUTO: 3.87 M/UL (ref 4–5.4)
RBC #/AREA URNS HPF: 2 /HPF (ref 0–4)
SARS-COV-2 RDRP RESP QL NAA+PROBE: NEGATIVE
SODIUM SERPL-SCNC: 139 MMOL/L (ref 136–145)
SP GR UR STRIP: 1.01 (ref 1–1.03)
TROPONIN I SERPL DL<=0.01 NG/ML-MCNC: <0.006 NG/ML (ref 0–0.03)
TSH SERPL DL<=0.005 MIU/L-ACNC: 0.81 UIU/ML (ref 0.4–4)
URN SPEC COLLECT METH UR: ABNORMAL
UROBILINOGEN UR STRIP-ACNC: NEGATIVE EU/DL
WBC # BLD AUTO: 10.19 K/UL (ref 3.9–12.7)
WBC #/AREA URNS HPF: 21 /HPF (ref 0–5)

## 2022-09-03 PROCEDURE — 96372 THER/PROPH/DIAG INJ SC/IM: CPT | Performed by: EMERGENCY MEDICINE

## 2022-09-03 PROCEDURE — 84484 ASSAY OF TROPONIN QUANT: CPT | Performed by: EMERGENCY MEDICINE

## 2022-09-03 PROCEDURE — 82550 ASSAY OF CK (CPK): CPT | Performed by: EMERGENCY MEDICINE

## 2022-09-03 PROCEDURE — 84443 ASSAY THYROID STIM HORMONE: CPT | Performed by: INTERNAL MEDICINE

## 2022-09-03 PROCEDURE — 80061 LIPID PANEL: CPT | Performed by: INTERNAL MEDICINE

## 2022-09-03 PROCEDURE — 83036 HEMOGLOBIN GLYCOSYLATED A1C: CPT | Performed by: INTERNAL MEDICINE

## 2022-09-03 PROCEDURE — 96375 TX/PRO/DX INJ NEW DRUG ADDON: CPT

## 2022-09-03 PROCEDURE — 82962 GLUCOSE BLOOD TEST: CPT

## 2022-09-03 PROCEDURE — 63600175 PHARM REV CODE 636 W HCPCS: Performed by: INTERNAL MEDICINE

## 2022-09-03 PROCEDURE — 11000001 HC ACUTE MED/SURG PRIVATE ROOM

## 2022-09-03 PROCEDURE — 63600175 PHARM REV CODE 636 W HCPCS: Performed by: EMERGENCY MEDICINE

## 2022-09-03 PROCEDURE — U0002 COVID-19 LAB TEST NON-CDC: HCPCS | Performed by: EMERGENCY MEDICINE

## 2022-09-03 PROCEDURE — 93010 ELECTROCARDIOGRAM REPORT: CPT | Mod: ,,, | Performed by: INTERNAL MEDICINE

## 2022-09-03 PROCEDURE — 93010 EKG 12-LEAD: ICD-10-PCS | Mod: ,,, | Performed by: INTERNAL MEDICINE

## 2022-09-03 PROCEDURE — 85025 COMPLETE CBC W/AUTO DIFF WBC: CPT | Performed by: EMERGENCY MEDICINE

## 2022-09-03 PROCEDURE — 93005 ELECTROCARDIOGRAM TRACING: CPT

## 2022-09-03 PROCEDURE — 83880 ASSAY OF NATRIURETIC PEPTIDE: CPT | Performed by: EMERGENCY MEDICINE

## 2022-09-03 PROCEDURE — 87086 URINE CULTURE/COLONY COUNT: CPT | Performed by: INTERNAL MEDICINE

## 2022-09-03 PROCEDURE — 99285 EMERGENCY DEPT VISIT HI MDM: CPT | Mod: 25

## 2022-09-03 PROCEDURE — 96374 THER/PROPH/DIAG INJ IV PUSH: CPT

## 2022-09-03 PROCEDURE — 80053 COMPREHEN METABOLIC PANEL: CPT | Performed by: EMERGENCY MEDICINE

## 2022-09-03 PROCEDURE — 81000 URINALYSIS NONAUTO W/SCOPE: CPT | Performed by: INTERNAL MEDICINE

## 2022-09-03 RX ORDER — HYDRALAZINE HYDROCHLORIDE 20 MG/ML
10 INJECTION INTRAMUSCULAR; INTRAVENOUS
Status: COMPLETED | OUTPATIENT
Start: 2022-09-03 | End: 2022-09-03

## 2022-09-03 RX ORDER — ATORVASTATIN CALCIUM 40 MG/1
40 TABLET, FILM COATED ORAL DAILY
Status: DISCONTINUED | OUTPATIENT
Start: 2022-09-04 | End: 2022-09-03

## 2022-09-03 RX ORDER — ASPIRIN 81 MG/1
81 TABLET ORAL DAILY
Status: DISCONTINUED | OUTPATIENT
Start: 2022-09-04 | End: 2022-09-03

## 2022-09-03 RX ORDER — ATORVASTATIN CALCIUM 40 MG/1
40 TABLET, FILM COATED ORAL DAILY
Status: DISCONTINUED | OUTPATIENT
Start: 2022-09-03 | End: 2022-09-04

## 2022-09-03 RX ORDER — ASPIRIN 81 MG/1
81 TABLET ORAL DAILY
Status: DISCONTINUED | OUTPATIENT
Start: 2022-09-03 | End: 2022-09-06 | Stop reason: HOSPADM

## 2022-09-03 RX ORDER — SODIUM CHLORIDE 0.9 % (FLUSH) 0.9 %
10 SYRINGE (ML) INJECTION
Status: DISCONTINUED | OUTPATIENT
Start: 2022-09-03 | End: 2022-09-06 | Stop reason: HOSPADM

## 2022-09-03 RX ORDER — DEXAMETHASONE SODIUM PHOSPHATE 4 MG/ML
8 INJECTION, SOLUTION INTRA-ARTICULAR; INTRALESIONAL; INTRAMUSCULAR; INTRAVENOUS; SOFT TISSUE
Status: COMPLETED | OUTPATIENT
Start: 2022-09-03 | End: 2022-09-03

## 2022-09-03 RX ORDER — MORPHINE SULFATE 4 MG/ML
4 INJECTION, SOLUTION INTRAMUSCULAR; INTRAVENOUS
Status: COMPLETED | OUTPATIENT
Start: 2022-09-03 | End: 2022-09-03

## 2022-09-03 RX ORDER — ENOXAPARIN SODIUM 100 MG/ML
40 INJECTION SUBCUTANEOUS EVERY 24 HOURS
Status: DISCONTINUED | OUTPATIENT
Start: 2022-09-03 | End: 2022-09-06 | Stop reason: HOSPADM

## 2022-09-03 RX ORDER — HYDROCODONE BITARTRATE AND ACETAMINOPHEN 5; 325 MG/1; MG/1
1 TABLET ORAL EVERY 4 HOURS PRN
Qty: 11 TABLET | Refills: 0 | Status: SHIPPED | OUTPATIENT
Start: 2022-09-03 | End: 2022-09-08

## 2022-09-03 RX ADMIN — ENOXAPARIN SODIUM 40 MG: 100 INJECTION SUBCUTANEOUS at 09:09

## 2022-09-03 RX ADMIN — DEXAMETHASONE SODIUM PHOSPHATE 8 MG: 4 INJECTION INTRA-ARTICULAR; INTRALESIONAL; INTRAMUSCULAR; INTRAVENOUS; SOFT TISSUE at 11:09

## 2022-09-03 RX ADMIN — HYDRALAZINE HYDROCHLORIDE 10 MG: 20 INJECTION INTRAMUSCULAR; INTRAVENOUS at 08:09

## 2022-09-03 RX ADMIN — MORPHINE SULFATE 4 MG: 4 INJECTION INTRAVENOUS at 08:09

## 2022-09-03 NOTE — Clinical Note
Diagnosis: Cerebrovascular accident (CVA), unspecified mechanism [[id number]]   Future Attending Provider: CONNIE TURCIOS [[id number]]   Admitting Provider:: CONNIE TURCIOS [[id number]]   Special Needs:: No Special Needs [1]

## 2022-09-03 NOTE — PHARMACY MED REC
"Admission Medication History     The home medication history was taken by Dieudonne Gonzalez.    You may go to "Admission" then "Reconcile Home Medications" tabs to review and/or act upon these items.     The home medication list has been updated by the Pharmacy department.   Please read ALL comments highlighted in yellow.   Please address this information as you see fit.    Feel free to contact us if you have any questions or require assistance.      The medications listed below were removed from the home medication list. Please reorder if appropriate:  Patient reports no longer taking the following medication(s):  RELISTOR    Medications listed below were obtained from: Patient/family and Analytic software- [employer]  (Not in a hospital admission)        Dieudonne Gonzalez  [id number]    Current Outpatient Medications on File Prior to Encounter   Medication Sig Dispense Refill Last Dose    diclofenac sodium (VOLTAREN) 1 % Gel Apply 4 g topically 2 (two) times daily.   Past Month    ferrous sulfate (FEOSOL) 325 mg (65 mg iron) Tab tablet Take 325 mg by mouth daily with breakfast. Every other day   9/2/2022    hydroCHLOROthiazide (HYDRODIURIL) 12.5 MG Tab TAKE 1 TABLET(12.5 MG) BY MOUTH EVERY 48 HOURS AS NEEDED 90 tablet 3 9/2/2022    mirtazapine (REMERON) 7.5 MG Tab Take 1 tablet (7.5 mg total) by mouth every evening. 30 tablet 11 9/2/2022    multivitamin (THERAGRAN) per tablet Take 1 tablet by mouth once daily.   9/2/2022    pravastatin (PRAVACHOL) 10 MG tablet Take 1 tablet (10 mg total) by mouth once daily. 90 tablet 3 9/2/2022    VIT A/C/E AC/ZNOX/CUPRIC OXIDE (EYE VITAMIN AND MINERALS ORAL) Take 1 tablet by mouth once daily at 6am.   9/2/2022    pregabalin (LYRICA) 50 MG capsule Take 50 mg by mouth 2 (two) times daily.   Not Taking                       .        "

## 2022-09-03 NOTE — ED PROVIDER NOTES
Encounter Date: 9/3/2022       History     Chief Complaint   Patient presents with    Back Pain     Back pain with bilateral lower extremity weakness     Patient states that sometime yesterday, she started having bilateral leg pain worse on the left.  States that this episode started actually six months ago, and has been seing her doctors for it.  Denies any bladder or bowel incontinence.      The history is provided by the patient.   Review of patient's allergies indicates:   Allergen Reactions    No known drug allergies      Past Medical History:   Diagnosis Date    CKD (chronic kidney disease) stage 3, GFR 30-59 ml/min     Dry eyes     Gastritis     Hyperlipidemia     Hypertension     Insomnia     Osteopenia      Past Surgical History:   Procedure Laterality Date    BREAST BIOPSY Left     , benign     SECTION      x 1    COLONOSCOPY N/A 2022    Procedure: COLONOSCOPY;  Surgeon: Karina Beck MD;  Location: [facility];  Service: Endoscopy;  Laterality: N/A;    ESOPHAGOGASTRODUODENOSCOPY N/A 2022    Procedure: EGD (ESOPHAGOGASTRODUODENOSCOPY);  Surgeon: Karina Beck MD;  Location: [facility];  Service: Endoscopy;  Laterality: N/A;    KNEE ARTHROSCOPY Left  approx    KNEE SURGERY      left arm surgery      left knee surgery       Family History   Problem Relation Age of Onset    Hypertension Mother     Hypertension Father     Breast cancer Neg Hx     Colon cancer Neg Hx     Ovarian cancer Neg Hx     Thrombophilia Neg Hx     Strabismus Neg Hx     Macular degeneration Neg Hx     Retinal detachment Neg Hx     Glaucoma Neg Hx     Blindness Neg Hx     Amblyopia Neg Hx      Social History     Tobacco Use    Smoking status: Never    Smokeless tobacco: Never   Substance Use Topics    Alcohol use: No     Alcohol/week: 0.0 standard drinks    Drug use: No     Review of Systems   Constitutional:  Negative for fever.   HENT:  Negative for sore throat.    Respiratory:  Negative for shortness of  breath.    Cardiovascular:  Negative for chest pain.   Gastrointestinal:  Negative for nausea.   Genitourinary:  Negative for dysuria.   Musculoskeletal:  Negative for back pain.   Skin:  Negative for rash.   Neurological:  Negative for weakness.   Hematological:  Does not bruise/bleed easily.     Physical Exam     Initial Vitals [09/03/22 0052]   BP Pulse Resp Temp SpO2   (!) 163/80 87 18 99.2 °F (37.3 °C) 98 %      MAP       --         Physical Exam    Nursing note and vitals reviewed.  Constitutional: She appears well-developed and well-nourished. No distress.   Elderly and frail   HENT:   Head: Normocephalic and atraumatic.   Mouth/Throat: Oropharynx is clear and moist.   Eyes: Conjunctivae and EOM are normal. Pupils are equal, round, and reactive to light.   Neck: Neck supple.   Normal range of motion.  Cardiovascular:  Regular rhythm and normal heart sounds.   Tachycardia present.   Exam reveals no gallop and no friction rub.       No murmur heard.  Pulmonary/Chest: Breath sounds normal. No respiratory distress. She has no wheezes. She has no rhonchi. She has no rales.   Abdominal: Abdomen is soft. Bowel sounds are normal. She exhibits no distension and no mass. There is no abdominal tenderness. There is no rebound and no guarding.   Musculoskeletal:         General: No tenderness or edema. Normal range of motion.      Cervical back: Normal range of motion and neck supple.     Neurological: She is alert and oriented to person, place, and time. She has normal strength. No cranial nerve deficit. GCS eye subscore is 4. GCS verbal subscore is 5. GCS motor subscore is 6.   4/5 strength to the LUE andLLE.   Skin: Skin is warm and dry. No rash noted.   Psychiatric: She has a normal mood and affect. Thought content normal.       ED Course   Procedures  Labs Reviewed   CBC W/ AUTO DIFFERENTIAL - Abnormal; Notable for the following components:       Result Value    RBC 3.87 (*)     Hemoglobin 11.9 (*)     Hematocrit  35.9 (*)     All other components within normal limits   COMPREHENSIVE METABOLIC PANEL - Abnormal; Notable for the following components:    eGFR 52 (*)     All other components within normal limits   CK - Abnormal; Notable for the following components:     (*)     All other components within normal limits   B-TYPE NATRIURETIC PEPTIDE   TROPONIN I   URINALYSIS, REFLEX TO URINE CULTURE   SARS-COV-2 RDRP GENE    Narrative:     This test utilizes isothermal nucleic acid amplification   technology to detect the SARS-CoV-2 RdRp nucleic acid segment.   The analytical sensitivity (limit of detection) is 125 genome   equivalents/mL.   A POSITIVE result implies infection with the SARS-CoV-2 virus;   the patient is presumed to be contagious.     A NEGATIVE result means that SARS-CoV-2 nucleic acids are not   present above the limit of detection. A NEGATIVE result should be   treated as presumptive. It does not rule out the possibility of   COVID-19 and should not be the sole basis for treatment decisions.   If COVID-19 is strongly suspected based on clinical and exposure   history, re-testing using an alternate molecular assay should be   considered.   This test is only for use under the Food and Drug   Administration s Emergency Use Authorization (EUA).   Commercial kits are provided by [employer].   Performance characteristics of the EUA have been independently   verified by [facility] Department of   Pathology and Laboratory Medicine.   _________________________________________________________________   The authorized Fact Sheet for Healthcare Providers and the authorized Fact   Sheet for Patients of the ID NOW COVID-19 are available on the FDA   website:     https://www.fda.gov/media/006991/download  https://www.fda.gov/media/296804/download             EKG Readings: (Independently Interpreted)   Rhythm: Normal Sinus Rhythm. Heart Rate: 103. Ectopy: No Ectopy. Conduction: Normal. ST Segments:  Normal ST Segments. T Waves: Normal. Clinical Impression: Normal Sinus Rhythm     Imaging Results              MRI Lumbar Spine Without Contrast (Final result)  Result time 09/03/22 12:34:23      Final result by Jeancarlos Tiwari MD (09/03/22 12:34:23)                   Impression:      1. Scattered degenerative changes with mild multilevel disc bulging and multilevel facet arthropathy.  2. Mild right lateral recess narrowing L3-4.  Otherwise lumbar central canal is widely patent throughout its course.  3. Mild neural foraminal stenosis L3-L4 and mild-moderate neural foraminal stenosis L4-L5.  Remaining neural foramen patent.  4. Negative for discitis, osteomyelitis or epidural abscess.      Electronically signed by: Jeancarlos Tiwari  Date:    09/03/2022  Time:    12:34               Narrative:    EXAMINATION:  MRI LUMBAR SPINE WITHOUT CONTRAST    CLINICAL HISTORY:  Low back pain, infection suspected;    TECHNIQUE:  Multiplanar, multisequence MR images were acquired from the thoracolumbar junction to the sacrum without the administration of contrast.    COMPARISON:  None.    FINDINGS:  1.2 mm retrolisthesis L2-L3.  2 mm retrolisthesis L3-L4.  All lumbar vertebral bodies are normal in height.  Patchy diminished T1 marrow signal throughout the visualized spine most consistent with mild red marrow reconversion.  No focal osseous lesions.  No fractures.  Distal spinal cord and conus medullaris are normal.  Conus terminates at the L1-L2 level.    T12-L1: Normal.    L1-L2: Mild facet arthropathy otherwise normal.    L2-L3: Minimal retrolisthesis.  Mild asymmetric to the right disc bulging.  Moderate facet arthropathy.  Central canal remains patent.  Neural foramen patent.    L3-L4: Mild generalized disc bulging with severe facet arthropathy and ligamentum flavum hypertrophy.  Right lateral recess narrowing.  Patent central canal.  Mild neural foraminal stenosis.    L4-L5: Normal posterior disc margin.  Moderate  facet arthropathy.  Central canal patent.  Moderate left and mild right neural foraminal stenosis.    L5-S1: Mild generalized disc bulging, slightly asymmetric to the left.  Minimal contact left S1 nerve root.  Moderate facet arthropathy.  Central canal is patent.  Neural foramen patent.    Paravertebral soft tissues and musculature are normal.  Visualized intra-abdominal and intrapelvic contents are normal.                                       US Lower Extremity Veins Left (Final result)  Result time 09/03/22 11:44:36      Final result by Jeancarlos Tiwari MD (09/03/22 11:44:36)                   Impression:      Negative for left lower extremity DVT.      Electronically signed by: Jeancarlos Tiwari  Date:    09/03/2022  Time:    11:44               Narrative:    EXAMINATION:  US LOWER EXTREMITY VEINS LEFT    CLINICAL HISTORY:  Left lower extremity pain.    COMPARISON:  None.    FINDINGS:  The left deep veins demonstrate a normal appearance of the common femoral vein, deep profunda, superficial femoral and popliteal vein.  There is no evidence of deep venous thrombosis.  Additionally, the greater saphenous, tibial and peroneal veins are patent.    Right common femoral vein patent.                                       CT Head Without Contrast (Final result)  Result time 09/03/22 08:02:28      Final result by Jeancarlos Tiwari MD (09/03/22 08:02:28)                   Impression:      1. No acute findings.  2. Atrophy and chronic ischemic change.  3. No significant change since 06/05/2020.  All CT scans at this facility are performed  using dose modulation techniques as appropriate to performed exam including the following:  automated exposure control; adjustment of mA and/or kV according to the patients size (this includes techniques or standardized protocols for targeted exams where dose is matched to indication/reason for exam: i.e. extremities or head);  iterative reconstruction  technique.      Electronically signed by: Jeancarlos Tiwari  Date:    09/03/2022  Time:    08:02               Narrative:    EXAMINATION:  CT HEAD WITHOUT CONTRAST    CLINICAL HISTORY:  Neuro deficit, acute, stroke suspected;.  Cerebral ischemia.    TECHNIQUE:  Low dose axial images were obtained through the head.  Coronal and sagittal reformations were also performed. Contrast was not administered.    COMPARISON:  06/05/2020    FINDINGS:  Midline structures are intact. Atrophy with diffuse prominence of ventricular system and cortical sulci.  Mild low-density change in the deep white matter compatible chronic ischemic microvascular disease.    No intracranial hemorrhage,acute infarct, or suspicious intracranial lesion is identified.    Skull base and calvarium are normal. Moderate size polyp/retention cyst right maxillary antrum.  Small retention cyst left maxillary antrum.  Mastoid air cells clear.                                       X-Ray Lumbar Spine Ap And Lateral (Final result)  Result time 09/03/22 08:17:31      Final result by Jeancarlos Tiwari MD (09/03/22 08:17:31)                   Impression:      1. Negative for compression fracture.  2. Multilevel degenerative changes as detailed above.  3. No significant change since 01/08/2020.      Electronically signed by: Jeancarlos Tiwari  Date:    09/03/2022  Time:    08:17               Narrative:    EXAMINATION:  XR LUMBAR SPINE AP AND LATERAL    CLINICAL HISTORY:  back pain;    COMPARISON:  01/08/2020    FINDINGS:  Minimal levoscoliosis centered at L2-L3.  L2-L3 and L3-L4.  Minimal retrolisthesis diffuse osteopenia.  Normal lumbar vertebral body heights.Mild disc space narrowing L2-L3.  Small osteophytes L1-2, L2-3 and L3-4.  Severe degenerative change L5-S1 facet joints..  Paravertebral soft tissues are normal.  No significant change since 01/08/2020.                                       X-Ray Chest AP Portable (Final result)  Result time 09/03/22  "08:18:39      Final result by Jeancarlos Tiwari MD (09/03/22 08:18:39)                   Impression:      1. No acute chest findings.  2. No significant change since 03/10/2022.      Electronically signed by: Jeancarlos Tiwari  Date:    09/03/2022  Time:    08:18               Narrative:    EXAMINATION:  XR CHEST AP PORTABLE    CLINICAL HISTORY:  Weakness;    COMPARISON:  03/10/2022    FINDINGS:  Lungs are clear.  Heart size within normal limits.No significant bony findings.                                       Medications   hydrALAZINE injection 10 mg (10 mg Intravenous Given 9/3/22 0802)   morphine injection 4 mg (4 mg Intravenous Given 9/3/22 0810)   dexamethasone injection 8 mg (8 mg Intramuscular Given 9/3/22 1119)      ED Vital Signs:  Vitals:    09/03/22 0052 09/03/22 0705 09/03/22 0750 09/03/22 0810   BP: (!) 163/80  (!) 233/104    Pulse: 87 108 110    Resp: 18  20 18   Temp: 99.2 °F (37.3 °C)      TempSrc: Oral      SpO2: 98%  99%    Height: 5' 2" (1.575 m)       09/03/22 0830 09/03/22 0900 09/03/22 1000 09/03/22 1027   BP: (!) 154/68 134/62 131/60    Pulse: 91 99 92    Resp: 19 17 13    Temp:    99.1 °F (37.3 °C)   TempSrc:    Oral   SpO2: 100% 99% 99%    Height:        09/03/22 1225   BP: (!) 151/71   Pulse: 87   Resp: 18   Temp:    TempSrc:    SpO2: 97%   Height:          Abnormal Lab Results:  Labs Reviewed   CBC W/ AUTO DIFFERENTIAL - Abnormal; Notable for the following components:       Result Value    RBC 3.87 (*)     Hemoglobin 11.9 (*)     Hematocrit 35.9 (*)     All other components within normal limits   COMPREHENSIVE METABOLIC PANEL - Abnormal; Notable for the following components:    eGFR 52 (*)     All other components within normal limits   CK - Abnormal; Notable for the following components:     (*)     All other components within normal limits   B-TYPE NATRIURETIC PEPTIDE   TROPONIN I   URINALYSIS, REFLEX TO URINE CULTURE   SARS-COV-2 RDRP GENE    Narrative:     This test " utilizes isothermal nucleic acid amplification   technology to detect the SARS-CoV-2 RdRp nucleic acid segment.   The analytical sensitivity (limit of detection) is 125 genome   equivalents/mL.   A POSITIVE result implies infection with the SARS-CoV-2 virus;   the patient is presumed to be contagious.     A NEGATIVE result means that SARS-CoV-2 nucleic acids are not   present above the limit of detection. A NEGATIVE result should be   treated as presumptive. It does not rule out the possibility of   COVID-19 and should not be the sole basis for treatment decisions.   If COVID-19 is strongly suspected based on clinical and exposure   history, re-testing using an alternate molecular assay should be   considered.   This test is only for use under the Food and Drug   Administration s Emergency Use Authorization (EUA).   Commercial kits are provided by [employer].   Performance characteristics of the EUA have been independently   verified by [facility] Department of   Pathology and Laboratory Medicine.   _________________________________________________________________   The authorized Fact Sheet for Healthcare Providers and the authorized Fact   Sheet for Patients of the ID NOW COVID-19 are available on the FDA   website:     https://www.fda.gov/media/133956/download  https://www.fda.gov/media/784561/download                  All Lab Results:  Results for orders placed or performed during the hospital encounter of 09/03/22   CBC Auto Differential   Result Value Ref Range    WBC 10.19 3.90 - 12.70 K/uL    RBC 3.87 (L) 4.00 - 5.40 M/uL    Hemoglobin 11.9 (L) 12.0 - 16.0 g/dL    Hematocrit 35.9 (L) 37.0 - 48.5 %    MCV 93 82 - 98 fL    MCH 30.7 27.0 - 31.0 pg    MCHC 33.1 32.0 - 36.0 g/dL    RDW 13.5 11.5 - 14.5 %    Platelets 233 150 - 450 K/uL    MPV 11.6 9.2 - 12.9 fL    Immature Granulocytes 0.3 0.0 - 0.5 %    Gran # (ANC) 6.5 1.8 - 7.7 K/uL    Immature Grans (Abs) 0.03 0.00 - 0.04 K/uL    Lymph #  2.8 1.0 - 4.8 K/uL    Mono # 0.8 0.3 - 1.0 K/uL    Eos # 0.0 0.0 - 0.5 K/uL    Baso # 0.05 0.00 - 0.20 K/uL    nRBC 0 0 /100 WBC    Gran % 63.7 38.0 - 73.0 %    Lymph % 27.4 18.0 - 48.0 %    Mono % 7.7 4.0 - 15.0 %    Eosinophil % 0.4 0.0 - 8.0 %    Basophil % 0.5 0.0 - 1.9 %    Differential Method Automated    Comprehensive Metabolic Panel   Result Value Ref Range    Sodium 139 136 - 145 mmol/L    Potassium 4.0 3.5 - 5.1 mmol/L    Chloride 102 95 - 110 mmol/L    CO2 25 23 - 29 mmol/L    Glucose 102 70 - 110 mg/dL    BUN 17 8 - 23 mg/dL    Creatinine 1.1 0.5 - 1.4 mg/dL    Calcium 10.5 8.7 - 10.5 mg/dL    Total Protein 7.7 6.0 - 8.4 g/dL    Albumin 4.3 3.5 - 5.2 g/dL    Total Bilirubin 0.9 0.1 - 1.0 mg/dL    Alkaline Phosphatase 89 55 - 135 U/L    AST 22 10 - 40 U/L    ALT 12 10 - 44 U/L    Anion Gap 12 8 - 16 mmol/L    eGFR 52 (A) >60 mL/min/1.73 m^2   BNP   Result Value Ref Range    BNP 44 0 - 99 pg/mL   CK   Result Value Ref Range     (H) 20 - 180 U/L   Troponin I   Result Value Ref Range    Troponin I <0.006 0.000 - 0.026 ng/mL   POCT COVID-19 Rapid Screening   Result Value Ref Range    POC Rapid COVID Negative Negative     Acceptable Yes            Imaging Results:  Imaging Results              MRI Lumbar Spine Without Contrast (Final result)  Result time 09/03/22 12:34:23      Final result by Jeancarlos Tiwari MD (09/03/22 12:34:23)                   Impression:      1. Scattered degenerative changes with mild multilevel disc bulging and multilevel facet arthropathy.  2. Mild right lateral recess narrowing L3-4.  Otherwise lumbar central canal is widely patent throughout its course.  3. Mild neural foraminal stenosis L3-L4 and mild-moderate neural foraminal stenosis L4-L5.  Remaining neural foramen patent.  4. Negative for discitis, osteomyelitis or epidural abscess.      Electronically signed by: Jeancarlos Tiwari  Date:    09/03/2022  Time:    12:34               Narrative:     EXAMINATION:  MRI LUMBAR SPINE WITHOUT CONTRAST    CLINICAL HISTORY:  Low back pain, infection suspected;    TECHNIQUE:  Multiplanar, multisequence MR images were acquired from the thoracolumbar junction to the sacrum without the administration of contrast.    COMPARISON:  None.    FINDINGS:  1.2 mm retrolisthesis L2-L3.  2 mm retrolisthesis L3-L4.  All lumbar vertebral bodies are normal in height.  Patchy diminished T1 marrow signal throughout the visualized spine most consistent with mild red marrow reconversion.  No focal osseous lesions.  No fractures.  Distal spinal cord and conus medullaris are normal.  Conus terminates at the L1-L2 level.    T12-L1: Normal.    L1-L2: Mild facet arthropathy otherwise normal.    L2-L3: Minimal retrolisthesis.  Mild asymmetric to the right disc bulging.  Moderate facet arthropathy.  Central canal remains patent.  Neural foramen patent.    L3-L4: Mild generalized disc bulging with severe facet arthropathy and ligamentum flavum hypertrophy.  Right lateral recess narrowing.  Patent central canal.  Mild neural foraminal stenosis.    L4-L5: Normal posterior disc margin.  Moderate facet arthropathy.  Central canal patent.  Moderate left and mild right neural foraminal stenosis.    L5-S1: Mild generalized disc bulging, slightly asymmetric to the left.  Minimal contact left S1 nerve root.  Moderate facet arthropathy.  Central canal is patent.  Neural foramen patent.    Paravertebral soft tissues and musculature are normal.  Visualized intra-abdominal and intrapelvic contents are normal.                                       US Lower Extremity Veins Left (Final result)  Result time 09/03/22 11:44:36      Final result by Jeancarlos Tiwari MD (09/03/22 11:44:36)                   Impression:      Negative for left lower extremity DVT.      Electronically signed by: Jeancarlos Tiwari  Date:    09/03/2022  Time:    11:44               Narrative:    EXAMINATION:  US LOWER EXTREMITY  VEINS LEFT    CLINICAL HISTORY:  Left lower extremity pain.    COMPARISON:  None.    FINDINGS:  The left deep veins demonstrate a normal appearance of the common femoral vein, deep profunda, superficial femoral and popliteal vein.  There is no evidence of deep venous thrombosis.  Additionally, the greater saphenous, tibial and peroneal veins are patent.    Right common femoral vein patent.                                       CT Head Without Contrast (Final result)  Result time 09/03/22 08:02:28      Final result by Jeancarlos Tiwari MD (09/03/22 08:02:28)                   Impression:      1. No acute findings.  2. Atrophy and chronic ischemic change.  3. No significant change since 06/05/2020.  All CT scans at this facility are performed  using dose modulation techniques as appropriate to performed exam including the following:  automated exposure control; adjustment of mA and/or kV according to the patients size (this includes techniques or standardized protocols for targeted exams where dose is matched to indication/reason for exam: i.e. extremities or head);  iterative reconstruction technique.      Electronically signed by: Jeancarlos Tiwari  Date:    09/03/2022  Time:    08:02               Narrative:    EXAMINATION:  CT HEAD WITHOUT CONTRAST    CLINICAL HISTORY:  Neuro deficit, acute, stroke suspected;.  Cerebral ischemia.    TECHNIQUE:  Low dose axial images were obtained through the head.  Coronal and sagittal reformations were also performed. Contrast was not administered.    COMPARISON:  06/05/2020    FINDINGS:  Midline structures are intact. Atrophy with diffuse prominence of ventricular system and cortical sulci.  Mild low-density change in the deep white matter compatible chronic ischemic microvascular disease.    No intracranial hemorrhage,acute infarct, or suspicious intracranial lesion is identified.    Skull base and calvarium are normal. Moderate size polyp/retention cyst right maxillary  antrum.  Small retention cyst left maxillary antrum.  Mastoid air cells clear.                                       X-Ray Lumbar Spine Ap And Lateral (Final result)  Result time 09/03/22 08:17:31      Final result by Jeancarlos Tiwari MD (09/03/22 08:17:31)                   Impression:      1. Negative for compression fracture.  2. Multilevel degenerative changes as detailed above.  3. No significant change since 01/08/2020.      Electronically signed by: Jeancarlos Tiwari  Date:    09/03/2022  Time:    08:17               Narrative:    EXAMINATION:  XR LUMBAR SPINE AP AND LATERAL    CLINICAL HISTORY:  back pain;    COMPARISON:  01/08/2020    FINDINGS:  Minimal levoscoliosis centered at L2-L3.  L2-L3 and L3-L4.  Minimal retrolisthesis diffuse osteopenia.  Normal lumbar vertebral body heights.Mild disc space narrowing L2-L3.  Small osteophytes L1-2, L2-3 and L3-4.  Severe degenerative change L5-S1 facet joints..  Paravertebral soft tissues are normal.  No significant change since 01/08/2020.                                       X-Ray Chest AP Portable (Final result)  Result time 09/03/22 08:18:39      Final result by Jeancarlos Tiwari MD (09/03/22 08:18:39)                   Impression:      1. No acute chest findings.  2. No significant change since 03/10/2022.      Electronically signed by: Jeancarlos Tiwari  Date:    09/03/2022  Time:    08:18               Narrative:    EXAMINATION:  XR CHEST AP PORTABLE    CLINICAL HISTORY:  Weakness;    COMPARISON:  03/10/2022    FINDINGS:  Lungs are clear.  Heart size within normal limits.No significant bony findings.                                         The Emergency Provider reviewed the vital signs and test results, which are outlined above.    ED Discussions:  9:16 AM: Reassessed pt at this time.  Pt states her condition has improved at this time. Discussed with pt all pertinent ED information and results. Discussed pt dx of weakness and plan of tx. Gave  pt all f/u and return to the ED instructions. All questions and concerns were addressed at this time. Pt expresses understanding of information and instructions, and is comfortable with plan to discharge. Pt is stable for discharge.                            Clinical Impression:   Final diagnoses:  [R53.1] Weakness  [M54.42, G89.29] Chronic bilateral low back pain with left-sided sciatica (Primary)  [M79.606] Leg pain        ED Disposition Condition    Discharge Stable          ED Prescriptions       Medication Sig Dispense Start Date End Date Auth. Provider    HYDROcodone-acetaminophen (NORCO) 5-325 mg per tablet Take 1 tablet by mouth every 4 (four) hours as needed. 11 tablet 9/3/2022 9/8/2022 Leonard Proctor MD          Follow-up Information       Follow up With Specialties Details Why Contact Info    Raul Hill MD Internal Medicine   [address] [facility] [address]  [phone]               Leonard Proctor MD  09/03/22 0916       Leonard Proctor MD  09/03/22 1247

## 2022-09-03 NOTE — PROVIDER PROGRESS NOTES - EMERGENCY DEPT.
Encounter Date: 9/3/2022    ED Physician Progress Notes          4:16 PM: Reevaluated pt. Pt has significant weakness to the L lower extremity. Possible expressive aphasia. Friend at bedside states speech is slower than normal. Pt is having trouble finding words. Pt has 0/5 strength to the L lower extremity. R leg is mildly weak with 4/5 strength. Lumbar MRI has no significant findings. Will obtain MRI of brain and C and T spine. Pt states sxs started 2 days ago.    5:40 PM: Discussed case with Pattie Feldman. FNP ([facility]). Dr. Naidu agrees with current care and management of pt and accepts admission.   Admitting Service: Hospital Medicine  Admitting Physician: Dr. Naidu  Admit to: Obs tele    5:40 PM: Re-evaluated pt. I have discussed test results, shared treatment plan, and the need for admission with patient and family at bedside. Pt and family express understanding at this time and agree with all information. All questions answered. Pt and family have no further questions or concerns at this time. Pt is ready for admit.

## 2022-09-04 LAB
ALBUMIN SERPL BCP-MCNC: 3.8 G/DL (ref 3.5–5.2)
ALP SERPL-CCNC: 78 U/L (ref 55–135)
ALT SERPL W/O P-5'-P-CCNC: 13 U/L (ref 10–44)
ANION GAP SERPL CALC-SCNC: 12 MMOL/L (ref 8–16)
AORTIC ROOT ANNULUS: 2.68 CM
APTT BLDCRRT: 21.1 SEC (ref 21–32)
ASCENDING AORTA: 2.61 CM
AST SERPL-CCNC: 25 U/L (ref 10–40)
AV INDEX (PROSTH): 0.79
AV MEAN GRADIENT: 8 MMHG
AV PEAK GRADIENT: 10 MMHG
AV REGURGITATION PRESSURE HALF TIME: 732.29 MS
AV VALVE AREA: 1.77 CM2
AV VELOCITY RATIO: 0.88
BASOPHILS # BLD AUTO: 0.03 K/UL (ref 0–0.2)
BASOPHILS NFR BLD: 0.3 % (ref 0–1.9)
BILIRUB SERPL-MCNC: 0.8 MG/DL (ref 0.1–1)
BSA FOR ECHO PROCEDURE: 1.57 M2
BUN SERPL-MCNC: 23 MG/DL (ref 8–23)
CALCIUM SERPL-MCNC: 9.7 MG/DL (ref 8.7–10.5)
CHLORIDE SERPL-SCNC: 102 MMOL/L (ref 95–110)
CHOLEST SERPL-MCNC: 178 MG/DL (ref 120–199)
CHOLEST/HDLC SERPL: 2.2 {RATIO} (ref 2–5)
CK MB SERPL-MCNC: 2.5 NG/ML (ref 0.1–6.5)
CK MB SERPL-RTO: 0.4 % (ref 0–5)
CK SERPL-CCNC: 566 U/L (ref 20–180)
CO2 SERPL-SCNC: 22 MMOL/L (ref 23–29)
CREAT SERPL-MCNC: 1.1 MG/DL (ref 0.5–1.4)
CV ECHO LV RWT: 0.58 CM
DIFFERENTIAL METHOD: ABNORMAL
DOP CALC AO PEAK VEL: 1.6 M/S
DOP CALC AO VTI: 37.8 CM
DOP CALC LVOT AREA: 2.2 CM2
DOP CALC LVOT DIAMETER: 1.69 CM
DOP CALC LVOT PEAK VEL: 1.41 M/S
DOP CALC LVOT STROKE VOLUME: 66.81 CM3
DOP CALC RVOT PEAK VEL: 0.83 M/S
DOP CALC RVOT VTI: 16.5 CM
DOP CALCLVOT PEAK VEL VTI: 29.8 CM
E WAVE DECELERATION TIME: 245.61 MSEC
E/A RATIO: 0.78
E/E' RATIO: 9.05 M/S
ECHO LV POSTERIOR WALL: 0.87 CM (ref 0.6–1.1)
EJECTION FRACTION: 65 %
EOSINOPHIL # BLD AUTO: 0 K/UL (ref 0–0.5)
EOSINOPHIL NFR BLD: 0.1 % (ref 0–8)
ERYTHROCYTE [DISTWIDTH] IN BLOOD BY AUTOMATED COUNT: 13.7 % (ref 11.5–14.5)
EST. GFR  (NO RACE VARIABLE): 52 ML/MIN/1.73 M^2
ESTIMATED AVG GLUCOSE: 108 MG/DL (ref 68–131)
FRACTIONAL SHORTENING: 37 % (ref 28–44)
GLUCOSE SERPL-MCNC: 103 MG/DL (ref 70–110)
HBA1C MFR BLD: 5.4 % (ref 4–5.6)
HCT VFR BLD AUTO: 37.3 % (ref 37–48.5)
HDLC SERPL-MCNC: 80 MG/DL (ref 40–75)
HDLC SERPL: 44.9 % (ref 20–50)
HGB BLD-MCNC: 12.2 G/DL (ref 12–16)
IMM GRANULOCYTES # BLD AUTO: 0.02 K/UL (ref 0–0.04)
IMM GRANULOCYTES NFR BLD AUTO: 0.2 % (ref 0–0.5)
INR PPP: 1 (ref 0.8–1.2)
INTERVENTRICULAR SEPTUM: 0.9 CM (ref 0.6–1.1)
IVC DIAMETER: 1.39 CM
IVRT: 62.8 MSEC
LA MAJOR: 3.93 CM
LA MINOR: 3.88 CM
LA WIDTH: 3 CM
LDLC SERPL CALC-MCNC: 88.8 MG/DL (ref 63–159)
LEFT ATRIUM SIZE: 2.45 CM
LEFT ATRIUM VOLUME INDEX: 15.6 ML/M2
LEFT ATRIUM VOLUME: 24.4 CM3
LEFT INTERNAL DIMENSION IN SYSTOLE: 1.9 CM (ref 2.1–4)
LEFT VENTRICLE DIASTOLIC VOLUME INDEX: 22.35 ML/M2
LEFT VENTRICLE DIASTOLIC VOLUME: 34.87 ML
LEFT VENTRICLE MASS INDEX: 44 G/M2
LEFT VENTRICLE SYSTOLIC VOLUME INDEX: 7.1 ML/M2
LEFT VENTRICLE SYSTOLIC VOLUME: 11.12 ML
LEFT VENTRICULAR INTERNAL DIMENSION IN DIASTOLE: 3 CM (ref 3.5–6)
LEFT VENTRICULAR MASS: 68.43 G
LV LATERAL E/E' RATIO: 9.56 M/S
LV SEPTAL E/E' RATIO: 8.6 M/S
LVOT MG: 4.64 MMHG
LVOT MV: 1.02 CM/S
LYMPHOCYTES # BLD AUTO: 1.8 K/UL (ref 1–4.8)
LYMPHOCYTES NFR BLD: 15.9 % (ref 18–48)
MAGNESIUM SERPL-MCNC: 1.7 MG/DL (ref 1.6–2.6)
MCH RBC QN AUTO: 30 PG (ref 27–31)
MCHC RBC AUTO-ENTMCNC: 32.7 G/DL (ref 32–36)
MCV RBC AUTO: 92 FL (ref 82–98)
MONOCYTES # BLD AUTO: 0.8 K/UL (ref 0.3–1)
MONOCYTES NFR BLD: 7.5 % (ref 4–15)
MV PEAK A VEL: 1.1 M/S
MV PEAK E VEL: 0.86 M/S
MV STENOSIS PRESSURE HALF TIME: 71.23 MS
MV VALVE AREA P 1/2 METHOD: 3.09 CM2
NEUTROPHILS # BLD AUTO: 8.5 K/UL (ref 1.8–7.7)
NEUTROPHILS NFR BLD: 76 % (ref 38–73)
NONHDLC SERPL-MCNC: 98 MG/DL
NRBC BLD-RTO: 0 /100 WBC
PHOSPHATE SERPL-MCNC: 3.4 MG/DL (ref 2.7–4.5)
PISA AR MAX VEL: 3.81 M/S
PISA TR MAX VEL: 2.73 M/S
PLATELET # BLD AUTO: 134 K/UL (ref 150–450)
PLATELET BLD QL SMEAR: ABNORMAL
PMV BLD AUTO: 12.3 FL (ref 9.2–12.9)
POTASSIUM SERPL-SCNC: 4.3 MMOL/L (ref 3.5–5.1)
PROT SERPL-MCNC: 7 G/DL (ref 6–8.4)
PROTHROMBIN TIME: 10.9 SEC (ref 9–12.5)
PULM VEIN S/D RATIO: 1.09
PV MEAN GRADIENT: 1.37 MMHG
PV PEAK D VEL: 0.35 M/S
PV PEAK S VEL: 0.38 M/S
RA MAJOR: 4 CM
RA PRESSURE: 3 MMHG
RA WIDTH: 2.79 CM
RBC # BLD AUTO: 4.06 M/UL (ref 4–5.4)
SINUS: 2.67 CM
SODIUM SERPL-SCNC: 136 MMOL/L (ref 136–145)
STJ: 2.7 CM
TDI LATERAL: 0.09 M/S
TDI SEPTAL: 0.1 M/S
TDI: 0.1 M/S
TR MAX PG: 30 MMHG
TRICUSPID ANNULAR PLANE SYSTOLIC EXCURSION: 1.99 CM
TRIGL SERPL-MCNC: 46 MG/DL (ref 30–150)
TROPONIN I SERPL DL<=0.01 NG/ML-MCNC: 0.01 NG/ML (ref 0–0.03)
TV REST PULMONARY ARTERY PRESSURE: 33 MMHG
WBC # BLD AUTO: 11.14 K/UL (ref 3.9–12.7)

## 2022-09-04 PROCEDURE — 85730 THROMBOPLASTIN TIME PARTIAL: CPT | Performed by: INTERNAL MEDICINE

## 2022-09-04 PROCEDURE — 85610 PROTHROMBIN TIME: CPT | Performed by: INTERNAL MEDICINE

## 2022-09-04 PROCEDURE — 97162 PT EVAL MOD COMPLEX 30 MIN: CPT

## 2022-09-04 PROCEDURE — 63600175 PHARM REV CODE 636 W HCPCS: Performed by: INTERNAL MEDICINE

## 2022-09-04 PROCEDURE — 25000003 PHARM REV CODE 250: Performed by: INTERNAL MEDICINE

## 2022-09-04 PROCEDURE — 36415 COLL VENOUS BLD VENIPUNCTURE: CPT | Performed by: INTERNAL MEDICINE

## 2022-09-04 PROCEDURE — 85025 COMPLETE CBC W/AUTO DIFF WBC: CPT | Performed by: INTERNAL MEDICINE

## 2022-09-04 PROCEDURE — 99222 PR INITIAL HOSPITAL CARE,LEVL II: ICD-10-PCS | Mod: ,,, | Performed by: PSYCHIATRY & NEUROLOGY

## 2022-09-04 PROCEDURE — 11000001 HC ACUTE MED/SURG PRIVATE ROOM

## 2022-09-04 PROCEDURE — 97167 OT EVAL HIGH COMPLEX 60 MIN: CPT

## 2022-09-04 PROCEDURE — 80053 COMPREHEN METABOLIC PANEL: CPT | Performed by: INTERNAL MEDICINE

## 2022-09-04 PROCEDURE — 99222 1ST HOSP IP/OBS MODERATE 55: CPT | Mod: ,,, | Performed by: PSYCHIATRY & NEUROLOGY

## 2022-09-04 PROCEDURE — 84484 ASSAY OF TROPONIN QUANT: CPT | Performed by: INTERNAL MEDICINE

## 2022-09-04 PROCEDURE — 25500020 PHARM REV CODE 255: Performed by: INTERNAL MEDICINE

## 2022-09-04 PROCEDURE — 82553 CREATINE MB FRACTION: CPT | Performed by: INTERNAL MEDICINE

## 2022-09-04 PROCEDURE — 25000003 PHARM REV CODE 250: Performed by: NURSE PRACTITIONER

## 2022-09-04 PROCEDURE — 97112 NEUROMUSCULAR REEDUCATION: CPT

## 2022-09-04 PROCEDURE — 83735 ASSAY OF MAGNESIUM: CPT | Performed by: INTERNAL MEDICINE

## 2022-09-04 PROCEDURE — 84100 ASSAY OF PHOSPHORUS: CPT | Performed by: INTERNAL MEDICINE

## 2022-09-04 PROCEDURE — 97116 GAIT TRAINING THERAPY: CPT

## 2022-09-04 RX ORDER — ATORVASTATIN CALCIUM 40 MG/1
80 TABLET, FILM COATED ORAL DAILY
Status: DISCONTINUED | OUTPATIENT
Start: 2022-09-05 | End: 2022-09-06 | Stop reason: HOSPADM

## 2022-09-04 RX ORDER — LABETALOL HYDROCHLORIDE 5 MG/ML
10 INJECTION, SOLUTION INTRAVENOUS EVERY 6 HOURS PRN
Status: DISCONTINUED | OUTPATIENT
Start: 2022-09-04 | End: 2022-09-06 | Stop reason: HOSPADM

## 2022-09-04 RX ORDER — CLOPIDOGREL BISULFATE 75 MG/1
75 TABLET ORAL DAILY
Status: DISCONTINUED | OUTPATIENT
Start: 2022-09-05 | End: 2022-09-06 | Stop reason: HOSPADM

## 2022-09-04 RX ORDER — CLOPIDOGREL BISULFATE 75 MG/1
300 TABLET ORAL ONCE
Status: DISCONTINUED | OUTPATIENT
Start: 2022-09-04 | End: 2022-09-06 | Stop reason: HOSPADM

## 2022-09-04 RX ORDER — HALOPERIDOL 5 MG/ML
5 INJECTION INTRAMUSCULAR ONCE
Status: COMPLETED | OUTPATIENT
Start: 2022-09-04 | End: 2022-09-04

## 2022-09-04 RX ADMIN — HALOPERIDOL LACTATE 5 MG: 5 INJECTION, SOLUTION INTRAMUSCULAR at 05:09

## 2022-09-04 RX ADMIN — ENOXAPARIN SODIUM 40 MG: 100 INJECTION SUBCUTANEOUS at 04:09

## 2022-09-04 RX ADMIN — ATORVASTATIN CALCIUM 40 MG: 40 TABLET, FILM COATED ORAL at 10:09

## 2022-09-04 RX ADMIN — ASPIRIN 81 MG: 81 TABLET, COATED ORAL at 10:09

## 2022-09-04 RX ADMIN — CEFTRIAXONE 1 G: 1 INJECTION, SOLUTION INTRAVENOUS at 10:09

## 2022-09-04 RX ADMIN — IOHEXOL 100 ML: 350 INJECTION, SOLUTION INTRAVENOUS at 10:09

## 2022-09-04 NOTE — NURSING
MD notified about patient being disoriented, elevated blood pressure and refusing labs. 5 mg of IV Haldol given.

## 2022-09-04 NOTE — PROGRESS NOTES
[facility] [address] [place] [address] Medicine  Progress Note    Patient Name: Mary Flanagan  MRN: [id number]  Patient Class: IP- Inpatient   Admission Date: 9/3/2022  Length of Stay: 1 days  Attending Physician: Ruben Zurita, *  Primary Care Provider: Raul Hill MD        Subjective:     Principal Problem:Acute cerebrovascular accident (CVA) of basal ganglia        HPI:  Ms. Flanagan is a 77-year-old  female with PMH significant for hypertension, hyperlipidemia, presented to the ED complaining of on bilateral lower extremity pain, and left lower extremity weakness, that has been ongoing for the past many months, much worse since yesterday.  She was evaluated in the ED earlier today, CT head was negative for acute intracranial pathology.  However during the course of the day, nursing staff noted increased expressive aphasia, increased left lower extremity weakness compared to right.  Patient is a poor historian.  Family member at the bedside states that her speech is slower.  Not a candidate for tPA as symptoms have been ongoing for more than 24 hours.  /68.  Laboratory workup unremarkable.  Patient had MRI of the cervical, thoracic and lumbar spine done as part of workup for left lower extremity weakness.  Revealed moderate multilevel degenerative disc disease.  No neurosurgical intervention.  See MRI C/T/L spines for further details.  MRI of the brain revealed acute infarct to the right basal ganglia.     Admitting diagnosis: Acute right basal ganglia infarct.  Left-sided weakness.      Overview/Hospital Course:  76 y/o female admitted for CVA and started on ASA and statin. Neurology consulted. CTA head/neck and echo pending. PT/OT/ST recommendations pending.           Review of Systems   Constitutional: Negative.  Negative for chills and fever.   HENT: Negative.  Negative for congestion, rhinorrhea, sore throat and trouble swallowing.    Eyes: Negative.  Negative for visual  disturbance.   Respiratory: Negative.  Negative for cough, shortness of breath and wheezing.    Cardiovascular: Negative.  Negative for chest pain and palpitations.   Gastrointestinal: Negative.  Negative for abdominal pain, diarrhea, nausea and vomiting.   Endocrine: Negative.    Genitourinary: Negative.  Negative for dysuria and flank pain.   Musculoskeletal: Negative.  Negative for back pain.   Skin: Negative.  Negative for rash.   Allergic/Immunologic: Negative.    Neurological:  Positive for speech difficulty and weakness (Left lower extremity weakness). Negative for numbness and headaches.   Hematological: Negative.    Psychiatric/Behavioral: Negative.  Negative for hallucinations. The patient is not nervous/anxious.    All other systems reviewed and are negative.  Objective:     Vital Signs (Most Recent):  Temp:  (refuse vitals) (09/04/22 0800)  Pulse: 85 (09/04/22 0442)  Resp: 18 (09/03/22 2326)  BP: (!) 199/114 (09/04/22 0442)  SpO2: 99 % (09/04/22 0442)   Vital Signs (24h Range):  Temp:  [98.5 °F (36.9 °C)-99.1 °F (37.3 °C)] 99 °F (37.2 °C)  Pulse:  [73-99] 85  Resp:  [12-20] 18  SpO2:  [96 %-100 %] 99 %  BP: (127-199)/() 199/114     Weight: 56.6 kg (124 lb 12.5 oz)  Body mass index is 22.82 kg/m².    Intake/Output Summary (Last 24 hours) at 9/4/2022 0847  Last data filed at 9/4/2022 0500  Gross per 24 hour   Intake --   Output 500 ml   Net -500 ml      Physical Exam  Vitals and nursing note reviewed.   Constitutional:       General: She is awake. She is not in acute distress.     Appearance: She is not ill-appearing.   HENT:      Head: Normocephalic and atraumatic.      Mouth/Throat:      Mouth: Mucous membranes are moist.   Eyes:      General: No scleral icterus.     Conjunctiva/sclera: Conjunctivae normal.   Cardiovascular:      Rate and Rhythm: Normal rate and regular rhythm.      Heart sounds: No murmur heard.  Pulmonary:      Effort: Pulmonary effort is normal. No respiratory distress.       Breath sounds: Normal breath sounds. No wheezing.   Abdominal:      Palpations: Abdomen is soft.      Tenderness: There is no abdominal tenderness.   Musculoskeletal:         General: No swelling. Normal range of motion.      Cervical back: Normal range of motion and neck supple.   Skin:     General: Skin is warm.      Coloration: Skin is not jaundiced.   Neurological:      Mental Status: She is alert and oriented to person. Confused at times.     Motor: Weakness (Left lower extremity weakness noted.  Speech is slowed.) present.      Comments: +expressive aphasia    Psychiatric:         Attention and Perception: Attention normal.         Speech: Speech normal.         Behavior: Behavior is cooperative.       Significant Labs: All pertinent labs within the past 24 hours have been reviewed.  BMP:   Recent Labs   Lab 09/03/22  0756         K 4.0      CO2 25   BUN 17   CREATININE 1.1   CALCIUM 10.5     CBC:   Recent Labs   Lab 09/03/22  0756   WBC 10.19   HGB 11.9*   HCT 35.9*        CMP:   Recent Labs   Lab 09/03/22  0756      K 4.0      CO2 25      BUN 17   CREATININE 1.1   CALCIUM 10.5   PROT 7.7   ALBUMIN 4.3   BILITOT 0.9   ALKPHOS 89   AST 22   ALT 12   ANIONGAP 12     Cardiac Markers:   Recent Labs   Lab 09/03/22  0756   BNP 44     Lipid Panel: No results for input(s): CHOL, HDL, LDLCALC, TRIG, CHOLHDL in the last 48 hours.    Significant Imaging:   Imaging Results              MRI Thoracic Spine Without Contrast (Final result)  Result time 09/03/22 20:12:53      Final result by Santa Maynard MD (09/03/22 20:12:53)                   Impression:      Mild anterior chronic compression deformity of T9 may be related to old injury.    No evidence for spinal canal stenosis or neural foraminal narrowing    No acute process seen      Electronically signed by: Caesar Pratt  Date:    09/03/2022  Time:    20:12               Narrative:    EXAMINATION:  [id number]    CLINICAL  HISTORY:  left leg paresis;    TECHNIQUE:  Standard multiplanar noncontrast MRI sequences of the thoracic spine.    COMPARISON:  None    FINDINGS:  Mild anterior compression deformity of T9 vertebral body without evidence for edema suggestive of chronic mild anterior could compression deformity.  The thoracic cord reveals normal signal and morphology. The thoracic vertebra otherwise reveal normal alignment, shape and signal intensity.  Small posterior right renal cyst.  Minimal posterior disc bulge T11-T12                                       MRI Cervical Spine Without Contrast (Final result)  Result time 09/03/22 20:27:45      Final result by Santa Maynard MD (09/03/22 20:27:45)                   Impression:      At least moderate multilevel degenerative disc disease with spinal stenosis and neural foraminal narrowing as described above.      Electronically signed by: Caesar Pratt  Date:    09/03/2022  Time:    20:27               Narrative:    EXAMINATION:  MRI CERVICAL SPINE WITHOUT CONTRAST    CLINICAL HISTORY:  left leg paresis;    TECHNIQUE:  Sagittal T1, sagittal T2, axial T2, and axial gradient echo images of the cervical spine obtained. No IV contrast.    FINDINGS:  Cervical spine alignment is within normal limits. No fracture. No marrow signal abnormality suspicious for an infiltrative process.    The cervical cord is normal in caliber and signal characteristics.  The craniocervical junction and visualized intracranial contents are unremarkable.  The adjacent soft tissue structures show no significant abnormalities.    There are findings of moderate cervical spondylosis, with disc osteophyte complex formation, disc dessication, and uncovertebral spurring, as below.  LIGAMENTUM FLAVUM HYPERTROPHY THROUGHOUT THE CERVICAL SPINE.    C2-C3:  Mild central disc bulge..  Mild spinal stenosis with the AP canal diameter 10 mm.  Mild right-sided neural foraminal narrowing    C3-C4: Moderate central disc bulge  abutting the cord.  8 mm AP dimension of the spinal canal consistent with spinal stenosis.  There is mild right-sided neural foraminal narrowing    C4-C5:  Central disc which abuts the cord with AP canal diameter of 8 mm.  There is moderate severe right-sided neural foraminal narrowing and mild moderate left-sided neural foraminal narrowing.    C5-C6:  Moderate severe right-sided neural foraminal narrowing.  Mild moderate left-sided neural foraminal narrowing.  The AP canal diameter measures 10 mm.  Mild left-sided neural foraminal narrowing    C6-C7:  Moderate bilateral neural foraminal narrowing.  The AP canal diameter measures 11 mm.  The AP canal diameter measures 9.5 mm suggestive of mild spinal stenosis.    C7-T1:   There is no focal disc herniation. No significant central canal or neural foraminal narrowing.                                       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    Moderate scattered periventricular and subcortical FLAIR hyperintensities suggestive of chronic microvascular ischemic changes    Right maxillary sinus opacification      Electronically signed by: Caesar Pratt  Date:    09/03/2022  Time:    20:06               Narrative:    EXAMINATION:  MRI BRAIN WITHOUT CONTRAST    CLINICAL HISTORY:  left leg weakness, exp aphasia;    TECHNIQUE:  Sagittal T1. Axial T1, T2, T2 FLAIR, GRE, DWI. Coronal T2 FLAIR.    COMPARISON:  None available.    FINDINGS:  Acute infarct involving the right basal ganglion measuring 20 x 16 mm in transaxial dimension.  Moderate scattered periventricular subcortical FLAIR hyperintensities suggestive of chronic microvascular ischemic changes    The ventricles and sulci are normal in size and configuration. Midline structures are normal. There are preserved  arterial flow-voids on T2 weighted imaging. Right maxillary sinus opacification.    No abnormal marrow signal is identified.                                       MRI Lumbar Spine Without Contrast (Final result)  Result time 09/03/22 12:34:23      Final result by Jeancarlos Tiwari MD (09/03/22 12:34:23)                   Impression:      1. Scattered degenerative changes with mild multilevel disc bulging and multilevel facet arthropathy.  2. Mild right lateral recess narrowing L3-4.  Otherwise lumbar central canal is widely patent throughout its course.  3. Mild neural foraminal stenosis L3-L4 and mild-moderate neural foraminal stenosis L4-L5.  Remaining neural foramen patent.  4. Negative for discitis, osteomyelitis or epidural abscess.      Electronically signed by: Jeancarlos Tiwari  Date:    09/03/2022  Time:    12:34               Narrative:    EXAMINATION:  MRI LUMBAR SPINE WITHOUT CONTRAST    CLINICAL HISTORY:  Low back pain, infection suspected;    TECHNIQUE:  Multiplanar, multisequence MR images were acquired from the thoracolumbar junction to the sacrum without the administration of contrast.    COMPARISON:  None.    FINDINGS:  1.2 mm retrolisthesis L2-L3.  2 mm retrolisthesis L3-L4.  All lumbar vertebral bodies are normal in height.  Patchy diminished T1 marrow signal throughout the visualized spine most consistent with mild red marrow reconversion.  No focal osseous lesions.  No fractures.  Distal spinal cord and conus medullaris are normal.  Conus terminates at the L1-L2 level.    T12-L1: Normal.    L1-L2: Mild facet arthropathy otherwise normal.    L2-L3: Minimal retrolisthesis.  Mild asymmetric to the right disc bulging.  Moderate facet arthropathy.  Central canal remains patent.  Neural foramen patent.    L3-L4: Mild generalized disc bulging with severe facet arthropathy and ligamentum flavum hypertrophy.  Right lateral recess narrowing.  Patent central canal.  Mild neural foraminal  stenosis.    L4-L5: Normal posterior disc margin.  Moderate facet arthropathy.  Central canal patent.  Moderate left and mild right neural foraminal stenosis.    L5-S1: Mild generalized disc bulging, slightly asymmetric to the left.  Minimal contact left S1 nerve root.  Moderate facet arthropathy.  Central canal is patent.  Neural foramen patent.    Paravertebral soft tissues and musculature are normal.  Visualized intra-abdominal and intrapelvic contents are normal.                                       US Lower Extremity Veins Left (Final result)  Result time 09/03/22 11:44:36      Final result by Jeancarlos Tiwari MD (09/03/22 11:44:36)                   Impression:      Negative for left lower extremity DVT.      Electronically signed by: Jeancarlos Tiwari  Date:    09/03/2022  Time:    11:44               Narrative:    EXAMINATION:  US LOWER EXTREMITY VEINS LEFT    CLINICAL HISTORY:  Left lower extremity pain.    COMPARISON:  None.    FINDINGS:  The left deep veins demonstrate a normal appearance of the common femoral vein, deep profunda, superficial femoral and popliteal vein.  There is no evidence of deep venous thrombosis.  Additionally, the greater saphenous, tibial and peroneal veins are patent.    Right common femoral vein patent.                                       CT Head Without Contrast (Final result)  Result time 09/03/22 08:02:28      Final result by Jeancarlos Tiwari MD (09/03/22 08:02:28)                   Impression:      1. No acute findings.  2. Atrophy and chronic ischemic change.  3. No significant change since 06/05/2020.  All CT scans at this facility are performed  using dose modulation techniques as appropriate to performed exam including the following:  automated exposure control; adjustment of mA and/or kV according to the patients size (this includes techniques or standardized protocols for targeted exams where dose is matched to indication/reason for exam: i.e.  extremities or head);  iterative reconstruction technique.      Electronically signed by: Jeancarlos Tiwari  Date:    09/03/2022  Time:    08:02               Narrative:    EXAMINATION:  CT HEAD WITHOUT CONTRAST    CLINICAL HISTORY:  Neuro deficit, acute, stroke suspected;.  Cerebral ischemia.    TECHNIQUE:  Low dose axial images were obtained through the head.  Coronal and sagittal reformations were also performed. Contrast was not administered.    COMPARISON:  06/05/2020    FINDINGS:  Midline structures are intact. Atrophy with diffuse prominence of ventricular system and cortical sulci.  Mild low-density change in the deep white matter compatible chronic ischemic microvascular disease.    No intracranial hemorrhage,acute infarct, or suspicious intracranial lesion is identified.    Skull base and calvarium are normal. Moderate size polyp/retention cyst right maxillary antrum.  Small retention cyst left maxillary antrum.  Mastoid air cells clear.                                       X-Ray Lumbar Spine Ap And Lateral (Final result)  Result time 09/03/22 08:17:31      Final result by Jeancarlos Tiwari MD (09/03/22 08:17:31)                   Impression:      1. Negative for compression fracture.  2. Multilevel degenerative changes as detailed above.  3. No significant change since 01/08/2020.      Electronically signed by: Jeancarlos Tiwari  Date:    09/03/2022  Time:    08:17               Narrative:    EXAMINATION:  XR LUMBAR SPINE AP AND LATERAL    CLINICAL HISTORY:  back pain;    COMPARISON:  01/08/2020    FINDINGS:  Minimal levoscoliosis centered at L2-L3.  L2-L3 and L3-L4.  Minimal retrolisthesis diffuse osteopenia.  Normal lumbar vertebral body heights.Mild disc space narrowing L2-L3.  Small osteophytes L1-2, L2-3 and L3-4.  Severe degenerative change L5-S1 facet joints..  Paravertebral soft tissues are normal.  No significant change since 01/08/2020.                                       X-Ray Chest  AP Portable (Final result)  Result time 09/03/22 08:18:39      Final result by Jeancarlos Tiwari MD (09/03/22 08:18:39)                   Impression:      1. No acute chest findings.  2. No significant change since 03/10/2022.      Electronically signed by: Jeancarlos Tiwari  Date:    09/03/2022  Time:    08:18               Narrative:    EXAMINATION:  XR CHEST AP PORTABLE    CLINICAL HISTORY:  Weakness;    COMPARISON:  03/10/2022    FINDINGS:  Lungs are clear.  Heart size within normal limits.No significant bony findings.                                         Assessment/Plan:      * Acute cerebrovascular accident (CVA) of basal ganglia  -MRI brain 9/3 shows acute infarct right basal ganglia, with left-sided weakness.    -aspirin, statin.    -neurology consult.    -TIA/CVA order set used.    -neurochecks every 4 hours.    -PT/OT/ST evaluation pending   -not a candidate for tPA as symptoms ongoing for more than 24 hours.  -Allow for permissive HTN for 24-48 hours   -Labetalol prn           Hyperlipidemia  -continue statin  -Lipid panel pending         VTE Risk Mitigation (From admission, onward)           Ordered     enoxaparin injection 40 mg  Daily         09/03/22 2005     IP VTE HIGH RISK PATIENT  Once         09/03/22 2005     Place sequential compression device  Until discontinued         09/03/22 2005                    Discharge Planning   NICOLE:      Code Status: Full Code   Is the patient medically ready for discharge?:     Reason for patient still in hospital (select all that apply): Patient trending condition, Laboratory test, Treatment, Imaging, Consult recommendations and PT / OT recommendations                     Jessy Everett NP  Department of Hospital Medicine   [facility]

## 2022-09-04 NOTE — CONSULTS
NEUROLOGY CONSULT TELE-VIRTUAL NOTE  [facility]    Patient Name:  Mary Flanagan  Date of Consult: 2022  Admit Date:  903  MR #:  [id number]  Acct #:  [id number]  :  1945    Physicians:  Raul Hill MD (Family); Ruben Zurita, *  Consulting Physician:  Malini Stein MD       Chief Complaint/Reason for Consult: Right basal gangliar infarct    Assessment and Plan:  Mary Flanagan is a 77 y.o. female with hypertension, hyperlipidemia presented to the ED complaining of left leg weakness. Per patient symptoms started on 2022 at approximately 2 pm.   MRI brain - acute right basal gangliar infarct. Hemoglobin A1c- 5.4, LDL- 88.8  CTA brain reviewed mild right MORTEZA atherosclerosis noted, no large vessel occlusion.    Unfortunately patient refused examination, upon inspection while speaking with patient left facial droop noted. No aphasia or dysarthria.       Assessment:  Acute right basal gangliar infarct 2/2 small vessel disease  HTN  HLD  Intracardiac right to left shunt  Atrial septal aneurysm   6. Mild C spine central canal narrowing at C3-4 and C4-5  7. T9 chronic compression deformity  8. Lumbar degenerative disease  9. Mild CK elevation     Recommendations:  Maintain permissive HTN for 24-48 hours ( can be discontinued at 2 pm today), do not treat blood pressure unless >220/120, can use IV Labetalol for the same prn  EKG, if not done   Telemetry monitoring  Lipitor 80 mg nightly   Plavix 300mg x 1 then continue plavix 75 mg daily, recommend dual antiplatelet therapy- ASA 81 mg daily and plavix 75 mg daily x 3 weeks per POINT trial after which would recommend switching to single antiplatelet agent  PT/OT/ Speech therapy consultation   Follow up with Stroke Neurology in 4 weeks    Atrial septal aneurysm management per Cardiology  Consider neurosurgery outpatient referral given evidence of c spine central stenosis  Recheck CK        History of Present Illness:  Mary THOMAS  Masha is a 77 y.o. female on whom I have been asked to consult for evaluation and treatment of right basal gangliar infarct.    Most of the history is obtained from the patient's chart and patient. Per patient she became weak on the left on 9/2/2022 at approximately 2pm. She states she also had a fall in the hospital. She denies any changes in speech or swallowing. She denies smoking and is not on aspirin at home.    She reports she lives with her .        Duration: >1 day  Location: left sided weakness   Intensity: moderate   Aggravating factors: none   Relieving factors: none   Frequency: one time   Timing: as above  Associated symptoms: none         Laboratory and Additional Data Reviewed:      CTA head and neck:    Impression:     1. Negative CTA neck.  2. Mild intracranial atherosclerosis right anterior cerebral artery.  Otherwise negative intracranial CTA.  Specifically, negative for large vessel occlusion or aneurysm.        Tests to date: All imaging reviewed personally by me in addition to cardiology reports.  MRI Thoracic Spine Without Contrast   Final Result      Mild anterior chronic compression deformity of T9 may be related to old injury.      No evidence for spinal canal stenosis or neural foraminal narrowing      No acute process seen         Electronically signed by: Caesar Pratt   Date:    09/03/2022   Time:    20:12      MRI Cervical Spine Without Contrast   Final Result      At least moderate multilevel degenerative disc disease with spinal stenosis and neural foraminal narrowing as described above.         Electronically signed by: Caesar Pratt   Date:    09/03/2022   Time:    20:27      MRI Brain Without Contrast   Final Result      Acute infarct of the right basal ganglion measuring up to 20 mm in AP dimension.      Moderate scattered periventricular and subcortical FLAIR hyperintensities suggestive of chronic microvascular ischemic changes      Right maxillary sinus opacification          Electronically signed by: Caesar Pratt   Date:    09/03/2022   Time:    20:06      MRI Lumbar Spine Without Contrast   Final Result      1. Scattered degenerative changes with mild multilevel disc bulging and multilevel facet arthropathy.   2. Mild right lateral recess narrowing L3-4.  Otherwise lumbar central canal is widely patent throughout its course.   3. Mild neural foraminal stenosis L3-L4 and mild-moderate neural foraminal stenosis L4-L5.  Remaining neural foramen patent.   4. Negative for discitis, osteomyelitis or epidural abscess.         Electronically signed by: Jeancarlos Tiwari   Date:    09/03/2022   Time:    12:34      US Lower Extremity Veins Left   Final Result      Negative for left lower extremity DVT.         Electronically signed by: Jeancarlos Tiwari   Date:    09/03/2022   Time:    11:44      CT Head Without Contrast   Final Result      1. No acute findings.   2. Atrophy and chronic ischemic change.   3. No significant change since 06/05/2020.   All CT scans at this facility are performed  using dose modulation techniques as appropriate to performed exam including the following:  automated exposure control; adjustment of mA and/or kV according to the patients size (this includes techniques or standardized protocols for targeted exams where dose is matched to indication/reason for exam: i.e. extremities or head);  iterative reconstruction technique.         Electronically signed by: Jeancarlos Tiwari   Date:    09/03/2022   Time:    08:02      X-Ray Lumbar Spine Ap And Lateral   Final Result      1. Negative for compression fracture.   2. Multilevel degenerative changes as detailed above.   3. No significant change since 01/08/2020.         Electronically signed by: Jeancarlos Tiwari   Date:    09/03/2022   Time:    08:17      X-Ray Chest AP Portable   Final Result      1. No acute chest findings.   2. No significant change since 03/10/2022.         Electronically signed by: Jeancarlos  Karie   Date:    09/03/2022   Time:    08:18      CTA Head and Neck (xpd)    (Results Pending)           Results for orders placed or performed during the hospital encounter of 09/03/22   MRI Brain Without Contrast    Narrative    EXAMINATION:  MRI BRAIN WITHOUT CONTRAST    CLINICAL HISTORY:  left leg weakness, exp aphasia;    TECHNIQUE:  Sagittal T1. Axial T1, T2, T2 FLAIR, GRE, DWI. Coronal T2 FLAIR.    COMPARISON:  None available.    FINDINGS:  Acute infarct involving the right basal ganglion measuring 20 x 16 mm in transaxial dimension.  Moderate scattered periventricular subcortical FLAIR hyperintensities suggestive of chronic microvascular ischemic changes    The ventricles and sulci are normal in size and configuration. Midline structures are normal. There are preserved arterial flow-voids on T2 weighted imaging. Right maxillary sinus opacification.    No abnormal marrow signal is identified.      Impression    Acute infarct of the right basal ganglion measuring up to 20 mm in AP dimension.    Moderate scattered periventricular and subcortical FLAIR hyperintensities suggestive of chronic microvascular ischemic changes    Right maxillary sinus opacification      Electronically signed by: Caesar Pratt  Date:    09/03/2022  Time:    20:06   CT Head Without Contrast    Narrative    EXAMINATION:  CT HEAD WITHOUT CONTRAST    CLINICAL HISTORY:  Neuro deficit, acute, stroke suspected;.  Cerebral ischemia.    TECHNIQUE:  Low dose axial images were obtained through the head.  Coronal and sagittal reformations were also performed. Contrast was not administered.    COMPARISON:  06/05/2020    FINDINGS:  Midline structures are intact. Atrophy with diffuse prominence of ventricular system and cortical sulci.  Mild low-density change in the deep white matter compatible chronic ischemic microvascular disease.    No intracranial hemorrhage,acute infarct, or suspicious intracranial lesion is identified.    Skull base and  calvarium are normal. Moderate size polyp/retention cyst right maxillary antrum.  Small retention cyst left maxillary antrum.  Mastoid air cells clear.      Impression    1. No acute findings.  2. Atrophy and chronic ischemic change.  3. No significant change since 06/05/2020.  All CT scans at this facility are performed  using dose modulation techniques as appropriate to performed exam including the following:  automated exposure control; adjustment of mA and/or kV according to the patients size (this includes techniques or standardized protocols for targeted exams where dose is matched to indication/reason for exam: i.e. extremities or head);  iterative reconstruction technique.      Electronically signed by: Jeancarlos Tiwari  Date:    09/03/2022  Time:    08:02       Labs: All labs reviewed by me.  Lab Results   Component Value Date    WBC 10.19 09/03/2022    HGB 11.9 (L) 09/03/2022    HCT 35.9 (L) 09/03/2022     09/03/2022    CHOL 208 (H) 05/30/2022    TRIG 90 05/30/2022    HDL 68 05/30/2022    LDLCALC 122.0 05/30/2022    ALT 12 09/03/2022    AST 22 09/03/2022     09/03/2022    K 4.0 09/03/2022     09/03/2022    CREATININE 1.1 09/03/2022    BUN 17 09/03/2022    TSH 0.811 09/03/2022       Lab Visit on 05/30/2022   Component Date Value Ref Range Status    Cholesterol 05/30/2022 208 (H)  120 - 199 mg/dL Final    Comment: The National Cholesterol Education Program (NCEP) has set the  following guidelines (reference ranges) for Cholesterol:  Optimal.....................<200 mg/dL  Borderline High.............200-239 mg/dL  High........................> or = 240 mg/dL      Triglycerides 05/30/2022 90  30 - 150 mg/dL Final    Comment: The National Cholesterol Education Program (NCEP) has set the  following guidelines (reference values) for triglycerides:  Normal......................<150 mg/dL  Borderline High.............150-199 mg/dL  High........................200-499 mg/dL      HDL 05/30/2022  68  40 - 75 mg/dL Final    Comment: The National Cholesterol Education Program (NCEP) has set the  following guidelines (reference values) for HDL Cholesterol:  Low...............<40 mg/dL  Optimal...........>60 mg/dL      LDL Cholesterol 2022 122.0  63.0 - 159.0 mg/dL Final    Comment: The National Cholesterol Education Program (NCEP) has set the  following guidelines (reference values) for LDL Cholesterol:  Optimal.......................<130 mg/dL  Borderline High...............130-159 mg/dL  High..........................160-189 mg/dL  Very High.....................>190 mg/dL      HDL/Cholesterol Ratio 2022 32.7  20.0 - 50.0 % Final    Total Cholesterol/HDL Ratio 2022 3.1  2.0 - 5.0 Final    Non-HDL Cholesterol 2022 140  mg/dL Final    Comment: Risk category and Non-HDL cholesterol goals:  Coronary heart disease (CHD)or equivalent (10-year risk of CHD >20%):  Non-HDL cholesterol goal     <130 mg/dL  Two or more CHD risk factors and 10-year risk of CHD <= 20%:  Non-HDL cholesterol goal     <160 mg/dL  0 to 1 CHD risk factor:  Non-HDL cholesterol goal     <190 mg/dL           History:   Past Medical History:   Diagnosis Date    CKD (chronic kidney disease) stage 3, GFR 30-59 ml/min     Dry eyes     Gastritis     Hyperlipidemia     Hypertension     Insomnia     Osteopenia      Past Surgical History:   Procedure Laterality Date    BREAST BIOPSY Left     , benign     SECTION      x 1    COLONOSCOPY N/A 2022    Procedure: COLONOSCOPY;  Surgeon: Karina Beck MD;  Location: [facility];  Service: Endoscopy;  Laterality: N/A;    ESOPHAGOGASTRODUODENOSCOPY N/A 2022    Procedure: EGD (ESOPHAGOGASTRODUODENOSCOPY);  Surgeon: Karina Beck MD;  Location: [facility];  Service: Endoscopy;  Laterality: N/A;    KNEE ARTHROSCOPY Left 2014 approx    KNEE SURGERY      left arm surgery      left knee surgery       Family History   Problem Relation Age of Onset    Hypertension Mother      Hypertension Father     Breast cancer Neg Hx     Colon cancer Neg Hx     Ovarian cancer Neg Hx     Thrombophilia Neg Hx     Strabismus Neg Hx     Macular degeneration Neg Hx     Retinal detachment Neg Hx     Glaucoma Neg Hx     Blindness Neg Hx     Amblyopia Neg Hx      Social History     Socioeconomic History    Marital status:     Number of children: 3   Occupational History    Occupation: retired   Tobacco Use    Smoking status: Never    Smokeless tobacco: Never   Substance and Sexual Activity    Alcohol use: No     Alcohol/week: 0.0 standard drinks    Drug use: No    Sexual activity: Not Currently     Partners: Male     Birth control/protection: None     Comment: mut monog     Social Determinants of Health     Financial Resource Strain: Low Risk     Difficulty of Paying Living Expenses: Not hard at all   Food Insecurity: No Food Insecurity    Worried About Running Out of Food in the Last Year: Never true    Ran Out of Food in the Last Year: Never true   Transportation Needs: No Transportation Needs    Lack of Transportation (Medical): No    Lack of Transportation (Non-Medical): No   Physical Activity: Inactive    Days of Exercise per Week: 0 days    Minutes of Exercise per Session: 0 min   Stress: Stress Concern Present    Feeling of Stress : Very much   Social Connections: Socially Integrated    Frequency of Communication with Friends and Family: More than three times a week    Frequency of Social Gatherings with Friends and Family: Once a week    Attends Restoration[employer] Services: More than 4 times per year    Active Member of Clubs or Organizations: Yes    Attends Club or Organization Meetings: More than 4 times per year    Marital Status:    Housing Stability: Low Risk     Unable to Pay for Housing in the Last Year: No    Number of Places Lived in the Last Year: 1    Unstable Housing in the Last Year: No       Allergy Information:        I have reviewed the patient's allergies.       No known drug  allergies    Home Medications:   No current facility-administered medications on file prior to encounter.     Current Outpatient Medications on File Prior to Encounter   Medication Sig Dispense Refill    diclofenac sodium (VOLTAREN) 1 % Gel Apply 4 g topically 2 (two) times daily.      ferrous sulfate (FEOSOL) 325 mg (65 mg iron) Tab tablet Take 325 mg by mouth daily with breakfast. Every other day      hydroCHLOROthiazide (HYDRODIURIL) 12.5 MG Tab TAKE 1 TABLET(12.5 MG) BY MOUTH EVERY 48 HOURS AS NEEDED 90 tablet 3    mirtazapine (REMERON) 7.5 MG Tab Take 1 tablet (7.5 mg total) by mouth every evening. 30 tablet 11    multivitamin (THERAGRAN) per tablet Take 1 tablet by mouth once daily.      pravastatin (PRAVACHOL) 10 MG tablet Take 1 tablet (10 mg total) by mouth once daily. 90 tablet 3    VIT A/C/E AC/ZNOX/CUPRIC OXIDE (EYE VITAMIN AND MINERALS ORAL) Take 1 tablet by mouth once daily at 6am.      pregabalin (LYRICA) 50 MG capsule Take 50 mg by mouth 2 (two) times daily.         Hospital Medications Reviewed in EPIC   aspirin  81 mg Oral Daily    atorvastatin  40 mg Oral Daily    cefTRIAXone (ROCEPHIN) IVPB  1 g Intravenous Q24H    enoxaparin  40 mg Subcutaneous Daily       Review of Systems:    ROS    14-point review of systems as follows:   No check nikky indicates NEGATIVE response   Constitutional: [] weight loss, [] change to appetite   Eyes: [] change in vision, [] double vision   Ears, nose, mouth, throat: [] frequent nose bleeds, [] ringing in the ears   Respiratory: [] cough, [] wheezing   Cardiovascular: [] chest pain, [] palpitations   Gastrointestinal: [] jaundice, [] nausea/vomiting   Genitourinary: [] incontinence, [] burning with urination   Hematologic/lymphatic: [] easy bruising/bleeding, [] night sweats   Neurological: [] numbness, [x] weakness   Endocrine: [] fatigue, [] heat/cold intolerance   Allergy/Immunologic: [] fevers, [] chills   Musculoskeletal: [] muscle pain, [] joint pain    Psychiatric: [] thoughts of harming self/others, [] depression   Integumentary: [] rashes, [] sores that do not heal     Physical Examination:  Vital signs in last 24 hours:  Temp:  [98.5 °F (36.9 °C)-99.1 °F (37.3 °C)] 99 °F (37.2 °C)  Pulse:  [73-98] 85  Resp:  [12-20] 18  SpO2:  [96 %-100 %] 99 %  BP: (127-199)/() 199/114      GENERAL:    Patient refused examination, upon inspection when I was speaking with her I noted mild left facial droop, no significant aphasia or dysarthria noted. Patient does not want to be examined at this time.            40 minutes spent face-to-face, >50% spent advising about: counseling and/or coordination of care           Malini Stein M.D    Medicine-Neurology, Clinical Neurophysiology    This note was created with voice recognition software.  Grammatical, syntax and spelling errors may be inevitable.    Problem List Items Addressed This Visit    None  Visit Diagnoses       Cerebrovascular accident (CVA), unspecified mechanism    -  Primary    Weakness        Relevant Orders    EKG 12-lead (Completed)    Chronic bilateral low back pain with left-sided sciatica        Leg pain        Relevant Orders    US Lower Extremity Veins Left (Completed)    Expressive aphasia        Left leg weakness        TIA (transient ischemic attack)        Relevant Orders    Echo Saline Bubble? Yes

## 2022-09-04 NOTE — SUBJECTIVE & OBJECTIVE
Review of Systems   Constitutional: Negative.  Negative for chills and fever.   HENT: Negative.  Negative for congestion, rhinorrhea, sore throat and trouble swallowing.    Eyes: Negative.  Negative for visual disturbance.   Respiratory: Negative.  Negative for cough, shortness of breath and wheezing.    Cardiovascular: Negative.  Negative for chest pain and palpitations.   Gastrointestinal: Negative.  Negative for abdominal pain, diarrhea, nausea and vomiting.   Endocrine: Negative.    Genitourinary: Negative.  Negative for dysuria and flank pain.   Musculoskeletal: Negative.  Negative for back pain.   Skin: Negative.  Negative for rash.   Allergic/Immunologic: Negative.    Neurological:  Positive for speech difficulty and weakness (Left lower extremity weakness). Negative for numbness and headaches.   Hematological: Negative.    Psychiatric/Behavioral: Negative.  Negative for hallucinations. The patient is not nervous/anxious.    All other systems reviewed and are negative.  Objective:     Vital Signs (Most Recent):  Temp:  (refuse vitals) (09/04/22 0800)  Pulse: 85 (09/04/22 0442)  Resp: 18 (09/03/22 2326)  BP: (!) 199/114 (09/04/22 0442)  SpO2: 99 % (09/04/22 0442)   Vital Signs (24h Range):  Temp:  [98.5 °F (36.9 °C)-99.1 °F (37.3 °C)] 99 °F (37.2 °C)  Pulse:  [73-99] 85  Resp:  [12-20] 18  SpO2:  [96 %-100 %] 99 %  BP: (127-199)/() 199/114     Weight: 56.6 kg (124 lb 12.5 oz)  Body mass index is 22.82 kg/m².    Intake/Output Summary (Last 24 hours) at 9/4/2022 0847  Last data filed at 9/4/2022 0500  Gross per 24 hour   Intake --   Output 500 ml   Net -500 ml      Physical Exam  Vitals and nursing note reviewed.   Constitutional:       General: She is awake. She is not in acute distress.     Appearance: She is not ill-appearing.   HENT:      Head: Normocephalic and atraumatic.      Mouth/Throat:      Mouth: Mucous membranes are moist.   Eyes:      General: No scleral icterus.     Conjunctiva/sclera:  Conjunctivae normal.   Cardiovascular:      Rate and Rhythm: Normal rate and regular rhythm.      Heart sounds: No murmur heard.  Pulmonary:      Effort: Pulmonary effort is normal. No respiratory distress.      Breath sounds: Normal breath sounds. No wheezing.   Abdominal:      Palpations: Abdomen is soft.      Tenderness: There is no abdominal tenderness.   Musculoskeletal:         General: No swelling. Normal range of motion.      Cervical back: Normal range of motion and neck supple.   Skin:     General: Skin is warm.      Coloration: Skin is not jaundiced.   Neurological:      Mental Status: She is alert and oriented to person, place, and time.      Motor: Weakness (Left lower extremity weakness noted.  Speech is slowed.) present.      Comments: +expressive aphasia    Psychiatric:         Attention and Perception: Attention normal.         Speech: Speech normal.         Behavior: Behavior is cooperative.       Significant Labs: All pertinent labs within the past 24 hours have been reviewed.  BMP:   Recent Labs   Lab 09/03/22  0756         K 4.0      CO2 25   BUN 17   CREATININE 1.1   CALCIUM 10.5     CBC:   Recent Labs   Lab 09/03/22  0756   WBC 10.19   HGB 11.9*   HCT 35.9*        CMP:   Recent Labs   Lab 09/03/22  0756      K 4.0      CO2 25      BUN 17   CREATININE 1.1   CALCIUM 10.5   PROT 7.7   ALBUMIN 4.3   BILITOT 0.9   ALKPHOS 89   AST 22   ALT 12   ANIONGAP 12     Cardiac Markers:   Recent Labs   Lab 09/03/22  0756   BNP 44     Lipid Panel: No results for input(s): CHOL, HDL, LDLCALC, TRIG, CHOLHDL in the last 48 hours.    Significant Imaging:   Imaging Results              MRI Thoracic Spine Without Contrast (Final result)  Result time 09/03/22 20:12:53      Final result by Santa Maynard MD (09/03/22 20:12:53)                   Impression:      Mild anterior chronic compression deformity of T9 may be related to old injury.    No evidence for spinal canal  stenosis or neural foraminal narrowing    No acute process seen      Electronically signed by: Caesar Pratt  Date:    09/03/2022  Time:    20:12               Narrative:    EXAMINATION:  [id number]    CLINICAL HISTORY:  left leg paresis;    TECHNIQUE:  Standard multiplanar noncontrast MRI sequences of the thoracic spine.    COMPARISON:  None    FINDINGS:  Mild anterior compression deformity of T9 vertebral body without evidence for edema suggestive of chronic mild anterior could compression deformity.  The thoracic cord reveals normal signal and morphology. The thoracic vertebra otherwise reveal normal alignment, shape and signal intensity.  Small posterior right renal cyst.  Minimal posterior disc bulge T11-T12                                       MRI Cervical Spine Without Contrast (Final result)  Result time 09/03/22 20:27:45      Final result by Santa Maynard MD (09/03/22 20:27:45)                   Impression:      At least moderate multilevel degenerative disc disease with spinal stenosis and neural foraminal narrowing as described above.      Electronically signed by: Caesar Pratt  Date:    09/03/2022  Time:    20:27               Narrative:    EXAMINATION:  MRI CERVICAL SPINE WITHOUT CONTRAST    CLINICAL HISTORY:  left leg paresis;    TECHNIQUE:  Sagittal T1, sagittal T2, axial T2, and axial gradient echo images of the cervical spine obtained. No IV contrast.    FINDINGS:  Cervical spine alignment is within normal limits. No fracture. No marrow signal abnormality suspicious for an infiltrative process.    The cervical cord is normal in caliber and signal characteristics.  The craniocervical junction and visualized intracranial contents are unremarkable.  The adjacent soft tissue structures show no significant abnormalities.    There are findings of moderate cervical spondylosis, with disc osteophyte complex formation, disc dessication, and uncovertebral spurring, as below.  LIGAMENTUM FLAVUM HYPERTROPHY  THROUGHOUT THE CERVICAL SPINE.    C2-C3:  Mild central disc bulge..  Mild spinal stenosis with the AP canal diameter 10 mm.  Mild right-sided neural foraminal narrowing    C3-C4: Moderate central disc bulge abutting the cord.  8 mm AP dimension of the spinal canal consistent with spinal stenosis.  There is mild right-sided neural foraminal narrowing    C4-C5:  Central disc which abuts the cord with AP canal diameter of 8 mm.  There is moderate severe right-sided neural foraminal narrowing and mild moderate left-sided neural foraminal narrowing.    C5-C6:  Moderate severe right-sided neural foraminal narrowing.  Mild moderate left-sided neural foraminal narrowing.  The AP canal diameter measures 10 mm.  Mild left-sided neural foraminal narrowing    C6-C7:  Moderate bilateral neural foraminal narrowing.  The AP canal diameter measures 11 mm.  The AP canal diameter measures 9.5 mm suggestive of mild spinal stenosis.    C7-T1:   There is no focal disc herniation. No significant central canal or neural foraminal narrowing.                                       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    Moderate scattered periventricular and subcortical FLAIR hyperintensities suggestive of chronic microvascular ischemic changes    Right maxillary sinus opacification      Electronically signed by: Caesar Pratt  Date:    09/03/2022  Time:    20:06               Narrative:    EXAMINATION:  MRI BRAIN WITHOUT CONTRAST    CLINICAL HISTORY:  left leg weakness, exp aphasia;    TECHNIQUE:  Sagittal T1. Axial T1, T2, T2 FLAIR, GRE, DWI. Coronal T2 FLAIR.    COMPARISON:  None available.    FINDINGS:  Acute infarct involving the right basal ganglion measuring 20 x 16 mm in transaxial dimension.  Moderate scattered  periventricular subcortical FLAIR hyperintensities suggestive of chronic microvascular ischemic changes    The ventricles and sulci are normal in size and configuration. Midline structures are normal. There are preserved arterial flow-voids on T2 weighted imaging. Right maxillary sinus opacification.    No abnormal marrow signal is identified.                                       MRI Lumbar Spine Without Contrast (Final result)  Result time 09/03/22 12:34:23      Final result by Jeancarlos Tiwari MD (09/03/22 12:34:23)                   Impression:      1. Scattered degenerative changes with mild multilevel disc bulging and multilevel facet arthropathy.  2. Mild right lateral recess narrowing L3-4.  Otherwise lumbar central canal is widely patent throughout its course.  3. Mild neural foraminal stenosis L3-L4 and mild-moderate neural foraminal stenosis L4-L5.  Remaining neural foramen patent.  4. Negative for discitis, osteomyelitis or epidural abscess.      Electronically signed by: Jeancarlos Tiwari  Date:    09/03/2022  Time:    12:34               Narrative:    EXAMINATION:  MRI LUMBAR SPINE WITHOUT CONTRAST    CLINICAL HISTORY:  Low back pain, infection suspected;    TECHNIQUE:  Multiplanar, multisequence MR images were acquired from the thoracolumbar junction to the sacrum without the administration of contrast.    COMPARISON:  None.    FINDINGS:  1.2 mm retrolisthesis L2-L3.  2 mm retrolisthesis L3-L4.  All lumbar vertebral bodies are normal in height.  Patchy diminished T1 marrow signal throughout the visualized spine most consistent with mild red marrow reconversion.  No focal osseous lesions.  No fractures.  Distal spinal cord and conus medullaris are normal.  Conus terminates at the L1-L2 level.    T12-L1: Normal.    L1-L2: Mild facet arthropathy otherwise normal.    L2-L3: Minimal retrolisthesis.  Mild asymmetric to the right disc bulging.  Moderate facet arthropathy.  Central canal remains  patent.  Neural foramen patent.    L3-L4: Mild generalized disc bulging with severe facet arthropathy and ligamentum flavum hypertrophy.  Right lateral recess narrowing.  Patent central canal.  Mild neural foraminal stenosis.    L4-L5: Normal posterior disc margin.  Moderate facet arthropathy.  Central canal patent.  Moderate left and mild right neural foraminal stenosis.    L5-S1: Mild generalized disc bulging, slightly asymmetric to the left.  Minimal contact left S1 nerve root.  Moderate facet arthropathy.  Central canal is patent.  Neural foramen patent.    Paravertebral soft tissues and musculature are normal.  Visualized intra-abdominal and intrapelvic contents are normal.                                       US Lower Extremity Veins Left (Final result)  Result time 09/03/22 11:44:36      Final result by Jeancarlos Tiwari MD (09/03/22 11:44:36)                   Impression:      Negative for left lower extremity DVT.      Electronically signed by: Jeancarlos Tiwari  Date:    09/03/2022  Time:    11:44               Narrative:    EXAMINATION:  US LOWER EXTREMITY VEINS LEFT    CLINICAL HISTORY:  Left lower extremity pain.    COMPARISON:  None.    FINDINGS:  The left deep veins demonstrate a normal appearance of the common femoral vein, deep profunda, superficial femoral and popliteal vein.  There is no evidence of deep venous thrombosis.  Additionally, the greater saphenous, tibial and peroneal veins are patent.    Right common femoral vein patent.                                       CT Head Without Contrast (Final result)  Result time 09/03/22 08:02:28      Final result by Jeancarlos Tiwari MD (09/03/22 08:02:28)                   Impression:      1. No acute findings.  2. Atrophy and chronic ischemic change.  3. No significant change since 06/05/2020.  All CT scans at this facility are performed  using dose modulation techniques as appropriate to performed exam including the following:  automated  exposure control; adjustment of mA and/or kV according to the patients size (this includes techniques or standardized protocols for targeted exams where dose is matched to indication/reason for exam: i.e. extremities or head);  iterative reconstruction technique.      Electronically signed by: Jeancarlos Tiwari  Date:    09/03/2022  Time:    08:02               Narrative:    EXAMINATION:  CT HEAD WITHOUT CONTRAST    CLINICAL HISTORY:  Neuro deficit, acute, stroke suspected;.  Cerebral ischemia.    TECHNIQUE:  Low dose axial images were obtained through the head.  Coronal and sagittal reformations were also performed. Contrast was not administered.    COMPARISON:  06/05/2020    FINDINGS:  Midline structures are intact. Atrophy with diffuse prominence of ventricular system and cortical sulci.  Mild low-density change in the deep white matter compatible chronic ischemic microvascular disease.    No intracranial hemorrhage,acute infarct, or suspicious intracranial lesion is identified.    Skull base and calvarium are normal. Moderate size polyp/retention cyst right maxillary antrum.  Small retention cyst left maxillary antrum.  Mastoid air cells clear.                                       X-Ray Lumbar Spine Ap And Lateral (Final result)  Result time 09/03/22 08:17:31      Final result by Jeancarlos Tiwari MD (09/03/22 08:17:31)                   Impression:      1. Negative for compression fracture.  2. Multilevel degenerative changes as detailed above.  3. No significant change since 01/08/2020.      Electronically signed by: Jeancarlos Tiwari  Date:    09/03/2022  Time:    08:17               Narrative:    EXAMINATION:  XR LUMBAR SPINE AP AND LATERAL    CLINICAL HISTORY:  back pain;    COMPARISON:  01/08/2020    FINDINGS:  Minimal levoscoliosis centered at L2-L3.  L2-L3 and L3-L4.  Minimal retrolisthesis diffuse osteopenia.  Normal lumbar vertebral body heights.Mild disc space narrowing L2-L3.  Small  osteophytes L1-2, L2-3 and L3-4.  Severe degenerative change L5-S1 facet joints..  Paravertebral soft tissues are normal.  No significant change since 01/08/2020.                                       X-Ray Chest AP Portable (Final result)  Result time 09/03/22 08:18:39      Final result by Jeancarlos Tiwari MD (09/03/22 08:18:39)                   Impression:      1. No acute chest findings.  2. No significant change since 03/10/2022.      Electronically signed by: Jeancarlos Tiwari  Date:    09/03/2022  Time:    08:18               Narrative:    EXAMINATION:  XR CHEST AP PORTABLE    CLINICAL HISTORY:  Weakness;    COMPARISON:  03/10/2022    FINDINGS:  Lungs are clear.  Heart size within normal limits.No significant bony findings.

## 2022-09-04 NOTE — H&P
[facility] - Emergency Dept.  [facility]  History & Physical    Patient Name: Mary Flanagan  MRN: [id number]  Patient Class: IP- Inpatient  Admission Date: 9/3/2022  Attending Physician: Ruben Zurita, *   Primary Care Provider: Raul Hill MD         Patient information was obtained from patient, past medical records and ER records.     Subjective:     Principal Problem:Acute cerebrovascular accident (CVA) of basal ganglia    Chief Complaint:   Chief Complaint   Patient presents with    Back Pain     Back pain with bilateral lower extremity weakness        HPI: Ms. Flanagan is a 77-year-old  female with PMH significant for hypertension, hyperlipidemia, presented to the ED complaining of on bilateral lower extremity pain, and left lower extremity weakness, that has been ongoing for the past many months, much worse since yesterday.  She was evaluated in the ED earlier today, CT head was negative for acute intracranial pathology.  However during the course of the day, nursing staff noted increased expressive aphasia, increased left lower extremity weakness compared to right.  Patient is a poor historian.  Family member at the bedside states that her speech is slower.  Not a candidate for tPA as symptoms have been ongoing for more than 24 hours.  /68.  Laboratory workup unremarkable.  Patient had MRI of the cervical, thoracic and lumbar spine done as part of workup for left lower extremity weakness.  Revealed moderate multilevel degenerative disc disease.  No neurosurgical intervention.  See MRI C/T/L spines for further details.  MRI of the brain revealed acute infarct to the right basal ganglia.     Admitting diagnosis: Acute right basal ganglia infarct.  Left-sided weakness.      Past Medical History:   Diagnosis Date    CKD (chronic kidney disease) stage 3, GFR 30-59 ml/min     Dry eyes     Gastritis     Hyperlipidemia     Hypertension     Insomnia     Osteopenia         Past Surgical History:   Procedure Laterality Date    BREAST BIOPSY Left     , benign     SECTION      x 1    COLONOSCOPY N/A 2022    Procedure: COLONOSCOPY;  Surgeon: Karina Beck MD;  Location: [facility];  Service: Endoscopy;  Laterality: N/A;    ESOPHAGOGASTRODUODENOSCOPY N/A 2022    Procedure: EGD (ESOPHAGOGASTRODUODENOSCOPY);  Surgeon: Karina Beck MD;  Location: [facility];  Service: Endoscopy;  Laterality: N/A;    KNEE ARTHROSCOPY Left  approx    KNEE SURGERY      left arm surgery      left knee surgery         Review of patient's allergies indicates:   Allergen Reactions    No known drug allergies        No current facility-administered medications on file prior to encounter.     Current Outpatient Medications on File Prior to Encounter   Medication Sig    diclofenac sodium (VOLTAREN) 1 % Gel Apply 4 g topically 2 (two) times daily.    ferrous sulfate (FEOSOL) 325 mg (65 mg iron) Tab tablet Take 325 mg by mouth daily with breakfast. Every other day    hydroCHLOROthiazide (HYDRODIURIL) 12.5 MG Tab TAKE 1 TABLET(12.5 MG) BY MOUTH EVERY 48 HOURS AS NEEDED    mirtazapine (REMERON) 7.5 MG Tab Take 1 tablet (7.5 mg total) by mouth every evening.    multivitamin (THERAGRAN) per tablet Take 1 tablet by mouth once daily.    pravastatin (PRAVACHOL) 10 MG tablet Take 1 tablet (10 mg total) by mouth once daily.    VIT A/C/E AC/ZNOX/CUPRIC OXIDE (EYE VITAMIN AND MINERALS ORAL) Take 1 tablet by mouth once daily at 6am.    pregabalin (LYRICA) 50 MG capsule Take 50 mg by mouth 2 (two) times daily.    [DISCONTINUED] garlic (GARLIQUE ORAL) Take by mouth.    [DISCONTINUED] HYDROcodone-acetaminophen (NORCO) 7.5-325 mg per tablet Take 1 tablet by mouth 3 (three) times daily as needed.    [DISCONTINUED] methylnaltrexone bromide (RELISTOR ORAL) Take by mouth. In the am before breakfast prn     Family History       Problem Relation (Age of Onset)    Hypertension Mother,  Father          Tobacco Use    Smoking status: Never    Smokeless tobacco: Never   Substance and Sexual Activity    Alcohol use: No     Alcohol/week: 0.0 standard drinks    Drug use: No    Sexual activity: Not Currently     Partners: Male     Birth control/protection: None     Comment: mut monog     Review of Systems   Constitutional: Negative.  Negative for chills and fever.   HENT: Negative.  Negative for congestion, rhinorrhea, sore throat and trouble swallowing.    Eyes: Negative.  Negative for visual disturbance.   Respiratory: Negative.  Negative for cough, shortness of breath and wheezing.    Cardiovascular: Negative.  Negative for chest pain and palpitations.   Gastrointestinal: Negative.  Negative for abdominal pain, diarrhea, nausea and vomiting.   Endocrine: Negative.    Genitourinary: Negative.  Negative for dysuria and flank pain.   Musculoskeletal: Negative.  Negative for back pain.   Skin: Negative.  Negative for rash.   Allergic/Immunologic: Negative.    Neurological:  Positive for speech difficulty and weakness (Left lower extremity weakness). Negative for numbness and headaches.   Hematological: Negative.    Psychiatric/Behavioral: Negative.  Negative for hallucinations. The patient is not nervous/anxious.    All other systems reviewed and are negative.  Objective:     Vital Signs (Most Recent):  Temp: 99.1 °F (37.3 °C) (09/03/22 1027)  Pulse: 98 (09/03/22 1700)  Resp: 15 (09/03/22 1700)  BP: (!) 146/70 (09/03/22 1700)  SpO2: 97 % (09/03/22 1700)   Vital Signs (24h Range):  Temp:  [99.1 °F (37.3 °C)-99.2 °F (37.3 °C)] 99.1 °F (37.3 °C)  Pulse:  [] 98  Resp:  [12-20] 15  SpO2:  [96 %-100 %] 97 %  BP: (127-233)/() 146/70        Body mass index is 22.7 kg/m².    Physical Exam  Vitals and nursing note reviewed.   Constitutional:       General: She is awake. She is not in acute distress.     Appearance: She is not ill-appearing.   HENT:      Head: Normocephalic and atraumatic.       Mouth/Throat:      Mouth: Mucous membranes are moist.   Eyes:      General: No scleral icterus.     Conjunctiva/sclera: Conjunctivae normal.   Cardiovascular:      Rate and Rhythm: Normal rate and regular rhythm.      Heart sounds: No murmur heard.  Pulmonary:      Effort: Pulmonary effort is normal. No respiratory distress.      Breath sounds: Normal breath sounds. No wheezing.   Abdominal:      Palpations: Abdomen is soft.      Tenderness: There is no abdominal tenderness.   Musculoskeletal:         General: No swelling. Normal range of motion.      Cervical back: Normal range of motion and neck supple.   Skin:     General: Skin is warm.      Coloration: Skin is not jaundiced.   Neurological:      Mental Status: She is alert and oriented to person, place, and time.      Motor: Weakness (Left lower extremity weakness noted.  Speech is slowed.) present.   Psychiatric:         Attention and Perception: Attention normal.         Speech: Speech normal.         Behavior: Behavior is cooperative.           Significant Labs: All pertinent labs within the past 24 hours have been reviewed.  BMP:   Recent Labs   Lab 09/03/22  0756         K 4.0      CO2 25   BUN 17   CREATININE 1.1   CALCIUM 10.5     CBC:   Recent Labs   Lab 09/03/22  0756   WBC 10.19   HGB 11.9*   HCT 35.9*        CMP:   Recent Labs   Lab 09/03/22  0756      K 4.0      CO2 25      BUN 17   CREATININE 1.1   CALCIUM 10.5   PROT 7.7   ALBUMIN 4.3   BILITOT 0.9   ALKPHOS 89   AST 22   ALT 12   ANIONGAP 12       Significant Imaging: I have reviewed all pertinent imaging results/findings within the past 24 hours.  I have reviewed and interpreted all pertinent imaging results/findings within the past 24 hours.    Imaging Results              MRI Thoracic Spine Without Contrast (In process)                      MRI Cervical Spine Without Contrast (In process)                      MRI Brain Without Contrast (In process)                       MRI Lumbar Spine Without Contrast (Final result)  Result time 09/03/22 12:34:23      Final result by Jeancarlos Tiwari MD (09/03/22 12:34:23)                   Impression:      1. Scattered degenerative changes with mild multilevel disc bulging and multilevel facet arthropathy.  2. Mild right lateral recess narrowing L3-4.  Otherwise lumbar central canal is widely patent throughout its course.  3. Mild neural foraminal stenosis L3-L4 and mild-moderate neural foraminal stenosis L4-L5.  Remaining neural foramen patent.  4. Negative for discitis, osteomyelitis or epidural abscess.      Electronically signed by: Jeancarlos Tiwari  Date:    09/03/2022  Time:    12:34               Narrative:    EXAMINATION:  MRI LUMBAR SPINE WITHOUT CONTRAST    CLINICAL HISTORY:  Low back pain, infection suspected;    TECHNIQUE:  Multiplanar, multisequence MR images were acquired from the thoracolumbar junction to the sacrum without the administration of contrast.    COMPARISON:  None.    FINDINGS:  1.2 mm retrolisthesis L2-L3.  2 mm retrolisthesis L3-L4.  All lumbar vertebral bodies are normal in height.  Patchy diminished T1 marrow signal throughout the visualized spine most consistent with mild red marrow reconversion.  No focal osseous lesions.  No fractures.  Distal spinal cord and conus medullaris are normal.  Conus terminates at the L1-L2 level.    T12-L1: Normal.    L1-L2: Mild facet arthropathy otherwise normal.    L2-L3: Minimal retrolisthesis.  Mild asymmetric to the right disc bulging.  Moderate facet arthropathy.  Central canal remains patent.  Neural foramen patent.    L3-L4: Mild generalized disc bulging with severe facet arthropathy and ligamentum flavum hypertrophy.  Right lateral recess narrowing.  Patent central canal.  Mild neural foraminal stenosis.    L4-L5: Normal posterior disc margin.  Moderate facet arthropathy.  Central canal patent.  Moderate left and mild right neural foraminal  stenosis.    L5-S1: Mild generalized disc bulging, slightly asymmetric to the left.  Minimal contact left S1 nerve root.  Moderate facet arthropathy.  Central canal is patent.  Neural foramen patent.    Paravertebral soft tissues and musculature are normal.  Visualized intra-abdominal and intrapelvic contents are normal.                                       US Lower Extremity Veins Left (Final result)  Result time 09/03/22 11:44:36      Final result by Jeancarlos Tiwari MD (09/03/22 11:44:36)                   Impression:      Negative for left lower extremity DVT.      Electronically signed by: Jeancarlos Tiwari  Date:    09/03/2022  Time:    11:44               Narrative:    EXAMINATION:  US LOWER EXTREMITY VEINS LEFT    CLINICAL HISTORY:  Left lower extremity pain.    COMPARISON:  None.    FINDINGS:  The left deep veins demonstrate a normal appearance of the common femoral vein, deep profunda, superficial femoral and popliteal vein.  There is no evidence of deep venous thrombosis.  Additionally, the greater saphenous, tibial and peroneal veins are patent.    Right common femoral vein patent.                                       CT Head Without Contrast (Final result)  Result time 09/03/22 08:02:28      Final result by Jeancarlos Tiwari MD (09/03/22 08:02:28)                   Impression:      1. No acute findings.  2. Atrophy and chronic ischemic change.  3. No significant change since 06/05/2020.  All CT scans at this facility are performed  using dose modulation techniques as appropriate to performed exam including the following:  automated exposure control; adjustment of mA and/or kV according to the patients size (this includes techniques or standardized protocols for targeted exams where dose is matched to indication/reason for exam: i.e. extremities or head);  iterative reconstruction technique.      Electronically signed by: Jeancarlos Tiwari  Date:    09/03/2022  Time:    08:02                Narrative:    EXAMINATION:  CT HEAD WITHOUT CONTRAST    CLINICAL HISTORY:  Neuro deficit, acute, stroke suspected;.  Cerebral ischemia.    TECHNIQUE:  Low dose axial images were obtained through the head.  Coronal and sagittal reformations were also performed. Contrast was not administered.    COMPARISON:  06/05/2020    FINDINGS:  Midline structures are intact. Atrophy with diffuse prominence of ventricular system and cortical sulci.  Mild low-density change in the deep white matter compatible chronic ischemic microvascular disease.    No intracranial hemorrhage,acute infarct, or suspicious intracranial lesion is identified.    Skull base and calvarium are normal. Moderate size polyp/retention cyst right maxillary antrum.  Small retention cyst left maxillary antrum.  Mastoid air cells clear.                                       X-Ray Lumbar Spine Ap And Lateral (Final result)  Result time 09/03/22 08:17:31      Final result by Jeancarlos Tiwari MD (09/03/22 08:17:31)                   Impression:      1. Negative for compression fracture.  2. Multilevel degenerative changes as detailed above.  3. No significant change since 01/08/2020.      Electronically signed by: Jeancarlos Tiwari  Date:    09/03/2022  Time:    08:17               Narrative:    EXAMINATION:  XR LUMBAR SPINE AP AND LATERAL    CLINICAL HISTORY:  back pain;    COMPARISON:  01/08/2020    FINDINGS:  Minimal levoscoliosis centered at L2-L3.  L2-L3 and L3-L4.  Minimal retrolisthesis diffuse osteopenia.  Normal lumbar vertebral body heights.Mild disc space narrowing L2-L3.  Small osteophytes L1-2, L2-3 and L3-4.  Severe degenerative change L5-S1 facet joints..  Paravertebral soft tissues are normal.  No significant change since 01/08/2020.                                       X-Ray Chest AP Portable (Final result)  Result time 09/03/22 08:18:39      Final result by Jeancarlos Tiwari MD (09/03/22 08:18:39)                   Impression:       1. No acute chest findings.  2. No significant change since 03/10/2022.      Electronically signed by: Jeancarlos Tiwari  Date:    09/03/2022  Time:    08:18               Narrative:    EXAMINATION:  XR CHEST AP PORTABLE    CLINICAL HISTORY:  Weakness;    COMPARISON:  03/10/2022    FINDINGS:  Lungs are clear.  Heart size within normal limits.No significant bony findings.                                      I have independently reviewed and interpreted the EKG.     I have independently reviewed all pertinent labs within the past 24 hours.    I have independently reviewed, visualized and interpreted all pertinent imaging results within the past 24 hours and discussed the findings with the ED physician, Dr. Guzman          Assessment/Plan:     * Acute cerebrovascular accident (CVA) of basal ganglia  -MRI brain 9/3 shows acute infarct right basal ganglia, with left-sided weakness.    -aspirin, statin.    -neurology consult.    -TIA/CVA order set used.    -neurochecks every 4 hours.    -PT/OT/ST evaluation.  -not a candidate for tPA as symptoms ongoing for more than 24 hours.      Hyperlipidemia  -continue statin        VTE Risk Mitigation (From admission, onward)         Ordered     enoxaparin injection 40 mg  Daily         09/03/22 2005     IP VTE HIGH RISK PATIENT  Once         09/03/22 2005     Place sequential compression device  Until discontinued         09/03/22 2005                   Jeremiah Kim MD  Department of Hospital Medicine   [facility] - Emergency Dept.

## 2022-09-04 NOTE — PT/OT/SLP PROGRESS
Speech Language Pathology      Mary Flanagan  MRN: [id number]    Patient attempted to be seen x2 by SKRISTIE. today with patient refusing services.   S.T explained reasons why M.D. ordered an evaluation and pt continued to refuse.   S.T. to attempt evaluation tomorrow.

## 2022-09-04 NOTE — ASSESSMENT & PLAN NOTE
-MRI brain 9/3 shows acute infarct right basal ganglia, with left-sided weakness.    -aspirin, statin.    -neurology consult.    -TIA/CVA order set used.    -neurochecks every 4 hours.    -PT/OT/ST evaluation.  -not a candidate for tPA as symptoms ongoing for more than 24 hours.

## 2022-09-04 NOTE — ASSESSMENT & PLAN NOTE
-MRI brain 9/3 shows acute infarct right basal ganglia, with left-sided weakness.    -aspirin, statin.    -neurology consult.    -TIA/CVA order set used.    -neurochecks every 4 hours.    -PT/OT/ST evaluation pending   -not a candidate for tPA as symptoms ongoing for more than 24 hours.  -Allow for permissive HTN for 24-48 hours   -Labetalol prn

## 2022-09-04 NOTE — SUBJECTIVE & OBJECTIVE
Past Medical History:   Diagnosis Date    CKD (chronic kidney disease) stage 3, GFR 30-59 ml/min     Dry eyes     Gastritis     Hyperlipidemia     Hypertension     Insomnia     Osteopenia        Past Surgical History:   Procedure Laterality Date    BREAST BIOPSY Left     , benign     SECTION      x 1    COLONOSCOPY N/A 2022    Procedure: COLONOSCOPY;  Surgeon: Karina Beck MD;  Location: [facility] ENDO;  Service: Endoscopy;  Laterality: N/A;    ESOPHAGOGASTRODUODENOSCOPY N/A 2022    Procedure: EGD (ESOPHAGOGASTRODUODENOSCOPY);  Surgeon: Karina Beck MD;  Location: [facility] ENDO;  Service: Endoscopy;  Laterality: N/A;    KNEE ARTHROSCOPY Left  approx    KNEE SURGERY      left arm surgery      left knee surgery         Review of patient's allergies indicates:   Allergen Reactions    No known drug allergies        No current facility-administered medications on file prior to encounter.     Current Outpatient Medications on File Prior to Encounter   Medication Sig    diclofenac sodium (VOLTAREN) 1 % Gel Apply 4 g topically 2 (two) times daily.    ferrous sulfate (FEOSOL) 325 mg (65 mg iron) Tab tablet Take 325 mg by mouth daily with breakfast. Every other day    hydroCHLOROthiazide (HYDRODIURIL) 12.5 MG Tab TAKE 1 TABLET(12.5 MG) BY MOUTH EVERY 48 HOURS AS NEEDED    mirtazapine (REMERON) 7.5 MG Tab Take 1 tablet (7.5 mg total) by mouth every evening.    multivitamin (THERAGRAN) per tablet Take 1 tablet by mouth once daily.    pravastatin (PRAVACHOL) 10 MG tablet Take 1 tablet (10 mg total) by mouth once daily.    VIT A/C/E AC/ZNOX/CUPRIC OXIDE (EYE VITAMIN AND MINERALS ORAL) Take 1 tablet by mouth once daily at 6am.    pregabalin (LYRICA) 50 MG capsule Take 50 mg by mouth 2 (two) times daily.    [DISCONTINUED] garlic (GARLIQUE ORAL) Take by mouth.    [DISCONTINUED] HYDROcodone-acetaminophen (NORCO) 7.5-325 mg per tablet Take 1 tablet by mouth 3 (three) times daily as needed.    [DISCONTINUED]  methylnaltrexone bromide (RELISTOR ORAL) Take by mouth. In the am before breakfast prn     Family History       Problem Relation (Age of Onset)    Hypertension Mother, Father          Tobacco Use    Smoking status: Never    Smokeless tobacco: Never   Substance and Sexual Activity    Alcohol use: No     Alcohol/week: 0.0 standard drinks    Drug use: No    Sexual activity: Not Currently     Partners: Male     Birth control/protection: None     Comment: mut monog     Review of Systems   Constitutional: Negative.  Negative for chills and fever.   HENT: Negative.  Negative for congestion, rhinorrhea, sore throat and trouble swallowing.    Eyes: Negative.  Negative for visual disturbance.   Respiratory: Negative.  Negative for cough, shortness of breath and wheezing.    Cardiovascular: Negative.  Negative for chest pain and palpitations.   Gastrointestinal: Negative.  Negative for abdominal pain, diarrhea, nausea and vomiting.   Endocrine: Negative.    Genitourinary: Negative.  Negative for dysuria and flank pain.   Musculoskeletal: Negative.  Negative for back pain.   Skin: Negative.  Negative for rash.   Allergic/Immunologic: Negative.    Neurological:  Positive for speech difficulty and weakness (Left lower extremity weakness). Negative for numbness and headaches.   Hematological: Negative.    Psychiatric/Behavioral: Negative.  Negative for hallucinations. The patient is not nervous/anxious.    All other systems reviewed and are negative.  Objective:     Vital Signs (Most Recent):  Temp: 99.1 °F (37.3 °C) (09/03/22 1027)  Pulse: 98 (09/03/22 1700)  Resp: 15 (09/03/22 1700)  BP: (!) 146/70 (09/03/22 1700)  SpO2: 97 % (09/03/22 1700)   Vital Signs (24h Range):  Temp:  [99.1 °F (37.3 °C)-99.2 °F (37.3 °C)] 99.1 °F (37.3 °C)  Pulse:  [] 98  Resp:  [12-20] 15  SpO2:  [96 %-100 %] 97 %  BP: (127-233)/() 146/70        Body mass index is 22.7 kg/m².    Physical Exam  Vitals and nursing note reviewed.    Constitutional:       General: She is awake. She is not in acute distress.     Appearance: She is not ill-appearing.   HENT:      Head: Normocephalic and atraumatic.      Mouth/Throat:      Mouth: Mucous membranes are moist.   Eyes:      General: No scleral icterus.     Conjunctiva/sclera: Conjunctivae normal.   Cardiovascular:      Rate and Rhythm: Normal rate and regular rhythm.      Heart sounds: No murmur heard.  Pulmonary:      Effort: Pulmonary effort is normal. No respiratory distress.      Breath sounds: Normal breath sounds. No wheezing.   Abdominal:      Palpations: Abdomen is soft.      Tenderness: There is no abdominal tenderness.   Musculoskeletal:         General: No swelling. Normal range of motion.      Cervical back: Normal range of motion and neck supple.   Skin:     General: Skin is warm.      Coloration: Skin is not jaundiced.   Neurological:      Mental Status: She is alert and oriented to person, place, and time.      Motor: Weakness (Left lower extremity weakness noted.  Speech is slowed.) present.   Psychiatric:         Attention and Perception: Attention normal.         Speech: Speech normal.         Behavior: Behavior is cooperative.           Significant Labs: All pertinent labs within the past 24 hours have been reviewed.  BMP:   Recent Labs   Lab 09/03/22  0756         K 4.0      CO2 25   BUN 17   CREATININE 1.1   CALCIUM 10.5     CBC:   Recent Labs   Lab 09/03/22  0756   WBC 10.19   HGB 11.9*   HCT 35.9*        CMP:   Recent Labs   Lab 09/03/22  0756      K 4.0      CO2 25      BUN 17   CREATININE 1.1   CALCIUM 10.5   PROT 7.7   ALBUMIN 4.3   BILITOT 0.9   ALKPHOS 89   AST 22   ALT 12   ANIONGAP 12       Significant Imaging: I have reviewed all pertinent imaging results/findings within the past 24 hours.  I have reviewed and interpreted all pertinent imaging results/findings within the past 24 hours.    Imaging Results              MRI  Thoracic Spine Without Contrast (In process)                      MRI Cervical Spine Without Contrast (In process)                      MRI Brain Without Contrast (In process)                      MRI Lumbar Spine Without Contrast (Final result)  Result time 09/03/22 12:34:23      Final result by Jeancarlos Tiwari MD (09/03/22 12:34:23)                   Impression:      1. Scattered degenerative changes with mild multilevel disc bulging and multilevel facet arthropathy.  2. Mild right lateral recess narrowing L3-4.  Otherwise lumbar central canal is widely patent throughout its course.  3. Mild neural foraminal stenosis L3-L4 and mild-moderate neural foraminal stenosis L4-L5.  Remaining neural foramen patent.  4. Negative for discitis, osteomyelitis or epidural abscess.      Electronically signed by: Jeancarlos Tiwari  Date:    09/03/2022  Time:    12:34               Narrative:    EXAMINATION:  MRI LUMBAR SPINE WITHOUT CONTRAST    CLINICAL HISTORY:  Low back pain, infection suspected;    TECHNIQUE:  Multiplanar, multisequence MR images were acquired from the thoracolumbar junction to the sacrum without the administration of contrast.    COMPARISON:  None.    FINDINGS:  1.2 mm retrolisthesis L2-L3.  2 mm retrolisthesis L3-L4.  All lumbar vertebral bodies are normal in height.  Patchy diminished T1 marrow signal throughout the visualized spine most consistent with mild red marrow reconversion.  No focal osseous lesions.  No fractures.  Distal spinal cord and conus medullaris are normal.  Conus terminates at the L1-L2 level.    T12-L1: Normal.    L1-L2: Mild facet arthropathy otherwise normal.    L2-L3: Minimal retrolisthesis.  Mild asymmetric to the right disc bulging.  Moderate facet arthropathy.  Central canal remains patent.  Neural foramen patent.    L3-L4: Mild generalized disc bulging with severe facet arthropathy and ligamentum flavum hypertrophy.  Right lateral recess narrowing.  Patent central canal.   Mild neural foraminal stenosis.    L4-L5: Normal posterior disc margin.  Moderate facet arthropathy.  Central canal patent.  Moderate left and mild right neural foraminal stenosis.    L5-S1: Mild generalized disc bulging, slightly asymmetric to the left.  Minimal contact left S1 nerve root.  Moderate facet arthropathy.  Central canal is patent.  Neural foramen patent.    Paravertebral soft tissues and musculature are normal.  Visualized intra-abdominal and intrapelvic contents are normal.                                       US Lower Extremity Veins Left (Final result)  Result time 09/03/22 11:44:36      Final result by Jeancarlos Tiwari MD (09/03/22 11:44:36)                   Impression:      Negative for left lower extremity DVT.      Electronically signed by: Jeancarlos Tiwari  Date:    09/03/2022  Time:    11:44               Narrative:    EXAMINATION:  US LOWER EXTREMITY VEINS LEFT    CLINICAL HISTORY:  Left lower extremity pain.    COMPARISON:  None.    FINDINGS:  The left deep veins demonstrate a normal appearance of the common femoral vein, deep profunda, superficial femoral and popliteal vein.  There is no evidence of deep venous thrombosis.  Additionally, the greater saphenous, tibial and peroneal veins are patent.    Right common femoral vein patent.                                       CT Head Without Contrast (Final result)  Result time 09/03/22 08:02:28      Final result by Jeancarlos Tiwari MD (09/03/22 08:02:28)                   Impression:      1. No acute findings.  2. Atrophy and chronic ischemic change.  3. No significant change since 06/05/2020.  All CT scans at this facility are performed  using dose modulation techniques as appropriate to performed exam including the following:  automated exposure control; adjustment of mA and/or kV according to the patients size (this includes techniques or standardized protocols for targeted exams where dose is matched to indication/reason for  exam: i.e. extremities or head);  iterative reconstruction technique.      Electronically signed by: Jeancarlos Tiwari  Date:    09/03/2022  Time:    08:02               Narrative:    EXAMINATION:  CT HEAD WITHOUT CONTRAST    CLINICAL HISTORY:  Neuro deficit, acute, stroke suspected;.  Cerebral ischemia.    TECHNIQUE:  Low dose axial images were obtained through the head.  Coronal and sagittal reformations were also performed. Contrast was not administered.    COMPARISON:  06/05/2020    FINDINGS:  Midline structures are intact. Atrophy with diffuse prominence of ventricular system and cortical sulci.  Mild low-density change in the deep white matter compatible chronic ischemic microvascular disease.    No intracranial hemorrhage,acute infarct, or suspicious intracranial lesion is identified.    Skull base and calvarium are normal. Moderate size polyp/retention cyst right maxillary antrum.  Small retention cyst left maxillary antrum.  Mastoid air cells clear.                                       X-Ray Lumbar Spine Ap And Lateral (Final result)  Result time 09/03/22 08:17:31      Final result by Jeancarlos Tiwari MD (09/03/22 08:17:31)                   Impression:      1. Negative for compression fracture.  2. Multilevel degenerative changes as detailed above.  3. No significant change since 01/08/2020.      Electronically signed by: Jeancarlos Tiwari  Date:    09/03/2022  Time:    08:17               Narrative:    EXAMINATION:  XR LUMBAR SPINE AP AND LATERAL    CLINICAL HISTORY:  back pain;    COMPARISON:  01/08/2020    FINDINGS:  Minimal levoscoliosis centered at L2-L3.  L2-L3 and L3-L4.  Minimal retrolisthesis diffuse osteopenia.  Normal lumbar vertebral body heights.Mild disc space narrowing L2-L3.  Small osteophytes L1-2, L2-3 and L3-4.  Severe degenerative change L5-S1 facet joints..  Paravertebral soft tissues are normal.  No significant change since 01/08/2020.                                        X-Ray Chest AP Portable (Final result)  Result time 09/03/22 08:18:39      Final result by Jeancarlos Tiwari MD (09/03/22 08:18:39)                   Impression:      1. No acute chest findings.  2. No significant change since 03/10/2022.      Electronically signed by: Jeancarlos Tiwari  Date:    09/03/2022  Time:    08:18               Narrative:    EXAMINATION:  XR CHEST AP PORTABLE    CLINICAL HISTORY:  Weakness;    COMPARISON:  03/10/2022    FINDINGS:  Lungs are clear.  Heart size within normal limits.No significant bony findings.                                      I have independently reviewed and interpreted the EKG.     I have independently reviewed all pertinent labs within the past 24 hours.    I have independently reviewed, visualized and interpreted all pertinent imaging results within the past 24 hours and discussed the findings with the ED physician, Dr. Guzman

## 2022-09-04 NOTE — PT/OT/SLP EVAL
Occupational Therapy   Evaluation    Name: Mary Flanagan  MRN: [id number]  Admitting Diagnosis:  Acute cerebrovascular accident (CVA) of basal ganglia  Recent Surgery: * No surgery found *      Recommendations:     Discharge Recommendations: rehabilitation facility  Discharge Equipment Recommendations:  other (see comments) (TBD)  Barriers to discharge:   (TBD)    Assessment:     Mary Flanagan is a 77 y.o. female with a medical diagnosis of Acute cerebrovascular accident (CVA) of basal ganglia.  She presents with SELF CARE DEBILITY. Performance deficits affecting function: weakness, impaired endurance, impaired self care skills, impaired functional mobility, gait instability, impaired balance, impaired cognition, visual deficits, decreased coordination, decreased upper extremity function, decreased lower extremity function, decreased safety awareness, pain, abnormal tone, impaired coordination.      Rehab Prognosis: Good; patient would benefit from acute skilled OT services to address these deficits and reach maximum level of function.       Plan:     Patient to be seen 2 x/week to address the above listed problems via self-care/home management, therapeutic activities, therapeutic exercises, neuromuscular re-education  Plan of Care Expires: 09/18/22  Plan of Care Reviewed with: patient    Subjective     Chief Complaint: C/O BODY   Patient/Family Comments/goals: NONE STATED.    Occupational Profile:  Living Environment: PATIENT STATED SHE LIVES IN  1 STORY HOUSE WITH NO STEPS WITH .  Previous level of function: PATIENT STATED HE WAS (I) WITH ALL LEVELS OF SELF CARE.  PATIENT WAS DRIVING.   Roles and Routines: PATIENT STATED HE WAS (I) WITH ALL LEVELS OF SELF CARE.  PATIENT WAS DRIVING.   Equipment Used at Home:  cane, straight, shower chair  Assistance upon Discharge: TBD    Pain/Comfort:  Pain Rating 1: 10/10  Location - Side 1: Right  Location 1: leg  Pain Addressed 1: Reposition, Distraction, Cessation of  Activity    Patients cultural, spiritual, Jainism conflicts given the current situation:      Objective:     Communicated with: NURSE MAGAÑA prior to session.  Patient found supine with bed alarm, telemetry, PureWick, peripheral IV, blood pressure cuff upon OT entry to room.    General Precautions: Standard, fall   Orthopedic Precautions:    Braces:    Respiratory Status: Room air    Occupational Performance:    Bed Mobility:    Patient completed Supine to Sit with minimum assistance  Patient completed Sit to Supine with minimum assistance    Functional Mobility/Transfers:  Patient completed Sit <> Stand Transfer with minimum assistance  with  rolling walker   Functional Mobility: MIN/MOD (A) WITH RW.    Activities of Daily Living:  Upper Body Dressing: moderate assistance    Lower Body Dressing: maximal assistance    Toileting: maximal assistance      Cognitive/Visual Perceptual:  PATIENT WEARS CORRECTIVE LENSES.  PATIENT PRESENTS WITH SLOW MENTAL PROCESSING AND WORD FINDING DIFFICULTY.    Physical Exam:  Balance:    -       PATIENT IN MOD (A) WITH RW WITH STANDING BALANCE AND STAND PIVOT T/F WITH RW.  Upper Extremity Range of Motion:     -       Right Upper Extremity: PATIENT WITH DECREASED (B) SHLD AROM.  -       Left Upper Extremity: PATIENT WITH DECREASED (B) SHLD AROM.    AMPA 6 Click ADL:  AMPAC Total Score: 12    Treatment & Education:  OT EVAL PERFORMED.  PATIENT EDUCATED RE:  PURPOSE OF OT.  PATIENT PARTICIPATED IN FUNC MOBILITY, NEURO RE-ED AND SELF CARE TASKS.    Patient left HOB elevated with all lines intact, call button in reach, and NURSE notified    GOALS:   Multidisciplinary Problems       Occupational Therapy Goals          Problem: Occupational Therapy    Goal Priority Disciplines Outcome Interventions   Occupational Therapy Goal     OT, PT/OT     Description: LTG'S TO BE MET IN 14 DAYS (9/18/22):    1)  PATIENT WILL PERFORM UB DRESSING WITH SBA.     2)  PATIENT WILL PERFORM LB DRESSING WITH  MIN (A).    3)  PATIENT WILL PERFORM TOILET T/F WITH CGA.    4)  PATIENT WILL PERFORM BUE HEP FOR INCREASED FUNC STRENGTH/ENDURANCE AND INCREASED LUE/BUE COORD WITH MIN VC/DEMO FOR PROPER TECHNIQUE.                        History:     Past Medical History:   Diagnosis Date    CKD (chronic kidney disease) stage 3, GFR 30-59 ml/min     Dry eyes     Gastritis     Hyperlipidemia     Hypertension     Insomnia     Osteopenia          Past Surgical History:   Procedure Laterality Date    BREAST BIOPSY Left     , benign     SECTION      x 1    COLONOSCOPY N/A 2022    Procedure: COLONOSCOPY;  Surgeon: Karina Beck MD;  Location: [facility];  Service: Endoscopy;  Laterality: N/A;    ESOPHAGOGASTRODUODENOSCOPY N/A 2022    Procedure: EGD (ESOPHAGOGASTRODUODENOSCOPY);  Surgeon: Karina Beck MD;  Location: [facility];  Service: Endoscopy;  Laterality: N/A;    KNEE ARTHROSCOPY Left  approx    KNEE SURGERY      left arm surgery      left knee surgery         Time Tracking:     OT Date of Treatment: 22  OT Start Time: 748  OT Stop Time: 817  OT Total Time (min): 29 min    Billable Minutes:Evaluation 10  Neuromuscular Re-education 19    2022

## 2022-09-04 NOTE — PLAN OF CARE
Discussed Plan of Care with patient and verbalized understanding - Patient remains AAOx4 but confused at times - remains free of falls, accidents and trauma during the day shift. Bed is in the low position and the call light is within reach. Patient has cooperated with PT, having blood drawn and having a CT - she was not willing to speak with the Neurologist today or have the ST do a swallow study - performed bedside Christine swallow study and she passed.  Will continue to monitor

## 2022-09-04 NOTE — PLAN OF CARE
OT EVAL PERFORMED.  PATIENT EDUCATED RE:  PURPOSE OF OT.  PATIENT PARTICIPATED IN FUNC MOBILITY, NEURO RE-ED AND SELF CARE TASKS.  PATIENT WOULD BENEFIT FROM IRF PLACEMENT AT D/C.

## 2022-09-04 NOTE — HOSPITAL COURSE
76 y/o female admitted for CVA and started on ASA and statin. Neurology consulted. CTA head/neck and echo pending. PT/OT/ST recommendations pending. 9/5/22- Patient seen and examined today. Patient able to move left leg much better today. Negative CTA neck. Mild intracranial atherosclerosis right anterior cerebral artery.  Otherwise negative intracranial CTA.  Specifically, negative for large vessel occlusion or aneurysm. Echo normal. PT/OT recommended rehab. Social work consult for rehab placement. Continue ASA, Plavix, Statin. 9/6/22- Patient seen and examiend on the date of discharge and found stable for discharge. Amlodipine added for better bp control. Home meds reconciled. Patient to discharge to [facility]. Patient to follow up with PCP, cardiology, neurology, and neurosurgery outpatient.

## 2022-09-04 NOTE — PT/OT/SLP EVAL
"Physical Therapy Evaluation    Patient Name:  Mary Flanagan   MRN:  [id number]    Recommendations:     Discharge Recommendations:  rehabilitation facility   Discharge Equipment Recommendations: other (see comments) (TBD at next level of care)   Barriers to discharge: None    Assessment:     Mary Flanagan is a 77 y.o. female admitted with a medical diagnosis of Acute cerebrovascular accident (CVA) of basal ganglia.  She presents with the following impairments/functional limitations:  impaired balance, impaired cognition, impaired coordination, impaired endurance, impaired fine motor, impaired functional mobility, impaired self care skills, gait instability, decreased safety awareness.    Rehab Prognosis: Good; patient would benefit from acute skilled PT services to address these deficits and reach maximum level of function.    Recent Surgery: * No surgery found *      Plan:     During this hospitalization, patient to be seen 3 x/week to address the identified rehab impairments via gait training, therapeutic activities, therapeutic exercises and progress toward the following goals:    Plan of Care Expires:  09/18/22    Subjective     Chief Complaint: Patient c/o RLE pain and "generalized body aches."  Patient/Family Comments/goals: Return to prior level of function.   Pain/Comfort:  Pain Rating 1: 10/10  Location - Side 1: Right  Location 1: leg  Pain Addressed 1: Cessation of Activity, Distraction, Reposition  Pain Rating Post-Intervention 1: 9/10    Patients cultural, spiritual, Jew conflicts given the current situation: no    Living Environment:    Patient lives in a single level home with her spouse.   Prior to admission, patients level of function was independent and driving.  Equipment used at home: shower chair, cane, straight.  DME owned (not currently used): single point cane.  Upon discharge, patient will have assistance from unknown.    Objective:     Communicated with CHANELL Unger prior to session.  " Patient found supine with bed alarm, telemetry, PureWick, peripheral IV  upon PT entry to room.    General Precautions: Standard, fall   Orthopedic Precautions: (None)   Braces: N/A  Respiratory Status: Room air    Exams:  Cognitive Exam:  Patient is oriented to Person, Time, and Situation  Sensation:    -       Intact  light/touch Intact  RLE ROM: WFL  RLE Strength: Deficits: +3/5  LLE ROM: WFL  LLE Strength: Deficits: 3/5      Therapeutic Activities and Exercises:     Patient found supine in bed upon PT entrance into room. PT provided education on role of PT and POC.     Patient completed:     Sup>sit: Min A with extended time to complete. Patient able to follow simple, one step commands, requiring verbal cues for hand placement to assist with transfer.     Scooting: Min A to place feet on the floor.     STS: Min A with RW. Verbal cues and tactile cues to push from bed with one hand to complete transfer safely.     Gait: 20', Mod A with RW. Patient ambulates with short stride length and shuffling gait pattern. With verbal cues to increase stride length, patient initially able to correct but transitions back to short stride length quickly. Patient unsteady with turns with RW requiring Mod A for safety.     Stand>sit at EOB: Min A    Sit>sup: Mod A        AM-PAC 6 CLICK MOBILITY  Total Score:14     Patient left supine with all lines intact, call button in reach, and bed alarm on.    GOALS:   Multidisciplinary Problems       Physical Therapy Goals          Problem: Physical Therapy    Goal Priority Disciplines Outcome Goal Variances Interventions   Physical Therapy Goal     PT, PT/OT      Description: Patient will be seen a minimal of 3 out of 7 days a week.     LTG to be met by 09/18:    Bed mobility: SPV  Transfers: CGA with RW  Gait: CGA with RW                       History:     Past Medical History:   Diagnosis Date    CKD (chronic kidney disease) stage 3, GFR 30-59 ml/min     Dry eyes     Gastritis      Hyperlipidemia     Hypertension     Insomnia     Osteopenia        Past Surgical History:   Procedure Laterality Date    BREAST BIOPSY Left     , benign     SECTION      x 1    COLONOSCOPY N/A 2022    Procedure: COLONOSCOPY;  Surgeon: Karina Beck MD;  Location: [facility];  Service: Endoscopy;  Laterality: N/A;    ESOPHAGOGASTRODUODENOSCOPY N/A 2022    Procedure: EGD (ESOPHAGOGASTRODUODENOSCOPY);  Surgeon: Karina Beck MD;  Location: [facility];  Service: Endoscopy;  Laterality: N/A;    KNEE ARTHROSCOPY Left  approx    KNEE SURGERY      left arm surgery      left knee surgery         Time Tracking:     PT Received On: 22  PT Start Time: 0730     PT Stop Time: 0753  PT Total Time (min): 23 min     Billable Minutes: Evaluation 11 and Gait Training 12      2022

## 2022-09-04 NOTE — HPI
Ms. Flanagan is a 77-year-old  female with PMH significant for hypertension, hyperlipidemia, presented to the ED complaining of on bilateral lower extremity pain, and left lower extremity weakness, that has been ongoing for the past many months, much worse since yesterday.  She was evaluated in the ED earlier today, CT head was negative for acute intracranial pathology.  However during the course of the day, nursing staff noted increased expressive aphasia, increased left lower extremity weakness compared to right.  Patient is a poor historian.  Family member at the bedside states that her speech is slower.  Not a candidate for tPA as symptoms have been ongoing for more than 24 hours.  /68.  Laboratory workup unremarkable.  Patient had MRI of the cervical, thoracic and lumbar spine done as part of workup for left lower extremity weakness.  Revealed moderate multilevel degenerative disc disease.  No neurosurgical intervention.  See MRI C/T/L spines for further details.  MRI of the brain revealed acute infarct to the right basal ganglia.     Admitting diagnosis: Acute right basal ganglia infarct.  Left-sided weakness.

## 2022-09-05 LAB
ALBUMIN SERPL BCP-MCNC: 3.7 G/DL (ref 3.5–5.2)
ALP SERPL-CCNC: 76 U/L (ref 55–135)
ALT SERPL W/O P-5'-P-CCNC: 14 U/L (ref 10–44)
ANION GAP SERPL CALC-SCNC: 12 MMOL/L (ref 8–16)
AST SERPL-CCNC: 25 U/L (ref 10–40)
BACTERIA UR CULT: NORMAL
BACTERIA UR CULT: NORMAL
BASOPHILS # BLD AUTO: 0.03 K/UL (ref 0–0.2)
BASOPHILS NFR BLD: 0.3 % (ref 0–1.9)
BILIRUB SERPL-MCNC: 0.9 MG/DL (ref 0.1–1)
BUN SERPL-MCNC: 26 MG/DL (ref 8–23)
CALCIUM SERPL-MCNC: 9.4 MG/DL (ref 8.7–10.5)
CHLORIDE SERPL-SCNC: 105 MMOL/L (ref 95–110)
CO2 SERPL-SCNC: 22 MMOL/L (ref 23–29)
CREAT SERPL-MCNC: 1.1 MG/DL (ref 0.5–1.4)
DIFFERENTIAL METHOD: ABNORMAL
EOSINOPHIL # BLD AUTO: 0 K/UL (ref 0–0.5)
EOSINOPHIL NFR BLD: 0.2 % (ref 0–8)
ERYTHROCYTE [DISTWIDTH] IN BLOOD BY AUTOMATED COUNT: 13.5 % (ref 11.5–14.5)
EST. GFR  (NO RACE VARIABLE): 52 ML/MIN/1.73 M^2
GLUCOSE SERPL-MCNC: 103 MG/DL (ref 70–110)
HCT VFR BLD AUTO: 35.8 % (ref 37–48.5)
HGB BLD-MCNC: 11.3 G/DL (ref 12–16)
IMM GRANULOCYTES # BLD AUTO: 0.03 K/UL (ref 0–0.04)
IMM GRANULOCYTES NFR BLD AUTO: 0.3 % (ref 0–0.5)
LYMPHOCYTES # BLD AUTO: 2.2 K/UL (ref 1–4.8)
LYMPHOCYTES NFR BLD: 22 % (ref 18–48)
MCH RBC QN AUTO: 29.4 PG (ref 27–31)
MCHC RBC AUTO-ENTMCNC: 31.6 G/DL (ref 32–36)
MCV RBC AUTO: 93 FL (ref 82–98)
MONOCYTES # BLD AUTO: 0.6 K/UL (ref 0.3–1)
MONOCYTES NFR BLD: 5.9 % (ref 4–15)
NEUTROPHILS # BLD AUTO: 7 K/UL (ref 1.8–7.7)
NEUTROPHILS NFR BLD: 71.3 % (ref 38–73)
NRBC BLD-RTO: 0 /100 WBC
PLATELET # BLD AUTO: 240 K/UL (ref 150–450)
PLATELET BLD QL SMEAR: ABNORMAL
PMV BLD AUTO: 11.6 FL (ref 9.2–12.9)
POTASSIUM SERPL-SCNC: 4.1 MMOL/L (ref 3.5–5.1)
PROT SERPL-MCNC: 6.4 G/DL (ref 6–8.4)
RBC # BLD AUTO: 3.85 M/UL (ref 4–5.4)
SODIUM SERPL-SCNC: 139 MMOL/L (ref 136–145)
WBC # BLD AUTO: 9.82 K/UL (ref 3.9–12.7)

## 2022-09-05 PROCEDURE — 97530 THERAPEUTIC ACTIVITIES: CPT | Mod: CQ

## 2022-09-05 PROCEDURE — 11000001 HC ACUTE MED/SURG PRIVATE ROOM

## 2022-09-05 PROCEDURE — 36415 COLL VENOUS BLD VENIPUNCTURE: CPT | Performed by: INTERNAL MEDICINE

## 2022-09-05 PROCEDURE — 63600175 PHARM REV CODE 636 W HCPCS: Performed by: INTERNAL MEDICINE

## 2022-09-05 PROCEDURE — 92610 EVALUATE SWALLOWING FUNCTION: CPT

## 2022-09-05 PROCEDURE — 97535 SELF CARE MNGMENT TRAINING: CPT

## 2022-09-05 PROCEDURE — 92523 SPEECH SOUND LANG COMPREHEN: CPT

## 2022-09-05 PROCEDURE — 25000003 PHARM REV CODE 250: Performed by: NURSE PRACTITIONER

## 2022-09-05 PROCEDURE — 25000003 PHARM REV CODE 250: Performed by: INTERNAL MEDICINE

## 2022-09-05 PROCEDURE — 80053 COMPREHEN METABOLIC PANEL: CPT | Performed by: INTERNAL MEDICINE

## 2022-09-05 PROCEDURE — 85025 COMPLETE CBC W/AUTO DIFF WBC: CPT | Performed by: INTERNAL MEDICINE

## 2022-09-05 RX ORDER — ATORVASTATIN CALCIUM 80 MG/1
80 TABLET, FILM COATED ORAL DAILY
Qty: 90 TABLET | Refills: 0
Start: 2022-09-06 | End: 2022-10-21 | Stop reason: SDUPTHER

## 2022-09-05 RX ORDER — ASPIRIN 81 MG/1
81 TABLET ORAL DAILY
Qty: 60 TABLET | Refills: 0
Start: 2022-09-06 | End: 2023-09-06

## 2022-09-05 RX ORDER — PREGABALIN 25 MG/1
25 CAPSULE ORAL 2 TIMES DAILY
Qty: 60 CAPSULE | Refills: 6
Start: 2022-09-05 | End: 2022-12-08 | Stop reason: SDUPTHER

## 2022-09-05 RX ORDER — CLOPIDOGREL BISULFATE 75 MG/1
75 TABLET ORAL DAILY
Qty: 21 TABLET | Refills: 0
Start: 2022-09-06 | End: 2022-10-17

## 2022-09-05 RX ADMIN — ATORVASTATIN CALCIUM 80 MG: 40 TABLET, FILM COATED ORAL at 09:09

## 2022-09-05 RX ADMIN — CLOPIDOGREL 75 MG: 75 TABLET, FILM COATED ORAL at 09:09

## 2022-09-05 RX ADMIN — CEFTRIAXONE 1 G: 1 INJECTION, SOLUTION INTRAVENOUS at 09:09

## 2022-09-05 RX ADMIN — ASPIRIN 81 MG: 81 TABLET, COATED ORAL at 09:09

## 2022-09-05 RX ADMIN — ENOXAPARIN SODIUM 40 MG: 100 INJECTION SUBCUTANEOUS at 05:09

## 2022-09-05 NOTE — PROGRESS NOTES
[facility] [address] [place] [address] Medicine  Progress Note    Patient Name: Mary Flanagan  MRN: [id number]  Patient Class: IP- Inpatient   Admission Date: 9/3/2022  Length of Stay: 2 days  Attending Physician: Ruben Zurita, *  Primary Care Provider: Raul Hill MD        Subjective:     Principal Problem:Acute cerebrovascular accident (CVA) of basal ganglia         HPI:  Ms. Flanagan is a 77-year-old  female with PMH significant for hypertension, hyperlipidemia, presented to the ED complaining of on bilateral lower extremity pain, and left lower extremity weakness, that has been ongoing for the past many months, much worse since yesterday.  She was evaluated in the ED earlier today, CT head was negative for acute intracranial pathology.  However during the course of the day, nursing staff noted increased expressive aphasia, increased left lower extremity weakness compared to right.  Patient is a poor historian.  Family member at the bedside states that her speech is slower.  Not a candidate for tPA as symptoms have been ongoing for more than 24 hours.  /68.  Laboratory workup unremarkable.  Patient had MRI of the cervical, thoracic and lumbar spine done as part of workup for left lower extremity weakness.  Revealed moderate multilevel degenerative disc disease.  No neurosurgical intervention.  See MRI C/T/L spines for further details.  MRI of the brain revealed acute infarct to the right basal ganglia.     Admitting diagnosis: Acute right basal ganglia infarct.  Left-sided weakness.      Overview/Hospital Course:  76 y/o female admitted for CVA and started on ASA and statin. Neurology consulted. CTA head/neck and echo pending. PT/OT/ST recommendations pending. 9/5/22- Patient seen and examined today. Patient able to move left leg much better today. Negative CTA neck. Mild intracranial atherosclerosis right anterior cerebral artery.  Otherwise negative intracranial CTA.   Specifically, negative for large vessel occlusion or aneurysm. Echo normal. PT/OT recommended rehab. Social work consult for rehab placement. Continue ASA, Plavix, Statin.           Review of Systems   Constitutional: Negative.  Negative for chills and fever.   HENT: Negative.  Negative for congestion, rhinorrhea, sore throat and trouble swallowing.    Eyes: Negative.  Negative for visual disturbance.   Respiratory: Negative.  Negative for cough, shortness of breath and wheezing.    Cardiovascular: Negative.  Negative for chest pain and palpitations.   Gastrointestinal: Negative.  Negative for abdominal pain, diarrhea, nausea and vomiting.   Endocrine: Negative.    Genitourinary: Negative.  Negative for dysuria and flank pain.   Musculoskeletal: Negative.  Negative for back pain.   Skin: Negative.  Negative for rash.   Allergic/Immunologic: Negative.    Neurological:  Positive for speech difficulty and weakness (Left lower extremity weakness). Negative for numbness and headaches.   Hematological: Negative.    Psychiatric/Behavioral: Negative.  Negative for hallucinations. The patient is not nervous/anxious.    All other systems reviewed and are negative.  Objective:     Vital Signs (Most Recent):  Temp: 98.5 °F (36.9 °C) (09/05/22 0753)  Pulse: 85 (09/05/22 0753)  Resp: 18 (09/05/22 0753)  BP: (!) 162/77 (09/05/22 0753)  SpO2: 98 % (09/05/22 0753)   Vital Signs (24h Range):  Temp:  [97.6 °F (36.4 °C)-98.7 °F (37.1 °C)] 98.5 °F (36.9 °C)  Pulse:  [] 85  Resp:  [18] 18  SpO2:  [97 %-100 %] 98 %  BP: (122-185)/(61-85) 162/77     Weight: 56.2 kg (124 lb)  Body mass index is 22.68 kg/m².    Intake/Output Summary (Last 24 hours) at 9/5/2022 1032  Last data filed at 9/4/2022 1717  Gross per 24 hour   Intake 46.5 ml   Output --   Net 46.5 ml      Physical Exam  Vitals and nursing note reviewed.   Constitutional:       General: She is awake. She is not in acute distress.     Appearance: She is not ill-appearing.    HENT:      Head: Normocephalic and atraumatic.      Mouth/Throat:      Mouth: Mucous membranes are moist.   Eyes:      General: No scleral icterus.     Conjunctiva/sclera: Conjunctivae normal.   Cardiovascular:      Rate and Rhythm: Normal rate and regular rhythm.      Heart sounds: No murmur heard.  Pulmonary:      Effort: Pulmonary effort is normal. No respiratory distress.      Breath sounds: Normal breath sounds. No wheezing.   Abdominal:      Palpations: Abdomen is soft.      Tenderness: There is no abdominal tenderness.   Musculoskeletal:         General: No swelling. Normal range of motion.      Cervical back: Normal range of motion and neck supple.   Skin:     General: Skin is warm.      Coloration: Skin is not jaundiced.   Neurological:      Mental Status: She is alert.      Motor: Weakness (Left lower extremity weakness.) present.      Comments: Confused at times   +expressive aphasia    Psychiatric:         Attention and Perception: Attention normal.         Speech: Speech normal.         Behavior: Behavior is cooperative.       Significant Labs: All pertinent labs within the past 24 hours have been reviewed.  BMP:   Recent Labs   Lab 09/04/22  1053 09/05/22  0640    103    139   K 4.3 4.1    105   CO2 22* 22*   BUN 23 26*   CREATININE 1.1 1.1   CALCIUM 9.7 9.4   MG 1.7  --      CBC:   Recent Labs   Lab 09/04/22  1054 09/05/22  0641   WBC 11.14 9.82   HGB 12.2 11.3*   HCT 37.3 35.8*   * 240     CMP:   Recent Labs   Lab 09/04/22  1053 09/05/22  0640    139   K 4.3 4.1    105   CO2 22* 22*    103   BUN 23 26*   CREATININE 1.1 1.1   CALCIUM 9.7 9.4   PROT 7.0 6.4   ALBUMIN 3.8 3.7   BILITOT 0.8 0.9   ALKPHOS 78 76   AST 25 25   ALT 13 14   ANIONGAP 12 12       Significant Imaging:   Imaging Results              MRI Thoracic Spine Without Contrast (Final result)  Result time 09/03/22 20:12:53      Final result by Santa Maynard MD (09/03/22 20:12:53)                    Impression:      Mild anterior chronic compression deformity of T9 may be related to old injury.    No evidence for spinal canal stenosis or neural foraminal narrowing    No acute process seen      Electronically signed by: Caesar Pratt  Date:    09/03/2022  Time:    20:12               Narrative:    EXAMINATION:  [id number]    CLINICAL HISTORY:  left leg paresis;    TECHNIQUE:  Standard multiplanar noncontrast MRI sequences of the thoracic spine.    COMPARISON:  None    FINDINGS:  Mild anterior compression deformity of T9 vertebral body without evidence for edema suggestive of chronic mild anterior could compression deformity.  The thoracic cord reveals normal signal and morphology. The thoracic vertebra otherwise reveal normal alignment, shape and signal intensity.  Small posterior right renal cyst.  Minimal posterior disc bulge T11-T12                                       MRI Cervical Spine Without Contrast (Final result)  Result time 09/03/22 20:27:45      Final result by Santa Maynard MD (09/03/22 20:27:45)                   Impression:      At least moderate multilevel degenerative disc disease with spinal stenosis and neural foraminal narrowing as described above.      Electronically signed by: Caesar Pratt  Date:    09/03/2022  Time:    20:27               Narrative:    EXAMINATION:  MRI CERVICAL SPINE WITHOUT CONTRAST    CLINICAL HISTORY:  left leg paresis;    TECHNIQUE:  Sagittal T1, sagittal T2, axial T2, and axial gradient echo images of the cervical spine obtained. No IV contrast.    FINDINGS:  Cervical spine alignment is within normal limits. No fracture. No marrow signal abnormality suspicious for an infiltrative process.    The cervical cord is normal in caliber and signal characteristics.  The craniocervical junction and visualized intracranial contents are unremarkable.  The adjacent soft tissue structures show no significant abnormalities.    There are findings of moderate cervical  spondylosis, with disc osteophyte complex formation, disc dessication, and uncovertebral spurring, as below.  LIGAMENTUM FLAVUM HYPERTROPHY THROUGHOUT THE CERVICAL SPINE.    C2-C3:  Mild central disc bulge..  Mild spinal stenosis with the AP canal diameter 10 mm.  Mild right-sided neural foraminal narrowing    C3-C4: Moderate central disc bulge abutting the cord.  8 mm AP dimension of the spinal canal consistent with spinal stenosis.  There is mild right-sided neural foraminal narrowing    C4-C5:  Central disc which abuts the cord with AP canal diameter of 8 mm.  There is moderate severe right-sided neural foraminal narrowing and mild moderate left-sided neural foraminal narrowing.    C5-C6:  Moderate severe right-sided neural foraminal narrowing.  Mild moderate left-sided neural foraminal narrowing.  The AP canal diameter measures 10 mm.  Mild left-sided neural foraminal narrowing    C6-C7:  Moderate bilateral neural foraminal narrowing.  The AP canal diameter measures 11 mm.  The AP canal diameter measures 9.5 mm suggestive of mild spinal stenosis.    C7-T1:   There is no focal disc herniation. No significant central canal or neural foraminal narrowing.                                       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    Moderate scattered periventricular and subcortical FLAIR hyperintensities suggestive of chronic microvascular ischemic changes    Right maxillary sinus opacification      Electronically signed by: Caesar Pratt  Date:    09/03/2022  Time:    20:06               Narrative:    EXAMINATION:  MRI BRAIN WITHOUT CONTRAST    CLINICAL HISTORY:  left leg weakness, exp aphasia;    TECHNIQUE:  Sagittal T1. Axial T1, T2, T2 FLAIR, GRE, DWI. Coronal T2 FLAIR.    COMPARISON:  None  available.    FINDINGS:  Acute infarct involving the right basal ganglion measuring 20 x 16 mm in transaxial dimension.  Moderate scattered periventricular subcortical FLAIR hyperintensities suggestive of chronic microvascular ischemic changes    The ventricles and sulci are normal in size and configuration. Midline structures are normal. There are preserved arterial flow-voids on T2 weighted imaging. Right maxillary sinus opacification.    No abnormal marrow signal is identified.                                       MRI Lumbar Spine Without Contrast (Final result)  Result time 09/03/22 12:34:23      Final result by Jeancarlos Tiwari MD (09/03/22 12:34:23)                   Impression:      1. Scattered degenerative changes with mild multilevel disc bulging and multilevel facet arthropathy.  2. Mild right lateral recess narrowing L3-4.  Otherwise lumbar central canal is widely patent throughout its course.  3. Mild neural foraminal stenosis L3-L4 and mild-moderate neural foraminal stenosis L4-L5.  Remaining neural foramen patent.  4. Negative for discitis, osteomyelitis or epidural abscess.      Electronically signed by: Jeancarlos Tiwari  Date:    09/03/2022  Time:    12:34               Narrative:    EXAMINATION:  MRI LUMBAR SPINE WITHOUT CONTRAST    CLINICAL HISTORY:  Low back pain, infection suspected;    TECHNIQUE:  Multiplanar, multisequence MR images were acquired from the thoracolumbar junction to the sacrum without the administration of contrast.    COMPARISON:  None.    FINDINGS:  1.2 mm retrolisthesis L2-L3.  2 mm retrolisthesis L3-L4.  All lumbar vertebral bodies are normal in height.  Patchy diminished T1 marrow signal throughout the visualized spine most consistent with mild red marrow reconversion.  No focal osseous lesions.  No fractures.  Distal spinal cord and conus medullaris are normal.  Conus terminates at the L1-L2 level.    T12-L1: Normal.    L1-L2: Mild facet arthropathy otherwise  normal.    L2-L3: Minimal retrolisthesis.  Mild asymmetric to the right disc bulging.  Moderate facet arthropathy.  Central canal remains patent.  Neural foramen patent.    L3-L4: Mild generalized disc bulging with severe facet arthropathy and ligamentum flavum hypertrophy.  Right lateral recess narrowing.  Patent central canal.  Mild neural foraminal stenosis.    L4-L5: Normal posterior disc margin.  Moderate facet arthropathy.  Central canal patent.  Moderate left and mild right neural foraminal stenosis.    L5-S1: Mild generalized disc bulging, slightly asymmetric to the left.  Minimal contact left S1 nerve root.  Moderate facet arthropathy.  Central canal is patent.  Neural foramen patent.    Paravertebral soft tissues and musculature are normal.  Visualized intra-abdominal and intrapelvic contents are normal.                                       US Lower Extremity Veins Left (Final result)  Result time 09/03/22 11:44:36      Final result by Jeancarlos Tiwari MD (09/03/22 11:44:36)                   Impression:      Negative for left lower extremity DVT.      Electronically signed by: Jeancarlos Tiwari  Date:    09/03/2022  Time:    11:44               Narrative:    EXAMINATION:  US LOWER EXTREMITY VEINS LEFT    CLINICAL HISTORY:  Left lower extremity pain.    COMPARISON:  None.    FINDINGS:  The left deep veins demonstrate a normal appearance of the common femoral vein, deep profunda, superficial femoral and popliteal vein.  There is no evidence of deep venous thrombosis.  Additionally, the greater saphenous, tibial and peroneal veins are patent.    Right common femoral vein patent.                                       CT Head Without Contrast (Final result)  Result time 09/03/22 08:02:28      Final result by Jeancarlos Tiwari MD (09/03/22 08:02:28)                   Impression:      1. No acute findings.  2. Atrophy and chronic ischemic change.  3. No significant change since 06/05/2020.  All CT  scans at this facility are performed  using dose modulation techniques as appropriate to performed exam including the following:  automated exposure control; adjustment of mA and/or kV according to the patients size (this includes techniques or standardized protocols for targeted exams where dose is matched to indication/reason for exam: i.e. extremities or head);  iterative reconstruction technique.      Electronically signed by: Jeancarlos Tiwari  Date:    09/03/2022  Time:    08:02               Narrative:    EXAMINATION:  CT HEAD WITHOUT CONTRAST    CLINICAL HISTORY:  Neuro deficit, acute, stroke suspected;.  Cerebral ischemia.    TECHNIQUE:  Low dose axial images were obtained through the head.  Coronal and sagittal reformations were also performed. Contrast was not administered.    COMPARISON:  06/05/2020    FINDINGS:  Midline structures are intact. Atrophy with diffuse prominence of ventricular system and cortical sulci.  Mild low-density change in the deep white matter compatible chronic ischemic microvascular disease.    No intracranial hemorrhage,acute infarct, or suspicious intracranial lesion is identified.    Skull base and calvarium are normal. Moderate size polyp/retention cyst right maxillary antrum.  Small retention cyst left maxillary antrum.  Mastoid air cells clear.                                       X-Ray Lumbar Spine Ap And Lateral (Final result)  Result time 09/03/22 08:17:31      Final result by Jeancarlos Tiwari MD (09/03/22 08:17:31)                   Impression:      1. Negative for compression fracture.  2. Multilevel degenerative changes as detailed above.  3. No significant change since 01/08/2020.      Electronically signed by: Jeancarlos Tiwari  Date:    09/03/2022  Time:    08:17               Narrative:    EXAMINATION:  XR LUMBAR SPINE AP AND LATERAL    CLINICAL HISTORY:  back pain;    COMPARISON:  01/08/2020    FINDINGS:  Minimal levoscoliosis centered at L2-L3.  L2-L3  and L3-L4.  Minimal retrolisthesis diffuse osteopenia.  Normal lumbar vertebral body heights.Mild disc space narrowing L2-L3.  Small osteophytes L1-2, L2-3 and L3-4.  Severe degenerative change L5-S1 facet joints..  Paravertebral soft tissues are normal.  No significant change since 01/08/2020.                                       X-Ray Chest AP Portable (Final result)  Result time 09/03/22 08:18:39      Final result by Jeancarlos Tiwari MD (09/03/22 08:18:39)                   Impression:      1. No acute chest findings.  2. No significant change since 03/10/2022.      Electronically signed by: Jeancarlos Tiwari  Date:    09/03/2022  Time:    08:18               Narrative:    EXAMINATION:  XR CHEST AP PORTABLE    CLINICAL HISTORY:  Weakness;    COMPARISON:  03/10/2022    FINDINGS:  Lungs are clear.  Heart size within normal limits.No significant bony findings.                                         Assessment/Plan:      * Acute cerebrovascular accident (CVA) of basal ganglia  -MRI brain 9/3 shows acute infarct right basal ganglia, with left-sided weakness.    -aspirin, statin.    -neurology consult.    -TIA/CVA order set used.    -neurochecks every 4 hours.    -PT/OT/ST evaluation pending   -not a candidate for tPA as symptoms ongoing for more than 24 hours.  -Allow for permissive HTN for 24-48 hours   -Labetalol prn     9/5/22  - Negative CTA neck. Mild intracranial atherosclerosis right anterior cerebral artery.  Otherwise negative intracranial CTA.  Specifically, negative for large vessel occlusion or aneurysm.   -Echo normal.   -PT/OT recommended rehab.   -Social work consult for rehab placement.   -Continue ASA, Plavix, Statin.           Hyperlipidemia  -continue statin  -Lipid panel reviewed         VTE Risk Mitigation (From admission, onward)         Ordered     enoxaparin injection 40 mg  Daily         09/03/22 2005     IP VTE HIGH RISK PATIENT  Once         09/03/22 2005     Place sequential  compression device  Until discontinued         09/03/22 2005                Discharge Planning   NICOLE:      Code Status: Full Code   Is the patient medically ready for discharge?:     Reason for patient still in hospital (select all that apply): Patient trending condition, Laboratory test and Treatment  Discharge Plan A: Rehab                  Jessy Everett NP  Department of Hospital Medicine   [facility]

## 2022-09-05 NOTE — PLAN OF CARE
"[facility] 3  Initial Discharge Assessment       Primary Care Provider: Raul Hill MD    Admission Diagnosis: TIA (transient ischemic attack) [G45.9]  Leg pain [M79.606]  Weakness [R53.1]  Left leg weakness [R29.898]  Expressive aphasia [R47.01]  Cerebrovascular accident (CVA), unspecified mechanism [I63.9]  Chronic bilateral low back pain with left-sided sciatica [M54.42, G89.29]    Admission Date: 9/3/2022  Expected Discharge Date:     Discharge Barriers Identified: None    Payor: MEDICARE / Plan: MEDICARE PART A & B / Product Type: Government /     Extended Emergency Contact Information  Primary Emergency Contact: Rohit Flanagan  Address: [facility] [address]           [place] [address] [facility]  Home Phone: [phone]  Mobile Phone: [phone]  Relation: Spouse    Discharge Plan A: Rehab  Discharge Plan B: Rehab      [id number] [place] [facility] [address] AT [facility]  [address] [address]  Phone: [phone] Fax: [phone]      Initial Assessment (most recent)       Adult Discharge Assessment - 09/05/22 0950          Discharge Assessment    Assessment Type Discharge Planning Assessment     Confirmed/corrected address, phone number and insurance Yes     Confirmed Demographics Correct on Facesheet     Source of Information patient     When was your last doctors appointment? 08/08/22     Reason For Admission "not feeling well"     Lives With spouse     Do you expect to return to your current living situation? Yes     Do you have help at home or someone to help you manage your care at home? No     Prior to hospitilization cognitive status: Alert/Oriented     Current cognitive status: Alert/Oriented     Walking or Climbing Stairs Difficulty none     Dressing/Bathing Difficulty none     Home Layout Able to live on 1st floor     Equipment Currently Used at Home none     Readmission within 30 days? No     " Patient currently being followed by outpatient case management? No     Do you currently have service(s) that help you manage your care at home? No     Do you take prescription medications? Yes     Do you have prescription coverage? Yes     Coverage Medicare     Do you have any problems affording any of your prescribed medications? No     Is the patient taking medications as prescribed? yes     Who is going to help you get home at discharge? Spouse Rohit Flanagan     How do you get to doctors appointments? car, drives self     Are you on dialysis? No     Do you take coumadin? No     Discharge Plan A Rehab     Discharge Plan B Rehab     DME Needed Upon Discharge  none     Discharge Plan discussed with: Patient     Discharge Barriers Identified None        Physical Activity    On average, how many days per week do you engage in moderate to strenuous exercise (like a brisk walk)? 5 days     On average, how many minutes do you engage in exercise at this level? 10 min        Financial Resource Strain    How hard is it for you to pay for the very basics like food, housing, medical care, and heating? Not hard at all        Housing Stability    In the last 12 months, was there a time when you were not able to pay the mortgage or rent on time? No        Transportation Needs    In the past 12 months, has lack of transportation kept you from medical appointments or from getting medications? No     In the past 12 months, has lack of transportation kept you from meetings, work, or from getting things needed for daily living? No        Food Insecurity    Within the past 12 months, you worried that your food would run out before you got the money to buy more. Never true     Within the past 12 months, the food you bought just didn't last and you didn't have money to get more. Never true        Social Connections    In a typical week, how many times do you talk on the phone with family, friends, or neighbors? Once a week     How often  do you get together with friends or relatives? Once a week     How often do you attend Islam or Sabianism services? --   Not at this time d/t Covid precautions    Do you belong to any clubs or organizations such as Islam groups, unions, fraternal or athletic groups, or school groups? Yes     How often do you attend meetings of the clubs or organizations you belong to? 1 to 4 times per year     Are you , , , , never , or living with a partner?         Alcohol Use    Q1: How often do you have a drink containing alcohol? Never     Q2: How many drinks containing alcohol do you have on a typical day when you are drinking? Patient does not drink     Q3: How often do you have six or more drinks on one occasion? Never        Relationship/Environment    Name(s) of Who Lives With Patient Rohit Flanagan, Spouse                   Sungr met with pt at bedside to complete discharge assessment. Pt reports no needs; Sungr discussed consult for rehba and pt selected Rehab. Patient choice form completed. CM provided a transitional care folder, information on advanced directives, information on pharmacy bedside delivery, and discharge planning begins on admission with contact information for any needs/questions.

## 2022-09-05 NOTE — PLAN OF CARE
Patient transferring to [facility] today.  She is Medicare A & B - needs not authorization.    Vicky with  Rehab calling nurse with report number and transportation time.       09/05/22 1306   Post-Acute Status   Post-Acute Authorization Placement   Post-Acute Placement Status Set-up Complete/Auth obtained   Discharge Plan   Discharge Plan A Rehab

## 2022-09-05 NOTE — SUBJECTIVE & OBJECTIVE
Review of Systems   Constitutional: Negative.  Negative for chills and fever.   HENT: Negative.  Negative for congestion, rhinorrhea, sore throat and trouble swallowing.    Eyes: Negative.  Negative for visual disturbance.   Respiratory: Negative.  Negative for cough, shortness of breath and wheezing.    Cardiovascular: Negative.  Negative for chest pain and palpitations.   Gastrointestinal: Negative.  Negative for abdominal pain, diarrhea, nausea and vomiting.   Endocrine: Negative.    Genitourinary: Negative.  Negative for dysuria and flank pain.   Musculoskeletal: Negative.  Negative for back pain.   Skin: Negative.  Negative for rash.   Allergic/Immunologic: Negative.    Neurological:  Positive for speech difficulty and weakness (Left lower extremity weakness). Negative for numbness and headaches.   Hematological: Negative.    Psychiatric/Behavioral: Negative.  Negative for hallucinations. The patient is not nervous/anxious.    All other systems reviewed and are negative.  Objective:     Vital Signs (Most Recent):  Temp: 98.5 °F (36.9 °C) (09/05/22 0753)  Pulse: 85 (09/05/22 0753)  Resp: 18 (09/05/22 0753)  BP: (!) 162/77 (09/05/22 0753)  SpO2: 98 % (09/05/22 0753)   Vital Signs (24h Range):  Temp:  [97.6 °F (36.4 °C)-98.7 °F (37.1 °C)] 98.5 °F (36.9 °C)  Pulse:  [] 85  Resp:  [18] 18  SpO2:  [97 %-100 %] 98 %  BP: (122-185)/(61-85) 162/77     Weight: 56.2 kg (124 lb)  Body mass index is 22.68 kg/m².    Intake/Output Summary (Last 24 hours) at 9/5/2022 1032  Last data filed at 9/4/2022 1717  Gross per 24 hour   Intake 46.5 ml   Output --   Net 46.5 ml      Physical Exam  Vitals and nursing note reviewed.   Constitutional:       General: She is awake. She is not in acute distress.     Appearance: She is not ill-appearing.   HENT:      Head: Normocephalic and atraumatic.      Mouth/Throat:      Mouth: Mucous membranes are moist.   Eyes:      General: No scleral icterus.     Conjunctiva/sclera:  Conjunctivae normal.   Cardiovascular:      Rate and Rhythm: Normal rate and regular rhythm.      Heart sounds: No murmur heard.  Pulmonary:      Effort: Pulmonary effort is normal. No respiratory distress.      Breath sounds: Normal breath sounds. No wheezing.   Abdominal:      Palpations: Abdomen is soft.      Tenderness: There is no abdominal tenderness.   Musculoskeletal:         General: No swelling. Normal range of motion.      Cervical back: Normal range of motion and neck supple.   Skin:     General: Skin is warm.      Coloration: Skin is not jaundiced.   Neurological:      Mental Status: She is alert.      Motor: Weakness (Left lower extremity weakness.) present.      Comments: Confused at times   +expressive aphasia    Psychiatric:         Attention and Perception: Attention normal.         Speech: Speech normal.         Behavior: Behavior is cooperative.       Significant Labs: All pertinent labs within the past 24 hours have been reviewed.  BMP:   Recent Labs   Lab 09/04/22  1053 09/05/22  0640    103    139   K 4.3 4.1    105   CO2 22* 22*   BUN 23 26*   CREATININE 1.1 1.1   CALCIUM 9.7 9.4   MG 1.7  --      CBC:   Recent Labs   Lab 09/04/22  1054 09/05/22  0641   WBC 11.14 9.82   HGB 12.2 11.3*   HCT 37.3 35.8*   * 240     CMP:   Recent Labs   Lab 09/04/22  1053 09/05/22  0640    139   K 4.3 4.1    105   CO2 22* 22*    103   BUN 23 26*   CREATININE 1.1 1.1   CALCIUM 9.7 9.4   PROT 7.0 6.4   ALBUMIN 3.8 3.7   BILITOT 0.8 0.9   ALKPHOS 78 76   AST 25 25   ALT 13 14   ANIONGAP 12 12       Significant Imaging:   Imaging Results              MRI Thoracic Spine Without Contrast (Final result)  Result time 09/03/22 20:12:53      Final result by Santa Maynard MD (09/03/22 20:12:53)                   Impression:      Mild anterior chronic compression deformity of T9 may be related to old injury.    No evidence for spinal canal stenosis or neural foraminal  narrowing    No acute process seen      Electronically signed by: Caesar Pratt  Date:    09/03/2022  Time:    20:12               Narrative:    EXAMINATION:  [id number]    CLINICAL HISTORY:  left leg paresis;    TECHNIQUE:  Standard multiplanar noncontrast MRI sequences of the thoracic spine.    COMPARISON:  None    FINDINGS:  Mild anterior compression deformity of T9 vertebral body without evidence for edema suggestive of chronic mild anterior could compression deformity.  The thoracic cord reveals normal signal and morphology. The thoracic vertebra otherwise reveal normal alignment, shape and signal intensity.  Small posterior right renal cyst.  Minimal posterior disc bulge T11-T12                                       MRI Cervical Spine Without Contrast (Final result)  Result time 09/03/22 20:27:45      Final result by Santa Maynard MD (09/03/22 20:27:45)                   Impression:      At least moderate multilevel degenerative disc disease with spinal stenosis and neural foraminal narrowing as described above.      Electronically signed by: Caesar Pratt  Date:    09/03/2022  Time:    20:27               Narrative:    EXAMINATION:  MRI CERVICAL SPINE WITHOUT CONTRAST    CLINICAL HISTORY:  left leg paresis;    TECHNIQUE:  Sagittal T1, sagittal T2, axial T2, and axial gradient echo images of the cervical spine obtained. No IV contrast.    FINDINGS:  Cervical spine alignment is within normal limits. No fracture. No marrow signal abnormality suspicious for an infiltrative process.    The cervical cord is normal in caliber and signal characteristics.  The craniocervical junction and visualized intracranial contents are unremarkable.  The adjacent soft tissue structures show no significant abnormalities.    There are findings of moderate cervical spondylosis, with disc osteophyte complex formation, disc dessication, and uncovertebral spurring, as below.  LIGAMENTUM FLAVUM HYPERTROPHY THROUGHOUT THE CERVICAL  SPINE.    C2-C3:  Mild central disc bulge..  Mild spinal stenosis with the AP canal diameter 10 mm.  Mild right-sided neural foraminal narrowing    C3-C4: Moderate central disc bulge abutting the cord.  8 mm AP dimension of the spinal canal consistent with spinal stenosis.  There is mild right-sided neural foraminal narrowing    C4-C5:  Central disc which abuts the cord with AP canal diameter of 8 mm.  There is moderate severe right-sided neural foraminal narrowing and mild moderate left-sided neural foraminal narrowing.    C5-C6:  Moderate severe right-sided neural foraminal narrowing.  Mild moderate left-sided neural foraminal narrowing.  The AP canal diameter measures 10 mm.  Mild left-sided neural foraminal narrowing    C6-C7:  Moderate bilateral neural foraminal narrowing.  The AP canal diameter measures 11 mm.  The AP canal diameter measures 9.5 mm suggestive of mild spinal stenosis.    C7-T1:   There is no focal disc herniation. No significant central canal or neural foraminal narrowing.                                       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    Moderate scattered periventricular and subcortical FLAIR hyperintensities suggestive of chronic microvascular ischemic changes    Right maxillary sinus opacification      Electronically signed by: Caesar Pratt  Date:    09/03/2022  Time:    20:06               Narrative:    EXAMINATION:  MRI BRAIN WITHOUT CONTRAST    CLINICAL HISTORY:  left leg weakness, exp aphasia;    TECHNIQUE:  Sagittal T1. Axial T1, T2, T2 FLAIR, GRE, DWI. Coronal T2 FLAIR.    COMPARISON:  None available.    FINDINGS:  Acute infarct involving the right basal ganglion measuring 20 x 16 mm in transaxial dimension.  Moderate scattered periventricular subcortical FLAIR  hyperintensities suggestive of chronic microvascular ischemic changes    The ventricles and sulci are normal in size and configuration. Midline structures are normal. There are preserved arterial flow-voids on T2 weighted imaging. Right maxillary sinus opacification.    No abnormal marrow signal is identified.                                       MRI Lumbar Spine Without Contrast (Final result)  Result time 09/03/22 12:34:23      Final result by Jeancarlos Tiwari MD (09/03/22 12:34:23)                   Impression:      1. Scattered degenerative changes with mild multilevel disc bulging and multilevel facet arthropathy.  2. Mild right lateral recess narrowing L3-4.  Otherwise lumbar central canal is widely patent throughout its course.  3. Mild neural foraminal stenosis L3-L4 and mild-moderate neural foraminal stenosis L4-L5.  Remaining neural foramen patent.  4. Negative for discitis, osteomyelitis or epidural abscess.      Electronically signed by: Jeancarlos Tiwari  Date:    09/03/2022  Time:    12:34               Narrative:    EXAMINATION:  MRI LUMBAR SPINE WITHOUT CONTRAST    CLINICAL HISTORY:  Low back pain, infection suspected;    TECHNIQUE:  Multiplanar, multisequence MR images were acquired from the thoracolumbar junction to the sacrum without the administration of contrast.    COMPARISON:  None.    FINDINGS:  1.2 mm retrolisthesis L2-L3.  2 mm retrolisthesis L3-L4.  All lumbar vertebral bodies are normal in height.  Patchy diminished T1 marrow signal throughout the visualized spine most consistent with mild red marrow reconversion.  No focal osseous lesions.  No fractures.  Distal spinal cord and conus medullaris are normal.  Conus terminates at the L1-L2 level.    T12-L1: Normal.    L1-L2: Mild facet arthropathy otherwise normal.    L2-L3: Minimal retrolisthesis.  Mild asymmetric to the right disc bulging.  Moderate facet arthropathy.  Central canal remains patent.  Neural foramen  patent.    L3-L4: Mild generalized disc bulging with severe facet arthropathy and ligamentum flavum hypertrophy.  Right lateral recess narrowing.  Patent central canal.  Mild neural foraminal stenosis.    L4-L5: Normal posterior disc margin.  Moderate facet arthropathy.  Central canal patent.  Moderate left and mild right neural foraminal stenosis.    L5-S1: Mild generalized disc bulging, slightly asymmetric to the left.  Minimal contact left S1 nerve root.  Moderate facet arthropathy.  Central canal is patent.  Neural foramen patent.    Paravertebral soft tissues and musculature are normal.  Visualized intra-abdominal and intrapelvic contents are normal.                                       US Lower Extremity Veins Left (Final result)  Result time 09/03/22 11:44:36      Final result by Jeancarlos Tiwari MD (09/03/22 11:44:36)                   Impression:      Negative for left lower extremity DVT.      Electronically signed by: Jeancarlos Tiwari  Date:    09/03/2022  Time:    11:44               Narrative:    EXAMINATION:  US LOWER EXTREMITY VEINS LEFT    CLINICAL HISTORY:  Left lower extremity pain.    COMPARISON:  None.    FINDINGS:  The left deep veins demonstrate a normal appearance of the common femoral vein, deep profunda, superficial femoral and popliteal vein.  There is no evidence of deep venous thrombosis.  Additionally, the greater saphenous, tibial and peroneal veins are patent.    Right common femoral vein patent.                                       CT Head Without Contrast (Final result)  Result time 09/03/22 08:02:28      Final result by Jeancarlos Tiwari MD (09/03/22 08:02:28)                   Impression:      1. No acute findings.  2. Atrophy and chronic ischemic change.  3. No significant change since 06/05/2020.  All CT scans at this facility are performed  using dose modulation techniques as appropriate to performed exam including the following:  automated exposure control;  adjustment of mA and/or kV according to the patients size (this includes techniques or standardized protocols for targeted exams where dose is matched to indication/reason for exam: i.e. extremities or head);  iterative reconstruction technique.      Electronically signed by: Jeancarlos Tiwari  Date:    09/03/2022  Time:    08:02               Narrative:    EXAMINATION:  CT HEAD WITHOUT CONTRAST    CLINICAL HISTORY:  Neuro deficit, acute, stroke suspected;.  Cerebral ischemia.    TECHNIQUE:  Low dose axial images were obtained through the head.  Coronal and sagittal reformations were also performed. Contrast was not administered.    COMPARISON:  06/05/2020    FINDINGS:  Midline structures are intact. Atrophy with diffuse prominence of ventricular system and cortical sulci.  Mild low-density change in the deep white matter compatible chronic ischemic microvascular disease.    No intracranial hemorrhage,acute infarct, or suspicious intracranial lesion is identified.    Skull base and calvarium are normal. Moderate size polyp/retention cyst right maxillary antrum.  Small retention cyst left maxillary antrum.  Mastoid air cells clear.                                       X-Ray Lumbar Spine Ap And Lateral (Final result)  Result time 09/03/22 08:17:31      Final result by Jeancarlos Tiwari MD (09/03/22 08:17:31)                   Impression:      1. Negative for compression fracture.  2. Multilevel degenerative changes as detailed above.  3. No significant change since 01/08/2020.      Electronically signed by: Jeancarlos Tiwari  Date:    09/03/2022  Time:    08:17               Narrative:    EXAMINATION:  XR LUMBAR SPINE AP AND LATERAL    CLINICAL HISTORY:  back pain;    COMPARISON:  01/08/2020    FINDINGS:  Minimal levoscoliosis centered at L2-L3.  L2-L3 and L3-L4.  Minimal retrolisthesis diffuse osteopenia.  Normal lumbar vertebral body heights.Mild disc space narrowing L2-L3.  Small osteophytes L1-2, L2-3 and  L3-4.  Severe degenerative change L5-S1 facet joints..  Paravertebral soft tissues are normal.  No significant change since 01/08/2020.                                       X-Ray Chest AP Portable (Final result)  Result time 09/03/22 08:18:39      Final result by Jeancarlos Tiwari MD (09/03/22 08:18:39)                   Impression:      1. No acute chest findings.  2. No significant change since 03/10/2022.      Electronically signed by: Jeancarlos Tiwari  Date:    09/03/2022  Time:    08:18               Narrative:    EXAMINATION:  XR CHEST AP PORTABLE    CLINICAL HISTORY:  Weakness;    COMPARISON:  03/10/2022    FINDINGS:  Lungs are clear.  Heart size within normal limits.No significant bony findings.

## 2022-09-05 NOTE — NURSING
Gave report to CHANELL Orellana at  rehab.  Patient alert with intermittent confusion, tele monitor discontinue and IV taken out.  Patient is waiting for transport.  Discharge paper work given to patient and explained.

## 2022-09-05 NOTE — PT/OT/SLP PROGRESS
Occupational Therapy   Treatment    Name: Mary Flanagan  MRN: [id number]  Admitting Diagnosis:  Acute cerebrovascular accident (CVA) of basal ganglia       Recommendations:     Discharge Recommendations: rehabilitation facility  Discharge Equipment Recommendations:   (TBD AT NEXT LEVEL OF CARE)  Barriers to discharge:  None    Assessment:     Mary Flanagan is a 77 y.o. female with a medical diagnosis of Acute cerebrovascular accident (CVA) of basal ganglia.  She presents with generalized weakness and debility. Performance deficits affecting function are weakness, impaired endurance, impaired self care skills, impaired functional mobility, gait instability, impaired balance, decreased upper extremity function, decreased lower extremity function, decreased safety awareness, pain, impaired cardiopulmonary response to activity, decreased ROM.     Rehab Prognosis:  Good; patient would benefit from acute skilled OT services to address these deficits and reach maximum level of function.       Plan:     Patient to be seen 2 x/week to address the above listed problems via self-care/home management, therapeutic activities, therapeutic exercises  Plan of Care Expires: 09/18/22  Plan of Care Reviewed with: patient    Subjective     Pain/Comfort:  Pain Rating 1: 3/10  Location - Side 1: Right  Location 1: leg  Pain Addressed 1: Distraction, Reposition, Cessation of Activity  Pain Rating Post-Intervention 1: 0/10    Objective:     Communicated with: Nurse Wagner and pic kellee review prior to session.  Patient found supine with bed alarm, telemetry, peripheral IV, Other (comments) (AVASYS) upon OT entry to room. Nurse requested patient return to bed due to high fall risk. Chair alarm not present.    General Precautions: Standard, fall   Orthopedic Precautions:N/A   Braces: N/A  Respiratory Status: Room air     Occupational Performance:     Bed Mobility:    Patient completed Rolling/Turning to Left with  minimum assistance  Patient  completed Scooting/Bridging with minimum assistance  Patient completed Supine to Sit with minimum assistance  Patient completed Sit to Supine with minimum assistance     Functional Mobility/Transfers:  Patient completed Sit <> Stand Transfer with minimum assistance  with  rolling walker   Patient completed Toilet Transfer Stand Pivot technique with minimum assistance with  rolling walker  Functional Mobility: Pt completed functional mobility from bed to restroom with RW Min/Mod A due to assistance with RW management and cues.     Activities of Daily Living:  Lower Body Dressing: maximal assistance to don brief while seated on commode, including pulling over hips.   Toileting: maximal assistance including virgilio hygiene after bowel movement, LE clothing management and STS from commode.       [facility] 6 Click ADL: 14    Treatment & Education:  Pt tolerated tx well. Pt reported some nausea while seated on commode, however subsided when returned supine in bed. Pt educated on benefits of actively participating in therapy. Pt educated on use of call button and fall prevention. Pt educated on importance of OOB ax and simulating chair position with bed. Pt verbalized understanding of all education.     Patient left with bed in chair position with all lines intact, call button in reach, and unable to engaged bed alarm however iBed engaged, 3 rails up and tray table placed on down side.     GOALS:   Multidisciplinary Problems       Occupational Therapy Goals          Problem: Occupational Therapy    Goal Priority Disciplines Outcome Interventions   Occupational Therapy Goal     OT, PT/OT Ongoing, Progressing    Description: LTG'S TO BE MET IN 14 DAYS (9/18/22):    1)  PATIENT WILL PERFORM UB DRESSING WITH SBA.     2)  PATIENT WILL PERFORM LB DRESSING WITH MIN (A).    3)  PATIENT WILL PERFORM TOILET T/F WITH CGA.    4)  PATIENT WILL PERFORM BUE HEP FOR INCREASED FUNC STRENGTH/ENDURANCE AND INCREASED LUE/BUE COORD WITH MIN VC/DEMO  FOR PROPER TECHNIQUE.                        Time Tracking:     OT Date of Treatment: 09/05/22  OT Start Time: 0840  OT Stop Time: 0910  OT Total Time (min): 30 min    Billable Minutes:Self Care/Home Management 30 minutes     OT/KARLI: OT          9/5/2022

## 2022-09-05 NOTE — PLAN OF CARE
[facility] - Med Surg 3  Discharge Final Note    Primary Care Provider: Raul Hill MD    Expected Discharge Date: 9/5/2022    Final Discharge Note (most recent)       Final Note - 09/05/22 1308          Final Note    Assessment Type Final Discharge Note     Anticipated Discharge Disposition Rehab Facility        Post-Acute Status    Post-Acute Authorization HME     Post-Acute Placement Status Set-up Complete/Auth obtained   [facility]    Discharge Delays None known at this time                     Important Message from Medicare             Contact Info       Raul Hill MD   Specialty: Internal Medicine   Relationship: PCP - General    [address] [facility] [address]   Phone: [phone]       Next Steps: Follow up in 3 day(s)    Instructions: for hospital follow-up of CVA    [facility]    [address] [facility] [address]       Next Steps: Follow up    Adrien Ibrahim MD   Specialty: Cardiology, Cardiovascular Disease   Relationship: Consulting Physician  Consulting Physician    [address]  [place] [address]   Phone: [phone]       Next Steps: Follow up in 3 day(s)    Instructions: for hospital follow-up of CVA    Ramo Bzaan MD   Specialty: Neurology    [address] [place] [address]   [facility] [place] [address]   Phone: [phone]       Next Steps: Follow up in 1 week(s)    Instructions: For hospital follow-up of CVA    Von Miranda MD   Specialty: Neurosurgery    [address] [place] [address] [place]  [facility] [address]   Phone: [phone]       Next Steps: Follow up in 1 week(s)    Instructions: For c spine central stenosis

## 2022-09-05 NOTE — NURSING
"PT refused morning labs and stated," come back later". Education on the importance of getting blood work.  "

## 2022-09-05 NOTE — PLAN OF CARE
Patient is intermittently confused to, place, and situation VSS  Patient remained afebrile throughout shift.  Patient remained free of falls this shift  Plan of care reviewed.  Patient verbalized understanding.  Patient moving/turning with x1 assistance  Frequent weight shifting encouraged.  Patient NSR on monitor  Bed low, side rails up x2, wheels locked, call light in reach.  Bed alarm maintained for safety.  Avasys at bedside  Patient instructed to call for assistance.  Hourly rounding completed.  Will continue to monitor.

## 2022-09-05 NOTE — PLAN OF CARE
Problem: Adult Inpatient Plan of Care  Goal: Plan of Care Review  9/5/2022 1311 by Kristy Muniz RN  Outcome: Met  9/5/2022 1114 by Kristy Muniz RN  Outcome: Ongoing, Progressing  Goal: Patient-Specific Goal (Individualized)  9/5/2022 1311 by Kristy Muniz RN  Outcome: Met  9/5/2022 1114 by Kristy Muniz RN  Outcome: Ongoing, Progressing  Goal: Absence of Hospital-Acquired Illness or Injury  9/5/2022 1311 by Kristy Muniz RN  Outcome: Met  9/5/2022 1114 by Kristy Muniz RN  Outcome: Ongoing, Progressing  Goal: Optimal Comfort and Wellbeing  9/5/2022 1311 by Kristy Muniz RN  Outcome: Met  9/5/2022 1114 by Kristy Muniz RN  Outcome: Ongoing, Progressing  Goal: Readiness for Transition of Care  9/5/2022 1311 by Kristy Muniz RN  Outcome: Met  9/5/2022 1114 by Kristy Muniz RN  Outcome: Ongoing, Progressing     Problem: Infection  Goal: Absence of Infection Signs and Symptoms  9/5/2022 1311 by Kristy Muniz RN  Outcome: Met  9/5/2022 1114 by Kristy Muniz RN  Outcome: Ongoing, Progressing     Problem: Adjustment to Illness (Stroke, Ischemic/Transient Ischemic Attack)  Goal: Optimal Coping  9/5/2022 1311 by Kristy Muniz RN  Outcome: Met  9/5/2022 1114 by Kristy Muniz RN  Outcome: Ongoing, Progressing     Problem: Bowel Elimination Impaired (Stroke, Ischemic/Transient Ischemic Attack)  Goal: Effective Bowel Elimination  9/5/2022 1311 by Kristy Muniz RN  Outcome: Met  9/5/2022 1114 by Kristy Muniz RN  Outcome: Ongoing, Progressing     Problem: Cerebral Tissue Perfusion (Stroke, Ischemic/Transient Ischemic Attack)  Goal: Optimal Cerebral Tissue Perfusion  9/5/2022 1311 by Kristy Muniz RN  Outcome: Met  9/5/2022 1114 by Kristy Muniz RN  Outcome: Ongoing, Progressing     Problem: Cognitive Impairment (Stroke, Ischemic/Transient Ischemic Attack)  Goal: Optimal Cognitive Function  9/5/2022 1311 by Kristy Muniz RN  Outcome: Met  9/5/2022 1114 by Kristy Muniz,  RN  Outcome: Ongoing, Progressing     Problem: Communication Impairment (Stroke, Ischemic/Transient Ischemic Attack)  Goal: Improved Communication Skills  9/5/2022 1311 by Kristy Muniz RN  Outcome: Met  9/5/2022 1114 by Kristy Muniz RN  Outcome: Ongoing, Progressing     Problem: Functional Ability Impaired (Stroke, Ischemic/Transient Ischemic Attack)  Goal: Optimal Functional Ability  9/5/2022 1311 by Kristy Muniz RN  Outcome: Met  9/5/2022 1114 by Kristy Muniz RN  Outcome: Ongoing, Progressing     Problem: Respiratory Compromise (Stroke, Ischemic/Transient Ischemic Attack)  Goal: Effective Oxygenation and Ventilation  9/5/2022 1311 by Kristy Muniz RN  Outcome: Met  9/5/2022 1114 by Kristy Muniz RN  Outcome: Ongoing, Progressing     Problem: Sensorimotor Impairment (Stroke, Ischemic/Transient Ischemic Attack)  Goal: Improved Sensorimotor Function  9/5/2022 1311 by Kristy Muniz RN  Outcome: Met  9/5/2022 1114 by Kristy Muniz RN  Outcome: Ongoing, Progressing     Problem: Swallowing Impairment (Stroke, Ischemic/Transient Ischemic Attack)  Goal: Optimal Eating and Swallowing without Aspiration  9/5/2022 1311 by Kristy Muniz RN  Outcome: Met  9/5/2022 1114 by Kristy Muniz RN  Outcome: Ongoing, Progressing     Problem: Urinary Elimination Impaired (Stroke, Ischemic/Transient Ischemic Attack)  Goal: Effective Urinary Elimination  9/5/2022 1311 by Kristy Muniz RN  Outcome: Met  9/5/2022 1114 by Kristy Muniz RN  Outcome: Ongoing, Progressing     Problem: Fall Injury Risk  Goal: Absence of Fall and Fall-Related Injury  9/5/2022 1311 by Kristy Muniz RN  Outcome: Met  9/5/2022 1114 by Kristy Muniz RN  Outcome: Ongoing, Progressing     Problem: Skin Injury Risk Increased  Goal: Skin Health and Integrity  9/5/2022 1311 by Kristy Muniz RN  Outcome: Met  9/5/2022 1114 by Kristy Muniz RN  Outcome: Ongoing, Progressing

## 2022-09-05 NOTE — PT/OT/SLP PROGRESS
Physical Therapy  Treatment    Mary Flanagan   MRN: [id number]   Admitting Diagnosis: Acute cerebrovascular accident (CVA) of basal ganglia    PT Received On: 09/05/22  PT Start Time: 0840     PT Stop Time: 0910    PT Total Time (min): 30 min       Billable Minutes:  Therapeutic Activity 30    Treatment Type: Treatment  PT/PTA: PTA     PTA Visit Number: 1       General Precautions: Standard, fall  Orthopedic Precautions: N/A   Braces: N/A  Respiratory Status: Room air    Spiritual, Cultural Beliefs, Amish Practices, Values that Affect Care: no    Subjective:  Communicated with patient's nurse, Kristy, and completed Epic chart review prior to session. Patient's nurse requested patient be returned to bed at end of session due to impulsivity and high fall risk. Chair alarm components not available at this time.   Patient agreed to PT session.     Pain/Comfort  Pain Rating 1: 0/10    Objective:   Patient found with: bed alarm, telemetry, peripheral IV, Other (comments) (AVASYS)    Functional Mobility:  Supine > sit EOB: Min A     STS from EOB > RW: Min A (VC for hand placement)    8ft x2 trials w/ RW Min-Mod A (consistent VC to increase B step length due to shuffling gait pattern w/ festinating like tendencies    Stand pivot T/F w/ RW to toilet: Min A (VC for safety w/ RW mgmt)    STS from toilet > RW x2 trials: Min A     Stand pivot T/F w/ RW to EOB: Min A (VC for safety w/ RW mgmt)    Sit > supine: Min A    Supine scoot towards HOB: Total A of 2    Educated patient on importance of increased tolerance to upright position and direct impact on CV endurance and strength. Patient encouraged to utilize elevated HOB to simulate chair position until able to safely complete chair T/F. Patient given a minimum goal of majority of the day to be spent in upright, especially with all meals. Encouraged patient to perform AROM TE to BLE throughout the day within all available planes of motion. Re enforced importance of utilizing  call light to meet needs in room and not attempt to get up without staff assistance. Patient verbalized understanding and agreed to comply.     AM-PAC 6 CLICK MOBILITY  How much help from another person does this patient currently need?   1 = Unable, Total/Dependent Assistance  2 = A lot, Maximum/Moderate Assistance  3 = A little, Minimum/Contact Guard/Supervision  4 = None, Modified [place]/Independent    Turning over in bed (including adjusting bedclothes, sheets and blankets)?: 3  Sitting down on and standing up from a chair with arms (e.g., wheelchair, bedside commode, etc.): 3  Moving from lying on back to sitting on the side of the bed?: 3  Moving to and from a bed to a chair (including a wheelchair)?: 3  Need to walk in hospital room?: 2  Climbing 3-5 steps with a railing?: 1  Basic Mobility Total Score: 15    AM-PAC Raw Score CMS G-Code Modifier Level of Impairment Assistance   6 % Total / Unable   7 - 9 CM 80 - 100% Maximal Assist   10 - 14 CL 60 - 80% Moderate Assist   15 - 19 CK 40 - 60% Moderate Assist   20 - 22 CJ 20 - 40% Minimal Assist   23 CI 1-20% SBA / CGA   24 CH 0% Independent/ Mod I     Patient left with bed in chair position with call button in reach, bed alarm on, and AVASYS present.    Assessment:  Mary Flanagan is a 77 y.o. female with a medical diagnosis of Acute cerebrovascular accident (CVA) of basal ganglia and presents with overall decline in functional mobility. Patient would continue to benefit from skilled PT to address functional limitations listed below in order to return to PLOF/decrease caregiver burden.     Rehab identified problem list/impairments: Rehab identified problem list/impairments: weakness, impaired endurance, impaired sensation, impaired self care skills, impaired functional mobility, gait instability, impaired balance, impaired cognition, decreased coordination, decreased upper extremity function, decreased lower extremity function, decreased safety  awareness, decreased ROM, impaired coordination, impaired cardiopulmonary response to activity    Rehab potential is fair.    Activity tolerance: Poor    Discharge recommendations:       Barriers to discharge:      Equipment recommendations: Equipment Needed After Discharge: other (see comments) (TBD BY NEXT LEVEL OF CARE)     GOALS:   Multidisciplinary Problems       Physical Therapy Goals          Problem: Physical Therapy    Goal Priority Disciplines Outcome Goal Variances Interventions   Physical Therapy Goal     PT, PT/OT      Description: Patient will be seen a minimal of 3 out of 7 days a week.     LTG to be met by 09/18:    Bed mobility: SPV  Transfers: CGA with RW  Gait: CGA with RW                       PLAN:    Patient to be seen 3 x/week  to address the above listed problems via gait training, therapeutic activities, therapeutic exercises  Plan of Care expires: 09/18/22  Plan of Care reviewed with: patient         09/05/2022

## 2022-09-05 NOTE — PLAN OF CARE
Problem: Adult Inpatient Plan of Care  Goal: Plan of Care Review  Outcome: Ongoing, Progressing  Goal: Patient-Specific Goal (Individualized)  Outcome: Ongoing, Progressing  Goal: Absence of Hospital-Acquired Illness or Injury  Outcome: Ongoing, Progressing  Goal: Optimal Comfort and Wellbeing  Outcome: Ongoing, Progressing  Goal: Readiness for Transition of Care  Outcome: Ongoing, Progressing     Problem: Infection  Goal: Absence of Infection Signs and Symptoms  Outcome: Ongoing, Progressing     Problem: Adjustment to Illness (Stroke, Ischemic/Transient Ischemic Attack)  Goal: Optimal Coping  Outcome: Ongoing, Progressing     Problem: Bowel Elimination Impaired (Stroke, Ischemic/Transient Ischemic Attack)  Goal: Effective Bowel Elimination  Outcome: Ongoing, Progressing     Problem: Cerebral Tissue Perfusion (Stroke, Ischemic/Transient Ischemic Attack)  Goal: Optimal Cerebral Tissue Perfusion  Outcome: Ongoing, Progressing     Problem: Cognitive Impairment (Stroke, Ischemic/Transient Ischemic Attack)  Goal: Optimal Cognitive Function  Outcome: Ongoing, Progressing     Problem: Communication Impairment (Stroke, Ischemic/Transient Ischemic Attack)  Goal: Improved Communication Skills  Outcome: Ongoing, Progressing     Problem: Functional Ability Impaired (Stroke, Ischemic/Transient Ischemic Attack)  Goal: Optimal Functional Ability  Outcome: Ongoing, Progressing     Problem: Respiratory Compromise (Stroke, Ischemic/Transient Ischemic Attack)  Goal: Effective Oxygenation and Ventilation  Outcome: Ongoing, Progressing     Problem: Sensorimotor Impairment (Stroke, Ischemic/Transient Ischemic Attack)  Goal: Improved Sensorimotor Function  Outcome: Ongoing, Progressing     Problem: Swallowing Impairment (Stroke, Ischemic/Transient Ischemic Attack)  Goal: Optimal Eating and Swallowing without Aspiration  Outcome: Ongoing, Progressing     Problem: Urinary Elimination Impaired (Stroke, Ischemic/Transient Ischemic  Attack)  Goal: Effective Urinary Elimination  Outcome: Ongoing, Progressing     Problem: Fall Injury Risk  Goal: Absence of Fall and Fall-Related Injury  Outcome: Ongoing, Progressing     Problem: Skin Injury Risk Increased  Goal: Skin Health and Integrity  Outcome: Ongoing, Progressing

## 2022-09-05 NOTE — NURSING
IRWIN greenberg for patient to not be on tele monitor or to start a new IV since discharge will be early in the morning.

## 2022-09-05 NOTE — PT/OT/SLP EVAL
Speech Language Pathology Evaluation  Cognitive/Bedside Swallow    Patient Name:  Mary Flanagan   MRN:  [id number]  Admitting Diagnosis: Acute cerebrovascular accident (CVA) of basal ganglia    Recommendations:                  General Recommendations:  Dysphagia therapy and Cognitive-linguistic therapy  Diet recommendations:  Soft & Bite Sized Diet - IDDSI Level 6, Thin liquids - IDDSI Level 0   Aspiration Precautions:  supervision with meals, Frequent oral care, HOB to 90 degrees, Meds whole 1 at a time, Remain upright 30 minutes post meal, Small bites/sips, and Standard aspiration precautions   General Precautions: Standard, aspiration  Communication strategies:  provide increased time to answer    History:     Past Medical History:   Diagnosis Date    CKD (chronic kidney disease) stage 3, GFR 30-59 ml/min     Dry eyes     Gastritis     Hyperlipidemia     Hypertension     Insomnia     Osteopenia        Past Surgical History:   Procedure Laterality Date    BREAST BIOPSY Left     , benign     SECTION      x 1    COLONOSCOPY N/A 2022    Procedure: COLONOSCOPY;  Surgeon: Karina Beck MD;  Location: [facility];  Service: Endoscopy;  Laterality: N/A;    ESOPHAGOGASTRODUODENOSCOPY N/A 2022    Procedure: EGD (ESOPHAGOGASTRODUODENOSCOPY);  Surgeon: Karina Beck MD;  Location: [facility];  Service: Endoscopy;  Laterality: N/A;    KNEE ARTHROSCOPY Left  approx    KNEE SURGERY      left arm surgery      left knee surgery         Social History: Patient lives alone during the week in a townhouse in [place].  Pt reported her  leaves for [employer] to tend to the land/cows during the week. Pt reportedly still drives and is independent with ADL's.  Pt belongs to a [employer] and enjoys reading.    Prior Intubation HX:  N/A    Modified Barium Swallow: N/A    Chest X-Rays: See medical chart.    Prior diet: Pt consumes a regular diet at home.      Subjective     Pt seen bedside for ST.  Sitting  upright in bed consuming breakfast--ordered a pureed diet following nursing screen over weekend, as pt reportedly refused ST attempted evaluation.  No c/o pain.  Cognitive deficits apparent. No family present.  [facility] consulted for pt transfer to  REHAB.  Patient goals: To improve strength    Pain/Comfort:  Pain Rating 1: 0/10  Pain Rating Post-Intervention 1: 0/10  Pain Rating 2: 0/10  Pain Rating Post-Intervention 2: 0/10    Respiratory Status: Room air    Objective:     Cognitive Status:    Pt oriented to person and place, partial time and situation/event.     Receptive Language:   Comprehension:   Delayed processing with self-correction during L-R discrimination tasks.  Impaired MU commands.    Pragmatics:    Flat affect    Expressive Language:  Verbal:    WFL in conversation, rote sequences and object ID      Motor Speech:  WFL intelligibility. Reduced rate?    Voice:   WFL    Visual-Spatial:  WFL--with self-correction during L-R discrimination tasks    Reading:   WFL --pt able to read words, phrases, sentences without difficulty.  Glasses worn.      Oral Musculature Evaluation  Oral Musculature: WFL  Dentition: present and adequate  Secretion Management: adequate  Mucosal Quality: good  Mandibular Strength and Mobility: impaired (reduced rate of mastication during solid consumption)  Oral Labial Strength and Mobility: WFL  Lingual Strength and Mobility: impaired strength  Velar Elevation: WFL  Buccal Strength and Mobility: WFL  Volitional Cough: present  Volitional Swallow: present  Voice Prior to PO Intake: WFL    Bedside Swallow Eval:   Consistencies Assessed:  Pt consumed thin liquids, pureed and soft solids during bedside CSE.    Oral Phase:   Slow oral transit time with solids    Pharyngeal Phase:   no overt clinical signs/symptoms of aspiration    Compensatory Strategies  None    Treatment: Pt recommended for IDDSI 0 (thin liquids) & IDDSI 6 (soft-bite sized solids), following swallowing  precautions/supervision with meals.    Assessment:     Mary Flanagan is a 77 y.o. female with an SLP diagnosis of Cognitive-Linguistic Impairment as detailed above with deficits in orientation, memory recall, processing and problem solving/reasoning.  She presents with reduced rate of mastication/strength and recommended for soft/bite sized solids (IDDSI 6) with thin liquids (IDDSI 0), following basic precautions. Pt recommended for continued ST intervention post-acute to improve cognition to prior level of function.    Goals:   Multidisciplinary Problems       SLP Goals          Problem: SLP    Goal Priority Disciplines Outcome   SLP Goal     SLP    Description: 1.  Pt will consume IDDSI 6/ IDDSI 0 without incident.  1a. Tx feeds of regular solids for potential diet advancement.    2.  Pt will improve cognitive-linguistic skills to prior level of function related to orientation, memory recall, language processing and problem solving/reasoning.                       Plan:     Patient to be seen:  2 x/week, 3 x/week   Plan of Care expires:  09/12/22  Plan of Care reviewed with:  patient   SLP Follow-Up:  Yes       Discharge recommendations:  Discharge Facility/Level of Care Needs: rehabilitation facility ([facility] consulted for pt disposition)   Barriers to Discharge:  None    Time Tracking:     SLP Treatment Date:   09/05/22  Speech Start Time:  1000  Speech Stop Time:  1030     Speech Total Time (min):  30 min    Billable Minutes: Eval 15 minutes  and Eval Swallow and Oral Function 15 minutes    09/05/2022

## 2022-09-05 NOTE — CONSULTS
Swer met with pt to discuss options for Rehab. Swer provided options based on insurance provider and pt selected [facility]. Swer reached out to Vicky to advise. Vicky will visit with pt today;advised no need to send through [facility]. CATALINA,MSW

## 2022-09-06 ENCOUNTER — TELEPHONE (OUTPATIENT)
Dept: FAMILY MEDICINE | Facility: CLINIC | Age: 77
End: 2022-09-06
Payer: MEDICARE

## 2022-09-06 ENCOUNTER — TELEPHONE (OUTPATIENT)
Dept: CARDIOLOGY | Facility: CLINIC | Age: 77
End: 2022-09-06
Payer: MEDICARE

## 2022-09-06 ENCOUNTER — TELEPHONE (OUTPATIENT)
Dept: NEUROSURGERY | Facility: CLINIC | Age: 77
End: 2022-09-06
Payer: MEDICARE

## 2022-09-06 ENCOUNTER — TELEPHONE (OUTPATIENT)
Dept: NEUROLOGY | Facility: CLINIC | Age: 77
End: 2022-09-06
Payer: MEDICARE

## 2022-09-06 VITALS
WEIGHT: 124 LBS | DIASTOLIC BLOOD PRESSURE: 67 MMHG | TEMPERATURE: 99 F | HEIGHT: 62 IN | BODY MASS INDEX: 22.82 KG/M2 | SYSTOLIC BLOOD PRESSURE: 142 MMHG | OXYGEN SATURATION: 99 % | HEART RATE: 81 BPM | RESPIRATION RATE: 20 BRPM

## 2022-09-06 PROCEDURE — 97530 THERAPEUTIC ACTIVITIES: CPT

## 2022-09-06 PROCEDURE — 25000003 PHARM REV CODE 250: Performed by: INTERNAL MEDICINE

## 2022-09-06 PROCEDURE — 25000003 PHARM REV CODE 250: Performed by: NURSE PRACTITIONER

## 2022-09-06 RX ORDER — ONDANSETRON 4 MG/1
4 TABLET, ORALLY DISINTEGRATING ORAL ONCE
Status: COMPLETED | OUTPATIENT
Start: 2022-09-06 | End: 2022-09-06

## 2022-09-06 RX ORDER — HYDROCHLOROTHIAZIDE 12.5 MG/1
12.5 TABLET ORAL DAILY
Qty: 90 TABLET | Refills: 3
Start: 2022-09-06 | End: 2022-10-03 | Stop reason: SDUPTHER

## 2022-09-06 RX ORDER — AMLODIPINE BESYLATE 5 MG/1
5 TABLET ORAL DAILY
Status: DISCONTINUED | OUTPATIENT
Start: 2022-09-06 | End: 2022-09-06 | Stop reason: HOSPADM

## 2022-09-06 RX ORDER — AMLODIPINE BESYLATE 5 MG/1
5 TABLET ORAL DAILY
Qty: 30 TABLET | Refills: 0 | Status: SHIPPED | OUTPATIENT
Start: 2022-09-07 | End: 2023-09-07

## 2022-09-06 RX ADMIN — CLOPIDOGREL 75 MG: 75 TABLET, FILM COATED ORAL at 08:09

## 2022-09-06 RX ADMIN — ASPIRIN 81 MG: 81 TABLET, COATED ORAL at 08:09

## 2022-09-06 RX ADMIN — ATORVASTATIN CALCIUM 80 MG: 40 TABLET, FILM COATED ORAL at 08:09

## 2022-09-06 RX ADMIN — ONDANSETRON 4 MG: 4 TABLET, ORALLY DISINTEGRATING ORAL at 09:09

## 2022-09-06 RX ADMIN — AMLODIPINE BESYLATE 5 MG: 5 TABLET ORAL at 08:09

## 2022-09-06 NOTE — TELEPHONE ENCOUNTER
Left vm for pt informed pt of the providers next available appt informed her of pts appt time day and location left vm for pt to return call if she had any further questions.

## 2022-09-06 NOTE — TELEPHONE ENCOUNTER
Pt contacted [facility] for pt to call back to appt scheduled.              ----- Message from Kristy Muniz RN sent at 9/5/2022 12:54 PM CDT -----  Patient is being discharged from the hospital. Patient will need follow-up appointment within three days. Please contact patient.   Thanks!

## 2022-09-06 NOTE — NURSING
[facility].       Pt's  will  medications from pharmacy and bring to facility.     Report called to CHANELL Brady at [facility].

## 2022-09-06 NOTE — PT/OT/SLP PROGRESS
"Physical Therapy Treatment    Patient Name:  Mary Flanagan   MRN:  [id number]    Recommendations:     Discharge Recommendations:  rehabilitation facility   Discharge Equipment Recommendations:  (TBD AT NEXT LEVEL OF CARE)   Barriers to discharge: None    Assessment:     Mary Flanagan is a 77 y.o. female admitted with a medical diagnosis of Acute cerebrovascular accident (CVA) of basal ganglia.  She presents with the following impairments/functional limitations:  weakness, impaired endurance, impaired functional mobility, gait instability, impaired balance, pain, decreased safety awareness, decreased coordination, impaired cognition.    Rehab Prognosis: Good; patient would benefit from acute skilled PT services to address these deficits and reach maximum level of function.    Recent Surgery: * No surgery found *    Plan:     During this hospitalization, patient to be seen 3 x/week to address the identified rehab impairments via gait training, therapeutic activities, therapeutic exercises, neuromuscular re-education and progress toward the following goals:    Plan of Care Expires:  09/18/22    Subjective     Chief Complaint: "I'M COLD"  Patient/Family Comments/goals:   Pain/Comfort:  Pain Rating 1: 0/10      Objective:     Communicated with NURSE NASCIMENTO prior to session.  Patient found supine with bed alarm, telemetry, peripheral IV (PATT SYS) upon PT entry to room.     General Precautions: Standard, fall   Orthopedic Precautions:N/A   Braces: N/A  Respiratory Status: Room air     Functional Mobility:  Bed Mobility:     Rolling Left:  minimum assistance  Scooting: minimum assistance  Supine to Sit: minimum assistance  Transfers:     Sit to Stand:  minimum assistance with no AD  Bed to Chair: minimum assistance with  no AD and hand-held assist  using  Step Transfer  Balance: FAIR SITTING BALANCE, POOR+ STANDING BALANCE    AM-PAC 6 CLICK MOBILITY  Turning over in bed (including adjusting bedclothes, sheets and " "blankets)?: 3  Sitting down on and standing up from a chair with arms (e.g., wheelchair, bedside commode, etc.): 3  Moving from lying on back to sitting on the side of the bed?: 3  Moving to and from a bed to a chair (including a wheelchair)?: 3  Need to walk in hospital room?: 3  Climbing 3-5 steps with a railing?: 1  Basic Mobility Total Score: 16     Therapeutic Activities and Exercises:  REVIEW ROLE OF P.T. AND POC IN ACUTE CARE HOSPITAL SETTING, ENCOURAGED TO INCREASE TIME OOB IN CHAIR TO TOLERANCE, EDUCATED IN AND ENCOURAGED TO PERFORM BLE THEREX WHILE SEATED OR SUPINE THROUGHOUT THE DAY TO TOLERANCE: HIP FLEX/EXT, QUAD SET, LAQ, HEEL SLIDES, AP'S.  PT AGREEABLE TO REQUESTS  PT EDUCATED ON RISK FOR FALLS DUE TO GENERALIZED WEAKNESS, EDUCATED ON "CALL DON'T FALL", ENCOURAGED TO CALL FOR ASSISTANCE WITH ALL NEEDS SUCH AS BED<>CHAIR TRANSFERS OR TRIPS TO BATHROOM, PT AGREEABLE TO SAFETY PRECAUTIONS  PT SET UP FOR BREAKFAST WHILE SEATED IN CHAIR, NURSE REPORTS PT LEAVING FOR INPATIENT REHAB SOON    Patient left up in chair with all lines intact, call button in reach, and NURSE notified, NURSE PRESENT    GOALS:   Multidisciplinary Problems       Physical Therapy Goals          Problem: Physical Therapy    Goal Priority Disciplines Outcome Goal Variances Interventions   Physical Therapy Goal     PT, PT/OT Ongoing, Progressing     Description: Patient will be seen a minimal of 3 out of 7 days a week.     LTG to be met by 09/18:    Bed mobility: SPV  Transfers: CGA with RW  Gait: CGA with RW                       Time Tracking:     PT Received On: 09/06/22  PT Start Time: 0810     PT Stop Time: 0833  PT Total Time (min): 23 min     Billable Minutes: Therapeutic Activity 23    Treatment Type: Treatment  PT/PTA: PT     PTA Visit Number: 0     09/06/2022  "

## 2022-09-06 NOTE — TELEPHONE ENCOUNTER
----- Message from Kristy Muniz RN sent at 9/5/2022 12:56 PM CDT -----  Patient is being discharged from the hospital. Patient will need follow-up appointment within one week. Please contact patient.   Thanks!

## 2022-09-06 NOTE — TELEPHONE ENCOUNTER
----- Message from Kristy uMniz RN sent at 9/5/2022 12:49 PM CDT -----  Patient is being discharged from the hospital. Patient will need follow-up appointment within three days. Please contact patient.   Thanks!

## 2022-09-06 NOTE — DISCHARGE SUMMARY
[facility]  [facility] Medicine  Discharge Summary      Patient Name: Mary Flanagan  MRN: [id number]  Patient Class: IP- Inpatient  Admission Date: 9/3/2022  Hospital Length of Stay: 3 days  Discharge Date and Time:  09/06/2022 10:40 AM  Attending Physician: Ruben Zurita, *   Discharging Provider: Jessy Everett NP  Primary Care Provider: aRul Hill MD      HPI:   Ms. Flanagan is a 77-year-old  female with PMH significant for hypertension, hyperlipidemia, presented to the ED complaining of on bilateral lower extremity pain, and left lower extremity weakness, that has been ongoing for the past many months, much worse since yesterday.  She was evaluated in the ED earlier today, CT head was negative for acute intracranial pathology.  However during the course of the day, nursing staff noted increased expressive aphasia, increased left lower extremity weakness compared to right.  Patient is a poor historian.  Family member at the bedside states that her speech is slower.  Not a candidate for tPA as symptoms have been ongoing for more than 24 hours.  /68.  Laboratory workup unremarkable.  Patient had MRI of the cervical, thoracic and lumbar spine done as part of workup for left lower extremity weakness.  Revealed moderate multilevel degenerative disc disease.  No neurosurgical intervention.  See MRI C/T/L spines for further details.  MRI of the brain revealed acute infarct to the right basal ganglia.     Admitting diagnosis: Acute right basal ganglia infarct.  Left-sided weakness.      * No surgery found *      Hospital Course:   78 y/o female admitted for CVA and started on ASA and statin. Neurology consulted. CTA head/neck and echo pending. PT/OT/ST recommendations pending. 9/5/22- Patient seen and examined today. Patient able to move left leg much better today. Negative CTA neck. Mild intracranial atherosclerosis right anterior cerebral artery.  Otherwise negative  intracranial CTA.  Specifically, negative for large vessel occlusion or aneurysm. Echo normal. PT/OT recommended rehab. Social work consult for rehab placement. Continue ASA, Plavix, Statin. 9/6/22- Patient seen and examiend on the date of discharge and found stable for discharge. Amlodipine added for better bp control. Home meds reconciled. Patient to discharge to [facility]. Patient to follow up with PCP, cardiology, neurology, and neurosurgery outpatient.        Goals of Care Treatment Preferences:  Code Status: Full Code      Consults:   Consults (From admission, onward)        Status Ordering Provider     Inpatient consult to Social Work  Once        Provider:  (Not yet assigned)    Completed RICHARD IGLESIAS     Inpatient consult to Neurology  Once        Provider:  Malini Stein MD    Completed BLOSSOM JONAS     IP consult to case management/social work  Once        Provider:  (Not yet assigned)    Completed BLOSSOM JONAS          No new Assessment & Plan notes have been filed under this hospital service since the last note was generated.  Service: Hospital Medicine    Final Active Diagnoses:    Diagnosis Date Noted POA    PRINCIPAL PROBLEM:  Acute cerebrovascular accident (CVA) of basal ganglia [I63.9] 09/03/2022 Yes    Hyperlipidemia [E78.5]  Yes      Problems Resolved During this Admission:       Discharged Condition: stable    Disposition: Rehab Facility    Follow Up:   Follow-up Information     Raul Hill MD Follow up in 3 day(s).    Specialty: Internal Medicine  Why: for hospital follow-up of CVA  Contact information:  [address] [facility] [address] [phone]             [facility] Follow up.    Contact information:  [address] [facility] [address] [facility] [place] [address]           Adrien Ibrahim MD Follow up in 3 day(s).    Specialties: Cardiology, Cardiovascular Disease  Why: for hospital follow-up of CVA  Contact information:  [address]  [place] [address]  [phone]             Ramo Bazan MD Follow up in 1 week(s).    Specialty: Neurology  Why: For hospital follow-up of CVA  Contact information:  [address] [place] [address] [address] [place] [address]  [phone]             Von Miranda MD Follow up in 1 week(s).    Specialty: Neurosurgery  Why: For c spine central stenosis  Contact information:  [address] [place] [address] [place] [address]  [phone]                       Patient Instructions:      Activity as tolerated       Significant Diagnostic Studies: Labs:   BMP:   Recent Labs   Lab 09/04/22  1053 09/05/22  0640    103    139   K 4.3 4.1    105   CO2 22* 22*   BUN 23 26*   CREATININE 1.1 1.1   CALCIUM 9.7 9.4   MG 1.7  --    , CMP   Recent Labs   Lab 09/04/22  1053 09/05/22  0640    139   K 4.3 4.1    105   CO2 22* 22*    103   BUN 23 26*   CREATININE 1.1 1.1   CALCIUM 9.7 9.4   PROT 7.0 6.4   ALBUMIN 3.8 3.7   BILITOT 0.8 0.9   ALKPHOS 78 76   AST 25 25   ALT 13 14   ANIONGAP 12 12    and CBC   Recent Labs   Lab 09/04/22  1054 09/05/22  0641   WBC 11.14 9.82   HGB 12.2 11.3*   HCT 37.3 35.8*   * 240       Pending Diagnostic Studies:     Procedure Component Value Units Date/Time    APTT [[id number]]     Order Status: Sent Lab Status: No result     Specimen: Blood     CBC auto differential [[id number]]     Order Status: Sent Lab Status: No result     Specimen: Blood     CK-MB [[id number]]     Order Status: Sent Lab Status: No result     Specimen: Blood     Comprehensive metabolic panel [[id number]]     Order Status: Sent Lab Status: No result     Specimen: Blood     Magnesium [[id number]]     Order Status: Sent Lab Status: No result     Specimen: Blood     Phosphorus [[id number]]     Order Status: Sent Lab Status: No result     Specimen: Blood     Protime-INR [[id number]]     Order Status: Sent Lab Status: No result     Specimen: Blood     Troponin I [[id number]]     Order Status:  Sent Lab Status: No result     Specimen: Blood          Medications:  Reconciled Home Medications:      Medication List      START taking these medications    amLODIPine 5 MG tablet  Commonly known as: NORVASC  Take 1 tablet (5 mg total) by mouth once daily.  Start taking on: September 7, 2022     aspirin 81 MG EC tablet  Commonly known as: ECOTRIN  Take 1 tablet (81 mg total) by mouth once daily.     atorvastatin 80 MG tablet  Commonly known as: LIPITOR  Take 1 tablet (80 mg total) by mouth once daily.     clopidogreL 75 mg tablet  Commonly known as: PLAVIX  Take 1 tablet (75 mg total) by mouth once daily. for 21 days     HYDROcodone-acetaminophen 5-325 mg per tablet  Commonly known as: NORCO  Take 1 tablet by mouth every 4 (four) hours as needed.        CHANGE how you take these medications    hydroCHLOROthiazide 12.5 MG Tab  Commonly known as: HYDRODIURIL  Take 1 tablet (12.5 mg total) by mouth once daily.  What changed: See the new instructions.     pregabalin 25 MG capsule  Commonly known as: LYRICA  Take 1 capsule (25 mg total) by mouth 2 (two) times daily.  What changed:   · medication strength  · how much to take        CONTINUE taking these medications    diclofenac sodium 1 % Gel  Commonly known as: VOLTAREN  Apply 4 g topically 2 (two) times daily.     EYE VITAMIN AND MINERALS ORAL  Take 1 tablet by mouth once daily at 6am.     ferrous sulfate 325 mg (65 mg iron) Tab tablet  Commonly known as: FEOSOL  Take 325 mg by mouth daily with breakfast. Every other day     mirtazapine 7.5 MG Tab  Commonly known as: REMERON  Take 1 tablet (7.5 mg total) by mouth every evening.     multivitamin per tablet  Commonly known as: THERAGRAN  Take 1 tablet by mouth once daily.        STOP taking these medications    pravastatin 10 MG tablet  Commonly known as: PRAVACHOL            Indwelling Lines/Drains at time of discharge:   Lines/Drains/Airways     None                 Time spent on the discharge of patient: 35  minutes         Jessy Everett NP  Department of Hospital Medicine  [facility]

## 2022-09-12 ENCOUNTER — TELEPHONE (OUTPATIENT)
Dept: PHYSICAL MEDICINE AND REHAB | Facility: CLINIC | Age: 77
End: 2022-09-12
Payer: MEDICARE

## 2022-09-12 NOTE — TELEPHONE ENCOUNTER
Patient on weekly Stroke Core Report.  Per chart, patient was discharged to outside rehab facility.  No phone call placed at this time.    Kellee Nolan RN

## 2022-09-30 ENCOUNTER — PATIENT OUTREACH (OUTPATIENT)
Dept: ADMINISTRATIVE | Facility: HOSPITAL | Age: 77
End: 2022-09-30
Payer: MEDICARE

## 2022-09-30 NOTE — PROGRESS NOTES
Attempted to contact the patient to confirm hospital follow up with PCP on 10/3/22, no answer, left voicemail.

## 2022-10-03 ENCOUNTER — OFFICE VISIT (OUTPATIENT)
Dept: FAMILY MEDICINE | Facility: CLINIC | Age: 77
End: 2022-10-03
Payer: MEDICARE

## 2022-10-03 VITALS
TEMPERATURE: 98 F | DIASTOLIC BLOOD PRESSURE: 84 MMHG | WEIGHT: 124.31 LBS | HEART RATE: 90 BPM | HEIGHT: 62 IN | RESPIRATION RATE: 18 BRPM | SYSTOLIC BLOOD PRESSURE: 138 MMHG | BODY MASS INDEX: 22.88 KG/M2 | OXYGEN SATURATION: 100 %

## 2022-10-03 DIAGNOSIS — I63.9: Primary | ICD-10-CM

## 2022-10-03 DIAGNOSIS — R29.898 WEAKNESS OF BOTH LOWER EXTREMITIES: ICD-10-CM

## 2022-10-03 DIAGNOSIS — M50.30 DDD (DEGENERATIVE DISC DISEASE), CERVICAL: ICD-10-CM

## 2022-10-03 DIAGNOSIS — R60.9 EDEMA, UNSPECIFIED TYPE: ICD-10-CM

## 2022-10-03 DIAGNOSIS — E78.2 MIXED HYPERLIPIDEMIA: ICD-10-CM

## 2022-10-03 DIAGNOSIS — I10 HYPERTENSION, UNSPECIFIED TYPE: ICD-10-CM

## 2022-10-03 PROCEDURE — 99213 OFFICE O/P EST LOW 20 MIN: CPT | Mod: PBBFAC,PO | Performed by: INTERNAL MEDICINE

## 2022-10-03 PROCEDURE — 99214 OFFICE O/P EST MOD 30 MIN: CPT | Mod: S$PBB,,, | Performed by: INTERNAL MEDICINE

## 2022-10-03 PROCEDURE — 99999 PR PBB SHADOW E&M-EST. PATIENT-LVL III: CPT | Mod: PBBFAC,,, | Performed by: INTERNAL MEDICINE

## 2022-10-03 PROCEDURE — 99214 PR OFFICE/OUTPT VISIT, EST, LEVL IV, 30-39 MIN: ICD-10-PCS | Mod: S$PBB,,, | Performed by: INTERNAL MEDICINE

## 2022-10-03 PROCEDURE — 99999 PR PBB SHADOW E&M-EST. PATIENT-LVL III: ICD-10-PCS | Mod: PBBFAC,,, | Performed by: INTERNAL MEDICINE

## 2022-10-03 RX ORDER — HYDROCHLOROTHIAZIDE 12.5 MG/1
12.5 TABLET ORAL
Qty: 30 TABLET | Refills: 3 | Status: SHIPPED | OUTPATIENT
Start: 2022-10-03 | End: 2023-02-03 | Stop reason: SDUPTHER

## 2022-10-03 NOTE — PROGRESS NOTES
Subjective:       Patient ID: Mary Flanagan is a 77 y.o. female.    Chief Complaint: Follow-up, Hospital Follow Up, Hypertension, Hyperlipidemia, and Stroke    Hospital f/u for cva----with?residual left side weakness-----on asa, statin,amlodipine--------------in therapy----------improving.    They cancelled her neurosurgery appt for cervical neck disease.               Follow-up  Associated symptoms include arthralgias, myalgias and weakness. Pertinent negatives include no abdominal pain, chest pain, chills, congestion, coughing, diaphoresis, fatigue, fever, headaches, joint swelling, nausea, neck pain, numbness, rash, sore throat or vomiting.   Hypertension  Pertinent negatives include no chest pain, headaches, neck pain, palpitations or shortness of breath.   Hyperlipidemia  Associated symptoms include myalgias. Pertinent negatives include no chest pain or shortness of breath.   Past Medical History:   Diagnosis Date    CKD (chronic kidney disease) stage 3, GFR 30-59 ml/min     Dry eyes     Gastritis     Hyperlipidemia     Hypertension     Insomnia     Osteopenia      Past Surgical History:   Procedure Laterality Date    BREAST BIOPSY Left     , benign     SECTION      x 1    COLONOSCOPY N/A 2022    Procedure: COLONOSCOPY;  Surgeon: Karina Beck MD;  Location: [facility];  Service: Endoscopy;  Laterality: N/A;    ESOPHAGOGASTRODUODENOSCOPY N/A 2022    Procedure: EGD (ESOPHAGOGASTRODUODENOSCOPY);  Surgeon: Karina Beck MD;  Location: [facility];  Service: Endoscopy;  Laterality: N/A;    KNEE ARTHROSCOPY Left  approx    KNEE SURGERY      left arm surgery      left knee surgery       Family History   Problem Relation Age of Onset    Hypertension Mother     Hypertension Father     Breast cancer Neg Hx     Colon cancer Neg Hx     Ovarian cancer Neg Hx     Thrombophilia Neg Hx     Strabismus Neg Hx     Macular degeneration Neg Hx     Retinal detachment Neg Hx     Glaucoma Neg Hx      Blindness Neg Hx     Amblyopia Neg Hx      Social History     Socioeconomic History    Marital status:     Number of children: 3   Occupational History    Occupation: retired   Tobacco Use    Smoking status: Never    Smokeless tobacco: Never   Substance and Sexual Activity    Alcohol use: No     Alcohol/week: 0.0 standard drinks    Drug use: No    Sexual activity: Not Currently     Partners: Male     Birth control/protection: None     Comment: mut monog     Social Determinants of Health     Financial Resource Strain: Low Risk     Difficulty of Paying Living Expenses: Not hard at all   Food Insecurity: No Food Insecurity    Worried About Running Out of Food in the Last Year: Never true    Ran Out of Food in the Last Year: Never true   Transportation Needs: No Transportation Needs    Lack of Transportation (Medical): No    Lack of Transportation (Non-Medical): No   Physical Activity: Insufficiently Active    Days of Exercise per Week: 5 days    Minutes of Exercise per Session: 10 min   Stress: Stress Concern Present    Feeling of Stress : Very much   Social Connections: Moderately Integrated    Frequency of Communication with Friends and Family: Once a week    Frequency of Social Gatherings with Friends and Family: Once a week    Attends Restoration Services: More than 4 times per year    Active Member of Clubs or Organizations: Yes    Attends Club or Organization Meetings: 1 to 4 times per year    Marital Status:    Housing Stability: Low Risk     Unable to Pay for Housing in the Last Year: No    Number of Places Lived in the Last Year: 1    Unstable Housing in the Last Year: No     Review of Systems   Constitutional:  Negative for activity change, appetite change, chills, diaphoresis, fatigue, fever and unexpected weight change.   HENT:  Negative for congestion, drooling, ear discharge, ear pain, facial swelling, hearing loss, mouth sores, nosebleeds, postnasal drip, rhinorrhea, sinus pressure, sneezing,  sore throat, tinnitus, trouble swallowing and voice change.    Eyes:  Negative for photophobia, redness and visual disturbance.   Respiratory:  Negative for apnea, cough, choking, chest tightness, shortness of breath and wheezing.    Cardiovascular:  Negative for chest pain and palpitations.   Gastrointestinal:  Negative for abdominal distention, abdominal pain, blood in stool, constipation, diarrhea, nausea and vomiting.   Endocrine: Negative for cold intolerance, heat intolerance, polydipsia, polyphagia and polyuria.   Genitourinary:  Negative for decreased urine volume, difficulty urinating, dysuria, flank pain, frequency, genital sores, hematuria, pelvic pain and urgency.   Musculoskeletal:  Positive for arthralgias, gait problem and myalgias. Negative for back pain, joint swelling, neck pain and neck stiffness.   Skin:  Negative for color change, pallor, rash and wound.   Allergic/Immunologic: Negative for food allergies and immunocompromised state.   Neurological:  Positive for weakness. Negative for dizziness, tremors, seizures, syncope, speech difficulty, light-headedness, numbness and headaches.   Hematological:  Negative for adenopathy. Does not bruise/bleed easily.   Psychiatric/Behavioral:  Negative for agitation, behavioral problems, confusion, decreased concentration, dysphoric mood, hallucinations, self-injury, sleep disturbance and suicidal ideas. The patient is not nervous/anxious and is not hyperactive.    All other systems reviewed and are negative.    Objective:      Physical Exam  Vitals and nursing note reviewed.   Constitutional:       General: She is not in acute distress.     Appearance: Normal appearance. She is well-developed. She is not diaphoretic.   HENT:      Head: Normocephalic and atraumatic.      Mouth/Throat:      Pharynx: No oropharyngeal exudate.   Eyes:      General: No scleral icterus.  Neck:      Thyroid: No thyromegaly.      Vascular: No carotid bruit or JVD.      Trachea: No  tracheal deviation.   Cardiovascular:      Rate and Rhythm: Normal rate and regular rhythm.      Heart sounds: Normal heart sounds.   Pulmonary:      Effort: Pulmonary effort is normal. No respiratory distress.      Breath sounds: Normal breath sounds. No wheezing or rales.   Chest:      Chest wall: No tenderness.   Abdominal:      General: Bowel sounds are normal. There is no distension.      Palpations: Abdomen is soft.      Tenderness: There is no abdominal tenderness. There is no guarding or rebound.   Musculoskeletal:         General: No tenderness. Normal range of motion.      Cervical back: Normal range of motion and neck supple.   Lymphadenopathy:      Cervical: No cervical adenopathy.   Skin:     General: Skin is warm and dry.      Coloration: Skin is not pale.      Findings: No erythema or rash.   Neurological:      Mental Status: She is alert and oriented to person, place, and time.   Psychiatric:         Behavior: Behavior normal.         Thought Content: Thought content normal.         Judgment: Judgment normal.       CMP  Sodium   Date Value Ref Range Status   09/05/2022 139 136 - 145 mmol/L Final     Potassium   Date Value Ref Range Status   09/05/2022 4.1 3.5 - 5.1 mmol/L Final     Chloride   Date Value Ref Range Status   09/05/2022 105 95 - 110 mmol/L Final     CO2   Date Value Ref Range Status   09/05/2022 22 (L) 23 - 29 mmol/L Final     Glucose   Date Value Ref Range Status   09/05/2022 103 70 - 110 mg/dL Final     BUN   Date Value Ref Range Status   09/05/2022 26 (H) 8 - 23 mg/dL Final     Creatinine   Date Value Ref Range Status   09/05/2022 1.1 0.5 - 1.4 mg/dL Final     Calcium   Date Value Ref Range Status   09/05/2022 9.4 8.7 - 10.5 mg/dL Final     Total Protein   Date Value Ref Range Status   09/05/2022 6.4 6.0 - 8.4 g/dL Final     Albumin   Date Value Ref Range Status   09/05/2022 3.7 3.5 - 5.2 g/dL Final     Total Bilirubin   Date Value Ref Range Status   09/05/2022 0.9 0.1 - 1.0 mg/dL  Final     Comment:     For infants and newborns, interpretation of results should be based  on gestational age, weight and in agreement with clinical  observations.    Premature Infant recommended reference ranges:  Up to 24 hours.............<8.0 mg/dL  Up to 48 hours............<12.0 mg/dL  3-5 days..................<15.0 mg/dL  6-29 days.................<15.0 mg/dL       Alkaline Phosphatase   Date Value Ref Range Status   09/05/2022 76 55 - 135 U/L Final     AST   Date Value Ref Range Status   09/05/2022 25 10 - 40 U/L Final     ALT   Date Value Ref Range Status   09/05/2022 14 10 - 44 U/L Final     Anion Gap   Date Value Ref Range Status   09/05/2022 12 8 - 16 mmol/L Final     eGFR if    Date Value Ref Range Status   05/02/2022 57 (A) >60 mL/min/1.73 m^2 Final     eGFR if non    Date Value Ref Range Status   05/02/2022 49 (A) >60 mL/min/1.73 m^2 Final     Comment:     Calculation used to obtain the estimated glomerular filtration  rate (eGFR) is the CKD-EPI equation.        Lab Results   Component Value Date    WBC 9.82 09/05/2022    HGB 11.3 (L) 09/05/2022    HCT 35.8 (L) 09/05/2022    MCV 93 09/05/2022     09/05/2022     Lab Results   Component Value Date    CHOL 178 09/03/2022     Lab Results   Component Value Date    HDL 80 (H) 09/03/2022     Lab Results   Component Value Date    LDLCALC 88.8 09/03/2022     Lab Results   Component Value Date    TRIG 46 09/03/2022     Lab Results   Component Value Date    CHOLHDL 44.9 09/03/2022     Lab Results   Component Value Date    TSH 0.811 09/03/2022     Lab Results   Component Value Date    HGBA1C 5.4 09/03/2022     Assessment:       1. Acute cerebrovascular accident (CVA) of basal ganglia    2. Mixed hyperlipidemia    3. Hypertension, unspecified type    4. DDD (degenerative disc disease), cervical    5. Weakness of both lower extremities    6. Edema, unspecified type          Plan:   Acute cerebrovascular accident (CVA) of  basal ganglia-------------asa, statin------therapy--------f/u with neurology as scheduled---------    Mixed hyperlipidemia    Hypertension, unspecified type---stable------------amlodipine-    DDD (degenerative disc disease), cervical--------    Weakness of both lower extremities    Edema, unspecified type    Other orders  -     hydroCHLOROthiazide (HYDRODIURIL) 12.5 MG Tab; Take 1 tablet (12.5 mg total) by mouth every 48 hours as needed.  Dispense: 30 tablet; Refill: 3      Stable-------------continue meds---------as above------------f/u 1 month--labs-

## 2022-10-05 ENCOUNTER — IMMUNIZATION (OUTPATIENT)
Dept: PRIMARY CARE CLINIC | Facility: CLINIC | Age: 77
End: 2022-10-05
Payer: MEDICARE

## 2022-10-05 DIAGNOSIS — Z23 NEED FOR VACCINATION: Primary | ICD-10-CM

## 2022-10-05 PROCEDURE — 0124A COVID-19, MRNA, LNP-S, BIVALENT BOOSTER, PF, 30 MCG/0.3 ML DOSE: CPT | Mod: CV19,PBBFAC | Performed by: FAMILY MEDICINE

## 2022-10-05 PROCEDURE — 91312 COVID-19, MRNA, LNP-S, BIVALENT BOOSTER, PF, 30 MCG/0.3 ML DOSE: CPT | Mod: PBBFAC | Performed by: FAMILY MEDICINE

## 2022-10-13 ENCOUNTER — TELEPHONE (OUTPATIENT)
Dept: FAMILY MEDICINE | Facility: CLINIC | Age: 77
End: 2022-10-13
Payer: MEDICARE

## 2022-10-13 NOTE — TELEPHONE ENCOUNTER
----- Message from iLnda Mcgrathlas sent at 10/13/2022  2:47 PM CDT -----  Contact: [facility] [place]/home health therapy  [facility] [place] is calling to report fall 10/12. Pt has no bruises or skin escobar but is in a lot of pain. Pain level is 6 and vital signs 118/68 Please call her back at [phone].          Thanks  DD

## 2022-10-16 ENCOUNTER — OFFICE VISIT (OUTPATIENT)
Dept: URGENT CARE | Facility: CLINIC | Age: 77
End: 2022-10-16
Payer: MEDICARE

## 2022-10-16 ENCOUNTER — HOSPITAL ENCOUNTER (OUTPATIENT)
Dept: RADIOLOGY | Facility: CLINIC | Age: 77
Discharge: HOME | End: 2022-10-16
Attending: NURSE PRACTITIONER
Payer: MEDICARE

## 2022-10-16 VITALS
TEMPERATURE: 97 F | HEIGHT: 62 IN | WEIGHT: 124 LBS | RESPIRATION RATE: 16 BRPM | SYSTOLIC BLOOD PRESSURE: 98 MMHG | OXYGEN SATURATION: 98 % | HEART RATE: 78 BPM | BODY MASS INDEX: 22.82 KG/M2 | DIASTOLIC BLOOD PRESSURE: 55 MMHG
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
## 2022-10-16 DIAGNOSIS — T14.8XXA BRUISING: ICD-10-CM

## 2022-10-16 DIAGNOSIS — S32.000A LUMBAR COMPRESSION FRACTURE, CLOSED, INITIAL ENCOUNTER: Primary | ICD-10-CM

## 2022-10-16 DIAGNOSIS — M54.50 ACUTE LEFT-SIDED LOW BACK PAIN WITHOUT SCIATICA: ICD-10-CM

## 2022-10-16 DIAGNOSIS — K59.01 CONSTIPATION BY DELAYED COLONIC TRANSIT: ICD-10-CM

## 2022-10-16 DIAGNOSIS — R60.9 EDEMA, UNSPECIFIED TYPE: ICD-10-CM

## 2022-10-16 PROCEDURE — 72100 XR LUMBAR SPINE 2 OR 3 VIEWS: ICD-10-PCS | Mod: S$GLB,,, | Performed by: RADIOLOGY

## 2022-10-16 PROCEDURE — 99215 PR OFFICE/OUTPT VISIT, EST, LEVL V, 40-54 MIN: ICD-10-PCS | Mod: S$GLB,,, | Performed by: NURSE PRACTITIONER

## 2022-10-16 PROCEDURE — 72100 X-RAY EXAM L-S SPINE 2/3 VWS: CPT | Mod: S$GLB,,, | Performed by: RADIOLOGY

## 2022-10-16 PROCEDURE — 99215 OFFICE O/P EST HI 40 MIN: CPT | Mod: S$GLB,,, | Performed by: NURSE PRACTITIONER

## 2022-10-16 NOTE — PATIENT INSTRUCTIONS
- You can take OTC Milk of magnesium.  - Drink plenty of fluids.    - Take over the counter dulcolax suppository- you can try two trials.- hold each in for AT LEAST 20 minutes.   - You can also do a fleets enema over the counter  - Once you have a bowel movement, take over the counter magnesium citrate and over the counter miralax for constipation. Do not take until you have a bowel movement after suppository/enema.       -If you experience no improvement in symptoms, go to ER.    - Follow up with your PCP or specialty clinic as directed in the next 1-2 weeks if not improved or as needed.  You can call [phone] to schedule an appointment with the appropriate provider.    - Go to the ED if your symptoms worsen.       A referral has be placed for you to follow up with Orthopedic. Someone should be contacting you soon to set up appointment. However, you may call [phone] at anytime to schedule this follow up appointment       Please follow up with your Primary care provider within 2-5 days if your signs and symptoms have not resolved or worsen.     If your condition worsens or fails to improve we recommend that you receive another evaluation at the emergency room immediately or contact your primary medical clinic to discuss your concerns.   You must understand that you have received an Urgent Care treatment only and that you may be released before all of your medical problems are known or treated. You, the patient, will arrange for follow up care as instructed.     RED FLAGS/WARNING SYMPTOMS DISCUSSED WITH PATIENT THAT WOULD WARRANT EMERGENT MEDICAL ATTENTION. PATIENT VERBALIZED UNDERSTANDING.

## 2022-10-16 NOTE — PROGRESS NOTES
"Subjective:       Patient ID: Mary Flanagan is a 77 y.o. female.    Vitals:  height is 5' 2" (1.575 m) and weight is 56.2 kg (124 lb). Her tympanic temperature is 96.8 °F (36 °C). Her blood pressure is 98/55 (abnormal) and her pulse is 78. Her respiration is 16 and oxygen saturation is 98%.     Chief Complaint: Back Pain    Patient presents with back pain from fall on Monday.  Patient had recent stroke labor day weekend. Still under therapy.  Patient states that she fell from standing position on carpet at home.      Daughter reports patient had back pain prior to the CVA that on labor day weekend.  Reports her back pain has "regressed because it is painful for her to try to walk now."  Daughter reports her mom has not be able to tolerate Physical Therapy this week.  Daughter reports she has DDD in her low back that she was suppose to have steroid injections for but was not able to get.  Reports she is started taking hydrocodone for the pain today    Patient rates pain 7/10 on numeric scale.  Reports the pain comes and goes and is sharp when she moves. Denies radiation.     Daughter also reports she has not been taking her fluid pills because her blood pressure has been low.  Patient reports she may drink 2 cups of water daily.    Back Pain  This is a new problem. The current episode started in the past 7 days (6). The problem occurs constantly. The problem has been gradually worsening since onset. The pain is present in the lumbar spine (left side). The pain is at a severity of 7/10. Pertinent negatives include no abdominal pain, bladder incontinence, bowel incontinence, chest pain, dysuria, fever, headaches, leg pain, numbness, paresis, paresthesias, pelvic pain, perianal numbness, tingling, weakness or weight loss. She has tried analgesics and muscle relaxant for the symptoms. The treatment provided mild relief.     Constitution: Negative for fever.   Cardiovascular:  Negative for chest pain.   Eyes:  Negative " for double vision.   Gastrointestinal:  Negative for abdominal pain and bowel incontinence.   Genitourinary:  Negative for dysuria, bladder incontinence and pelvic pain.   Musculoskeletal:  Positive for back pain.   Skin:  Positive for bruising.   Neurological:  Negative for headaches and numbness.     Objective:      Physical Exam   Constitutional: She appears well-developed.  Non-toxic appearance. She does not appear ill. No distress.   HENT:   Head: Normocephalic and atraumatic.   Ears:   Right Ear: External ear normal.   Left Ear: External ear normal.   Nose: Nose normal.   Eyes: Conjunctivae and EOM are normal.   Neck: Neck supple.   Cardiovascular: Normal rate, regular rhythm and normal heart sounds.   Pulmonary/Chest: Effort normal and breath sounds normal. No stridor. No respiratory distress. She has no rhonchi. She has no rales.   Abdominal: Normal appearance.   Musculoskeletal: Normal range of motion.         General: Normal range of motion.        Arms:       Right lower le+ Pitting Edema present.      Left lower le+ Pitting Edema present.      Comments: Bruising, mild edema, and tenderness upon palpation noted to this area   Neurological: no focal deficit. She is alert. She displays no weakness. Gait normal.   Skin: Skin is warm, dry, not diaphoretic, not pale and no rash.   Psychiatric: Her behavior is normal.     XR LUMBAR SPINE 2 OR 3 VIEWS    Result Date: 10/16/2022  EXAMINATION: XR LUMBAR SPINE 2 OR 3 VIEWS CLINICAL HISTORY: Unspecified fall, initial encounter TECHNIQUE: Lumbar spine, two views COMPARISON: MRI thoracic and lumbar spine from 2022 FINDINGS: Mild compression fracture of the superior endplate of L1, new when compared to prior MRI from 2022.  Remaining osseous structures are intact.  Scoliosis present.  Spinal alignment is otherwise maintained.  Multilevel facet arthropathy.  A few pelvic phleboliths noted.     Mild compression fracture of the superior endplate of L1  vertebral body, new when compared to most recent MRI lumbar spine of 09/03/2022. Electronically signed by: Torsten Everett MD Date:    10/16/2022 Time:    14:37        Assessment:       1. Lumbar compression fracture, closed, initial encounter    2. Acute left-sided low back pain without sciatica    3. Fall, initial encounter    4. Constipation by delayed colonic transit    5. Edema, unspecified type    6. Bruising          Plan:         Lumbar compression fracture, closed, initial encounter  -     Ambulatory referral/consult to Orthopedics    Acute left-sided low back pain without sciatica  -     XR LUMBAR SPINE 2 OR 3 VIEWS; Future; Expected date: 10/16/2022  -     Ambulatory referral/consult to Orthopedics    Fall, initial encounter  -     XR LUMBAR SPINE 2 OR 3 VIEWS; Future; Expected date: 10/16/2022  -     Ambulatory referral/consult to Orthopedics    Constipation by delayed colonic transit    Edema, unspecified type    Bruising       Discussed xray results with patient and daughter   Discussed purchasing OTC back brace and using when OOB/ambulating  Discussed elevating BLE above heart level  Discussed increasing hydration  with water and taking hydrochlorothiazide as prescribed  Discussed OTC MOM for relief of constipation and OTC colace as daily preventative  Discussed Ortho referral   Discussed ER precautions for constipation  Patient and daughter agreed with plan of care and verbalized understanding        Patient Instructions   - You can take OTC Milk of magnesium.  - Drink plenty of fluids.    - Take over the counter dulcolax suppository- you can try two trials.- hold each in for AT LEAST 20 minutes.   - You can also do a fleets enema over the counter  - Once you have a bowel movement, take over the counter magnesium citrate and over the counter miralax for constipation. Do not take until you have a bowel movement after suppository/enema.       -If you experience no improvement in symptoms, go to ER.     - Follow up with your PCP or specialty clinic as directed in the next 1-2 weeks if not improved or as needed.  You can call [phone] to schedule an appointment with the appropriate provider.    - Go to the ED if your symptoms worsen.       A referral has be placed for you to follow up with Orthopedic. Someone should be contacting you soon to set up appointment. However, you may call [phone] at anytime to schedule this follow up appointment       Please follow up with your Primary care provider within 2-5 days if your signs and symptoms have not resolved or worsen.     If your condition worsens or fails to improve we recommend that you receive another evaluation at the emergency room immediately or contact your primary medical clinic to discuss your concerns.   You must understand that you have received an Urgent Care treatment only and that you may be released before all of your medical problems are known or treated. You, the patient, will arrange for follow up care as instructed.     RED FLAGS/WARNING SYMPTOMS DISCUSSED WITH PATIENT THAT WOULD WARRANT EMERGENT MEDICAL ATTENTION. PATIENT VERBALIZED UNDERSTANDING.

## 2022-10-17 ENCOUNTER — TELEPHONE (OUTPATIENT)
Dept: FAMILY MEDICINE | Facility: CLINIC | Age: 77
End: 2022-10-17

## 2022-10-17 ENCOUNTER — OFFICE VISIT (OUTPATIENT)
Dept: FAMILY MEDICINE | Facility: CLINIC | Age: 77
End: 2022-10-17
Payer: MEDICARE

## 2022-10-17 ENCOUNTER — HOSPITAL ENCOUNTER (OUTPATIENT)
Dept: RADIOLOGY | Facility: HOSPITAL | Age: 77
Discharge: HOME | End: 2022-10-17
Attending: INTERNAL MEDICINE
Payer: MEDICARE

## 2022-10-17 VITALS
HEART RATE: 90 BPM | OXYGEN SATURATION: 98 % | DIASTOLIC BLOOD PRESSURE: 80 MMHG | RESPIRATION RATE: 18 BRPM | HEIGHT: 62 IN | SYSTOLIC BLOOD PRESSURE: 130 MMHG | TEMPERATURE: 98 F | BODY MASS INDEX: 22.68 KG/M2

## 2022-10-17 DIAGNOSIS — S32.010D COMPRESSION FRACTURE OF L1 VERTEBRA WITH ROUTINE HEALING: ICD-10-CM

## 2022-10-17 DIAGNOSIS — K59.00 CONSTIPATION, UNSPECIFIED CONSTIPATION TYPE: ICD-10-CM

## 2022-10-17 DIAGNOSIS — K59.00 CONSTIPATION, UNSPECIFIED CONSTIPATION TYPE: Primary | ICD-10-CM

## 2022-10-17 PROCEDURE — 74018 RADEX ABDOMEN 1 VIEW: CPT | Mod: TC,FY,PO

## 2022-10-17 PROCEDURE — 74018 XR ABDOMEN AP 1 VIEW: ICD-10-PCS | Mod: 26,,, | Performed by: RADIOLOGY

## 2022-10-17 PROCEDURE — 99214 OFFICE O/P EST MOD 30 MIN: CPT | Mod: PBBFAC,PO | Performed by: INTERNAL MEDICINE

## 2022-10-17 PROCEDURE — 74018 RADEX ABDOMEN 1 VIEW: CPT | Mod: 26,,, | Performed by: RADIOLOGY

## 2022-10-17 PROCEDURE — 99214 OFFICE O/P EST MOD 30 MIN: CPT | Mod: S$PBB,,, | Performed by: INTERNAL MEDICINE

## 2022-10-17 PROCEDURE — 99999 PR PBB SHADOW E&M-EST. PATIENT-LVL IV: CPT | Mod: PBBFAC,,, | Performed by: INTERNAL MEDICINE

## 2022-10-17 PROCEDURE — 99999 PR PBB SHADOW E&M-EST. PATIENT-LVL IV: ICD-10-PCS | Mod: PBBFAC,,, | Performed by: INTERNAL MEDICINE

## 2022-10-17 PROCEDURE — 99214 PR OFFICE/OUTPT VISIT, EST, LEVL IV, 30-39 MIN: ICD-10-PCS | Mod: S$PBB,,, | Performed by: INTERNAL MEDICINE

## 2022-10-17 NOTE — PROGRESS NOTES
Subjective:       Patient ID: Mary Flanagan is a 77 y.o. female.    Chief Complaint: Back Pain and Fall    F/u L1 compression fracture s/p fall-------------seen urgent care yesterday------------back pain.         Also has constipation------no abdominal pain -no N/V.           Back Pain  Associated symptoms include weakness. Pertinent negatives include no abdominal pain or fever.   Fall  Pertinent negatives include no abdominal pain, fever, nausea or vomiting.   Past Medical History:   Diagnosis Date    CKD (chronic kidney disease) stage 3, GFR 30-59 ml/min     Dry eyes     Gastritis     Hyperlipidemia     Hypertension     Insomnia     Osteopenia      Past Surgical History:   Procedure Laterality Date    BREAST BIOPSY Left     , benign     SECTION      x 1    COLONOSCOPY N/A 2022    Procedure: COLONOSCOPY;  Surgeon: Karina Beck MD;  Location: [facility];  Service: Endoscopy;  Laterality: N/A;    ESOPHAGOGASTRODUODENOSCOPY N/A 2022    Procedure: EGD (ESOPHAGOGASTRODUODENOSCOPY);  Surgeon: Karina Beck MD;  Location: [facility];  Service: Endoscopy;  Laterality: N/A;    KNEE ARTHROSCOPY Left  approx    KNEE SURGERY      left arm surgery      left knee surgery       Family History   Problem Relation Age of Onset    Hypertension Mother     Hypertension Father     Breast cancer Neg Hx     Colon cancer Neg Hx     Ovarian cancer Neg Hx     Thrombophilia Neg Hx     Strabismus Neg Hx     Macular degeneration Neg Hx     Retinal detachment Neg Hx     Glaucoma Neg Hx     Blindness Neg Hx     Amblyopia Neg Hx      Social History     Socioeconomic History    Marital status:     Number of children: 3   Occupational History    Occupation: retired   Tobacco Use    Smoking status: Never    Smokeless tobacco: Never   Substance and Sexual Activity    Alcohol use: No     Alcohol/week: 0.0 standard drinks    Drug use: No    Sexual activity: Not Currently     Partners: Male     Birth  control/protection: None     Comment: mut monog     Social Determinants of Health     Financial Resource Strain: Low Risk     Difficulty of Paying Living Expenses: Not hard at all   Food Insecurity: No Food Insecurity    Worried About Running Out of Food in the Last Year: Never true    Ran Out of Food in the Last Year: Never true   Transportation Needs: No Transportation Needs    Lack of Transportation (Medical): No    Lack of Transportation (Non-Medical): No   Physical Activity: Insufficiently Active    Days of Exercise per Week: 5 days    Minutes of Exercise per Session: 10 min   Stress: Stress Concern Present    Feeling of Stress : Very much   Social Connections: Moderately Integrated    Frequency of Communication with Friends and Family: Once a week    Frequency of Social Gatherings with Friends and Family: Once a week    Attends Nondenominational Services: More than 4 times per year    Active Member of Clubs or Organizations: Yes    Attends Club or Organization Meetings: 1 to 4 times per year    Marital Status:    Housing Stability: Low Risk     Unable to Pay for Housing in the Last Year: No    Number of Places Lived in the Last Year: 1    Unstable Housing in the Last Year: No     Review of Systems   Constitutional:  Negative for chills and fever.   HENT: Negative.     Respiratory:  Negative for apnea, cough, choking, chest tightness, shortness of breath, wheezing and stridor.    Cardiovascular:  Positive for leg swelling.   Gastrointestinal:  Negative for abdominal pain, nausea and vomiting.   Musculoskeletal:  Positive for back pain and gait problem.   Neurological:  Positive for weakness. Negative for dizziness.   Psychiatric/Behavioral:  Negative for agitation, behavioral problems and confusion.      Objective:      Physical Exam  Vitals and nursing note reviewed.   Constitutional:       General: She is not in acute distress.     Appearance: Normal appearance. She is well-developed. She is not diaphoretic.    HENT:      Head: Normocephalic and atraumatic.      Mouth/Throat:      Pharynx: No oropharyngeal exudate.   Eyes:      General: No scleral icterus.  Neck:      Thyroid: No thyromegaly.      Vascular: No carotid bruit or JVD.      Trachea: No tracheal deviation.   Cardiovascular:      Rate and Rhythm: Normal rate and regular rhythm.      Heart sounds: Normal heart sounds.   Pulmonary:      Effort: Pulmonary effort is normal. No respiratory distress.      Breath sounds: Normal breath sounds. No wheezing or rales.   Chest:      Chest wall: No tenderness.   Abdominal:      General: Bowel sounds are normal. There is no distension.      Palpations: Abdomen is soft.      Tenderness: There is no abdominal tenderness. There is no guarding or rebound.   Musculoskeletal:         General: No tenderness. Normal range of motion.      Cervical back: Normal range of motion and neck supple.   Lymphadenopathy:      Cervical: No cervical adenopathy.   Skin:     General: Skin is warm and dry.      Coloration: Skin is not pale.      Findings: No erythema or rash.   Neurological:      Mental Status: She is alert and oriented to person, place, and time.   Psychiatric:         Judgment: Judgment normal.       CMP  Sodium   Date Value Ref Range Status   09/05/2022 139 136 - 145 mmol/L Final     Potassium   Date Value Ref Range Status   09/05/2022 4.1 3.5 - 5.1 mmol/L Final     Chloride   Date Value Ref Range Status   09/05/2022 105 95 - 110 mmol/L Final     CO2   Date Value Ref Range Status   09/05/2022 22 (L) 23 - 29 mmol/L Final     Glucose   Date Value Ref Range Status   09/05/2022 103 70 - 110 mg/dL Final     BUN   Date Value Ref Range Status   09/05/2022 26 (H) 8 - 23 mg/dL Final     Creatinine   Date Value Ref Range Status   09/05/2022 1.1 0.5 - 1.4 mg/dL Final     Calcium   Date Value Ref Range Status   09/05/2022 9.4 8.7 - 10.5 mg/dL Final     Total Protein   Date Value Ref Range Status   09/05/2022 6.4 6.0 - 8.4 g/dL Final      Albumin   Date Value Ref Range Status   09/05/2022 3.7 3.5 - 5.2 g/dL Final     Total Bilirubin   Date Value Ref Range Status   09/05/2022 0.9 0.1 - 1.0 mg/dL Final     Comment:     For infants and newborns, interpretation of results should be based  on gestational age, weight and in agreement with clinical  observations.    Premature Infant recommended reference ranges:  Up to 24 hours.............<8.0 mg/dL  Up to 48 hours............<12.0 mg/dL  3-5 days..................<15.0 mg/dL  6-29 days.................<15.0 mg/dL       Alkaline Phosphatase   Date Value Ref Range Status   09/05/2022 76 55 - 135 U/L Final     AST   Date Value Ref Range Status   09/05/2022 25 10 - 40 U/L Final     ALT   Date Value Ref Range Status   09/05/2022 14 10 - 44 U/L Final     Anion Gap   Date Value Ref Range Status   09/05/2022 12 8 - 16 mmol/L Final     eGFR if    Date Value Ref Range Status   05/02/2022 57 (A) >60 mL/min/1.73 m^2 Final     eGFR if non    Date Value Ref Range Status   05/02/2022 49 (A) >60 mL/min/1.73 m^2 Final     Comment:     Calculation used to obtain the estimated glomerular filtration  rate (eGFR) is the CKD-EPI equation.        Lab Results   Component Value Date    WBC 9.82 09/05/2022    HGB 11.3 (L) 09/05/2022    HCT 35.8 (L) 09/05/2022    MCV 93 09/05/2022     09/05/2022     Lab Results   Component Value Date    CHOL 178 09/03/2022     Lab Results   Component Value Date    HDL 80 (H) 09/03/2022     Lab Results   Component Value Date    LDLCALC 88.8 09/03/2022     Lab Results   Component Value Date    TRIG 46 09/03/2022     Lab Results   Component Value Date    CHOLHDL 44.9 09/03/2022     Lab Results   Component Value Date    TSH 0.811 09/03/2022     Lab Results   Component Value Date    HGBA1C 5.4 09/03/2022     Assessment:       1. Constipation, unspecified constipation type    2. Compression fracture of L1 vertebra with routine healing          Plan:    Constipation, unspecified constipation type----------------MOM with dulcolax supp--------  -     X-Ray Abdomen AP 1 View; Future; Expected date: 10/17/2022    Compression fracture of L1 vertebra with routine healing---------------tylenol prn.         Norco if needed prn---------   -     X-Ray Abdomen AP 1 View; Future; Expected date: 10/17/2022  -     Ambulatory referral/consult to Pain Clinic; Future; Expected date: 10/24/2022    As above----------------f/u 2 weeks----------go to er for worsening symptoms----------

## 2022-10-18 ENCOUNTER — TELEPHONE (OUTPATIENT)
Dept: PAIN MEDICINE | Facility: CLINIC | Age: 77
End: 2022-10-18
Payer: MEDICARE

## 2022-10-19 ENCOUNTER — TELEPHONE (OUTPATIENT)
Dept: PAIN MEDICINE | Facility: CLINIC | Age: 77
End: 2022-10-19
Payer: MEDICARE

## 2022-10-20 RX ORDER — PREGABALIN 25 MG/1
25 CAPSULE ORAL 2 TIMES DAILY
Qty: 60 CAPSULE | Refills: 6
Start: 2022-10-20 | End: 2023-04-20

## 2022-10-20 NOTE — TELEPHONE ENCOUNTER
No new care gaps identified.  [facility] Embedded Care Gaps. Reference number: [id number]. 10/20/2022   3:24:22 PM CDT

## 2022-10-20 NOTE — TELEPHONE ENCOUNTER
----- Message from Lashawn Mcknight sent at 10/20/2022  2:15 PM CDT -----  Contact: Rohit/  .Type:  RX Refill Request    Who Called: Rohit/  Refill or New Rx:refill  RX Name and Strength:pregabalin (LYRICA) 25 MG capsule  atorvastatin (LIPITOR) 80 MG tablet  How is the patient currently taking it? (ex. 1XDay):as prescribed   Is this a 30 day or 90 day RX:90  Preferred Pharmacy with phone number:.  [id number] - [place], [place] - [address] AT [address] [address] [place] [address]  Phone: [phone] Fax: [phone]  Local or Mail Order:local  Ordering Provider:Dr. Hill  Would the patient rather a call back or a response via [facility]? call  Best Call Back Number:[phone]   Additional Information:   Thanks  LR

## 2022-10-21 RX ORDER — ATORVASTATIN CALCIUM 80 MG/1
80 TABLET, FILM COATED ORAL DAILY
Qty: 30 TABLET | Refills: 0
Start: 2022-10-21 | End: 2022-10-25 | Stop reason: SDUPTHER

## 2022-10-21 NOTE — TELEPHONE ENCOUNTER
No new care gaps identified.  Health system Embedded Care Gaps. Reference number: [id number]. 10/21/2022   11:28:15 AM LUZT

## 2022-10-24 ENCOUNTER — TELEPHONE (OUTPATIENT)
Dept: FAMILY MEDICINE | Facility: CLINIC | Age: 77
End: 2022-10-24
Payer: MEDICARE

## 2022-10-24 NOTE — TELEPHONE ENCOUNTER
----- Message from Kita Marquez sent at 10/24/2022  9:05 AM CDT -----  Contact: Rohit()  Rohit called to consult with nurse or staff regarding the patients atorvastatin. He states the patient is out and this medication was not called in to the pharmacy. He would like to know the status of this request and can be reached at [phone]. Thanks/MR

## 2022-10-25 RX ORDER — ATORVASTATIN CALCIUM 80 MG/1
80 TABLET, FILM COATED ORAL DAILY
Qty: 90 TABLET | Refills: 3 | Status: SHIPPED | OUTPATIENT
Start: 2022-10-25 | End: 2022-11-21

## 2022-10-25 NOTE — TELEPHONE ENCOUNTER
----- Message from Paty Soto sent at 10/25/2022 12:13 PM CDT -----  Contact: Jazmine Lucero with [employer] is requesting a callback from the nurse in regards to a refill for RX: Atorvastatin please.  Several calls     Jazmine can be reached at [phone]     Thanks

## 2022-10-31 ENCOUNTER — TELEPHONE (OUTPATIENT)
Dept: PAIN MEDICINE | Facility: CLINIC | Age: 77
End: 2022-10-31
Payer: MEDICARE

## 2022-11-01 ENCOUNTER — TELEPHONE (OUTPATIENT)
Dept: ADMINISTRATIVE | Facility: HOSPITAL | Age: 77
End: 2022-11-01
Payer: MEDICARE

## 2022-11-03 ENCOUNTER — TELEPHONE (OUTPATIENT)
Dept: PAIN MEDICINE | Facility: CLINIC | Age: 77
End: 2022-11-03
Payer: MEDICARE

## 2022-11-03 ENCOUNTER — OFFICE VISIT (OUTPATIENT)
Dept: FAMILY MEDICINE | Facility: CLINIC | Age: 77
End: 2022-11-03
Payer: MEDICARE

## 2022-11-03 VITALS
WEIGHT: 115.06 LBS | RESPIRATION RATE: 16 BRPM | BODY MASS INDEX: 21.05 KG/M2 | HEART RATE: 78 BPM | SYSTOLIC BLOOD PRESSURE: 134 MMHG | DIASTOLIC BLOOD PRESSURE: 86 MMHG | TEMPERATURE: 98 F | OXYGEN SATURATION: 99 %

## 2022-11-03 DIAGNOSIS — I63.9: ICD-10-CM

## 2022-11-03 DIAGNOSIS — S32.010D COMPRESSION FRACTURE OF L1 VERTEBRA WITH ROUTINE HEALING: Primary | ICD-10-CM

## 2022-11-03 DIAGNOSIS — K59.00 CONSTIPATION, UNSPECIFIED CONSTIPATION TYPE: ICD-10-CM

## 2022-11-03 PROCEDURE — 99214 PR OFFICE/OUTPT VISIT, EST, LEVL IV, 30-39 MIN: ICD-10-PCS | Mod: S$PBB,,, | Performed by: INTERNAL MEDICINE

## 2022-11-03 PROCEDURE — 99999 PR PBB SHADOW E&M-EST. PATIENT-LVL IV: CPT | Mod: PBBFAC,,, | Performed by: INTERNAL MEDICINE

## 2022-11-03 PROCEDURE — G0008 ADMIN INFLUENZA VIRUS VAC: HCPCS | Mod: PBBFAC,PO

## 2022-11-03 PROCEDURE — 99214 OFFICE O/P EST MOD 30 MIN: CPT | Mod: S$PBB,,, | Performed by: INTERNAL MEDICINE

## 2022-11-03 PROCEDURE — 99999 PR PBB SHADOW E&M-EST. PATIENT-LVL IV: ICD-10-PCS | Mod: PBBFAC,,, | Performed by: INTERNAL MEDICINE

## 2022-11-03 PROCEDURE — 99214 OFFICE O/P EST MOD 30 MIN: CPT | Mod: PBBFAC,PO | Performed by: INTERNAL MEDICINE

## 2022-11-03 NOTE — PROGRESS NOTES
Subjective:       Patient ID: Mary Flanagan is a 77 y.o. female.    Chief Complaint: Follow-up (1m ) and Back Pain    L1 compression fracture------------pain improving---------------using tylenol prn.        Constipation under control----------    Follow-up  Associated symptoms include arthralgias and myalgias. Pertinent negatives include no abdominal pain, chest pain, chills, congestion, coughing, diaphoresis, fatigue, fever, headaches, joint swelling, nausea, neck pain, numbness, rash, sore throat, vomiting or weakness.   Back Pain  Pertinent negatives include no abdominal pain, chest pain, dysuria, fever, headaches, numbness, pelvic pain or weakness.   Past Medical History:   Diagnosis Date    CKD (chronic kidney disease) stage 3, GFR 30-59 ml/min     Dry eyes     Gastritis     Hyperlipidemia     Hypertension     Insomnia     Osteopenia      Past Surgical History:   Procedure Laterality Date    BREAST BIOPSY Left     , benign     SECTION      x 1    COLONOSCOPY N/A 2022    Procedure: COLONOSCOPY;  Surgeon: Karina Beck MD;  Location[facility];  Service: Endoscopy;  Laterality: N/A;    ESOPHAGOGASTRODUODENOSCOPY N/A 2022    Procedure: EGD (ESOPHAGOGASTRODUODENOSCOPY);  Surgeon: Karina Beck MD;  Location: [facility];  Service: Endoscopy;  Laterality: N/A;    KNEE ARTHROSCOPY Left  approx    KNEE SURGERY      left arm surgery      left knee surgery       Family History   Problem Relation Age of Onset    Hypertension Mother     Hypertension Father     Breast cancer Neg Hx     Colon cancer Neg Hx     Ovarian cancer Neg Hx     Thrombophilia Neg Hx     Strabismus Neg Hx     Macular degeneration Neg Hx     Retinal detachment Neg Hx     Glaucoma Neg Hx     Blindness Neg Hx     Amblyopia Neg Hx      Social History     Socioeconomic History    Marital status:     Number of children: 3   Occupational History    Occupation: retired   Tobacco Use    Smoking status: Never    Smokeless  tobacco: Never   Substance and Sexual Activity    Alcohol use: No     Alcohol/week: 0.0 standard drinks    Drug use: No    Sexual activity: Not Currently     Partners: Male     Birth control/protection: None     Comment: mut monog     Social Determinants of Health     Financial Resource Strain: Low Risk     Difficulty of Paying Living Expenses: Not hard at all   Food Insecurity: No Food Insecurity    Worried About Running Out of Food in the Last Year: Never true    Ran Out of Food in the Last Year: Never true   Transportation Needs: No Transportation Needs    Lack of Transportation (Medical): No    Lack of Transportation (Non-Medical): No   Physical Activity: Insufficiently Active    Days of Exercise per Week: 5 days    Minutes of Exercise per Session: 10 min   Stress: Stress Concern Present    Feeling of Stress : Very much   Social Connections: Moderately Integrated    Frequency of Communication with Friends and Family: Once a week    Frequency of Social Gatherings with Friends and Family: Once a week    Attends Evangelical Services: More than 4 times per year    Active Member of Clubs or Organizations: Yes    Attends Club or Organization Meetings: 1 to 4 times per year    Marital Status:    Housing Stability: Low Risk     Unable to Pay for Housing in the Last Year: No    Number of Places Lived in the Last Year: 1    Unstable Housing in the Last Year: No     Review of Systems   Constitutional:  Negative for activity change, appetite change, chills, diaphoresis, fatigue, fever and unexpected weight change.   HENT:  Negative for congestion, drooling, ear discharge, ear pain, facial swelling, hearing loss, mouth sores, nosebleeds, postnasal drip, rhinorrhea, sinus pressure, sneezing, sore throat, tinnitus, trouble swallowing and voice change.    Eyes:  Negative for photophobia, redness and visual disturbance.   Respiratory:  Negative for apnea, cough, choking, chest tightness, shortness of breath and wheezing.     Cardiovascular:  Negative for chest pain and palpitations.   Gastrointestinal:  Negative for abdominal distention, abdominal pain, blood in stool, constipation, diarrhea, nausea and vomiting.   Endocrine: Negative for cold intolerance, heat intolerance, polydipsia, polyphagia and polyuria.   Genitourinary:  Negative for decreased urine volume, difficulty urinating, dysuria, flank pain, frequency, genital sores, hematuria, pelvic pain and urgency.   Musculoskeletal:  Positive for arthralgias, back pain, gait problem and myalgias. Negative for joint swelling, neck pain and neck stiffness.   Skin:  Negative for color change, pallor, rash and wound.   Allergic/Immunologic: Negative for food allergies and immunocompromised state.   Neurological:  Negative for dizziness, tremors, seizures, syncope, speech difficulty, weakness, light-headedness, numbness and headaches.   Hematological:  Negative for adenopathy. Does not bruise/bleed easily.   Psychiatric/Behavioral:  Negative for agitation, behavioral problems, confusion, decreased concentration, dysphoric mood, hallucinations, self-injury, sleep disturbance and suicidal ideas. The patient is not nervous/anxious and is not hyperactive.    All other systems reviewed and are negative.    Objective:      Physical Exam  Vitals and nursing note reviewed.   Constitutional:       General: She is not in acute distress.     Appearance: Normal appearance. She is well-developed. She is not diaphoretic.   HENT:      Head: Normocephalic and atraumatic.      Mouth/Throat:      Pharynx: No oropharyngeal exudate.   Eyes:      General: No scleral icterus.  Neck:      Thyroid: No thyromegaly.      Vascular: No carotid bruit or JVD.      Trachea: No tracheal deviation.   Cardiovascular:      Rate and Rhythm: Normal rate and regular rhythm.      Heart sounds: Normal heart sounds.   Pulmonary:      Effort: Pulmonary effort is normal. No respiratory distress.      Breath sounds: Normal breath  sounds. No wheezing or rales.   Chest:      Chest wall: No tenderness.   Abdominal:      General: Bowel sounds are normal. There is no distension.      Palpations: Abdomen is soft.      Tenderness: There is no abdominal tenderness. There is no guarding or rebound.   Musculoskeletal:         General: No tenderness. Normal range of motion.      Cervical back: Normal range of motion and neck supple.   Lymphadenopathy:      Cervical: No cervical adenopathy.   Skin:     General: Skin is warm and dry.      Coloration: Skin is not pale.      Findings: No erythema or rash.   Neurological:      Mental Status: She is alert and oriented to person, place, and time.   Psychiatric:         Behavior: Behavior normal.         Thought Content: Thought content normal.         Judgment: Judgment normal.       CMP  Sodium   Date Value Ref Range Status   09/05/2022 139 136 - 145 mmol/L Final     Potassium   Date Value Ref Range Status   09/05/2022 4.1 3.5 - 5.1 mmol/L Final     Chloride   Date Value Ref Range Status   09/05/2022 105 95 - 110 mmol/L Final     CO2   Date Value Ref Range Status   09/05/2022 22 (L) 23 - 29 mmol/L Final     Glucose   Date Value Ref Range Status   09/05/2022 103 70 - 110 mg/dL Final     BUN   Date Value Ref Range Status   09/05/2022 26 (H) 8 - 23 mg/dL Final     Creatinine   Date Value Ref Range Status   09/05/2022 1.1 0.5 - 1.4 mg/dL Final     Calcium   Date Value Ref Range Status   09/05/2022 9.4 8.7 - 10.5 mg/dL Final     Total Protein   Date Value Ref Range Status   09/05/2022 6.4 6.0 - 8.4 g/dL Final     Albumin   Date Value Ref Range Status   09/05/2022 3.7 3.5 - 5.2 g/dL Final     Total Bilirubin   Date Value Ref Range Status   09/05/2022 0.9 0.1 - 1.0 mg/dL Final     Comment:     For infants and newborns, interpretation of results should be based  on gestational age, weight and in agreement with clinical  observations.    Premature Infant recommended reference ranges:  Up to 24 hours.............<8.0  mg/dL  Up to 48 hours............<12.0 mg/dL  3-5 days..................<15.0 mg/dL  6-29 days.................<15.0 mg/dL       Alkaline Phosphatase   Date Value Ref Range Status   09/05/2022 76 55 - 135 U/L Final     AST   Date Value Ref Range Status   09/05/2022 25 10 - 40 U/L Final     ALT   Date Value Ref Range Status   09/05/2022 14 10 - 44 U/L Final     Anion Gap   Date Value Ref Range Status   09/05/2022 12 8 - 16 mmol/L Final     eGFR if    Date Value Ref Range Status   05/02/2022 57 (A) >60 mL/min/1.73 m^2 Final     eGFR if non    Date Value Ref Range Status   05/02/2022 49 (A) >60 mL/min/1.73 m^2 Final     Comment:     Calculation used to obtain the estimated glomerular filtration  rate (eGFR) is the CKD-EPI equation.        Lab Results   Component Value Date    WBC 9.82 09/05/2022    HGB 11.3 (L) 09/05/2022    HCT 35.8 (L) 09/05/2022    MCV 93 09/05/2022     09/05/2022     Lab Results   Component Value Date    CHOL 178 09/03/2022     Lab Results   Component Value Date    HDL 80 (H) 09/03/2022     Lab Results   Component Value Date    LDLCALC 88.8 09/03/2022     Lab Results   Component Value Date    TRIG 46 09/03/2022     Lab Results   Component Value Date    CHOLHDL 44.9 09/03/2022     Lab Results   Component Value Date    TSH 0.811 09/03/2022     Lab Results   Component Value Date    HGBA1C 5.4 09/03/2022     Assessment:       1. Compression fracture of L1 vertebra with routine healing    2. Constipation, unspecified constipation type    3. Acute cerebrovascular accident (CVA) of basal ganglia          Plan:   Compression fracture of L1 vertebra with routine healing------------tylenol prn-----------  -     Ambulatory referral/consult to Pain Clinic; Future; Expected date: 11/10/2022    Constipation, unspecified constipation type-------MOM, fluids-    Acute cerebrovascular accident (CVA) of basal ganglia----asa, bb,  statin-----      Stable--------------------continue meds----------as above----------------f/u 3 months-

## 2022-11-04 ENCOUNTER — TELEPHONE (OUTPATIENT)
Dept: PAIN MEDICINE | Facility: CLINIC | Age: 77
End: 2022-11-04
Payer: MEDICARE

## 2022-11-07 ENCOUNTER — TELEPHONE (OUTPATIENT)
Dept: PAIN MEDICINE | Facility: CLINIC | Age: 77
End: 2022-11-07
Payer: MEDICARE

## 2022-11-08 ENCOUNTER — TELEPHONE (OUTPATIENT)
Dept: FAMILY MEDICINE | Facility: CLINIC | Age: 77
End: 2022-11-08
Payer: MEDICARE

## 2022-11-08 NOTE — TELEPHONE ENCOUNTER
----- Message from Khris Haro sent at 11/8/2022  9:47 AM CST -----  Contact: Karon JOHNSON/ Jesse [phone]  He needs a verbal order for continued PT for an additional month.    Thank you

## 2022-11-15 ENCOUNTER — TELEPHONE (OUTPATIENT)
Dept: FAMILY MEDICINE | Facility: CLINIC | Age: 77
End: 2022-11-15
Payer: MEDICARE

## 2022-11-15 NOTE — TELEPHONE ENCOUNTER
Spoke to Jazmine with [facility]  and she states that Mrs Hernandez has increased weakness and her  believes its from the Lipitor 80 that she is taking so he started to give it to her every other day.  She is just wanting to know if this can be the cause and if its alright to give it to her every other day?

## 2022-11-15 NOTE — TELEPHONE ENCOUNTER
----- Message from Luisa Goodman sent at 11/15/2022  1:58 PM CST -----  Contact: [email or web address]  Jazmine the home health nurse is calling to speak with the nurse regarding medication. Reports having questions and concerns about the medication atorvastatin (LIPITOR) 80 MG tablet. Please give jazmine a call back at [phone]  [place]

## 2022-11-19 ENCOUNTER — DOCUMENT SCAN (OUTPATIENT)
Dept: HOME HEALTH SERVICES | Facility: HOSPITAL | Age: 77
End: 2022-11-19
Payer: MEDICARE

## 2022-11-21 ENCOUNTER — TELEPHONE (OUTPATIENT)
Dept: FAMILY MEDICINE | Facility: CLINIC | Age: 77
End: 2022-11-21
Payer: MEDICARE

## 2022-11-21 RX ORDER — ATORVASTATIN CALCIUM 40 MG/1
40 TABLET, FILM COATED ORAL DAILY
Qty: 90 TABLET | Refills: 3 | Status: SHIPPED | OUTPATIENT
Start: 2022-11-21 | End: 2023-12-07

## 2022-11-21 NOTE — TELEPHONE ENCOUNTER
----- Message from Monika Gonzalez sent at 11/21/2022 10:59 AM CST -----  Contact: Home Health Nurse  Request a return call on this pt, no additional info given and can be reached at [phone]///thxMW

## 2022-11-21 NOTE — TELEPHONE ENCOUNTER
----- Message from Ayde Escalante MA sent at 11/21/2022 11:36 AM CST -----  Spoke to Jazmine with [facility]  and she states that Mrs Hernandez has increased weakness and her  believes its from the Lipitor 80 that she is taking so he started to give it to her every other day.  She is just wanting to know if this can be the cause and if its alright to give it to her every other day?    Spoke with [facility] and she stated that  has been giving her half of the 80mgs and the weakness and loose stool has gotten better.  She is asking if you want her on the 80 or call in a new script of 40.  Please advise

## 2022-11-29 ENCOUNTER — TELEPHONE (OUTPATIENT)
Dept: FAMILY MEDICINE | Facility: CLINIC | Age: 77
End: 2022-11-29
Payer: MEDICARE

## 2022-11-29 ENCOUNTER — HOSPITAL ENCOUNTER (OUTPATIENT)
Dept: RADIOLOGY | Facility: CLINIC | Age: 77
Discharge: HOME | End: 2022-11-29
Payer: MEDICARE

## 2022-11-29 ENCOUNTER — OFFICE VISIT (OUTPATIENT)
Dept: URGENT CARE | Facility: CLINIC | Age: 77
End: 2022-11-29
Payer: MEDICARE

## 2022-11-29 VITALS
HEART RATE: 85 BPM | OXYGEN SATURATION: 100 % | TEMPERATURE: 98 F | HEIGHT: 62 IN | WEIGHT: 115 LBS | BODY MASS INDEX: 21.16 KG/M2 | RESPIRATION RATE: 16 BRPM | DIASTOLIC BLOOD PRESSURE: 66 MMHG | SYSTOLIC BLOOD PRESSURE: 134 MMHG

## 2022-11-29 DIAGNOSIS — S32.010D COMPRESSION FRACTURE OF L1 VERTEBRA WITH ROUTINE HEALING: ICD-10-CM

## 2022-11-29 DIAGNOSIS — S32.010G COMPRESSION FRACTURE OF L1 VERTEBRA WITH DELAYED HEALING, SUBSEQUENT ENCOUNTER: Primary | ICD-10-CM

## 2022-11-29 DIAGNOSIS — M54.50 ACUTE BILATERAL LOW BACK PAIN WITHOUT SCIATICA: ICD-10-CM

## 2022-11-29 PROCEDURE — 72100 X-RAY EXAM L-S SPINE 2/3 VWS: CPT | Mod: S$GLB,,, | Performed by: RADIOLOGY

## 2022-11-29 PROCEDURE — 72100 XR LUMBAR SPINE 2 OR 3 VIEWS: ICD-10-PCS | Mod: S$GLB,,, | Performed by: RADIOLOGY

## 2022-11-29 PROCEDURE — 99214 PR OFFICE/OUTPT VISIT, EST, LEVL IV, 30-39 MIN: ICD-10-PCS | Mod: S$GLB,,,

## 2022-11-29 PROCEDURE — 99214 OFFICE O/P EST MOD 30 MIN: CPT | Mod: S$GLB,,,

## 2022-11-29 NOTE — TELEPHONE ENCOUNTER
----- Message from Jorge Alva sent at 11/29/2022  8:13 AM CST -----  Contact: Mary  Patient is calling to speak with the office regarding orders. Reports patient having a fall and back cedric/ problems and would like orders to be placed for a x-ray. Please give patient a call back at [phone].

## 2022-11-29 NOTE — TELEPHONE ENCOUNTER
Spoke with pt.  She states that she fell yesterday and hurt her back when she went down.  She is still very weak.  She is asking if she can go have an xray done just to make sure nothing is broken.

## 2022-11-29 NOTE — PROGRESS NOTES
"Subjective:       Patient ID: Mary Flanagan is a 77 y.o. female.    Vitals:  height is 5' 2" (1.575 m) and weight is 52.2 kg (115 lb). Her tympanic temperature is 98.4 °F (36.9 °C). Her blood pressure is 134/66 and her pulse is 85. Her respiration is 16 and oxygen saturation is 100%.     Chief Complaint: Back Pain    Mary Flanagan is presenting to urgent care with complaint of back pain. She reports she fell off bed on 11/27, and hurt back. Patient reports she has had multiple reoccurrence of falls, and back pain prior to CVA. She is still undergoing therapy for a L1 compression fracture.    Back Pain  This is a chronic problem. The current episode started in the past 7 days. The symptoms are aggravated by bending, lying down and sitting. Pertinent negatives include no abdominal pain, bowel incontinence, tingling or weakness. Risk factors include recent trauma.   Gastrointestinal:  Negative for abdominal pain and bowel incontinence.   Musculoskeletal:  Positive for back pain.     Objective:      Physical Exam   Constitutional: She is oriented to person, place, and time. She appears well-developed. She is cooperative. No distress.   HENT:   Head: Normocephalic and atraumatic.   Nose: Nose normal.   Mouth/Throat: Oropharynx is clear and moist and mucous membranes are normal.   Eyes: Conjunctivae and lids are normal.   Neck: Trachea normal and phonation normal. Neck supple.   Cardiovascular: Normal rate, regular rhythm, normal heart sounds and normal pulses.   Pulmonary/Chest: Effort normal and breath sounds normal.   Abdominal: Normal appearance and bowel sounds are normal. She exhibits no mass. Soft.   Musculoskeletal:         General: No deformity.        Back:       Comments: Diffuse tenderness in indicated region. Bony tenderness in addition to paraspinal tenderness  Patient sitting in wheelchair and using this to get around in clinic   Neurological: She is alert and oriented to person, place, and time. She " has normal strength and normal reflexes. No sensory deficit.   Skin: Skin is warm, dry, intact and not diaphoretic.   Psychiatric: Her speech is normal and behavior is normal. Judgment and thought content normal.   Nursing note and vitals reviewed.      XR LUMBAR SPINE 2 OR 3 VIEWS    Result Date: 11/29/2022  EXAMINATION: XR LUMBAR SPINE 2 OR 3 VIEWS CLINICAL HISTORY: Low back pain, unspecified TECHNIQUE: Two views lumbar spine are obtained. COMPARISON: 10/16/2022 FINDINGS: Compression deformity is present of the L1 vertebral body, progressed since 10/16/2022.  Multilevel multifactorial degenerative changes are present.  Slight retrolisthesis of L3 on L4 and L4 on L5.  Paravertebral soft tissues are unremarkable.  Levoscoliosis.     Interval worsening of compression fracture of L1 in the interval. Levoscoliosis. Multilevel multifactorial degenerative changes are present. Electronically signed by: Moses Mendez Date:    11/29/2022 Time:    18:31     Assessment:       1. Compression fracture of L1 vertebra with delayed healing, subsequent encounter    2. Acute bilateral low back pain without sciatica          Plan:         Discussed xray findings with patient. Discussed ortho referral in place in our system vs following up with pain management referral from Dr. Hill. Does appear that Dr. Hill has been following patient for her initial compression fx diagnosed mid October.     Compression fracture of L1 vertebra with delayed healing, subsequent encounter  -     XR LUMBAR SPINE 2 OR 3 VIEWS; Future; Expected date: 11/29/2022    Acute bilateral low back pain without sciatica  -     XR LUMBAR SPINE 2 OR 3 VIEWS; Future; Expected date: 11/29/2022       Patient Instructions   Follow back up with Dr. Hill as soon as possible and/or ORTHOPEDICS. There is an orthopedic referral in the system.     General Referral to [facility]   You were referred to [facility] for follow-up care on your  condition.    Please call [phone] ([place]) - [place] - [phone] ([place])  to schedule your appointment.    Please go to the Emergency Department for any concerns or worsening of condition.  Please follow up with your primary care doctor or specialist in the next 48-72hrs as needed.    If you  smoke, please stop smoking.    If you have severe or uncontrolled pain please go to the ED for further evaluation.         XRAY REPORT:     Narrative & Impression  EXAMINATION:  XR LUMBAR SPINE 2 OR 3 VIEWS     CLINICAL HISTORY:  Low back pain, unspecified     TECHNIQUE:  Two views lumbar spine are obtained.     COMPARISON:  10/16/2022     FINDINGS:  Compression deformity is present of the L1 vertebral body, progressed since 10/16/2022.  Multilevel multifactorial degenerative changes are present.  Slight retrolisthesis of L3 on L4 and L4 on L5.  Paravertebral soft tissues are unremarkable.  Levoscoliosis.     Impression:     Interval worsening of compression fracture of L1 in the interval.     Levoscoliosis.     Multilevel multifactorial degenerative changes are present.        Electronically signed by: Moses Mendez  Date:                                            11/29/2022  Time:                                           18:31

## 2022-11-30 ENCOUNTER — TELEPHONE (OUTPATIENT)
Dept: PAIN MEDICINE | Facility: CLINIC | Age: 77
End: 2022-11-30
Payer: MEDICARE

## 2022-11-30 ENCOUNTER — TELEPHONE (OUTPATIENT)
Dept: FAMILY MEDICINE | Facility: CLINIC | Age: 77
End: 2022-11-30
Payer: MEDICARE

## 2022-11-30 DIAGNOSIS — S32.010A CLOSED COMPRESSION FRACTURE OF BODY OF L1 VERTEBRA: Primary | ICD-10-CM

## 2022-11-30 NOTE — PATIENT INSTRUCTIONS
Follow back up with Dr. Hill as soon as possible and/or ORTHOPEDICS. There is an orthopedic referral in the system.     General Referral to [facility]   You were referred to [facility] for follow-up care on your condition.    Please call [phone] ([place]) - [place] - [phone] ([place])  to schedule your appointment.    Please go to the Emergency Department for any concerns or worsening of condition.  Please follow up with your primary care doctor or specialist in the next 48-72hrs as needed.    If you  smoke, please stop smoking.    If you have severe or uncontrolled pain please go to the ED for further evaluation.         XRAY REPORT:     Narrative & Impression  EXAMINATION:  XR LUMBAR SPINE 2 OR 3 VIEWS     CLINICAL HISTORY:  Low back pain, unspecified     TECHNIQUE:  Two views lumbar spine are obtained.     COMPARISON:  10/16/2022     FINDINGS:  Compression deformity is present of the L1 vertebral body, progressed since 10/16/2022.  Multilevel multifactorial degenerative changes are present.  Slight retrolisthesis of L3 on L4 and L4 on L5.  Paravertebral soft tissues are unremarkable.  Levoscoliosis.     Impression:     Interval worsening of compression fracture of L1 in the interval.     Levoscoliosis.     Multilevel multifactorial degenerative changes are present.        Electronically signed by: Moses Mnedez  Date:                                            11/29/2022  Time:                                           18:31

## 2022-11-30 NOTE — TELEPHONE ENCOUNTER
Pain clinic consult---for compression fracture L1---------------------------has neurosurgery appt instead of ortho-------

## 2022-12-01 ENCOUNTER — TELEPHONE (OUTPATIENT)
Dept: PAIN MEDICINE | Facility: CLINIC | Age: 77
End: 2022-12-01
Payer: MEDICARE

## 2022-12-02 ENCOUNTER — TELEPHONE (OUTPATIENT)
Dept: PAIN MEDICINE | Facility: CLINIC | Age: 77
End: 2022-12-02
Payer: MEDICARE

## 2022-12-02 ENCOUNTER — TELEPHONE (OUTPATIENT)
Dept: FAMILY MEDICINE | Facility: CLINIC | Age: 77
End: 2022-12-02
Payer: MEDICARE

## 2022-12-02 NOTE — TELEPHONE ENCOUNTER
----- Message from Pili Gay sent at 12/2/2022  8:16 AM CST -----  Contact: Rohit ()  Rohit stated that he rec'd a call yesterday from Pain Mgmt Office to schedule pt a appt, stated he doesn't know why she is being referred and needs to speak to someone, advised him that pcp sent a referral, says he is not understanding the reasoning, Tried to explain to him that pcp was in clinic, but wanted to speak to pcp . Please call him back [phone]

## 2022-12-05 ENCOUNTER — TELEPHONE (OUTPATIENT)
Dept: ADMINISTRATIVE | Facility: HOSPITAL | Age: 77
End: 2022-12-05
Payer: MEDICARE

## 2022-12-05 PROBLEM — I63.9 ACUTE CEREBROVASCULAR ACCIDENT (CVA) OF BASAL GANGLIA: Status: RESOLVED | Noted: 2022-09-03 | Resolved: 2022-12-05

## 2022-12-06 ENCOUNTER — OFFICE VISIT (OUTPATIENT)
Dept: ORTHOPEDICS | Facility: CLINIC | Age: 77
End: 2022-12-06
Payer: MEDICARE

## 2022-12-06 VITALS — WEIGHT: 115 LBS | BODY MASS INDEX: 21.16 KG/M2 | HEIGHT: 62 IN

## 2022-12-06 DIAGNOSIS — M80.08XA AGE-RELATED OSTEOPOROSIS WITH CURRENT PATHOLOGICAL FRACTURE, VERTEBRA(E), INITIAL ENCOUNTER FOR FRACTURE: Primary | ICD-10-CM

## 2022-12-06 PROCEDURE — 99999 PR PBB SHADOW E&M-EST. PATIENT-LVL III: ICD-10-PCS | Mod: PBBFAC,,, | Performed by: STUDENT IN AN ORGANIZED HEALTH CARE EDUCATION/TRAINING PROGRAM

## 2022-12-06 PROCEDURE — 99213 OFFICE O/P EST LOW 20 MIN: CPT | Mod: PBBFAC | Performed by: STUDENT IN AN ORGANIZED HEALTH CARE EDUCATION/TRAINING PROGRAM

## 2022-12-06 PROCEDURE — 99213 PR OFFICE/OUTPT VISIT, EST, LEVL III, 20-29 MIN: ICD-10-PCS | Mod: S$PBB,,, | Performed by: STUDENT IN AN ORGANIZED HEALTH CARE EDUCATION/TRAINING PROGRAM

## 2022-12-06 PROCEDURE — 99213 OFFICE O/P EST LOW 20 MIN: CPT | Mod: S$PBB,,, | Performed by: STUDENT IN AN ORGANIZED HEALTH CARE EDUCATION/TRAINING PROGRAM

## 2022-12-06 PROCEDURE — 99999 PR PBB SHADOW E&M-EST. PATIENT-LVL III: CPT | Mod: PBBFAC,,, | Performed by: STUDENT IN AN ORGANIZED HEALTH CARE EDUCATION/TRAINING PROGRAM

## 2022-12-06 NOTE — PROGRESS NOTES
Orthopaedics Sports Medicine     Back Initial Visit         2022    Referring MD: Self, Aaareferral    Chief Complaint   Patient presents with    Spine - Pain, Injury         History of Present Illness:   Mary Flanagan is a 77 y.o. female who presents with lower back pain and dysfunction.    Onset of the symptoms was 2022.     Inciting event: fall     Current symptoms include intermittent aching pain of the lower back.     Pain is aggravated by ambulation.      Evaluation to date: X-ray     Treatment to date: rest, activity modification, Tylenol PRN     Past Medical History:   Past Medical History:   Diagnosis Date    CKD (chronic kidney disease) stage 3, GFR 30-59 ml/min     Dry eyes     Gastritis     Hyperlipidemia     Hypertension     Insomnia     Osteopenia        Past Surgical History:   Past Surgical History:   Procedure Laterality Date    BREAST BIOPSY Left     , benign     SECTION      x 1    COLONOSCOPY N/A 2022    Procedure: COLONOSCOPY;  Surgeon: Karina Beck MD;  Location: [facility];  Service: Endoscopy;  Laterality: N/A;    ESOPHAGOGASTRODUODENOSCOPY N/A 2022    Procedure: EGD (ESOPHAGOGASTRODUODENOSCOPY);  Surgeon: Karina Beck MD;  Location: [facility];  Service: Endoscopy;  Laterality: N/A;    KNEE ARTHROSCOPY Left  approx    KNEE SURGERY      left arm surgery      left knee surgery         Medications:  Patient's Medications   New Prescriptions    No medications on file   Previous Medications    AMLODIPINE (NORVASC) 5 MG TABLET    Take 1 tablet (5 mg total) by mouth once daily.    ASPIRIN (ECOTRIN) 81 MG EC TABLET    Take 1 tablet (81 mg total) by mouth once daily.    ATORVASTATIN (LIPITOR) 40 MG TABLET    Take 1 tablet (40 mg total) by mouth once daily.    DICLOFENAC SODIUM (VOLTAREN) 1 % GEL    Apply 4 g topically 2 (two) times daily.    FERROUS SULFATE (FEOSOL) 325 MG (65 MG IRON) TAB TABLET    Take 325 mg by mouth daily with breakfast. Every other  "day    HYDROCHLOROTHIAZIDE (HYDRODIURIL) 12.5 MG TAB    Take 1 tablet (12.5 mg total) by mouth every 48 hours as needed.    MIRTAZAPINE (REMERON) 7.5 MG TAB    Take 1 tablet (7.5 mg total) by mouth every evening.    MULTIVITAMIN (THERAGRAN) PER TABLET    Take 1 tablet by mouth once daily.    PREGABALIN (LYRICA) 25 MG CAPSULE    Take 1 capsule (25 mg total) by mouth 2 (two) times daily.    VIT A/C/E AC/ZNOX/CUPRIC OXIDE (EYE VITAMIN AND MINERALS ORAL)    Take 1 tablet by mouth once daily at 6am.   Modified Medications    No medications on file   Discontinued Medications    No medications on file       Allergies:   Review of patient's allergies indicates:   Allergen Reactions    No known drug allergies        Social History:   Home town: [place], [place]  Occupation: retired  Alcohol use: She reports no history of alcohol use.  Tobacco use: She reports that she has never smoked. She has never used smokeless tobacco.    Review of systems:  History of recent illness, fevers, shakes, or chills: no  History of cardiac problems or chest pain: no  History of pulmonary problems or asthma: no  History of diabetes: no  History of prior dvt or clotting problems: no  History of sleep apnea: no      Physical Examination:  Estimated body mass index is 21.03 kg/m² as calculated from the following:    Height as of this encounter: 5' 2" (1.575 m).    Weight as of this encounter: 52.2 kg (115 lb).    General  Healthy appearing female in no acute distress  Alert and oriented, normal mood, appropriate affect    Back Exam     Tenderness   The patient is experiencing tenderness in the lumbar.    Muscle Strength   Right Quadriceps:  5/5   Left Quadriceps:  5/5   Right Hamstrings:  5/5   Left Hamstrings:  5/5     Other   Sensation: normal  Gait: antalgic     Comments:  Sensation intact to light touch bilateral lower extremities          Imaging:  XR LUMBAR SPINE 2 OR 3 VIEWS  Narrative: EXAMINATION:  XR LUMBAR SPINE 2 OR 3 VIEWS    CLINICAL " HISTORY:  Low back pain, unspecified    TECHNIQUE:  Two views lumbar spine are obtained.    COMPARISON:  10/16/2022    FINDINGS:  Compression deformity is present of the L1 vertebral body, progressed since 10/16/2022.  Multilevel multifactorial degenerative changes are present.  Slight retrolisthesis of L3 on L4 and L4 on L5.  Paravertebral soft tissues are unremarkable.  Levoscoliosis.  Impression: Interval worsening of compression fracture of L1 in the interval.    Levoscoliosis.    Multilevel multifactorial degenerative changes are present.    Electronically signed by: Moses Mendez  Date:    11/29/2022  Time:    18:31       Physician Read: I agree with the above impression.      Impression:  77 y.o. female with a compression fracture of the L1 vertebral body      Plan:  Discussed diagnosis and treatment options with the patient today. She has a compression fracture of the L1 vertebral body with significant anterior column collapse but no neurologic deficits.  She should be on bone health medication to help prevent further osteoporotic fractures  I recommend continuing with PT for core strengthening and gait training.  She does have a spine extension brace but does not wear it because it is uncomfortable for her. I discussed with her that these braces are for comfort only and if she is more comfortable out of the brace then it is ok to stop using it  I recommend that she keep her appointment with Neurosurgery in January. If she is still symptomatic at that point they may discuss further treatment options.  Follow up as needed            Raul Wiley MD

## 2022-12-07 ENCOUNTER — LAB VISIT (OUTPATIENT)
Dept: LAB | Facility: HOSPITAL | Age: 77
End: 2022-12-07
Attending: INTERNAL MEDICINE
Payer: MEDICARE

## 2022-12-07 DIAGNOSIS — M81.0 OSTEOPOROSIS, UNSPECIFIED OSTEOPOROSIS TYPE, UNSPECIFIED PATHOLOGICAL FRACTURE PRESENCE: ICD-10-CM

## 2022-12-07 DIAGNOSIS — R53.1 WEAKNESS: ICD-10-CM

## 2022-12-07 LAB
25(OH)D3+25(OH)D2 SERPL-MCNC: 55 NG/ML (ref 30–96)
ALBUMIN SERPL BCP-MCNC: 3.7 G/DL (ref 3.5–5.2)
ALP SERPL-CCNC: 107 U/L (ref 55–135)
ALT SERPL W/O P-5'-P-CCNC: 10 U/L (ref 10–44)
ANION GAP SERPL CALC-SCNC: 10 MMOL/L (ref 8–16)
AST SERPL-CCNC: 17 U/L (ref 10–40)
BILIRUB SERPL-MCNC: 0.8 MG/DL (ref 0.1–1)
BUN SERPL-MCNC: 20 MG/DL (ref 8–23)
CALCIUM SERPL-MCNC: 10.2 MG/DL (ref 8.7–10.5)
CHLORIDE SERPL-SCNC: 105 MMOL/L (ref 95–110)
CK SERPL-CCNC: 66 U/L (ref 20–180)
CO2 SERPL-SCNC: 26 MMOL/L (ref 23–29)
CREAT SERPL-MCNC: 0.9 MG/DL (ref 0.5–1.4)
EST. GFR  (NO RACE VARIABLE): >60 ML/MIN/1.73 M^2
GLUCOSE SERPL-MCNC: 98 MG/DL (ref 70–110)
POTASSIUM SERPL-SCNC: 4 MMOL/L (ref 3.5–5.1)
PROT SERPL-MCNC: 7 G/DL (ref 6–8.4)
SODIUM SERPL-SCNC: 141 MMOL/L (ref 136–145)

## 2022-12-07 PROCEDURE — 82550 ASSAY OF CK (CPK): CPT | Performed by: INTERNAL MEDICINE

## 2022-12-07 PROCEDURE — 80053 COMPREHEN METABOLIC PANEL: CPT | Performed by: INTERNAL MEDICINE

## 2022-12-07 PROCEDURE — 36415 COLL VENOUS BLD VENIPUNCTURE: CPT | Performed by: INTERNAL MEDICINE

## 2022-12-07 PROCEDURE — 82306 VITAMIN D 25 HYDROXY: CPT | Performed by: INTERNAL MEDICINE

## 2022-12-08 DIAGNOSIS — M81.0 OSTEOPOROSIS, UNSPECIFIED OSTEOPOROSIS TYPE, UNSPECIFIED PATHOLOGICAL FRACTURE PRESENCE: Primary | ICD-10-CM

## 2022-12-08 RX ORDER — ALENDRONATE SODIUM 70 MG/1
70 TABLET ORAL
Qty: 4 TABLET | Refills: 11 | Status: SHIPPED | OUTPATIENT
Start: 2022-12-08 | End: 2023-11-10 | Stop reason: SDUPTHER

## 2022-12-08 NOTE — TELEPHONE ENCOUNTER
----- Message from Jorge Alva sent at 12/8/2022 11:50 AM CST -----  Contact: [facility]  [facility] is calling to speak with the nurse regarding medication. Please give William a call to further discuss at [phone]

## 2022-12-08 NOTE — TELEPHONE ENCOUNTER
Home health called stated that they are not getting a return call from her neurologist and asked if you can refill her lyrica until she see the neurologist in March.    Lv 11/3/22  Nv 2/3/22

## 2022-12-08 NOTE — TELEPHONE ENCOUNTER
No new care gaps identified.  [facility] Embedded Care Gaps. Reference number: [id number]. 12/08/2022   4:25:19 PM CST

## 2022-12-09 ENCOUNTER — TELEPHONE (OUTPATIENT)
Dept: RHEUMATOLOGY | Facility: CLINIC | Age: 77
End: 2022-12-09
Payer: MEDICARE

## 2022-12-09 RX ORDER — PREGABALIN 25 MG/1
25 CAPSULE ORAL 2 TIMES DAILY
Qty: 180 CAPSULE | Refills: 3 | Status: SHIPPED | OUTPATIENT
Start: 2022-12-09 | End: 2023-07-07 | Stop reason: SDUPTHER

## 2022-12-09 NOTE — TELEPHONE ENCOUNTER
----- Message from Isiah Carrion sent at 12/9/2022 11:58 AM CST -----  Contact: [phone]  Type:  Patient Returning Call    Who Called:Mary   Who Left Message for Patient:nurse  Does the patient know what this is regarding?:results   Would the patient rather a call back or a response via [facility]? Call back   Best Call Back Number:[phone]   Additional Information:

## 2022-12-09 NOTE — TELEPHONE ENCOUNTER
"Returned patients phone call to discuss DEXA scan results. All questions answered.      Tanna Roe (Allye), [facility]  Rheumatology Department    "

## 2022-12-28 ENCOUNTER — TELEPHONE (OUTPATIENT)
Dept: ORTHOPEDICS | Facility: CLINIC | Age: 77
End: 2022-12-28
Payer: MEDICARE

## 2022-12-28 NOTE — TELEPHONE ENCOUNTER
Medication not refilled, never prescribed by myself or Dr. Wiley; defer refill to PCP or neurosurgery    ----- Message from Francine Huber MA sent at 12/7/2022  2:12 PM CST -----  Contact: yobany    ----- Message -----  From: Linda Delgado  Sent: 12/7/2022   2:06 PM CST  To: Saurabh Carter Staff    .Type:  RX Refill Request    Who Called: yobany  Refjuliette or New Rx:refill  RX Name and Strength:pregabalin (LYRICA) 25 MG capsule  How is the patient currently taking it? (ex. 1XDay):as prescribed   Is this a 30 day or 90 day RX:90 day   Preferred Pharmacy with phone number:  [id number] - [place] - [address] AT [facility]  [address] [address]  Phone: [phone] Fax: [phone]   Local or Mail Order:local  Ordering Provider:Dr. Wiley   Would the patient rather a call back or a response via [facility]? no  Best Call Back Number:n/a  Additional Information: n/a          Thanks  PHUONG

## 2023-02-03 ENCOUNTER — LAB VISIT (OUTPATIENT)
Dept: LAB | Facility: HOSPITAL | Age: 78
End: 2023-02-03
Attending: INTERNAL MEDICINE
Payer: MEDICARE

## 2023-02-03 ENCOUNTER — OFFICE VISIT (OUTPATIENT)
Dept: FAMILY MEDICINE | Facility: CLINIC | Age: 78
End: 2023-02-03
Payer: MEDICARE

## 2023-02-03 ENCOUNTER — TELEPHONE (OUTPATIENT)
Dept: FAMILY MEDICINE | Facility: CLINIC | Age: 78
End: 2023-02-03

## 2023-02-03 VITALS
SYSTOLIC BLOOD PRESSURE: 110 MMHG | BODY MASS INDEX: 20.97 KG/M2 | RESPIRATION RATE: 16 BRPM | HEART RATE: 70 BPM | TEMPERATURE: 98 F | DIASTOLIC BLOOD PRESSURE: 60 MMHG | WEIGHT: 114.63 LBS

## 2023-02-03 DIAGNOSIS — M50.30 DDD (DEGENERATIVE DISC DISEASE), CERVICAL: Primary | ICD-10-CM

## 2023-02-03 DIAGNOSIS — E78.2 MIXED HYPERLIPIDEMIA: ICD-10-CM

## 2023-02-03 DIAGNOSIS — Z12.31 ENCOUNTER FOR SCREENING MAMMOGRAM FOR BREAST CANCER: ICD-10-CM

## 2023-02-03 DIAGNOSIS — I10 HYPERTENSION, UNSPECIFIED TYPE: ICD-10-CM

## 2023-02-03 DIAGNOSIS — I63.9 CEREBROVASCULAR ACCIDENT (CVA), UNSPECIFIED MECHANISM: ICD-10-CM

## 2023-02-03 DIAGNOSIS — R60.9 EDEMA, UNSPECIFIED TYPE: ICD-10-CM

## 2023-02-03 DIAGNOSIS — M48.062 SPINAL STENOSIS OF LUMBAR REGION WITH NEUROGENIC CLAUDICATION: ICD-10-CM

## 2023-02-03 DIAGNOSIS — M81.0 OSTEOPOROSIS, UNSPECIFIED OSTEOPOROSIS TYPE, UNSPECIFIED PATHOLOGICAL FRACTURE PRESENCE: ICD-10-CM

## 2023-02-03 DIAGNOSIS — R29.898 WEAKNESS OF BOTH LOWER EXTREMITIES: ICD-10-CM

## 2023-02-03 LAB
CHOLEST SERPL-MCNC: 140 MG/DL (ref 120–199)
CHOLEST/HDLC SERPL: 2.3 {RATIO} (ref 2–5)
HDLC SERPL-MCNC: 60 MG/DL (ref 40–75)
HDLC SERPL: 42.9 % (ref 20–50)
LDLC SERPL CALC-MCNC: 68 MG/DL (ref 63–159)
NONHDLC SERPL-MCNC: 80 MG/DL
TRIGL SERPL-MCNC: 60 MG/DL (ref 30–150)

## 2023-02-03 PROCEDURE — 36415 COLL VENOUS BLD VENIPUNCTURE: CPT | Mod: PO | Performed by: INTERNAL MEDICINE

## 2023-02-03 PROCEDURE — 99213 OFFICE O/P EST LOW 20 MIN: CPT | Mod: PBBFAC,PO | Performed by: INTERNAL MEDICINE

## 2023-02-03 PROCEDURE — 99999 PR PBB SHADOW E&M-EST. PATIENT-LVL III: CPT | Mod: PBBFAC,,, | Performed by: INTERNAL MEDICINE

## 2023-02-03 PROCEDURE — 80061 LIPID PANEL: CPT | Performed by: INTERNAL MEDICINE

## 2023-02-03 PROCEDURE — 99999 PR PBB SHADOW E&M-EST. PATIENT-LVL III: ICD-10-PCS | Mod: PBBFAC,,, | Performed by: INTERNAL MEDICINE

## 2023-02-03 PROCEDURE — 99214 PR OFFICE/OUTPT VISIT, EST, LEVL IV, 30-39 MIN: ICD-10-PCS | Mod: S$PBB,,, | Performed by: INTERNAL MEDICINE

## 2023-02-03 PROCEDURE — 99214 OFFICE O/P EST MOD 30 MIN: CPT | Mod: S$PBB,,, | Performed by: INTERNAL MEDICINE

## 2023-02-03 RX ORDER — HYDROCHLOROTHIAZIDE 12.5 MG/1
12.5 TABLET ORAL
Qty: 30 TABLET | Refills: 3 | Status: SHIPPED | OUTPATIENT
Start: 2023-02-03 | End: 2023-05-09

## 2023-02-03 NOTE — TELEPHONE ENCOUNTER
Spoke with pt, she stated the pharmacy told her  sent in HCTZ for her but she does not need the refill right now. Let her know the pharmacy can hold the rx for when she is due for a refill. Pt verbalized understanding.

## 2023-02-03 NOTE — PROGRESS NOTES
Subjective:       Patient ID: Mary Flanagan is a 77 y.o. female.    Chief Complaint: Follow-up (3m), Hypertension, and Hyperlipidemia    Follow-up  Associated symptoms include arthralgias, fatigue and weakness. Pertinent negatives include no abdominal pain, chest pain, chills, congestion, coughing, diaphoresis, fever, headaches, joint swelling, myalgias, nausea, neck pain, numbness, rash, sore throat or vomiting.   Hypertension  Pertinent negatives include no chest pain, headaches, neck pain, palpitations or shortness of breath.   Hyperlipidemia  Pertinent negatives include no chest pain, myalgias or shortness of breath.   Past Medical History:   Diagnosis Date    CKD (chronic kidney disease) stage 3, GFR 30-59 ml/min     Dry eyes     Gastritis     Hyperlipidemia     Hypertension     Insomnia     Osteopenia      Past Surgical History:   Procedure Laterality Date    BREAST BIOPSY Left     , benign     SECTION      x 1    COLONOSCOPY N/A 2022    Procedure: COLONOSCOPY;  Surgeon: Karina Beck MD;  Location: [facility];  Service: Endoscopy;  Laterality: N/A;    ESOPHAGOGASTRODUODENOSCOPY N/A 2022    Procedure: EGD (ESOPHAGOGASTRODUODENOSCOPY);  Surgeon: Karina Beck MD;  Location: [facility];  Service: Endoscopy;  Laterality: N/A;    KNEE ARTHROSCOPY Left  approx    KNEE SURGERY      left arm surgery      left knee surgery       Family History   Problem Relation Age of Onset    Hypertension Mother     Hypertension Father     Breast cancer Neg Hx     Colon cancer Neg Hx     Ovarian cancer Neg Hx     Thrombophilia Neg Hx     Strabismus Neg Hx     Macular degeneration Neg Hx     Retinal detachment Neg Hx     Glaucoma Neg Hx     Blindness Neg Hx     Amblyopia Neg Hx      Social History     Socioeconomic History    Marital status:     Number of children: 3   Occupational History    Occupation: retired   Tobacco Use    Smoking status: Never    Smokeless tobacco: Never   Substance and  Sexual Activity    Alcohol use: No     Alcohol/week: 0.0 standard drinks    Drug use: No    Sexual activity: Not Currently     Partners: Male     Birth control/protection: None     Comment: mut monog     Social Determinants of Health     Financial Resource Strain: Low Risk     Difficulty of Paying Living Expenses: Not hard at all   Food Insecurity: No Food Insecurity    Worried About Running Out of Food in the Last Year: Never true    Ran Out of Food in the Last Year: Never true   Transportation Needs: No Transportation Needs    Lack of Transportation (Medical): No    Lack of Transportation (Non-Medical): No   Physical Activity: Insufficiently Active    Days of Exercise per Week: 5 days    Minutes of Exercise per Session: 10 min   Social Connections: Unknown    Frequency of Communication with Friends and Family: Once a week    Frequency of Social Gatherings with Friends and Family: Once a week    Active Member of Clubs or Organizations: Yes    Attends Club or Organization Meetings: 1 to 4 times per year    Marital Status:    Housing Stability: Unknown    Unable to Pay for Housing in the Last Year: No     Review of Systems   Constitutional:  Positive for activity change and fatigue. Negative for appetite change, chills, diaphoresis, fever and unexpected weight change.   HENT:  Negative for congestion, drooling, ear discharge, ear pain, facial swelling, hearing loss, mouth sores, nosebleeds, postnasal drip, rhinorrhea, sinus pressure, sneezing, sore throat, tinnitus, trouble swallowing and voice change.    Eyes:  Negative for photophobia, redness and visual disturbance.   Respiratory:  Negative for apnea, cough, choking, chest tightness, shortness of breath and wheezing.    Cardiovascular:  Negative for chest pain and palpitations.   Gastrointestinal:  Negative for abdominal distention, abdominal pain, blood in stool, constipation, diarrhea, nausea and vomiting.   Endocrine: Negative for cold intolerance, heat  intolerance, polydipsia, polyphagia and polyuria.   Genitourinary:  Negative for decreased urine volume, difficulty urinating, dysuria, flank pain, frequency, genital sores, hematuria, pelvic pain and urgency.   Musculoskeletal:  Positive for arthralgias, back pain and gait problem. Negative for joint swelling, myalgias, neck pain and neck stiffness.   Skin:  Negative for color change, pallor, rash and wound.   Allergic/Immunologic: Negative for food allergies and immunocompromised state.   Neurological:  Positive for weakness. Negative for dizziness, tremors, seizures, syncope, speech difficulty, light-headedness, numbness and headaches.   Hematological:  Negative for adenopathy. Does not bruise/bleed easily.   Psychiatric/Behavioral:  Negative for agitation, behavioral problems, confusion, decreased concentration, dysphoric mood, hallucinations, self-injury, sleep disturbance and suicidal ideas. The patient is not nervous/anxious and is not hyperactive.    All other systems reviewed and are negative.    Objective:      Physical Exam  Vitals and nursing note reviewed.   Constitutional:       General: She is not in acute distress.     Appearance: Normal appearance. She is well-developed. She is not diaphoretic.   HENT:      Head: Normocephalic and atraumatic.      Mouth/Throat:      Pharynx: No oropharyngeal exudate.   Eyes:      General: No scleral icterus.  Neck:      Thyroid: No thyromegaly.      Vascular: No carotid bruit or JVD.      Trachea: No tracheal deviation.   Cardiovascular:      Rate and Rhythm: Normal rate and regular rhythm.      Heart sounds: Normal heart sounds.   Pulmonary:      Effort: Pulmonary effort is normal. No respiratory distress.      Breath sounds: Normal breath sounds. No wheezing or rales.   Chest:      Chest wall: No tenderness.   Abdominal:      General: Bowel sounds are normal. There is no distension.      Palpations: Abdomen is soft.      Tenderness: There is no abdominal  tenderness. There is no guarding or rebound.   Musculoskeletal:         General: No tenderness. Normal range of motion.      Cervical back: Normal range of motion and neck supple.   Lymphadenopathy:      Cervical: No cervical adenopathy.   Skin:     General: Skin is warm and dry.      Coloration: Skin is not pale.      Findings: No erythema or rash.   Neurological:      Mental Status: She is alert and oriented to person, place, and time.   Psychiatric:         Behavior: Behavior normal.         Thought Content: Thought content normal.         Judgment: Judgment normal.       CMP  Sodium   Date Value Ref Range Status   12/07/2022 141 136 - 145 mmol/L Final     Potassium   Date Value Ref Range Status   12/07/2022 4.0 3.5 - 5.1 mmol/L Final     Chloride   Date Value Ref Range Status   12/07/2022 105 95 - 110 mmol/L Final     CO2   Date Value Ref Range Status   12/07/2022 26 23 - 29 mmol/L Final     Glucose   Date Value Ref Range Status   12/07/2022 98 70 - 110 mg/dL Final     BUN   Date Value Ref Range Status   12/07/2022 20 8 - 23 mg/dL Final     Creatinine   Date Value Ref Range Status   12/07/2022 0.9 0.5 - 1.4 mg/dL Final     Calcium   Date Value Ref Range Status   12/07/2022 10.2 8.7 - 10.5 mg/dL Final     Total Protein   Date Value Ref Range Status   12/07/2022 7.0 6.0 - 8.4 g/dL Final     Albumin   Date Value Ref Range Status   12/07/2022 3.7 3.5 - 5.2 g/dL Final     Total Bilirubin   Date Value Ref Range Status   12/07/2022 0.8 0.1 - 1.0 mg/dL Final     Comment:     For infants and newborns, interpretation of results should be based  on gestational age, weight and in agreement with clinical  observations.    Premature Infant recommended reference ranges:  Up to 24 hours.............<8.0 mg/dL  Up to 48 hours............<12.0 mg/dL  3-5 days..................<15.0 mg/dL  6-29 days.................<15.0 mg/dL       Alkaline Phosphatase   Date Value Ref Range Status   12/07/2022 107 55 - 135 U/L Final     AST    Date Value Ref Range Status   12/07/2022 17 10 - 40 U/L Final     ALT   Date Value Ref Range Status   12/07/2022 10 10 - 44 U/L Final     Anion Gap   Date Value Ref Range Status   12/07/2022 10 8 - 16 mmol/L Final     eGFR if    Date Value Ref Range Status   05/02/2022 57 (A) >60 mL/min/1.73 m^2 Final     eGFR if non    Date Value Ref Range Status   05/02/2022 49 (A) >60 mL/min/1.73 m^2 Final     Comment:     Calculation used to obtain the estimated glomerular filtration  rate (eGFR) is the CKD-EPI equation.        Lab Results   Component Value Date    WBC 9.82 09/05/2022    HGB 11.3 (L) 09/05/2022    HCT 35.8 (L) 09/05/2022    MCV 93 09/05/2022     09/05/2022     Lab Results   Component Value Date    CHOL 178 09/03/2022     Lab Results   Component Value Date    HDL 80 (H) 09/03/2022     Lab Results   Component Value Date    LDLCALC 88.8 09/03/2022     Lab Results   Component Value Date    TRIG 46 09/03/2022     Lab Results   Component Value Date    CHOLHDL 44.9 09/03/2022     Lab Results   Component Value Date    TSH 0.811 09/03/2022     Lab Results   Component Value Date    HGBA1C 5.4 09/03/2022     Assessment:       1. DDD (degenerative disc disease), cervical    2. Edema, unspecified type    3. Hypertension, unspecified type    4. Mixed hyperlipidemia    5. Osteoporosis, unspecified osteoporosis type, unspecified pathological fracture presence    6. Spinal stenosis of lumbar region with neurogenic claudication    7. Weakness of both lower extremities    8. Cerebrovascular accident (CVA), unspecified mechanism    9. Encounter for screening mammogram for breast cancer          Plan:   DDD (degenerative disc disease), cervical    Edema, unspecified type    Hypertension, unspecified type    Mixed hyperlipidemia  -     Lipid Panel; Future; Expected date: 02/03/2023    Osteoporosis, unspecified osteoporosis type, unspecified pathological fracture presence    Spinal stenosis of  lumbar region with neurogenic claudication    Weakness of both lower extremities    Cerebrovascular accident (CVA), unspecified mechanism    Encounter for screening mammogram for breast cancer  -     Mammo Digital Screening Bilat w/ Ryan; Future; Expected date: 02/03/2023    Other orders  -     hydroCHLOROthiazide (HYDRODIURIL) 12.5 MG Tab; Take 1 tablet (12.5 mg total) by mouth every 48 hours as needed.  Dispense: 30 tablet; Refill: 3    Stable---------------continue meds-------------as above---------------f/u 4 months--------

## 2023-02-03 NOTE — TELEPHONE ENCOUNTER
----- Message from Lula Carrion sent at 2/3/2023  1:08 PM CST -----  Contact: Mary Sierra is calling in regards to a prescription the doctor wanted her to take.Please call back at [phone]          Thanks  CARA

## 2023-03-07 ENCOUNTER — OFFICE VISIT (OUTPATIENT)
Dept: NEUROLOGY | Facility: CLINIC | Age: 78
End: 2023-03-07
Payer: MEDICARE

## 2023-03-07 VITALS
WEIGHT: 114 LBS | HEART RATE: 74 BPM | DIASTOLIC BLOOD PRESSURE: 80 MMHG | BODY MASS INDEX: 20.98 KG/M2 | SYSTOLIC BLOOD PRESSURE: 152 MMHG | HEIGHT: 62 IN | RESPIRATION RATE: 16 BRPM

## 2023-03-07 DIAGNOSIS — I63.311 THROMBOTIC STROKE INVOLVING RIGHT MIDDLE CEREBRAL ARTERY: Primary | ICD-10-CM

## 2023-03-07 PROCEDURE — 99999 PR PBB SHADOW E&M-EST. PATIENT-LVL III: ICD-10-PCS | Mod: PBBFAC,,, | Performed by: PSYCHIATRY & NEUROLOGY

## 2023-03-07 PROCEDURE — 99214 OFFICE O/P EST MOD 30 MIN: CPT | Mod: S$PBB,,, | Performed by: PSYCHIATRY & NEUROLOGY

## 2023-03-07 PROCEDURE — 99999 PR PBB SHADOW E&M-EST. PATIENT-LVL III: CPT | Mod: PBBFAC,,, | Performed by: PSYCHIATRY & NEUROLOGY

## 2023-03-07 PROCEDURE — 99213 OFFICE O/P EST LOW 20 MIN: CPT | Mod: PBBFAC | Performed by: PSYCHIATRY & NEUROLOGY

## 2023-03-07 PROCEDURE — 99214 PR OFFICE/OUTPT VISIT, EST, LEVL IV, 30-39 MIN: ICD-10-PCS | Mod: S$PBB,,, | Performed by: PSYCHIATRY & NEUROLOGY

## 2023-03-07 NOTE — PROGRESS NOTES
[facility]  Neurology Department  Vascular Neurology  Clinic Note      Reason for Consult (Chief Complaint):  History of stroke    SUBJECTIVE:     History of Present Illness:  Patient is a 77 y.o. female who presents to clinic today as a new patient. She had a stroke in 2022.  Here for recommendations.    She intially had L side weakness (leg>arm) and has had improvement since, but still uses a walker to ambulate and has residual L leg weakness.    Interval History:  Since the stroke, she has had a couple of falls but no recurrent symptoms.    Past Medical History:   Diagnosis Date    CKD (chronic kidney disease) stage 3, GFR 30-59 ml/min     Dry eyes     Gastritis     Hyperlipidemia     Hypertension     Insomnia     Osteopenia      Past Surgical History:   Procedure Laterality Date    BREAST BIOPSY Left     , benign     SECTION      x 1    COLONOSCOPY N/A 2022    Procedure: COLONOSCOPY;  Surgeon: Karina Beck MD;  Location: [facility];  Service: Endoscopy;  Laterality: N/A;    ESOPHAGOGASTRODUODENOSCOPY N/A 2022    Procedure: EGD (ESOPHAGOGASTRODUODENOSCOPY);  Surgeon: Karina Beck MD;  Location: [facility];  Service: Endoscopy;  Laterality: N/A;    KNEE ARTHROSCOPY Left  approx    KNEE SURGERY      left arm surgery      left knee surgery       Family History   Problem Relation Age of Onset    Hypertension Mother     Hypertension Father     Breast cancer Neg Hx     Colon cancer Neg Hx     Ovarian cancer Neg Hx     Thrombophilia Neg Hx     Strabismus Neg Hx     Macular degeneration Neg Hx     Retinal detachment Neg Hx     Glaucoma Neg Hx     Blindness Neg Hx     Amblyopia Neg Hx      Social History     Tobacco Use    Smoking status: Never    Smokeless tobacco: Never   Substance Use Topics    Alcohol use: No     Alcohol/week: 0.0 standard drinks    Drug use: No     Review of patient's allergies indicates:   Allergen Reactions    No known drug allergies   "      Medications:   I have reviewed the current medication administration record.  For a complete list of medications please see the medication list.    Review of Systems:  Constitutional: no fever, no chills, no fatigue  Cardiovascular: no chest pain w/exertion, no palpitations, no leg pain while walking, no irregular heartbeat  Neurological: no headaches, dizziness, or confusion    OBJECTIVE:     Vital Signs (Most Recent):  BP (!) 152/80   Pulse 74   Resp 16   Ht 5' 2" (1.575 m)   Wt 51.7 kg (114 lb)   BMI 20.85 kg/m²     Physical Exam:  General: well developed, well nourished  Head/Neck: atraumatic, thyroid normal, no palpable mass  Respiratory: clear to auscultation  Vascular: no carotid bruits    Neurological Exam:   LOC: alert and follows requests  Language: No aphasia  Speech: No dysarthria  Memory: Recent memory intact, Remote memory intact, Age correct, Month correct  EOM (CN III, IV, VI): Full/intact  Oculocephalics: normal  Facial Movement (CN VII): symmetric facial expression  Motor*: Arm Left:  Normal (5/5), Leg Left:   Paretic:  4/5, Arm Right:   Normal (5/5), Leg Right:   Normal (5/5)     Laboratory:  BMP:   Lab Results   Component Value Date     12/07/2022    K 4.0 12/07/2022     12/07/2022    CO2 26 12/07/2022    BUN 20 12/07/2022    CREATININE 0.9 12/07/2022    CALCIUM 10.2 12/07/2022     CBC:   Lab Results   Component Value Date    WBC 9.82 09/05/2022    RBC 3.85 (L) 09/05/2022    HGB 11.3 (L) 09/05/2022    HCT 35.8 (L) 09/05/2022     09/05/2022    MCV 93 09/05/2022    MCH 29.4 09/05/2022    MCHC 31.6 (L) 09/05/2022     Lipid Panel:   Lab Results   Component Value Date    CHOL 140 02/03/2023    LDLCALC 68.0 02/03/2023    HDL 60 02/03/2023    TRIG 60 02/03/2023     Coagulation:   Lab Results   Component Value Date    INR 1.0 09/04/2022    APTT 21.1 09/04/2022     Hgb A1C:   Lab Results   Component Value Date    HGBA1C 5.4 09/03/2022     TSH:   Lab Results   Component Value " Date    TSH 0.811 09/03/2022       Diagnostic Results:  Personally reviewed  Brain Imaging: MRI Head. Date: 9.2022  Right lateral lenticulostriate infarct    Cerebrovascular Imaging: MRA Head and MRA neck. Date: 9.2022  No signigicant stenosis    ASSESSMENT/PLAN:     Diagnosis:  Thrombotic stroke R MCA branch  Small vessel disease  HTN    Recommendations:  Aggressive risk factor modification for secondary stroke prevention.  Continue antiplatelet therapy and statin therapy as currently prescribed to minimize the risk of future events.    Her goal systolic blood pressure should be less than 130.  Her goal total cholesterol should be less than 200.  Her goal LDL should be less than 100.    Patient/Family Stroke Education    I have discussed the patient's stroke risk factors and the importance to modify them in order to reduce the risk of future events.  Also, the importance of a healthy diet and daily exercise in order to reduce the risk of future strokes.    I went over the usual stroke warning signs and symptoms, and the need to activate EMS (call 911) as soon as the symptoms present.  - Sudden onset numbness or weakness of the face, arm, or leg; especially on one side of the body  - Sudden confusion, trouble speaking, or understanding  - Sudden trouble seeing in one or both eyes  - Sudden trouble walking, dizziness, loss of coordination  - Sudden severe headache with no known cause        Rafael Flores MD  Vascular and Interventional Neurology  , Department of Neurology  Section Head, Vascular Neurology  Departments of Neurology, Neurosurgery and Radiology  [facility] [place], [place]

## 2023-03-08 ENCOUNTER — TELEPHONE (OUTPATIENT)
Dept: FAMILY MEDICINE | Facility: CLINIC | Age: 78
End: 2023-03-08
Payer: MEDICARE

## 2023-03-08 ENCOUNTER — HOSPITAL ENCOUNTER (OUTPATIENT)
Dept: RADIOLOGY | Facility: CLINIC | Age: 78
Discharge: HOME | End: 2023-03-08
Attending: PHYSICIAN ASSISTANT
Payer: MEDICARE

## 2023-03-08 ENCOUNTER — OFFICE VISIT (OUTPATIENT)
Dept: URGENT CARE | Facility: CLINIC | Age: 78
End: 2023-03-08
Payer: MEDICARE

## 2023-03-08 VITALS
OXYGEN SATURATION: 98 % | WEIGHT: 114 LBS | HEART RATE: 78 BPM | BODY MASS INDEX: 20.98 KG/M2 | SYSTOLIC BLOOD PRESSURE: 164 MMHG | RESPIRATION RATE: 20 BRPM | DIASTOLIC BLOOD PRESSURE: 75 MMHG | HEIGHT: 62 IN | TEMPERATURE: 98 F
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
## 2023-03-08 DIAGNOSIS — M54.9 BACK PAIN, UNSPECIFIED BACK LOCATION, UNSPECIFIED BACK PAIN LATERALITY, UNSPECIFIED CHRONICITY: ICD-10-CM

## 2023-03-08 DIAGNOSIS — M54.9 BACK PAIN, UNSPECIFIED BACK LOCATION, UNSPECIFIED BACK PAIN LATERALITY, UNSPECIFIED CHRONICITY: Primary | ICD-10-CM

## 2023-03-08 PROCEDURE — 99213 PR OFFICE/OUTPT VISIT, EST, LEVL III, 20-29 MIN: ICD-10-PCS | Mod: S$GLB,,, | Performed by: PHYSICIAN ASSISTANT

## 2023-03-08 PROCEDURE — 72070 XR THORACIC SPINE AP LATERAL: ICD-10-PCS | Mod: S$GLB,,, | Performed by: RADIOLOGY

## 2023-03-08 PROCEDURE — 72070 X-RAY EXAM THORAC SPINE 2VWS: CPT | Mod: S$GLB,,, | Performed by: RADIOLOGY

## 2023-03-08 PROCEDURE — 71046 X-RAY EXAM CHEST 2 VIEWS: CPT | Mod: S$GLB,,, | Performed by: RADIOLOGY

## 2023-03-08 PROCEDURE — 71046 XR CHEST PA AND LATERAL: ICD-10-PCS | Mod: S$GLB,,, | Performed by: RADIOLOGY

## 2023-03-08 PROCEDURE — 99213 OFFICE O/P EST LOW 20 MIN: CPT | Mod: S$GLB,,, | Performed by: PHYSICIAN ASSISTANT

## 2023-03-08 RX ORDER — TRAMADOL HYDROCHLORIDE 50 MG/1
50 TABLET ORAL EVERY 6 HOURS PRN
Qty: 20 TABLET | Refills: 0 | Status: SHIPPED | OUTPATIENT
Start: 2023-03-08

## 2023-03-08 NOTE — TELEPHONE ENCOUNTER
----- Message from Shaneka Gonzalez sent at 3/8/2023 12:34 PM CST -----  Contact: Mary  Patient is calling to speak with the nurse in regards to xrays. Reports needing orders for xray due to seizures [place]. Please give patient a callback at [phone] or [phone].

## 2023-03-08 NOTE — TELEPHONE ENCOUNTER
Spoke to pt she stated she fell and her  wants to take her to UC.  She was asking if we can send orders for an xray for them to do one.  Told her we don't need to send orders to UC if they feel and xray is needed they will do one.  She voiced understanding.

## 2023-03-08 NOTE — PROGRESS NOTES
"Subjective:       Patient ID: Mary Flanagan is a 77 y.o. female.    Vitals:  height is 5' 2" (1.575 m) and weight is 51.7 kg (114 lb). Her temperature is 97.8 °F (36.6 °C). Her blood pressure is 164/75 (abnormal) and her pulse is 78. Her respiration is 20 and oxygen saturation is 98%.     Chief Complaint: Fall    Patient presents 1 week after fall upper thoracic pain. She fell from bed and has no recollection of the fall. She woke up on the floor    Fall  The accident occurred 5 to 7 days ago. The fall occurred from a bed. She fell from a height of 3 to 5 ft. She landed on Carpet. There was no blood loss. Point of impact: unknown. Pain location: low to mid back. The pain is at a severity of 4/10. The pain is severe. The symptoms are aggravated by rotation. Pertinent negatives include no abdominal pain, bowel incontinence, fever, headaches, hearing loss, hematuria, loss of consciousness, nausea, numbness, tingling, visual change or vomiting. She has tried acetaminophen for the symptoms. The treatment provided mild relief.     Constitution: Negative for fever.   Gastrointestinal:  Negative for abdominal pain, nausea, vomiting and bowel incontinence.   Genitourinary:  Negative for hematuria.   Neurological:  Negative for headaches, loss of consciousness and numbness.     Objective:      Physical Exam   Constitutional: She is not intubated.   HENT:   Head: Normocephalic.   Cardiovascular: Normal rate.   Pulmonary/Chest: Breath sounds normal. No accessory muscle usage. She is not intubated. No respiratory distress. She has no wheezes. She has no rales.         She exhibits tenderness.   Abdominal: Normal appearance.   Musculoskeletal:      Thoracic back: She exhibits tenderness and bony tenderness. She exhibits normal range of motion, no swelling, no edema, no deformity, no laceration and no spasm.      Lumbar back: Normal.   Neurological: She is alert.   Nursing note and vitals reviewed.      Assessment:       1. Back " pain, unspecified back location, unspecified back pain laterality, unspecified chronicity    2. Fall, initial encounter          Here with thoracic back pain and posterior right sided rib pain after falling out of bed last week. She has felt more weak since then. She denies shortness of breath or chest pain. She denies hitting her head when she fell. She has history of CVA last year but still walks some. I will order thoracic xray and chest xray to evaluate for fracture. There are no acute changes in xray. There is remote compression fracture of L1 that was present on prior xray. She is having trouble getting around due to pain. I will prescribe her some tramadol. She was instructed to take 1 to 1/2 a tablet as needed for pain.     Plan:         Back pain, unspecified back location, unspecified back pain laterality, unspecified chronicity  -     XR CHEST PA AND LATERAL; Future; Expected date: 03/08/2023  -     XR THORACIC SPINE AP LATERAL; Future; Expected date: 03/08/2023  -     traMADoL (ULTRAM) 50 mg tablet; Take 1 tablet (50 mg total) by mouth every 6 (six) hours as needed for Pain.  Dispense: 20 tablet; Refill: 0    Fall, initial encounter

## 2023-03-09 ENCOUNTER — TELEPHONE (OUTPATIENT)
Dept: URGENT CARE | Facility: CLINIC | Age: 78
End: 2023-03-09

## 2023-03-11 ENCOUNTER — TELEPHONE (OUTPATIENT)
Dept: URGENT CARE | Facility: CLINIC | Age: 78
End: 2023-03-11
Payer: MEDICARE

## 2023-03-13 ENCOUNTER — TELEPHONE (OUTPATIENT)
Dept: RHEUMATOLOGY | Facility: CLINIC | Age: 78
End: 2023-03-13
Payer: MEDICARE

## 2023-03-13 NOTE — TELEPHONE ENCOUNTER
alendronate and is now having bone pain. Should she continue? Also patint is wanting to know if she should be taking a Calcium pill?

## 2023-03-13 NOTE — TELEPHONE ENCOUNTER
----- Message from Josie Everett sent at 3/13/2023  9:42 AM CDT -----  Contact: [phone]  Patient is requesting a call back regarding patient having bone pain and needing to take calcium. Please call patient back at [phone]. Thanks

## 2023-03-15 NOTE — TELEPHONE ENCOUNTER
Vidal Gerber MD     May stay off Fosamax.  Recommend follow up encounter to discuss alternative pharmacotherapy for osteoporosis.       Spoke with patient and  regarding Dr. Gerber above recommendations . Patient states that the bone pain is the day after taking her Fosamax and it goes away after a day . They do not want to stop medication at this time . They were just making sure it was not doing any damage to her bones. Patient to call back if she changes her mind regarding an appointment to discuss a new treatment .

## 2023-03-21 ENCOUNTER — HOSPITAL ENCOUNTER (OUTPATIENT)
Dept: RADIOLOGY | Facility: HOSPITAL | Age: 78
Discharge: HOME | End: 2023-03-21
Attending: INTERNAL MEDICINE
Payer: MEDICARE

## 2023-03-21 VITALS — BODY MASS INDEX: 20.98 KG/M2 | WEIGHT: 114 LBS | HEIGHT: 62 IN

## 2023-03-21 DIAGNOSIS — Z12.31 ENCOUNTER FOR SCREENING MAMMOGRAM FOR BREAST CANCER: ICD-10-CM

## 2023-03-21 PROCEDURE — 77067 SCR MAMMO BI INCL CAD: CPT | Mod: 26,,, | Performed by: RADIOLOGY

## 2023-03-21 PROCEDURE — 77067 SCR MAMMO BI INCL CAD: CPT | Mod: TC

## 2023-03-21 PROCEDURE — 77063 BREAST TOMOSYNTHESIS BI: CPT | Mod: 26,,, | Performed by: RADIOLOGY

## 2023-03-21 PROCEDURE — 77063 MAMMO DIGITAL SCREENING BILAT WITH TOMO: ICD-10-PCS | Mod: 26,,, | Performed by: RADIOLOGY

## 2023-03-21 PROCEDURE — 77067 MAMMO DIGITAL SCREENING BILAT WITH TOMO: ICD-10-PCS | Mod: 26,,, | Performed by: RADIOLOGY

## 2023-04-24 NOTE — TELEPHONE ENCOUNTER
Reports intermittent elevated BP since Saturday. Running between 170's/80's, highest 180/90 usually runs low. Just now 154/82 HR 80. Pt very anxious, states scared it will continue to elevated today. Asymptomatic. Informed pt message will be routed to MD regarding possible availability. Pt does not want to wait until tomorrow. Reiterated plan to have someone call back regarding availability but chances of possibly not being able to fit in today. Pt not wanting to wait another night, informed pt that if unable to be seen & not wanting to wait, symptoms change or continues to elevated to go to ER. Verbalized understanding.     Reason for Disposition   Patient wants to be seen    Additional Information   Negative: Sounds like a life-threatening emergency to the triager   Negative: Pregnant > 20 weeks or postpartum (< 6 weeks after delivery) and new hand or face swelling   Negative: Pregnant > 20 weeks and BP > 140/90   Negative: Systolic BP >= 160 OR Diastolic >= 100, and any cardiac or neurologic symptoms (e.g., chest pain, difficulty breathing, unsteady gait, blurred vision)   Negative: Patient sounds very sick or weak to the triager   Negative: BP Systolic BP >= 140 OR Diastolic >= 90 and postpartum (from 0 to 6 weeks after delivery)   Negative: Systolic BP >= 180 OR Diastolic >= 110, and missed most recent dose of blood pressure medication   Negative: Systolic BP >= 180 OR Diastolic >= 110    Protocols used: HIGH BLOOD PRESSURE-A-OH       Yes

## 2023-05-02 ENCOUNTER — TELEPHONE (OUTPATIENT)
Dept: FAMILY MEDICINE | Facility: CLINIC | Age: 78
End: 2023-05-02
Payer: MEDICARE

## 2023-05-02 NOTE — TELEPHONE ENCOUNTER
Spoke with daughter she is staying with her through the summer in [place].  She went to an UC up there and said her large intestines are pushing on some organs and that is causing some discomfort and they recommended Colonic they ware asking if its ok to do?

## 2023-05-02 NOTE — TELEPHONE ENCOUNTER
----- Message from Paty Soto sent at 5/2/2023 10:23 AM CDT -----  Regarding: Medical Question  Contact: Giovanna  Type:  Needs Medical Advice    Who Called: Giovanna-daughter  Symptoms (please be specific):  pain in rib area right side  How long has patient had these symptoms:    Pharmacy name and phone #:    Would the patient rather a call back or a response via [facility]? call  Best Call Back Number: [phone]   Additional Information:  Giovanna is requesting a callback from the nurse in regards to the patient had a recent urgent care visit while out of town due to pain on right side rib cage and it was determined that her bowls were putting pressure on the large intestines and she want to discuss a procedure called: Colonic

## 2023-05-08 PROBLEM — I63.9 CVA (CEREBRAL VASCULAR ACCIDENT): Status: RESOLVED | Noted: 2023-02-03 | Resolved: 2023-05-08

## 2023-05-08 NOTE — TELEPHONE ENCOUNTER
Refill Routing Note   Medication(s) are not appropriate for processing by [facility] for the following reason(s):      Required vitals abnormal: 164/75    [facility] action(s):  Defer Care Due:  None identified          Appointments  past 12m or future 3m with PCP    Date Provider   Last Visit   2/3/2023 Raul Hill MD   Next Visit   6/7/2023 Raul Hill MD   ED visits in past 90 days: 0        Note composed:4:29 PM 05/08/2023

## 2023-05-08 NOTE — TELEPHONE ENCOUNTER
No care due was identified.  [facility] Embedded Care Due Messages. Reference number: [id number].   5/08/2023 7:51:46 AM CDT

## 2023-05-09 RX ORDER — HYDROCHLOROTHIAZIDE 12.5 MG/1
TABLET ORAL
Qty: 45 TABLET | Refills: 4 | Status: SHIPPED | OUTPATIENT
Start: 2023-05-09

## 2023-05-23 ENCOUNTER — TELEPHONE (OUTPATIENT)
Dept: FAMILY MEDICINE | Facility: CLINIC | Age: 78
End: 2023-05-23
Payer: MEDICARE

## 2023-06-12 PROBLEM — I63.311 THROMBOTIC STROKE INVOLVING RIGHT MIDDLE CEREBRAL ARTERY: Status: RESOLVED | Noted: 2023-03-07 | Resolved: 2023-06-12

## 2023-07-07 RX ORDER — PREGABALIN 25 MG/1
25 CAPSULE ORAL 2 TIMES DAILY
Qty: 180 CAPSULE | Refills: 3 | Status: SHIPPED | OUTPATIENT
Start: 2023-07-07 | End: 2024-07-01

## 2023-07-07 NOTE — TELEPHONE ENCOUNTER
Care Due:                  Date            Visit Type   Department     Provider  --------------------------------------------------------------------------------                                EP -                              PRIMARY      [facility]  Last Visit: 02-      CARE ([facility])   MEDICINE       Raul Hill  Next Visit: None Scheduled  None         None Found                                                            Last  Test          Frequency    Reason                     Performed    Due Date  --------------------------------------------------------------------------------    CBC.........  12 months..  mirtazapine..............  09- 08-    [facility] Embedded Care Due Messages. Reference number: [id number].   7/07/2023 1:29:28 PM CDT

## 2023-07-13 ENCOUNTER — TELEPHONE (OUTPATIENT)
Dept: FAMILY MEDICINE | Facility: CLINIC | Age: 78
End: 2023-07-13
Payer: MEDICARE

## 2023-07-13 NOTE — TELEPHONE ENCOUNTER
----- Message from Josefina Hays sent at 7/13/2023  1:41 PM CDT -----  Pt's daughter Dori is requesting a call back regarding her medication. Call back number is [phone]. Thx.EL

## 2023-07-24 ENCOUNTER — TELEPHONE (OUTPATIENT)
Dept: FAMILY MEDICINE | Facility: CLINIC | Age: 78
End: 2023-07-24
Payer: MEDICARE

## 2023-07-24 NOTE — TELEPHONE ENCOUNTER
----- Message from Carissa Singh sent at 7/24/2023  3:50 PM CDT -----  Patients daughter stated the patient recently had a stroke and is now living in [place] with her, she is requesting a call back to have lab orders sent over to them. Call back at [phone]

## 2023-07-24 NOTE — TELEPHONE ENCOUNTER
Spoke with daughter  and stated that her mom is stating with her for a while.  Mrs Hernandez wants some labs ordered to be sent to [place] to have a complete lab workup done.  She will find a place that she can get her labs done and call back with a fax number.

## 2023-07-25 ENCOUNTER — TELEPHONE (OUTPATIENT)
Dept: FAMILY MEDICINE | Facility: CLINIC | Age: 78
End: 2023-07-25
Payer: MEDICARE

## 2023-07-25 DIAGNOSIS — E78.5 DYSLIPIDEMIA: Primary | ICD-10-CM

## 2023-07-25 NOTE — TELEPHONE ENCOUNTER
----- Message from Raul Hill MD sent at 7/25/2023  3:08 PM CDT -----  Contact: Giovanna/Daughter  Cholesterol in lipids and hemoglobin in cbc------  ----- Message -----  From: Ayde Escalante MA  Sent: 7/25/2023   1:51 PM CDT  To: Raul Hill MD    Can you please adt these 2 test to her labs please  ----- Message -----  From: Magdalene Calabrese  Sent: 7/25/2023  12:15 PM CDT  To: Liz TORRES Staff    Patient's daughter is calling to speak with a nurse regarding appt . Reports needing blood work orders faxed to [employer] .States needing cholesterol and hemoglobin in orders as well . Please Fax to [phone] .   Please give a call back at [phone].

## 2023-07-31 ENCOUNTER — TELEPHONE (OUTPATIENT)
Dept: FAMILY MEDICINE | Facility: CLINIC | Age: 78
End: 2023-07-31
Payer: MEDICARE

## 2023-07-31 NOTE — TELEPHONE ENCOUNTER
----- Message from Lilia Wren sent at 7/31/2023  2:46 PM CDT -----  Contact: Pt  Type: Needs Medical Advice    Who Called:pt daughter  Best Call Back Number:[phone]    Additional Information Requesting a call back regarding Pt daughter was calling to speak with office in regards to mothers lab orders daughter wanted to see if office would be able to send over orders electronically daughter stated to please call Thank you  Please Advise-Thank you

## 2023-08-11 ENCOUNTER — TELEPHONE (OUTPATIENT)
Dept: FAMILY MEDICINE | Facility: CLINIC | Age: 78
End: 2023-08-11
Payer: MEDICARE

## 2023-08-11 NOTE — TELEPHONE ENCOUNTER
----- Message from Magdalene Calabrese sent at 8/11/2023  3:17 PM CDT -----  Contact: Mary Boyer is calling to get results from lab work . Please give a call back at [phone] or [phone] .

## 2023-08-18 ENCOUNTER — TELEPHONE (OUTPATIENT)
Dept: FAMILY MEDICINE | Facility: CLINIC | Age: 78
End: 2023-08-18
Payer: MEDICARE

## 2023-08-18 NOTE — TELEPHONE ENCOUNTER
----- Message from Shaneka Gonzalez sent at 8/18/2023  1:35 PM CDT -----  Contact: Giovanna/daughter  Patients daughter is calling to speak with the nurse in regards to results. Reports checking the status of lab results that were done on 08/01/2023 at [facility] in [place]. Please give patient a callback at [phone].

## 2023-08-25 ENCOUNTER — TELEPHONE (OUTPATIENT)
Dept: ADMINISTRATIVE | Facility: HOSPITAL | Age: 78
End: 2023-08-25
Payer: MEDICARE

## 2023-08-25 ENCOUNTER — PATIENT MESSAGE (OUTPATIENT)
Dept: ADMINISTRATIVE | Facility: HOSPITAL | Age: 78
End: 2023-08-25
Payer: MEDICARE

## 2023-08-30 ENCOUNTER — TELEPHONE (OUTPATIENT)
Dept: FAMILY MEDICINE | Facility: CLINIC | Age: 78
End: 2023-08-30
Payer: MEDICARE

## 2023-08-30 NOTE — TELEPHONE ENCOUNTER
----- Message from Mone Magana sent at 8/30/2023 12:16 PM CDT -----  Contact: Dori/daughter  Dori/daughter would like a call back at [phone], in regards to patient having recent labs done and has some abnormal results. Daughter sent over lab results on Monday 8/28/23, and hasn't received a call back after sending over several messages.  Thanks   Am

## 2023-08-30 NOTE — TELEPHONE ENCOUNTER
----- Message from Mone Magana sent at 8/30/2023 12:16 PM CDT -----  Contact: Dori/daughter  Dori/daughter would like a call back at [phone], in regards to patient having recent labs done and has some abnormal results. Daughter sent over lab results on Monday 8/28/23, and hasn't received a call back after sending over several messages.  Thanks   Am

## 2023-08-30 NOTE — TELEPHONE ENCOUNTER
----- Message from Ginger Dolan sent at 8/30/2023  4:29 PM CDT -----  Contact: daughter  ..Type:  Patient Returning Call    Who Called:Dori   Who Left Message for Patient:   Does the patient know what this is regarding?:  Would the patient rather a call back or a response via [employer]? Call back   Best Call Back Number: [phone]  Additional Information: Dori states she missed a call

## 2023-08-30 NOTE — TELEPHONE ENCOUNTER
----- Message from Ginger Dolan sent at 8/30/2023  4:29 PM CDT -----  Contact: daughter  ..Type:  Patient Returning Call    Who Called:Dori   Who Left Message for Patient:   Does the patient know what this is regarding?:  Would the patient rather a call back or a response via [employer]? Call back   Best Call Back Number: [phone]  Additional Information: Dori states she missed a call

## 2023-09-20 ENCOUNTER — TELEPHONE (OUTPATIENT)
Dept: FAMILY MEDICINE | Facility: CLINIC | Age: 78
End: 2023-09-20
Payer: MEDICARE

## 2023-09-20 NOTE — TELEPHONE ENCOUNTER
----- Message from Miranda Medina sent at 9/20/2023 11:04 AM CDT -----  Contact: Giovanna/daughter  Pt daughter is calling in regards to getting an appt the week of 11/07. Also she is needing pt records faxed over to [facility] at [phone] due to pt possibly moving with her full time in another area. Please call back at [phone] (daughter)              Thanks  SW

## 2023-11-06 PROBLEM — I27.20 PULMONARY HYPERTENSION: Status: ACTIVE | Noted: 2023-11-06

## 2023-11-10 DIAGNOSIS — M81.0 OSTEOPOROSIS, UNSPECIFIED OSTEOPOROSIS TYPE, UNSPECIFIED PATHOLOGICAL FRACTURE PRESENCE: ICD-10-CM

## 2023-11-10 RX ORDER — ALENDRONATE SODIUM 70 MG/1
70 TABLET ORAL
Qty: 4 TABLET | Refills: 11 | Status: SHIPPED | OUTPATIENT
Start: 2023-11-10 | End: 2024-11-09

## 2023-12-05 ENCOUNTER — PATIENT OUTREACH (OUTPATIENT)
Dept: ADMINISTRATIVE | Facility: HOSPITAL | Age: 78
End: 2023-12-05
Payer: MEDICARE

## 2023-12-05 ENCOUNTER — PATIENT MESSAGE (OUTPATIENT)
Dept: ADMINISTRATIVE | Facility: HOSPITAL | Age: 78
End: 2023-12-05
Payer: MEDICARE

## 2023-12-07 RX ORDER — ATORVASTATIN CALCIUM 40 MG/1
40 TABLET, FILM COATED ORAL
Qty: 90 TABLET | Refills: 0 | Status: SHIPPED | OUTPATIENT
Start: 2023-12-07

## 2023-12-07 NOTE — TELEPHONE ENCOUNTER
Refill Routing Note   Medication(s) are not appropriate for processing by [facility] for the following reason(s):        Required labs outdated    [facility] action(s):  Defer     Requires labs : Yes             Appointments  past 12m or future 3m with PCP    Date Provider   Last Visit   2/3/2023 Raul Hill MD   Next Visit   Visit date not found Raul Hill MD   ED visits in past 90 days: 0        Note composed:1:00 PM 12/07/2023

## 2023-12-07 NOTE — TELEPHONE ENCOUNTER
Care Due:                  Date            Visit Type   Department     Provider  --------------------------------------------------------------------------------                                EP -                              PRIMARY      [facility]  Last Visit: 02-      CARE ([facility])   MEDICINE       Raul Hill  Next Visit: None Scheduled  None         None Found                                                            Last  Test          Frequency    Reason                     Performed    Due Date  --------------------------------------------------------------------------------    CBC.........  12 months..  mirtazapine..............  09- 08-    CMP.........  12 months..  atorvastatin,              12- 12-                             hydroCHLOROthiazide,                             mirtazapine..............    Lipid Panel.  12 months..  atorvastatin.............  02- 01-    [facility] Embedded Care Due Messages. Reference number: [id number].   12/07/2023 2:24:52 AM CST

## 2024-02-14 ENCOUNTER — OFFICE VISIT (OUTPATIENT)
Age: 79
End: 2024-02-14
Payer: MEDICARE

## 2024-02-14 VITALS
OXYGEN SATURATION: 99 % | WEIGHT: 110 LBS | HEART RATE: 86 BPM | BODY MASS INDEX: 20.24 KG/M2 | SYSTOLIC BLOOD PRESSURE: 118 MMHG | HEIGHT: 62 IN | DIASTOLIC BLOOD PRESSURE: 78 MMHG

## 2024-02-14 DIAGNOSIS — I69.993 CVA, OLD, ATAXIA: Primary | ICD-10-CM

## 2024-02-14 DIAGNOSIS — G20.A1 PARKINSON'S DISEASE WITHOUT DYSKINESIA OR FLUCTUATING MANIFESTATIONS: ICD-10-CM

## 2024-02-14 PROCEDURE — 1036F TOBACCO NON-USER: CPT | Performed by: PSYCHIATRY & NEUROLOGY

## 2024-02-14 PROCEDURE — 1123F ACP DISCUSS/DSCN MKR DOCD: CPT | Performed by: PSYCHIATRY & NEUROLOGY

## 2024-02-14 PROCEDURE — G8399 PT W/DXA RESULTS DOCUMENT: HCPCS | Performed by: PSYCHIATRY & NEUROLOGY

## 2024-02-14 PROCEDURE — G8420 CALC BMI NORM PARAMETERS: HCPCS | Performed by: PSYCHIATRY & NEUROLOGY

## 2024-02-14 PROCEDURE — 99205 OFFICE O/P NEW HI 60 MIN: CPT | Performed by: PSYCHIATRY & NEUROLOGY

## 2024-02-14 PROCEDURE — G8484 FLU IMMUNIZE NO ADMIN: HCPCS | Performed by: PSYCHIATRY & NEUROLOGY

## 2024-02-14 PROCEDURE — 1090F PRES/ABSN URINE INCON ASSESS: CPT | Performed by: PSYCHIATRY & NEUROLOGY

## 2024-02-14 PROCEDURE — G8427 DOCREV CUR MEDS BY ELIG CLIN: HCPCS | Performed by: PSYCHIATRY & NEUROLOGY

## 2024-02-14 RX ORDER — ATORVASTATIN CALCIUM 40 MG/1
40 TABLET, FILM COATED ORAL
COMMUNITY
Start: 2023-12-07

## 2024-02-14 RX ORDER — ALENDRONATE SODIUM 70 MG/1
70 TABLET ORAL
COMMUNITY
Start: 2024-02-03

## 2024-02-14 RX ORDER — LIDOCAINE 50 MG/G
PATCH TOPICAL
COMMUNITY
Start: 2023-12-22

## 2024-02-14 RX ORDER — TRAMADOL HYDROCHLORIDE 50 MG/1
50 TABLET ORAL EVERY 6 HOURS PRN
COMMUNITY
Start: 2023-03-08

## 2024-02-14 RX ORDER — AMLODIPINE BESYLATE 5 MG/1
5 TABLET ORAL DAILY
COMMUNITY
Start: 2022-09-07

## 2024-02-14 RX ORDER — MIRTAZAPINE 7.5 MG/1
7.5 TABLET, FILM COATED ORAL NIGHTLY
COMMUNITY
Start: 2022-06-03

## 2024-02-14 RX ORDER — PREGABALIN 25 MG/1
25 CAPSULE ORAL 2 TIMES DAILY
COMMUNITY
Start: 2024-02-07

## 2024-02-14 RX ORDER — ASPIRIN 81 MG/1
81 TABLET ORAL DAILY
COMMUNITY
Start: 2022-09-06

## 2024-02-14 RX ORDER — HYDROCHLOROTHIAZIDE 12.5 MG/1
TABLET ORAL
COMMUNITY
Start: 2023-05-09

## 2024-02-14 NOTE — PROGRESS NOTES
Chief Complaint   Patient presents with    Neurologic Problem     Parkinson's symptoms, referred by Physical therapist at Rehab facility, h/o Spinal Stenosis, here with daughter Luma Denny     HISTORY OF PRESENT ILLNESS  Monse Harper is a 78 y.o. female who was advised by her therapist to see a neurologist as she was suspected to be developing parkinsonism.  She came along with her daughter.  She states that patient had a stroke in September 2022 when she developed right lower extremity weakness and difficulty with speech as per records from [place] in the EMR.  MRI at that time showed acute infarct the right basal ganglia..  CTA was negative.  She was initiated on antiplatelet therapy and statin which she is continuing.  She states that she recovered but her balance never got back to baseline.  Over the past year her balance has continued to deteriorate and she has sustained a few falls.  She often shuffles when she walks, her voice has slowed down and her handwriting is small.  It is difficult for her to get out of her bed or chair.  She takes several steps if she needs to make a turn.    She also has chronic low back pain/spinal stenosis for which she takes pregabalin.  She is currently in PT/OT.  Underlying vascular risk factors include hypertension, mixed dyslipidemia.    History reviewed. No pertinent past medical history.  Current Outpatient Medications   Medication Sig    alendronate (FOSAMAX) 70 MG tablet Take 1 tablet by mouth every 7 days    pregabalin (LYRICA) 25 MG capsule Take 1 capsule by mouth 2 times daily.    amLODIPine (NORVASC) 5 MG tablet Take 1 tablet by mouth daily    aspirin 81 MG EC tablet Take 1 tablet by mouth daily    atorvastatin (LIPITOR) 40 MG tablet Take 1 tablet by mouth    hydroCHLOROthiazide 12.5 MG tablet TAKE 1 TABLET(12.5 MG) BY MOUTH EVERY 48 HOURS AS NEEDED    carbidopa-levodopa (SINEMET)  MG per tablet Take 1 tablet by mouth 3 times daily    lidocaine (LIDODERM) 5

## 2024-02-14 NOTE — PROGRESS NOTES
Chief Complaint   Patient presents with    Neurologic Problem     Parkinson's symptoms, referred by Physical therapist at Rehab facility, h/o Spinal Stenosis, here with daughter Luma Denny     Vitals:    02/14/24 1006   BP: 118/78   Pulse: 86   SpO2: 99%

## 2024-05-23 NOTE — PATIENT INSTRUCTIONS

## 2024-05-24 ENCOUNTER — OFFICE VISIT (OUTPATIENT)
Age: 79
End: 2024-05-24
Payer: MEDICARE

## 2024-05-24 VITALS
HEART RATE: 90 BPM | HEIGHT: 62 IN | DIASTOLIC BLOOD PRESSURE: 68 MMHG | WEIGHT: 110 LBS | OXYGEN SATURATION: 99 % | BODY MASS INDEX: 20.24 KG/M2 | SYSTOLIC BLOOD PRESSURE: 122 MMHG

## 2024-05-24 DIAGNOSIS — E78.5 DYSLIPIDEMIA: ICD-10-CM

## 2024-05-24 DIAGNOSIS — I10 HYPERTENSION, UNSPECIFIED TYPE: Primary | ICD-10-CM

## 2024-05-24 DIAGNOSIS — R06.02 SHORTNESS OF BREATH: ICD-10-CM

## 2024-05-24 DIAGNOSIS — I63.9 CEREBROVASCULAR ACCIDENT (CVA), UNSPECIFIED MECHANISM (HCC): ICD-10-CM

## 2024-05-24 DIAGNOSIS — R07.9 CHEST PAIN, UNSPECIFIED TYPE: Primary | ICD-10-CM

## 2024-05-24 DIAGNOSIS — I10 PRIMARY HYPERTENSION: ICD-10-CM

## 2024-05-24 PROCEDURE — 1036F TOBACCO NON-USER: CPT | Performed by: SPECIALIST

## 2024-05-24 PROCEDURE — 1123F ACP DISCUSS/DSCN MKR DOCD: CPT | Performed by: SPECIALIST

## 2024-05-24 PROCEDURE — G8420 CALC BMI NORM PARAMETERS: HCPCS | Performed by: SPECIALIST

## 2024-05-24 PROCEDURE — G8427 DOCREV CUR MEDS BY ELIG CLIN: HCPCS | Performed by: SPECIALIST

## 2024-05-24 PROCEDURE — 99204 OFFICE O/P NEW MOD 45 MIN: CPT | Performed by: SPECIALIST

## 2024-05-24 PROCEDURE — 93005 ELECTROCARDIOGRAM TRACING: CPT | Performed by: SPECIALIST

## 2024-05-24 PROCEDURE — 93010 ELECTROCARDIOGRAM REPORT: CPT | Performed by: SPECIALIST

## 2024-05-24 PROCEDURE — 3074F SYST BP LT 130 MM HG: CPT | Performed by: SPECIALIST

## 2024-05-24 PROCEDURE — 3078F DIAST BP <80 MM HG: CPT | Performed by: SPECIALIST

## 2024-05-24 PROCEDURE — G8399 PT W/DXA RESULTS DOCUMENT: HCPCS | Performed by: SPECIALIST

## 2024-05-24 PROCEDURE — 1090F PRES/ABSN URINE INCON ASSESS: CPT | Performed by: SPECIALIST

## 2024-05-24 NOTE — PROGRESS NOTES
CARDIOLOGY OFFICE NOTE    Foreign Trimble MD, FAC    [address] [facility], [place] [address]  Phone [phone]; Fax [phone]  Mobile [phone]   Voice Mail [phone]    Primary care: Siegrist, Stephen K, DO       ATTENTION:   This medical record was transcribed using an electronic medical records/speech recognition system.  Although proofread, it may and can contain electronic, spelling and other errors.  Corrections may be executed at a later time.  Please feel free to contact us for any clarifications as needed.             Monse Harper is a 78 y.o. female with  referred for hypertension dyslipidemia and shortness of breath           HISTORY OF PRESENTING ILLNESS    Ms./Mr. Monse Harper  78 y.o. is very sweet lady who moved to [facility] to be with her daughter.  She is from [place] and suffered a stroke and in April 2023 moved to [place].  That her CVA was in September 2022.  At that time she had an echocardiogram with a bubble study that showed a left to right shunt.  She currently describes no chest pain but does have shortness of breath.  She is in a wheelchair secondary to left leg weakness secondary to her CVA.  She will stand up and move around but I think her activity level is not great.  She was recently diagnosed with Parkinson's secondary to shuffling gait.  She is doing physical therapy at home.  She denies any PND orthopnea.  Occasional notes some discomfort in her chest and substernal and sometimes stretching will help.         ACTIVE PROBLEM LIST     There are no problems to display for this patient.          PAST MEDICAL HISTORY     History reviewed. No pertinent past medical history.        PAST SURGICAL HISTORY     History reviewed. No pertinent surgical history.       ALLERGIES     No Known Allergies       FAMILY HISTORY     History reviewed. No pertinent family history. negative for cardiac disease       SOCIAL HISTORY     Social 
ventricular remodeling, PAP 34 mmHg, mild AI, mild AS  9/4/22-EF 65%, left ventricle is small with concentric remodeling and normal  systolic function, mild AS, mild AI/TR, PAP 33 mmHg, Study is positive for intercardiac shunt that is right to left.    2. Holter  5/29/20-48 hr-· Sinus rhythm with heart rates varying between 58 and 105 bpm with an   average of 81 bpm.   There were very frequent monomorphic PVCs totalling 10907 and averaging   341.31 per hour. The ventricular arrhythmias percentage was 7.1. There   were 74 bigeminal cycles. There were 34 triplets.   There were very rare PACs totalling 22 and averaging 0.46 per hour.    3. Stress Test  6/2/20-NM-no ischemia, EF 80%    4. PYP  4/28/21-negative    5. Lipids  2/3/23- , HDL 60, LDL 68, TG 60

## 2024-05-28 ENCOUNTER — TELEPHONE (OUTPATIENT)
Age: 79
End: 2024-05-28

## 2024-05-28 DIAGNOSIS — S06.0X0S CONCUSSION WITHOUT LOSS OF CONSCIOUSNESS, SEQUELA (HCC): Primary | ICD-10-CM

## 2024-05-28 NOTE — TELEPHONE ENCOUNTER
Patient's daughter calling to inform doctor that her mother had a fall and hit her head, on Monday, 5/20/24 and since than an increase in confussion and disorientated. States patient stares off to space as if she's in deep thought. Also states patient also had two UTI's but was giving antibiotics for it. Daughter is requesting MRI.     Please contact [phone]

## 2024-06-07 ENCOUNTER — HOSPITAL ENCOUNTER (OUTPATIENT)
Facility: HOSPITAL | Age: 79
End: 2024-06-07
Attending: PSYCHIATRY & NEUROLOGY
Payer: MEDICARE

## 2024-06-07 DIAGNOSIS — S06.0X0S CONCUSSION WITHOUT LOSS OF CONSCIOUSNESS, SEQUELA (HCC): ICD-10-CM

## 2024-06-07 PROCEDURE — 95816 EEG AWAKE AND DROWSY: CPT

## 2024-06-07 PROCEDURE — 70450 CT HEAD/BRAIN W/O DYE: CPT

## 2024-06-10 PROBLEM — S06.0X0A CONCUSSION WITH NO LOSS OF CONSCIOUSNESS: Status: ACTIVE | Noted: 2024-06-10

## 2024-06-10 PROCEDURE — 95816 EEG AWAKE AND DROWSY: CPT | Performed by: PSYCHIATRY & NEUROLOGY

## 2024-06-10 NOTE — PROCEDURES
[address]  [address]                                   EEG      PATIENT NAME: ELKE ABDUL                : 1945  MED REC NO: [id number]                       ROOM:   ACCOUNT NO: [id number]                       ADMIT DATE: 2024  PROVIDER: Judith Navarro MD    DATE OF SERVICE:  2024    REFERRING PHYSICIAN:  JUDITH NAVARRO    HISTORY:  The patient is a 78-year-old female, who had a fall recently, striking her head.  Since then, she has had increased confusion and disorientation.  Sometimes, she will just stare off into space.    DESCRIPTION:  This is an 18-channel EEG performed on an awake patient.  The dominant posterior background rhythm consists of medium voltage rhythms in the 7 Hz frequency range out of the posterior head region.  Intermittently, slower theta frequencies were seen in a generalized fashion, slightly more prominent on the right.  Drowsiness and sleep architecture was not noted.  Photic stimulation elicits a symmetric driving response.  Hyperventilation was not performed.    EEG SUMMARY:  Mildly abnormal EEG due to mild slowing of the background rhythms.    CLINICAL INTERPRETATION:  This EEG shows mild background slowing, which is a nonspecific finding and may suggest nonspecific encephalopathy.  No convincingly lateralizing or epileptiform features were noted.  No electrographic seizures were seen.        JUDITH NAVARRO MD      ASS/AQS  D:  06/10/2024 13:04:44  T:  06/10/2024 14:25:57  JOB #:  [id number]

## 2024-06-10 NOTE — RESULT ENCOUNTER NOTE
Artur Ayoub MD Javed, Jihan I, ROSA MARIA  Please inform that EEG and CT brain came back okay.  Nothing concerning.  She may have had a mild concussion and symptoms should gradually improve over the next couple of weeks.    Patient's daughter, Charu, on HIPAA, called, verified and notified of results. JL

## 2024-06-11 DIAGNOSIS — G20.A1 PARKINSON'S DISEASE WITHOUT DYSKINESIA OR FLUCTUATING MANIFESTATIONS (HCC): ICD-10-CM

## 2024-06-12 ENCOUNTER — TELEPHONE (OUTPATIENT)
Age: 79
End: 2024-06-12

## 2024-06-25 ENCOUNTER — PATIENT MESSAGE (OUTPATIENT)
Dept: ADMINISTRATIVE | Facility: CLINIC | Age: 79
End: 2024-06-25
Payer: MEDICARE

## 2024-07-30 ENCOUNTER — PATIENT MESSAGE (OUTPATIENT)
Age: 79
End: 2024-07-30

## 2024-08-01 ENCOUNTER — ANCILLARY PROCEDURE (OUTPATIENT)
Age: 79
End: 2024-08-01
Payer: MEDICARE

## 2024-08-01 VITALS — WEIGHT: 100 LBS | HEIGHT: 62 IN | BODY MASS INDEX: 18.4 KG/M2

## 2024-08-01 VITALS
BODY MASS INDEX: 20.24 KG/M2 | DIASTOLIC BLOOD PRESSURE: 72 MMHG | SYSTOLIC BLOOD PRESSURE: 128 MMHG | HEIGHT: 62 IN | WEIGHT: 110 LBS | HEART RATE: 90 BPM

## 2024-08-01 DIAGNOSIS — R07.9 CHEST PAIN, UNSPECIFIED TYPE: ICD-10-CM

## 2024-08-01 DIAGNOSIS — R06.02 SHORTNESS OF BREATH: ICD-10-CM

## 2024-08-01 LAB
ECHO AO ROOT DIAM: 2.7 CM
ECHO AO ROOT INDEX: 1.82 CM/M2
ECHO AV AREA PEAK VELOCITY: 2.4 CM2
ECHO AV AREA VTI: 2.3 CM2
ECHO AV AREA/BSA PEAK VELOCITY: 1.6 CM2/M2
ECHO AV AREA/BSA VTI: 1.6 CM2/M2
ECHO AV MEAN GRADIENT: 5 MMHG
ECHO AV MEAN VELOCITY: 1.1 M/S
ECHO AV PEAK GRADIENT: 9 MMHG
ECHO AV PEAK VELOCITY: 1.5 M/S
ECHO AV VELOCITY RATIO: 0.93
ECHO AV VTI: 28.5 CM
ECHO BSA: 1.41 M2
ECHO BSA: 1.48 M2
ECHO EST RA PRESSURE: 3 MMHG
ECHO LA DIAMETER INDEX: 2.23 CM/M2
ECHO LA DIAMETER: 3.3 CM
ECHO LA TO AORTIC ROOT RATIO: 1.22
ECHO LA VOL A-L A2C: 40 ML (ref 22–52)
ECHO LA VOL A-L A4C: 37 ML (ref 22–52)
ECHO LA VOL MOD A2C: 38 ML (ref 22–52)
ECHO LA VOL MOD A4C: 35 ML (ref 22–52)
ECHO LA VOLUME AREA LENGTH: 40 ML
ECHO LA VOLUME INDEX A-L A2C: 27 ML/M2 (ref 16–34)
ECHO LA VOLUME INDEX A-L A4C: 25 ML/M2 (ref 16–34)
ECHO LA VOLUME INDEX AREA LENGTH: 27 ML/M2 (ref 16–34)
ECHO LA VOLUME INDEX MOD A2C: 26 ML/M2 (ref 16–34)
ECHO LA VOLUME INDEX MOD A4C: 24 ML/M2 (ref 16–34)
ECHO LV E' LATERAL VELOCITY: 7 CM/S
ECHO LV E' SEPTAL VELOCITY: 6 CM/S
ECHO LV EDV A2C: 60 ML
ECHO LV EDV A4C: 51 ML
ECHO LV EDV BP: 56 ML (ref 56–104)
ECHO LV EDV INDEX A4C: 34 ML/M2
ECHO LV EDV INDEX BP: 38 ML/M2
ECHO LV EDV NDEX A2C: 41 ML/M2
ECHO LV EJECTION FRACTION A2C: 77 %
ECHO LV EJECTION FRACTION A4C: 80 %
ECHO LV EJECTION FRACTION BIPLANE: 78 % (ref 55–100)
ECHO LV ESV A2C: 14 ML
ECHO LV ESV A4C: 10 ML
ECHO LV ESV BP: 12 ML (ref 19–49)
ECHO LV ESV INDEX A2C: 9 ML/M2
ECHO LV ESV INDEX A4C: 7 ML/M2
ECHO LV ESV INDEX BP: 8 ML/M2
ECHO LV FRACTIONAL SHORTENING: 46 % (ref 28–44)
ECHO LV INTERNAL DIMENSION DIASTOLE INDEX: 2.36 CM/M2
ECHO LV INTERNAL DIMENSION DIASTOLIC: 3.5 CM (ref 3.9–5.3)
ECHO LV INTERNAL DIMENSION SYSTOLIC INDEX: 1.28 CM/M2
ECHO LV INTERNAL DIMENSION SYSTOLIC: 1.9 CM
ECHO LV IVSD: 0.9 CM (ref 0.6–0.9)
ECHO LV MASS 2D: 88.8 G (ref 67–162)
ECHO LV MASS INDEX 2D: 60 G/M2 (ref 43–95)
ECHO LV POSTERIOR WALL DIASTOLIC: 0.9 CM (ref 0.6–0.9)
ECHO LV RELATIVE WALL THICKNESS RATIO: 0.51
ECHO LVOT AREA: 2.5 CM2
ECHO LVOT AV VTI INDEX: 0.89
ECHO LVOT DIAM: 1.8 CM
ECHO LVOT MEAN GRADIENT: 4 MMHG
ECHO LVOT PEAK GRADIENT: 8 MMHG
ECHO LVOT PEAK VELOCITY: 1.4 M/S
ECHO LVOT STROKE VOLUME INDEX: 43.7 ML/M2
ECHO LVOT SV: 64.6 ML
ECHO LVOT VTI: 25.4 CM
ECHO MV A VELOCITY: 1.01 M/S
ECHO MV AREA PHT: 3 CM2
ECHO MV AREA VTI: 3.3 CM2
ECHO MV E DECELERATION TIME (DT): 251.6 MS
ECHO MV E VELOCITY: 0.82 M/S
ECHO MV E/A RATIO: 0.81
ECHO MV E/E' LATERAL: 11.71
ECHO MV E/E' RATIO (AVERAGED): 12.69
ECHO MV E/E' SEPTAL: 13.67
ECHO MV LVOT VTI INDEX: 0.77
ECHO MV MAX VELOCITY: 1 M/S
ECHO MV MEAN GRADIENT: 2 MMHG
ECHO MV MEAN VELOCITY: 0.7 M/S
ECHO MV PEAK GRADIENT: 4 MMHG
ECHO MV PRESSURE HALF TIME (PHT): 73 MS
ECHO MV VTI: 19.6 CM
ECHO RA AREA 4C: 11.4 CM2
ECHO RA END SYSTOLIC VOLUME APICAL 4 CHAMBER INDEX BSA: 17 ML/M2
ECHO RA VOLUME: 25 ML
ECHO RIGHT VENTRICULAR SYSTOLIC PRESSURE (RVSP): 33 MMHG
ECHO RV INTERNAL DIMENSION: 2.9 CM
ECHO RV TAPSE: 2 CM (ref 1.7–?)
ECHO TV REGURGITANT MAX VELOCITY: 2.72 M/S
ECHO TV REGURGITANT PEAK GRADIENT: 30 MMHG
NUC STRESS EJECTION FRACTION: 81 %
STRESS BASELINE DIAS BP: 72 MMHG
STRESS BASELINE HR: 80 BPM
STRESS BASELINE SYS BP: 128 MMHG
STRESS ESTIMATED WORKLOAD: 1 METS
STRESS EXERCISE DUR MIN: 0 MIN
STRESS EXERCISE DUR SEC: 0 SEC
STRESS O2 SAT PEAK: 98 %
STRESS O2 SAT REST: 99 %
STRESS PEAK DIAS BP: 78 MMHG
STRESS PEAK SYS BP: 130 MMHG
STRESS PERCENT HR ACHIEVED: 79 %
STRESS POST PEAK HR: 113 BPM
STRESS RATE PRESSURE PRODUCT: NORMAL BPM*MMHG
STRESS TARGET HR: 143 BPM
TID: 1.18

## 2024-08-01 PROCEDURE — 93306 TTE W/DOPPLER COMPLETE: CPT | Performed by: SPECIALIST

## 2024-08-01 PROCEDURE — A9500 TC99M SESTAMIBI: HCPCS | Performed by: SPECIALIST

## 2024-08-01 PROCEDURE — 78452 HT MUSCLE IMAGE SPECT MULT: CPT | Performed by: SPECIALIST

## 2024-08-01 RX ORDER — REGADENOSON 0.08 MG/ML
0.4 INJECTION, SOLUTION INTRAVENOUS
Status: COMPLETED | OUTPATIENT
Start: 2024-08-01 | End: 2024-08-01

## 2024-08-01 RX ORDER — TETRAKIS(2-METHOXYISOBUTYLISOCYANIDE)COPPER(I) TETRAFLUOROBORATE 1 MG/ML
26.4 INJECTION, POWDER, LYOPHILIZED, FOR SOLUTION INTRAVENOUS
Status: COMPLETED | OUTPATIENT
Start: 2024-08-01 | End: 2024-08-01

## 2024-08-01 RX ORDER — TETRAKIS(2-METHOXYISOBUTYLISOCYANIDE)COPPER(I) TETRAFLUOROBORATE 1 MG/ML
8 INJECTION, POWDER, LYOPHILIZED, FOR SOLUTION INTRAVENOUS
Status: COMPLETED | OUTPATIENT
Start: 2024-08-01 | End: 2024-08-01

## 2024-08-01 RX ADMIN — TECHNETIUM TC-99M SESTAMIBI 8 MILLICURIE: 1 INJECTION INTRAVENOUS at 09:00

## 2024-08-01 RX ADMIN — TECHNETIUM TC-99M SESTAMIBI 26.4 MILLICURIE: 1 INJECTION INTRAVENOUS at 10:15

## 2024-08-01 RX ADMIN — REGADENOSON 0.4 MG: 0.08 INJECTION, SOLUTION INTRAVENOUS at 09:49

## 2024-08-07 ENCOUNTER — OFFICE VISIT (OUTPATIENT)
Age: 79
End: 2024-08-07
Payer: MEDICARE

## 2024-08-07 VITALS
SYSTOLIC BLOOD PRESSURE: 138 MMHG | HEIGHT: 62 IN | DIASTOLIC BLOOD PRESSURE: 70 MMHG | WEIGHT: 105.2 LBS | BODY MASS INDEX: 19.36 KG/M2 | OXYGEN SATURATION: 98 % | HEART RATE: 80 BPM

## 2024-08-07 DIAGNOSIS — Z86.73 HISTORY OF CARDIOEMBOLIC CEREBROVASCULAR ACCIDENT (CVA): Primary | ICD-10-CM

## 2024-08-07 DIAGNOSIS — G20.A1 PARKINSON'S DISEASE WITHOUT DYSKINESIA, UNSPECIFIED WHETHER MANIFESTATIONS FLUCTUATE (HCC): ICD-10-CM

## 2024-08-07 DIAGNOSIS — E78.5 DYSLIPIDEMIA: ICD-10-CM

## 2024-08-07 PROCEDURE — 99215 OFFICE O/P EST HI 40 MIN: CPT | Performed by: SPECIALIST

## 2024-08-07 PROCEDURE — 1123F ACP DISCUSS/DSCN MKR DOCD: CPT | Performed by: SPECIALIST

## 2024-08-07 PROCEDURE — G8399 PT W/DXA RESULTS DOCUMENT: HCPCS | Performed by: SPECIALIST

## 2024-08-07 PROCEDURE — G8427 DOCREV CUR MEDS BY ELIG CLIN: HCPCS | Performed by: SPECIALIST

## 2024-08-07 PROCEDURE — 1036F TOBACCO NON-USER: CPT | Performed by: SPECIALIST

## 2024-08-07 PROCEDURE — 1090F PRES/ABSN URINE INCON ASSESS: CPT | Performed by: SPECIALIST

## 2024-08-07 PROCEDURE — G8420 CALC BMI NORM PARAMETERS: HCPCS | Performed by: SPECIALIST

## 2024-08-07 NOTE — PROGRESS NOTES
Chief Complaint   Patient presents with    Hypertension    Cholesterol Problem    Cerebrovascular Accident     Vitals:    08/07/24 1139   BP: 138/70   Site: Left Upper Arm   Position: Sitting   Pulse: 80   SpO2: 98%   Weight: 47.7 kg (105 lb 3.2 oz)   Height: 1.575 m (5' 2\")         Chest pain: DENIED     Recent hospital stays: DENIED     Refills: DENIED

## 2024-08-07 NOTE — PROGRESS NOTES
Amanda Meza MD. [facility]          Patient: Monse Harpre  : 1945      Today's Date: 2024        HISTORY OF PRESENT ILLNESS:     History of Present Illness:    She moved to [facility] to be with her daughter. She is from [place] and suffered a stroke and in 2023 moved to [place]. That her CVA was in 2022. At that time she had an echocardiogram with a bubble study that showed a left to right shunt. She currently describes no chest pain but does have shortness of breath. She is in a wheelchair secondary to left leg weakness secondary to her CVA. She will stand up and move around but I think her activity level is not great. She was recently diagnosed with Parkinson's secondary to shuffling gait.     She feels a chest congestion - worse after eating.    She feels tired when exercising.    No sig lightheadedness         PAST MEDICAL HISTORY:     Past Medical History:   Diagnosis Date    Dyslipidemia     History of cardioembolic cerebrovascular accident (CVA)     Parkinson disease (HCC)          CURRENT MEDICATIONS:    .  Current Outpatient Medications   Medication Sig Dispense Refill    carbidopa-levodopa (SINEMET)  MG per tablet TAKE 1 TABLET BY MOUTH THREE TIMES A DAY 90 tablet 3    alendronate (FOSAMAX) 70 MG tablet Take 1 tablet by mouth every 7 days      lidocaine (LIDODERM) 5 %       aspirin 81 MG EC tablet Take 1 tablet by mouth daily      atorvastatin (LIPITOR) 40 MG tablet Take 1 tablet by mouth      diclofenac sodium (VOLTAREN) 1 % GEL Apply 4 g topically 2 times daily      traMADol (ULTRAM) 50 MG tablet Take 1 tablet by mouth every 6 hours as needed.      pregabalin (LYRICA) 25 MG capsule Take 1 capsule by mouth 2 times daily.      mirtazapine (REMERON) 7.5 MG tablet Take 1 tablet by mouth nightly (Patient not taking: Reported on 2024)       No current facility-administered medications for this visit.       No Known Allergies      SOCIAL HISTORY:     Social

## 2024-10-03 ENCOUNTER — TRANSCRIBE ORDERS (OUTPATIENT)
Facility: HOSPITAL | Age: 79
End: 2024-10-03

## 2024-10-03 DIAGNOSIS — Z00.00 ROUTINE GENERAL MEDICAL EXAMINATION AT A HEALTH CARE FACILITY: Primary | ICD-10-CM

## 2024-10-08 DIAGNOSIS — G20.A1 PARKINSON'S DISEASE WITHOUT DYSKINESIA OR FLUCTUATING MANIFESTATIONS (HCC): ICD-10-CM

## 2024-10-08 RX ORDER — CARBIDOPA AND LEVODOPA 25; 100 MG/1; MG/1
1 TABLET ORAL 3 TIMES DAILY
Qty: 90 TABLET | Refills: 3 | Status: SHIPPED | OUTPATIENT
Start: 2024-10-08

## 2024-10-15 ENCOUNTER — HOSPITAL ENCOUNTER (OUTPATIENT)
Facility: HOSPITAL | Age: 79
Discharge: HOME | End: 2024-10-18
Attending: SPECIALIST

## 2024-10-15 DIAGNOSIS — Z00.00 ROUTINE GENERAL MEDICAL EXAMINATION AT A HEALTH CARE FACILITY: ICD-10-CM

## 2024-10-15 PROCEDURE — 75571 CT HRT W/O DYE W/CA TEST: CPT

## 2024-10-22 ENCOUNTER — OFFICE VISIT (OUTPATIENT)
Age: 79
End: 2024-10-22
Payer: MEDICARE

## 2024-10-22 VITALS
DIASTOLIC BLOOD PRESSURE: 74 MMHG | HEART RATE: 88 BPM | BODY MASS INDEX: 18.4 KG/M2 | OXYGEN SATURATION: 99 % | HEIGHT: 62 IN | WEIGHT: 100 LBS | SYSTOLIC BLOOD PRESSURE: 118 MMHG

## 2024-10-22 DIAGNOSIS — G20.A1 PARKINSON'S DISEASE WITHOUT DYSKINESIA OR FLUCTUATING MANIFESTATIONS (HCC): ICD-10-CM

## 2024-10-22 DIAGNOSIS — I69.993 CVA, OLD, ATAXIA: Primary | ICD-10-CM

## 2024-10-22 DIAGNOSIS — F32.A MILD DEPRESSION: ICD-10-CM

## 2024-10-22 PROCEDURE — 1090F PRES/ABSN URINE INCON ASSESS: CPT | Performed by: PSYCHIATRY & NEUROLOGY

## 2024-10-22 PROCEDURE — 1036F TOBACCO NON-USER: CPT | Performed by: PSYCHIATRY & NEUROLOGY

## 2024-10-22 PROCEDURE — G8484 FLU IMMUNIZE NO ADMIN: HCPCS | Performed by: PSYCHIATRY & NEUROLOGY

## 2024-10-22 PROCEDURE — G8419 CALC BMI OUT NRM PARAM NOF/U: HCPCS | Performed by: PSYCHIATRY & NEUROLOGY

## 2024-10-22 PROCEDURE — 1123F ACP DISCUSS/DSCN MKR DOCD: CPT | Performed by: PSYCHIATRY & NEUROLOGY

## 2024-10-22 PROCEDURE — G8427 DOCREV CUR MEDS BY ELIG CLIN: HCPCS | Performed by: PSYCHIATRY & NEUROLOGY

## 2024-10-22 PROCEDURE — G8399 PT W/DXA RESULTS DOCUMENT: HCPCS | Performed by: PSYCHIATRY & NEUROLOGY

## 2024-10-22 PROCEDURE — 99214 OFFICE O/P EST MOD 30 MIN: CPT | Performed by: PSYCHIATRY & NEUROLOGY

## 2024-10-22 RX ORDER — ESCITALOPRAM OXALATE 10 MG/1
10 TABLET ORAL DAILY
Qty: 30 TABLET | Refills: 3 | Status: SHIPPED | OUTPATIENT
Start: 2024-10-22

## 2024-10-22 RX ORDER — HYDRALAZINE HYDROCHLORIDE 25 MG/1
TABLET, FILM COATED ORAL
COMMUNITY

## 2024-10-22 RX ORDER — ESTRADIOL 0.1 MG/G
CREAM VAGINAL
COMMUNITY
Start: 2024-09-23

## 2024-10-22 RX ORDER — CARBIDOPA AND LEVODOPA 25; 100 MG/1; MG/1
1.5 TABLET ORAL 3 TIMES DAILY
Qty: 135 TABLET | Refills: 3 | Status: SHIPPED | OUTPATIENT
Start: 2024-10-22

## 2024-10-22 NOTE — PROGRESS NOTES
Chief Complaint   Patient presents with    Neurologic Problem     Follow up, Parkinson's, Hx of CVA     HISTORY OF PRESENT ILLNESS  Monse Harper came back for follow-up.  She was last seen in February of 2024  She was diagnosed with Parkinson's disease that she developed after having a stroke in September 2022.  She is now on carbidopa levodopa 25/100 mg, 1 tablet 3 times daily and she believes that it has helped.  However, her energy levels are still low and sometimes she feels sad and depressed.  Remains independent with all activities of daily living but needs assistance with certain activities such as taking a shower, changing clothes etc.  Uses a rollator and wheelchair as needed to walk.  Feels tired most of the time and often naps during the day.  Not on pregabalin or Remeron anymore    RECAP  Patient is a 79 y.o. female who was advised by her therapist to see a neurologist as she was suspected to be developing parkinsonism.  She came along with her daughter.  She states that patient had a stroke in September 2022 when she developed right lower extremity weakness and difficulty with speech as per records from [place] in the EMR.  MRI at that time showed acute infarct the right basal ganglia..  CTA was negative.  She was initiated on antiplatelet therapy and statin which she is continuing.  She states that she recovered but her balance never got back to baseline.  Over the past year her balance has continued to deteriorate and she has sustained a few falls.  She often shuffles when she walks, her voice has slowed down and her handwriting is small.  It is difficult for her to get out of her bed or chair.  She takes several steps if she needs to make a turn.    She also has chronic low back pain/spinal stenosis for which she takes pregabalin.  She is currently in PT/OT.  Underlying vascular risk factors include hypertension, mixed dyslipidemia.    Current Outpatient Medications   Medication Sig

## 2024-10-22 NOTE — PROGRESS NOTES
Chief Complaint   Patient presents with    Neurologic Problem     Follow up, Parkinson's, Hx of CVA     Vitals:    10/22/24 1437   BP: 118/74   Pulse: 88   SpO2: 99%

## 2024-11-20 DIAGNOSIS — F32.A MILD DEPRESSION: ICD-10-CM

## 2024-11-21 RX ORDER — ESCITALOPRAM OXALATE 10 MG/1
10 TABLET ORAL DAILY
Qty: 90 TABLET | Refills: 2 | Status: SHIPPED | OUTPATIENT
Start: 2024-11-21

## 2024-12-02 ENCOUNTER — HOSPITAL ENCOUNTER (OUTPATIENT)
Facility: HOSPITAL | Age: 79
Discharge: HOME | End: 2024-12-05
Payer: MEDICARE

## 2024-12-02 DIAGNOSIS — Z78.0 ASYMPTOMATIC MENOPAUSAL STATE: ICD-10-CM

## 2024-12-02 PROCEDURE — 77080 DXA BONE DENSITY AXIAL: CPT

## 2025-02-28 ENCOUNTER — TRANSCRIBE ORDERS (OUTPATIENT)
Facility: HOSPITAL | Age: 80
End: 2025-02-28

## 2025-02-28 DIAGNOSIS — R13.10 DYSPHAGIA, UNSPECIFIED TYPE: ICD-10-CM

## 2025-02-28 DIAGNOSIS — G20.A1 PARKINSON'S DISEASE, UNSPECIFIED WHETHER DYSKINESIA PRESENT, UNSPECIFIED WHETHER MANIFESTATIONS FLUCTUATE (HCC): Primary | ICD-10-CM

## 2025-02-28 DIAGNOSIS — T17.208A OROPHARYNGEAL ASPIRATION, INITIAL ENCOUNTER: ICD-10-CM

## 2025-03-01 DIAGNOSIS — G20.A1 PARKINSON'S DISEASE WITHOUT DYSKINESIA OR FLUCTUATING MANIFESTATIONS (HCC): ICD-10-CM

## 2025-03-03 RX ORDER — CARBIDOPA AND LEVODOPA 25; 100 MG/1; MG/1
1.5 TABLET ORAL 3 TIMES DAILY
Qty: 135 TABLET | Refills: 0 | Status: SHIPPED | OUTPATIENT
Start: 2025-03-03

## 2025-03-29 DIAGNOSIS — G20.A1 PARKINSON'S DISEASE WITHOUT DYSKINESIA OR FLUCTUATING MANIFESTATIONS (HCC): ICD-10-CM

## 2025-03-31 RX ORDER — CARBIDOPA AND LEVODOPA 25; 100 MG/1; MG/1
1.5 TABLET ORAL 3 TIMES DAILY
Qty: 405 TABLET | Refills: 1 | Status: SHIPPED | OUTPATIENT
Start: 2025-03-31

## 2025-04-01 ENCOUNTER — HOSPITAL ENCOUNTER (OUTPATIENT)
Facility: HOSPITAL | Age: 80
Discharge: HOME | End: 2025-04-04
Payer: MEDICARE

## 2025-04-01 DIAGNOSIS — T17.208A OROPHARYNGEAL ASPIRATION, INITIAL ENCOUNTER: ICD-10-CM

## 2025-04-01 DIAGNOSIS — G20.A1 PARKINSON'S DISEASE, UNSPECIFIED WHETHER DYSKINESIA PRESENT, UNSPECIFIED WHETHER MANIFESTATIONS FLUCTUATE (HCC): ICD-10-CM

## 2025-04-01 DIAGNOSIS — R13.10 DYSPHAGIA, UNSPECIFIED TYPE: ICD-10-CM

## 2025-04-01 PROCEDURE — 92611 MOTION FLUOROSCOPY/SWALLOW: CPT

## 2025-04-01 PROCEDURE — 74230 X-RAY XM SWLNG FUNCJ C+: CPT

## 2025-04-01 NOTE — PROGRESS NOTES
[facility]  SPEECH PATHOLOGY MODIFIED BARIUM SWALLOW STUDY  Patient: Monse Harper (79 y.o. female)  Date: 4/1/2025  Referring Provider:  Bola Pretty    SUBJECTIVE:   Patient reports that she had a CVA in November 2024. Also PD.   No c/o dysphagia.     OBJECTIVE:   Past Medical History:   Past Medical History:   Diagnosis Date    Dyslipidemia     History of cardioembolic cerebrovascular accident (CVA)     Parkinson disease (HCC)    No past surgical history on file.  Current Dietary Status:  regular, thins?  Type of Study: Modified barium swallow  Film Views: Lateral  Radiologist: Paula  Patient Position: upright in [facility] chair      Trial 1: Trial 2:   Consistencies Administered:  (thin, pudding, cracker, pill)     How Presented: Self-fed/presented;Cup/gulp;Spoon;Straw;Successive Swallows  ORAL PHASE:      Bolus Acceptance: No impairment     Bolus Formation/Control: Impaired (piecemeal deglutition with solids and purees. noted lingual pumping)       Propulsion: Tongue pumping       Oral Residue:  (mild posterior lingual residue that she independently cleared)      PHARYNGEAL PHASE:      Timing: Pooling 1-5 sec     Penetration: None     Aspiration/Timing: No evidence of aspiration     Pharyngeal Clearance: No residue     Attempted Modifications: Alternate liquids/solids             Trial 3: Trial 4:                                                                                       Swallowing Physiology  Decreased Tongue Base Retraction?: No  Laryngeal Elevation: WFL (within functional limits)  Penetration-Aspiration Scale (PAS): 1 - Material does not enter the airway  Pharyngeal Symmetry: Not assessed       Functional Oral Intake Scale (FOIS): 7--Total oral diet with no restrictions    ASSESSMENT :  Based on the objective data described above, the patient presents with mild oral and normal pharyngeal swallow. Mild chewing and a-p propulsion issues.      PLAN/RECOMMENDATIONS

## 2025-04-02 ENCOUNTER — TELEPHONE (OUTPATIENT)
Age: 80
End: 2025-04-02

## 2025-04-02 NOTE — ASSESSMENT & PLAN NOTE
Office Note     Name: Dalila Pike    : 1969     MRN: [id number]     Chief Complaint  Fall (Broken shoulder)    Subjective     History of Present Illness:  Dalila Pike is a 55 y.o. female who presents today for   History of Present Illness  The patient is a 55-year-old female who presents for evaluation of left proximal humerus fracture, hallucinations, urinary tract infection, and sinus infection.    She experienced a fall at home on Friday, 2025, resulting in a left proximal humerus fracture. She was subsequently referred to orthopedics but has not yet scheduled an appointment. She describes the pain as the most severe she has ever experienced. During her hospital visit, she was prescribed meloxicam, which she can not take due to her concurrent aspirin therapy. She has a history of multiple fractures.    She reports experiencing hallucinations, which she believes started approximately 2 days after beginning fluoxetine therapy. She has been on fluoxetine for 3 days. She has no prior history of hallucinations. She is currently on Keppra, clobazam, and Vimpat, prescribed by her neurologist for seizures.    She reports mild dysuria and has a history of urinary tract infections. She has been experiencing fevers, chills, and sweats, which she attributes to menopause. She has not noticed any hematuria since her last visit. She has increased her water intake and reports increased frequency of urination. She does not experience any back pain or difficulty initiating urination but reports urinary urgency and incontinence, which she attributes to her stroke which occurred last year. She also reports mild pelvic pain and pressure.    She has been experiencing a cough and congestion for the past 2 weeks, accompanied by sinus pressure and green nasal discharge. She has been attempting to quit smoking.    Supplemental Information  She had a stent placed in the bile duct on the gallbladder, which was later  -MRI brain 9/3 shows acute infarct right basal ganglia, with left-sided weakness.    -aspirin, statin.    -neurology consult.    -TIA/CVA order set used.    -neurochecks every 4 hours.    -PT/OT/ST evaluation pending   -not a candidate for tPA as symptoms ongoing for more than 24 hours.  -Allow for permissive HTN for 24-48 hours   -Labetalol prn     9/5/22  - Negative CTA neck. Mild intracranial atherosclerosis right anterior cerebral artery.  Otherwise negative intracranial CTA.  Specifically, negative for large vessel occlusion or aneurysm.   -Echo normal.   -PT/OT recommended rehab.   -Social work consult for rehab placement.   -Continue ASA, Plavix, Statin.          removed. She had a stroke on 08/21/2024 due to a blood clot in the carotid artery. She was hospitalized from 12/22/2024 to 01/07/2025. She has been experiencing nausea, vomiting, and diarrhea intermittently since her stroke.    SOCIAL HISTORY  She is trying to quit smoking.    ALLERGIES  The patient is allergic to NSAIDs.    MEDICATIONS  Current: aspirin, Keppra, clobazam, Vimpat, fluoxetine    Review of Systems:   Review of Systems   Constitutional:  Negative for chills, diaphoresis and fever.   Gastrointestinal:  Positive for diarrhea (intermittent diarrhea since stroke), nausea (intermittent since stroke) and vomiting (intermittent episodes since stroke).   Genitourinary:  Positive for dysuria, frequency, pelvic pain, pelvic pressure and urgency. Negative for decreased urine volume, difficulty urinating, flank pain and hematuria.       Past Medical History:   Past Medical History:   Diagnosis Date    Acute CVA (cerebrovascular accident) 08/21/2024    Alcohol use 08/21/2024    Alcoholism     Anemia     Cholelithiasis     Diabetes mellitus     Dyslipidemia 08/21/2024    Fatty liver     Hyperlipidemia     Hypertension     Obesity (BMI 30-39.9) 08/21/2024    PAD (peripheral artery disease) 08/21/2024    Tobacco use 08/21/2024       Past Surgical History:   Past Surgical History:   Procedure Laterality Date    ABDOMINAL SURGERY      ENDOSCOPY N/A 10/08/2024    Procedure: ESOPHAGOGASTRODUODENOSCOPY;  Surgeon: Elie Sanders MD;  Location:  [facility];  Service: Gastroenterology;  Laterality: N/A;    ERCP N/A 10/08/2024    Procedure: ENDOSCOPIC RETROGRADE CHOLANGIOPANCREATOGRAPHY WITH STENT PLACEMENT;  Surgeon: Elie Sanders MD;  Location:  [facility];  Service: Gastroenterology;  Laterality: N/A;  SPHINCTEROTOMY @ 1737, 9-12MM AND 12-15MM BALLOON SWEEP TO CBD    ERCP N/A 11/22/2024    Procedure: ENDOSCOPIC RETROGRADE CHOLANGIOPANCREATOGRAPHY;  Surgeon: Spenser Hassan MD;  Location:  [facility]OS[facility];  Service: Gastroenterology;  Laterality: N/A;    INTERVENTIONAL RADIOLOGY PROCEDURE Bilateral 08/21/2024    Procedure: Carotid Cervical Angiogram;  Surgeon: Jamaal Saini MD;  Location: [facility] INVASIVE LOCATION;  Service: Interventional Radiology;  Laterality: Bilateral;       Immunizations:   Immunization History   Administered Date(s) Administered    Tdap 07/27/2021        Medications:     Current Outpatient Medications:     aspirin 81 MG EC tablet, Take 1 tablet by mouth Daily., Disp: , Rfl:     atorvastatin (LIPITOR) 40 MG tablet, Take 1 tablet by mouth Daily., Disp: 90 tablet, Rfl: 2    cloBAZam (ONFI) 10 MG tablet, TAKE 1/2 TABLET BY MOUTH 2 TIMES DAY, Disp: , Rfl:     cyclobenzaprine (FLEXERIL) 5 MG tablet, Take 1 tablet by mouth 2 (Two) Times a Day As Needed for Muscle Spasms., Disp: 60 tablet, Rfl: 0    FLUoxetine (PROzac) 20 MG capsule, Take 1 capsule by mouth Daily., Disp: 30 capsule, Rfl: 2    folic acid (FOLVITE) 1 MG tablet, Take 1 tablet by mouth Daily., Disp: , Rfl:     lacosamide (VIMPAT) 200 MG tablet, Take 1 tablet by mouth Every 12 (Twelve) Hours., Disp: , Rfl:     levETIRAcetam (KEPPRA) 750 MG tablet, , Disp: , Rfl:     lisinopril (PRINIVIL,ZESTRIL) 20 MG tablet, Take 1 tablet by mouth Daily., Disp: 90 tablet, Rfl: 2    nicotine (Nicotrol) 10 MG inhaler, Inhale 1 puff As Needed for Smoking Cessation., Disp: 1 each, Rfl: 1    pantoprazole (PROTONIX) 40 MG EC tablet, Take 1 tablet by mouth Daily., Disp: 90 tablet, Rfl: 3    sulfamethoxazole-trimethoprim (Bactrim DS) 800-160 MG per tablet, Take 1 tablet by mouth 2 (Two) Times a Day., Disp: 20 tablet, Rfl: 0    vitamin D (ERGOCALCIFEROL) 1.25 MG (78553 UT) capsule capsule, Take 1 capsule by mouth 1 (One) Time Per Week., Disp: 5 capsule, Rfl: 3    Allergies:   Allergies   Allergen Reactions    Erythromycin Anaphylaxis    Latex Rash    Toradol [Ketorolac Tromethamine] Rash    Contrast Dye (Echo Or Unknown Ct/Mr) Unknown (See  "Comments)     Hives on chest       Family History:   Family History   Problem Relation Age of Onset    Alcohol abuse Father        Social History:   Social History     Socioeconomic History    Marital status:    Tobacco Use    Smoking status: Former     Average packs/day: 2.0 packs/day for 38.8 years (76.4 ttl pk-yrs)     Types: Cigarettes     Start date: 2024     Passive exposure: Current    Smokeless tobacco: Never    Tobacco comments:     4-5 a day   Vaping Use    Vaping status: Never Used   Substance and Sexual Activity    Alcohol use: Not Currently    Drug use: Not Currently    Sexual activity: Defer         Objective     Vital Signs  /82 (BP Location: Right arm, Patient Position: Sitting, Cuff Size: Large Adult)   Pulse 80   Temp 98.1 °F (36.7 °C) (Temporal)   Resp 16   Ht 172.7 cm (68\")   Wt 90.8 kg (200 lb 3.2 oz)   SpO2 97%   BMI 30.44 kg/m²   Estimated body mass index is 30.44 kg/m² as calculated from the following:    Height as of this encounter: 172.7 cm (68\").    Weight as of this encounter: 90.8 kg (200 lb 3.2 oz).            Physical Exam  Vitals and nursing note reviewed.   Constitutional:       General: She is not in acute distress.     Appearance: Normal appearance. She is not ill-appearing or toxic-appearing.      Comments: Patient appears groggy.    HENT:      Head: Normocephalic and atraumatic.   Eyes:      General: No scleral icterus.        Right eye: No discharge.         Left eye: No discharge.      Conjunctiva/sclera: Conjunctivae normal.   Cardiovascular:      Rate and Rhythm: Normal rate and regular rhythm.      Heart sounds: Normal heart sounds.   Pulmonary:      Effort: Pulmonary effort is normal.      Breath sounds: Normal breath sounds.   Musculoskeletal:      Cervical back: Neck supple. No rigidity or tenderness.      Comments: Patient is in a wheelchair with her left arm in a sling and and ace wrapped around her body and left arm.    Lymphadenopathy:      " Cervical: No cervical adenopathy.   Skin:     General: Skin is warm.   Neurological:      Mental Status: She is alert. Mental status is at baseline.   Psychiatric:         Mood and Affect: Mood normal.         Behavior: Behavior normal.         Thought Content: Thought content normal.         Judgment: Judgment normal.          Assessment and Plan     Procedures      Lab Results (last 72 hours)       Procedure Component Value Units Date/Time    POC Urinalysis Dipstick [[id number]]  (Abnormal) Collected: 04/02/25 1003    Specimen: Urine Updated: 04/02/25 1004     Color Dark Yellow     Clarity, UA Clear     Glucose, UA Negative mg/dL      Bilirubin Negative     Ketones, UA Negative     Specific Gravity  1.025     Blood, UA 3+     pH, Urine 5.5     Protein, POC Trace mg/dL      Urobilinogen, UA Normal     Leukocytes Negative     Nitrite, UA Negative    Microalbumin / Creatinine Urine Ratio - Urine, Clean Catch [[id number]] Collected: 04/02/25 1012    Specimen: Urine, Clean Catch Updated: 04/02/25 2255     Microalbumin/Creatinine Ratio 18.4 mg/g      Creatinine, Urine 184.5 mg/dL      Microalbumin, Urine 3.4 mg/dL     Urine Culture - Urine, Urine, Random Void [[id number]] Collected: 04/02/25 1021    Specimen: Urine, Random Void Updated: 04/03/25 1109     Urine Culture 25,000 CFU/mL Mixed Veronica Isolated    Narrative:      Specimen contains mixed organisms of questionable pathogenicity suggestive of contamination. If symptoms persist, suggest recollection.  Colonization of the urinary tract without infection is common. Treatment is discouraged unless the patient is symptomatic, pregnant, or undergoing an invasive urologic procedure.               Diagnoses and all orders for this visit:    1. Dysuria (Primary)  -     POC Urinalysis Dipstick  -     Urine Culture - Urine, Urine, Random Void; Future    2. Hallucinations  -     Levetiracetam Level (Keppra); Future  -     Urine Culture - Urine, Urine, Random Void; Future    3.  Other closed nondisplaced fracture of proximal end of left humerus, initial encounter  -     Ambulatory Referral to Orthopedic Surgery    4. Acute maxillary sinusitis, recurrence not specified    Other orders  -     sulfamethoxazole-trimethoprim (Bactrim DS) 800-160 MG per tablet; Take 1 tablet by mouth 2 (Two) Times a Day.  Dispense: 20 tablet; Refill: 0        Assessment & Plan  1. Left proximal humerus fracture.  The fracture is nondisplaced, proximal fracture. A referral to orthopedics will be arranged for further evaluation and management.    2. Hallucinations.  A Keppra level test will be ordered to monitor the medication's impact. If the hallucinations persist, further evaluation will be necessary.     3. Urinary tract infection.  A urine will be sent for culture.  Bactrim will be prescribed to manage the UTI. If the culture results indicate resistance to Bactrim, an alternative antibiotic will be considered.    4. Sinus infection.  Bactrim will also be prescribed to address the sinus infection. If symptoms do not improve, further evaluation and potential alternative treatments will be considered.    PROCEDURE  The patient had a stent placed in the bile duct on the gallbladder, which was later removed.      Follow Up  Return if symptoms worsen or fail to improve.    Patient or patient representative verbalized consent for the use of Ambient Listening during the visit with  ROXANE Feliz for chart documentation. 4/3/2025  16:28 EDT    ROXANE Feliz [facility]  [address] [place] [address] DR ROQUE TEAGUE [id number] [phone]

## 2025-04-02 NOTE — TELEPHONE ENCOUNTER
Patient's daughter would like a call back to discuss disorientation and confusion her mother has been having.    She is wondering if perhaps this is due to an increase in her carbidopa-levodopa.    She states she has been tested for a UTI and it has come back negative.

## 2025-04-04 NOTE — TELEPHONE ENCOUNTER
Attempted to reach Luma Denny without success, sent Dr. Ayoub's recommendations thru a [employer] message. JL

## 2025-04-04 NOTE — TELEPHONE ENCOUNTER
She should have been on carbidopa levodopa 1.5 tablets 3 times daily for the past 4 to 5 months unless someone recently changed it.  It can cause confusion but it would be best to bring her in soon for an evaluation.  Can see an NP.  She can also try reducing carbidopa levodopa by half a tablet each time she takes it to see if that helps     Artur Ayoub MD

## 2025-04-04 NOTE — TELEPHONE ENCOUNTER
Luma called back wanting to make sure that her [employer] message response back was received. I let her know that it was sent to Dr. Ayoub. She states that the patient has an appointment scheduled on 4/23 and would like to know if she can be seen sooner.

## 2025-04-22 NOTE — PROGRESS NOTES
Neurology Clinic Follow up Note    Patient ID:   Monse Harper  1945  79 y.o. female  [id number]      Chief Complaint   Patient presents with    Follow-up     Follow up  Parkinson's         Last Appointment With Me:    None, last seen by Dr Ayoub 10/22/24  \" Ms. Monse Harper had an acute right-sided basal ganglia infarct back in September 2022.  She recovered well from her initial deficits but has continued to experience gait ataxia which has deteriorated over the past year.  She has sustained a few falls.  On examination she does appear to have reduced facial expression, soft voice, mild rigidity in upper extremities, some resting tremor in the right lower extremity, shuffling when she initiates her gait, walks with short steps and takes several steps to make a turn.   I concur that she is developing parkinsonism which may be due to vascular reasons or idiopathic Parkinson's disease  She has responded to low-dose carbidopa levodopa and I have recommended increasing the dose to 1.5 tablets 3 times daily, before meals  Start Lexapro 10 mg at bedtime to help uplift her mood.  May also help her sleep better at least in the beginning     She may need further titration of the medications depending upon the clinical course  Follow-up in 6 months or earlier if needed     Continue aspirin and statin for secondary stroke prophylaxis \"    Interval History:   She is here with her son in law today, pt lives w/ daughter and LORETTA. Takes Carbidopa-Levodopa 25/100 1.5 tablets at 8 or 9 am and 12pm, one tablet at 6pm.  Per LORETTA, when she was taking Sinemet 1.5 TID it caused side effect of disorientation, \"zoning out\", paranoia. LORETTA gives example of pt waking up in middle of the night when daughter/LORETTA were out of town. Pt tried to wake up her young grandchildren to ask them to find a phone number to reach the daughter.  Pt lives with LORETTA's mother, who was home at the time and redirected the pt. Pt was able to go back to sleep.

## 2025-04-23 ENCOUNTER — OFFICE VISIT (OUTPATIENT)
Age: 80
End: 2025-04-23
Payer: MEDICARE

## 2025-04-23 VITALS
SYSTOLIC BLOOD PRESSURE: 110 MMHG | HEART RATE: 71 BPM | BODY MASS INDEX: 18.11 KG/M2 | RESPIRATION RATE: 16 BRPM | DIASTOLIC BLOOD PRESSURE: 60 MMHG | WEIGHT: 99 LBS | OXYGEN SATURATION: 99 %

## 2025-04-23 DIAGNOSIS — R41.89 MODERATE COGNITIVE IMPAIRMENT: ICD-10-CM

## 2025-04-23 DIAGNOSIS — G20.A1 PARKINSON'S DISEASE WITHOUT DYSKINESIA OR FLUCTUATING MANIFESTATIONS (HCC): Primary | ICD-10-CM

## 2025-04-23 DIAGNOSIS — I69.993 CVA, OLD, ATAXIA: ICD-10-CM

## 2025-04-23 DIAGNOSIS — F32.A MILD DEPRESSION: ICD-10-CM

## 2025-04-23 PROCEDURE — 1160F RVW MEDS BY RX/DR IN RCRD: CPT

## 2025-04-23 PROCEDURE — 1123F ACP DISCUSS/DSCN MKR DOCD: CPT

## 2025-04-23 PROCEDURE — 99215 OFFICE O/P EST HI 40 MIN: CPT

## 2025-04-23 PROCEDURE — 1090F PRES/ABSN URINE INCON ASSESS: CPT

## 2025-04-23 PROCEDURE — 1036F TOBACCO NON-USER: CPT

## 2025-04-23 PROCEDURE — G8399 PT W/DXA RESULTS DOCUMENT: HCPCS

## 2025-04-23 PROCEDURE — G8419 CALC BMI OUT NRM PARAM NOF/U: HCPCS

## 2025-04-23 PROCEDURE — 1159F MED LIST DOCD IN RCRD: CPT

## 2025-04-23 PROCEDURE — G8427 DOCREV CUR MEDS BY ELIG CLIN: HCPCS

## 2025-04-23 RX ORDER — MULTIVITAMIN
1 TABLET ORAL DAILY
COMMUNITY

## 2025-04-23 RX ORDER — SERTRALINE HYDROCHLORIDE 25 MG/1
25 TABLET, FILM COATED ORAL DAILY
Qty: 30 TABLET | Refills: 3 | Status: SHIPPED | OUTPATIENT
Start: 2025-04-23

## 2025-04-23 ASSESSMENT — PATIENT HEALTH QUESTIONNAIRE - PHQ9
1. LITTLE INTEREST OR PLEASURE IN DOING THINGS: NOT AT ALL
SUM OF ALL RESPONSES TO PHQ QUESTIONS 1-9: 0
SUM OF ALL RESPONSES TO PHQ QUESTIONS 1-9: 0
2. FEELING DOWN, DEPRESSED OR HOPELESS: NOT AT ALL
SUM OF ALL RESPONSES TO PHQ QUESTIONS 1-9: 0
SUM OF ALL RESPONSES TO PHQ QUESTIONS 1-9: 0

## 2025-04-23 ASSESSMENT — VISUAL ACUITY: VA_NORMAL: 1

## 2025-04-24 ENCOUNTER — RESULTS FOLLOW-UP (OUTPATIENT)
Age: 80
End: 2025-04-24

## 2025-04-24 LAB
TSH SERPL DL<=0.005 MIU/L-ACNC: 2.39 UIU/ML (ref 0.45–4.5)
VIT B12 SERPL-MCNC: 1406 PG/ML (ref 232–1245)

## 2025-04-30 NOTE — PROGRESS NOTES
Patient: Monse Harper  : 1945    Primary Cardiologist: Amanda Meza MD. [facility]    Today's Date: 2025        HISTORY OF PRESENT ILLNESS:     History of Present Illness:  Presents today for follow-up. Continues with chest tightness/congestion - worse after eating. Worse when she is constipated. Complains of fatigue. Denies shortness of breath, anginal chest pain symptoms. Denies significant edema. Denies palpitations.       Previous OV \"She moved to [facility] to be with her daughter. She is from [place] and suffered a stroke and in 2023 moved to [place]. That her CVA was in 2022. At that time she had an echocardiogram with a bubble study that showed a left to right shunt. She currently describes no chest pain but does have shortness of breath. She is in a wheelchair secondary to left leg weakness secondary to her CVA. She will stand up and move around but I think her activity level is not great. She was recently diagnosed with Parkinson's secondary to shuffling gait. \"      PAST MEDICAL HISTORY:     Past Medical History:   Diagnosis Date    Dyslipidemia     History of cardioembolic cerebrovascular accident (CVA)     Parkinson disease (HCC)          CURRENT MEDICATIONS:    .  Current Outpatient Medications   Medication Sig Dispense Refill    Multiple Vitamin (MULTI-VITAMIN) TABS Take 1 tablet by mouth daily      sertraline (ZOLOFT) 25 MG tablet Take 1 tablet by mouth daily 30 tablet 3    carbidopa-levodopa (SINEMET)  MG per tablet TAKE 1.5 TABLETS BY MOUTH 3 TIMES DAILY. 405 tablet 1    hydrALAZINE (APRESOLINE) 25 MG tablet 25 mg as needed by oral route.      alendronate (FOSAMAX) 70 MG tablet Take 1 tablet by mouth every 7 days      aspirin 81 MG EC tablet Take 1 tablet by mouth daily      atorvastatin (LIPITOR) 40 MG tablet Take 1 tablet by mouth       No current facility-administered medications for this visit.       No Known Allergies      SOCIAL HISTORY:

## 2025-05-01 ENCOUNTER — OFFICE VISIT (OUTPATIENT)
Age: 80
End: 2025-05-01
Payer: MEDICARE

## 2025-05-01 VITALS
RESPIRATION RATE: 16 BRPM | OXYGEN SATURATION: 99 % | BODY MASS INDEX: 17.41 KG/M2 | DIASTOLIC BLOOD PRESSURE: 60 MMHG | HEART RATE: 70 BPM | SYSTOLIC BLOOD PRESSURE: 108 MMHG | HEIGHT: 62 IN | WEIGHT: 94.6 LBS

## 2025-05-01 DIAGNOSIS — R07.9 CHEST PAIN, UNSPECIFIED TYPE: ICD-10-CM

## 2025-05-01 DIAGNOSIS — Z86.73 HISTORY OF CARDIOEMBOLIC CEREBROVASCULAR ACCIDENT (CVA): Primary | ICD-10-CM

## 2025-05-01 DIAGNOSIS — E78.5 DYSLIPIDEMIA: ICD-10-CM

## 2025-05-01 PROCEDURE — G8427 DOCREV CUR MEDS BY ELIG CLIN: HCPCS

## 2025-05-01 PROCEDURE — 93010 ELECTROCARDIOGRAM REPORT: CPT

## 2025-05-01 PROCEDURE — 1090F PRES/ABSN URINE INCON ASSESS: CPT

## 2025-05-01 PROCEDURE — 1159F MED LIST DOCD IN RCRD: CPT

## 2025-05-01 PROCEDURE — G8419 CALC BMI OUT NRM PARAM NOF/U: HCPCS

## 2025-05-01 PROCEDURE — 1160F RVW MEDS BY RX/DR IN RCRD: CPT

## 2025-05-01 PROCEDURE — 1123F ACP DISCUSS/DSCN MKR DOCD: CPT

## 2025-05-01 PROCEDURE — G8399 PT W/DXA RESULTS DOCUMENT: HCPCS

## 2025-05-01 PROCEDURE — 93005 ELECTROCARDIOGRAM TRACING: CPT

## 2025-05-01 PROCEDURE — 99214 OFFICE O/P EST MOD 30 MIN: CPT

## 2025-05-01 PROCEDURE — 1036F TOBACCO NON-USER: CPT

## 2025-05-01 PROCEDURE — 1126F AMNT PAIN NOTED NONE PRSNT: CPT

## 2025-05-01 NOTE — PROGRESS NOTES
had concerns including Other (Hx CVA) and Cholesterol Problem.    Vitals:    05/01/25 1018   BP: 108/60   BP Site: Left Upper Arm   Patient Position: Sitting   BP Cuff Size: Medium Adult   Pulse: 70   Resp: 16   SpO2: 99%   Weight: 42.9 kg (94 lb 9.6 oz)   Height: 1.575 m (5' 2\")        Chest pain No    Refills No        1. Have you been to the ER, urgent care clinic since your last visit? No       Hospitalized since your last visit? No       Where?        Date?

## 2025-06-23 NOTE — TELEPHONE ENCOUNTER
----- Message from Diamante Wetlisette sent at 7/18/2022 12:49 PM CDT -----  Contact: DARIN AUGUSTINE [[id number]]  ...Type:  Needs Medical Advice    Who Called: DARIN AUGUSTINE [[id number]]  Would the patient rather a call back or a response via [facility]? Call   Best Call Back Number: .[phone] (home)    Additional Information: Pt is req a call back in regards to one having her mammo order placed on file an also she needs to have her annual visit scheduled to follow. Pt advised last mammo was in Sept of last yr also annual was done in the same month... Thanks AW         06/23/25 9:00 AM     Chart reviewed for [facility] was/were not submitted to the patient's insurance.     Liane Aparicio MA   [facility]